# Patient Record
Sex: FEMALE | Race: WHITE | URBAN - METROPOLITAN AREA
[De-identification: names, ages, dates, MRNs, and addresses within clinical notes are randomized per-mention and may not be internally consistent; named-entity substitution may affect disease eponyms.]

---

## 2017-07-09 ENCOUNTER — TRANSFERRED RECORDS (OUTPATIENT)
Dept: HEALTH INFORMATION MANAGEMENT | Facility: CLINIC | Age: 77
End: 2017-07-09

## 2017-07-10 ENCOUNTER — TRANSFERRED RECORDS (OUTPATIENT)
Dept: HEALTH INFORMATION MANAGEMENT | Facility: CLINIC | Age: 77
End: 2017-07-10

## 2017-07-11 ENCOUNTER — TRANSFERRED RECORDS (OUTPATIENT)
Dept: HEALTH INFORMATION MANAGEMENT | Facility: CLINIC | Age: 77
End: 2017-07-11

## 2017-07-12 ENCOUNTER — TRANSFERRED RECORDS (OUTPATIENT)
Dept: HEALTH INFORMATION MANAGEMENT | Facility: CLINIC | Age: 77
End: 2017-07-12

## 2017-07-14 ENCOUNTER — TELEPHONE (OUTPATIENT)
Dept: FAMILY MEDICINE | Facility: CLINIC | Age: 77
End: 2017-07-14

## 2017-07-14 ENCOUNTER — TRANSFERRED RECORDS (OUTPATIENT)
Dept: HEALTH INFORMATION MANAGEMENT | Facility: CLINIC | Age: 77
End: 2017-07-14

## 2017-07-14 NOTE — TELEPHONE ENCOUNTER
Left message on pt family vm requesting what prescription they are referring to.  Requested they call me back with this information and spell it if it is hard to spell.  Alison Lund RN

## 2017-07-14 NOTE — TELEPHONE ENCOUNTER
Reason for Call:  Other prescription    Detailed comments: daughter is calling to inform pcp that patient will be going on hospice but doesn't need this right now but wants to make sure pcp will order this when time comes please call daughter to discuss and let her know this has been ordered    Phone Number Patient can be reached at: Home number on file [phone] (home)    Best Time:     Can we leave a detailed message on this number? YES    Call taken on 7/14/2017 at 11:33 AM by Allie Toledo

## 2017-07-14 NOTE — TELEPHONE ENCOUNTER
Pt daughter states pt is terminal now with pancreatic cancer.  They are not quite ready for hospice right now because pt is doing well.  When she starts declining they will be requesting hospice orders.  Right now pt is seeing Dr. Oro at [facility] and has discussed hospice with him.  Her orders will be reverting to Dr. Cope when she is done with Dr. Oro.  Family would like to confirm Dr. Cope will do hospice orders when they are needed.    To provider to advise.  Alison Lund RN

## 2017-10-02 ENCOUNTER — OFFICE VISIT (OUTPATIENT)
Dept: FAMILY MEDICINE | Facility: CLINIC | Age: 77
End: 2017-10-02
Payer: COMMERCIAL

## 2017-10-02 VITALS
DIASTOLIC BLOOD PRESSURE: 71 MMHG | HEART RATE: 89 BPM | OXYGEN SATURATION: 96 % | WEIGHT: 134.6 LBS | SYSTOLIC BLOOD PRESSURE: 142 MMHG | BODY MASS INDEX: 26.73 KG/M2

## 2017-10-02 DIAGNOSIS — C25.9 PANCREATIC ADENOCARCINOMA (H): Primary | ICD-10-CM

## 2017-10-02 PROCEDURE — 99213 OFFICE O/P EST LOW 20 MIN: CPT | Performed by: FAMILY MEDICINE

## 2017-10-02 NOTE — PROGRESS NOTES
SUBJECTIVE:   Talya Gatica is a 77 year old female who presents to clinic today for the following health issues:          Hospital Follow-up Visit:    Hospital/Nursing Home/IP Rehab Facility: [facility]  Date of Admission: 7/9/17  Date of Discharge: 7/14/17  Reason(s) for Admission: Pancreatic mass, liver mass.              Problems taking medications regularly:  None       Medication changes since discharge: None       Problems adhering to non-medication therapy:  Questions about prognosis and plans     Summary of hospitalization:  See outside records, reviewed and scanned  Diagnostic Tests/Treatments reviewed.  Follow up needed: none  Other Healthcare Providers Involved in Patient s Care:         Care Coordination  Update since discharge: improved.      Post Discharge Medication Reconciliation: discharge medications reconciled and changed, per note/orders (see AVS).  Plan of care communicated with patient     Coding guidelines for this visit:  Type of Medical   Decision Making Face-to-Face Visit       within 7 Days of discharge Face-to-Face Visit        within 14 days of discharge   Moderate Complexity 47353 [id number]   High Complexity 04706 [id number]            SUBJECTIVE:  77 year old.The patient has a history of pancreatic ca.  This started 3 months ago.   Associated symptoms are appetite is decreased. ROS she has loss weight.       Reviewed health maintenance  Patient Active Problem List   Diagnosis     CARDIOVASCULAR SCREENING; LDL GOAL LESS THAN 130     Hyperlipidemia LDL goal <160     Pseudophakia, ou     Benign neoplasm of eyelid     Advanced directives, counseling/discussion     Dermatochalasis of both upper eyelids     Glaucoma suspect, bilateral     Pancreatic adenocarcinoma (H)     Past Medical History:   Diagnosis Date     AR (allergic rhinitis)      Baker's cyst      DJD (degenerative joint disease)      Elevated cholesterol      Glaucoma      Menopause      Vertigo        OBJECTIVE:  no apparent  distress  /71  Pulse 89  Wt 134 lb 9.6 oz (61.1 kg)  SpO2 96%  BMI 26.73 kg/m2    LUNGS:  CTA B/L, no wheezing or crackles.   Cardiovascular: negative, PMI normal. No lifts, heaves, or thrills. RRR. No murmurs, clicks gallops or rub   Gastrointestinal: Abdomen soft, non-tender. BS normal. No masses, organomegaly       ICD-10-CM    1. Pancreatic adenocarcinoma (H) C25.9 ONC/HEME ADULT REFERRAL    PLAN: patient understands tumor is unlikely to be treated but would like to have in second opinion.

## 2017-10-02 NOTE — NURSING NOTE
"Chief Complaint   Patient presents with     Consult       Initial /71  Pulse 89  Wt 134 lb 9.6 oz (61.1 kg)  SpO2 96%  BMI 26.73 kg/m2 Estimated body mass index is 26.73 kg/(m^2) as calculated from the following:    Height as of 10/31/13: 4' 11.5\" (1.511 m).    Weight as of this encounter: 134 lb 9.6 oz (61.1 kg).  Medication Reconciliation: complete  Elroy Mclaughlin CMA    "

## 2017-10-02 NOTE — MR AVS SNAPSHOT
After Visit Summary   10/2/2017    Talya Gatica    MRN: [id number]           Patient Information     Date Of Birth          1940        Visit Information        Provider Department      10/2/2017 4:45 PM Pancho Cope MD [facility]        Today's Diagnoses     Pancreatic adenocarcinoma (H)    -  1       Follow-ups after your visit        Additional Services     ONC/HEME ADULT REFERRAL       Your provider has referred you to: [facility]: [facility] [phone]   [email or web address]    Please be aware that coverage of these services is subject to the terms and limitations of your health insurance plan.  Call member services at your health plan with any benefit or coverage questions.      Please bring the following with you to your appointment:    (1) Any X-Rays, CTs or MRIs which have been performed.  Contact the facility where they were done to arrange for  prior to your scheduled appointment.   (2) List of current medications  (3) This referral request   (4) Any documents/labs given to you for this referral                  Who to contact     If you have questions or need follow up information about today's clinic visit or your schedule please contact [facility] directly at [phone].  Normal or non-critical lab and imaging results will be communicated to you by MyChart, letter or phone within 4 business days after the clinic has received the results. If you do not hear from us within 7 days, please contact the clinic through MyChart or phone. If you have a critical or abnormal lab result, we will notify you by phone as soon as possible.  Submit refill requests through [employer] or call your pharmacy and they will forward the refill request to us. Please allow 3 business days for your refill to be completed.          Additional Information About Your  "Visit        Yemeksepetihart Information     In2Games lets you send messages to your doctor, view your test results, renew your prescriptions, schedule appointments and more. To sign up, go to [email or web address] . Click on \"Log in\" on the left side of the screen, which will take you to the Welcome page. Then click on \"Sign up Now\" on the right side of the page.     You will be asked to enter the access code listed below, as well as some personal information. Please follow the directions to create your username and password.     Your access code is: [email or web address]  Expires: 2017  5:34 PM     Your access code will  in 90 days. If you need help or a new code, please call your [place] clinic or [phone].        Care EveryWhere ID     This is your Care EveryWhere ID. This could be used by other organizations to access your [place] medical records  [id number]        Your Vitals Were     Pulse Pulse Oximetry BMI (Body Mass Index)             89 96% 26.73 kg/m2          Blood Pressure from Last 3 Encounters:   10/02/17 142/71   10/31/13 125/70   09/10/12 152/88    Weight from Last 3 Encounters:   10/02/17 134 lb 9.6 oz (61.1 kg)   10/31/13 158 lb (71.7 kg)   11 158 lb (71.7 kg)              We Performed the Following     ONC/HEME ADULT REFERRAL        Primary Care Provider Office Phone # Fax #    Pancho Cope [phone] [address] [facility] [address]        Equal Access to Services     [facility]: Hadii aad ku hadasho Soomaali, waaxda luqadaha, qaybta kaalmada patricio, carrie wheatley. So [facility] [phone].    ATENCIÓN: Si habla español, tiene a ornelas disposición servicios gratuitos de asistencia lingüística. Llame al [phone].    We comply with applicable federal civil rights laws and [place] laws. We do not discriminate on the basis of race, color, national origin, age, disability, sex, sexual orientation, or gender " identity.            Thank you!     Thank you for choosing [facility]  [facility]. Our goal is always to provide you with excellent care. Hearing back from our patients is one way we can continue to improve our services. Please take a few minutes to complete the written survey that you may receive in the mail after your visit with us. Thank you!             Your Updated Medication List - Protect others around you: Learn how to safely use, store and throw away your medicines at www.disposemymeds.org.          This list is accurate as of: 10/2/17  5:34 PM.  Always use your most recent med list.                   Brand Name Dispense Instructions for use Diagnosis    aspirin 325 MG EC tablet     100 tablet    Take 1 tablet by mouth daily.    High cholesterol       BIOTIN PO           latanoprost 0.005 % ophthalmic solution    XALATAN    3 Bottle    Place 1 drop into both eyes At Bedtime    Glaucoma suspect, bilateral       WOMENS 50+ MULTI VITAMIN/MIN PO

## 2017-10-03 ENCOUNTER — TELEPHONE (OUTPATIENT)
Dept: FAMILY MEDICINE | Facility: CLINIC | Age: 77
End: 2017-10-03

## 2017-10-03 NOTE — TELEPHONE ENCOUNTER
Left message on answering machine for patient to call back. Nora SANTANA, [phone]     Pt presents with fevers and cough with green nasal drainage x 3 days.

## 2017-10-03 NOTE — TELEPHONE ENCOUNTER
Patient is calling in regards to whether or not she should get a flu shot. Please call to discuss. Thank you.

## 2017-10-03 NOTE — TELEPHONE ENCOUNTER
Patient has been diagnosed terminal pancreatic cancer. Patient has not seen an oncologist. Patient is currently not doing chemo, etc. Patient wondering if Dr. Cope advises she gets her flu shot.   .Kaya REYNOSON, RN, CPN

## 2017-10-06 PROCEDURE — 00000346 ZZHCL STATISTIC REVIEW OUTSIDE SLIDES TC 88321: Performed by: INTERNAL MEDICINE

## 2017-10-11 LAB — COPATH REPORT: NORMAL

## 2017-10-16 ENCOUNTER — ONCOLOGY VISIT (OUTPATIENT)
Dept: ONCOLOGY | Facility: CLINIC | Age: 77
End: 2017-10-16
Payer: COMMERCIAL

## 2017-10-16 VITALS
TEMPERATURE: 97.2 F | RESPIRATION RATE: 15 BRPM | DIASTOLIC BLOOD PRESSURE: 102 MMHG | HEIGHT: 59 IN | WEIGHT: 132 LBS | HEART RATE: 88 BPM | SYSTOLIC BLOOD PRESSURE: 163 MMHG | BODY MASS INDEX: 26.61 KG/M2 | OXYGEN SATURATION: 97 %

## 2017-10-16 DIAGNOSIS — Z23 ENCOUNTER FOR IMMUNIZATION: ICD-10-CM

## 2017-10-16 DIAGNOSIS — C25.9 PANCREATIC ADENOCARCINOMA (H): Primary | ICD-10-CM

## 2017-10-16 PROCEDURE — G0008 ADMIN INFLUENZA VIRUS VAC: HCPCS | Performed by: INTERNAL MEDICINE

## 2017-10-16 PROCEDURE — 99205 OFFICE O/P NEW HI 60 MIN: CPT | Mod: 25 | Performed by: INTERNAL MEDICINE

## 2017-10-16 PROCEDURE — 90662 IIV NO PRSV INCREASED AG IM: CPT | Performed by: INTERNAL MEDICINE

## 2017-10-16 ASSESSMENT — PAIN SCALES - GENERAL: PAINLEVEL: MILD PAIN (2)

## 2017-10-16 NOTE — MR AVS SNAPSHOT
After Visit Summary   10/16/2017    Talya Gatica    MRN: [id number]           Patient Information     Date Of Birth          1940        Visit Information        Provider Department      10/16/2017 2:00 PM Jim Soares MD [facility]        Today's Diagnoses     Pancreatic adenocarcinoma (H)    -  1    Encounter for immunization           Follow-ups after your visit        Additional Services     NUTRITION REFERRAL       Your provider has referred you to: [facility]: [facility] [phone]   [email or web address]    Please be aware that coverage of these services is subject to the terms and limitations of your health insurance plan.  Call member services at your health plan with any benefit or coverage questions.      Please bring the following with you to your appointment:    (1) This referral request  (2) Any documents given to you regarding this referral  (3) Any specific questions you have about diet and/or food choices                  Your next 10 appointments already scheduled     Oct 19, 2017  3:30 PM CDT   CT CHEST/ABDOMEN/PELVIS W CONTRAST with MGCT1   [facility]    [address] [place] [id number] [phone]           Please bring any scans or X-rays taken at other hospitals, if similar tests were done. Also bring a list of your medicines, including vitamins, minerals and over-the-counter drugs. It is safest to leave personal items at home.  Be sure to tell your doctor:   If you have any allergies.   If there s any chance you are pregnant.   If you are breastfeeding.   If you have any special needs.  You may have contrast for this exam. To prepare:   Do not eat or drink for 2 hours before your exam. If you need to take medicine, you may take it with small sips of water. (We may ask you to take liquid medicine as well.)   The day before  your exam, drink extra fluids at least six 8-ounce glasses (unless your doctor tells you to restrict your fluids).  Patients over 70 or patients with diabetes or kidney problems:   If you haven t had a blood test (creatinine test) within the last 30 days, go to your clinic or Diagnostic Imaging Department for this test.  If you have diabetes:   If your kidney function is normal, continue taking your metformin (Avandamet, Glucophage, Glucovance, Metaglip) on the day of your exam.   If your kidney function is abnormal, wait 48 hours before restarting this medicine.  You will have oral contrast for this exam:   You will drink the contrast at home. Get this from your clinic or Diagnostic Imaging Department. Please follow the directions given.  Please wear loose clothing, such as a sweat suit or jogging clothes. Avoid snaps, zippers and other metal. We may ask you to undress and put on a hospital gown.  If you have any questions, please call the Imaging Department where you will have your exam.            Oct 30, 2017  2:30 PM CDT   Return Visit with Jim Soares MD   [facility]    [address] [place] [address] [phone]              Future tests that were ordered for you today     Open Standing Orders        Priority Remaining Interval Expires Ordered    Cancer antigen 19-9 Routine 25/25  10/16/2018 10/16/2017    CBC with platelets differential Routine 25/25  10/16/2018 10/16/2017    CEA Routine 25/25  10/16/2018 10/16/2017    Comprehensive metabolic panel Routine 25/25  10/16/2018 10/16/2017          Open Future Orders        Priority Expected Expires Ordered    CT Chest/Abdomen/Pelvis w Contrast Routine  10/16/2018 10/16/2017            Who to contact     If you have questions or need follow up information about today's clinic visit or your schedule please contact [facility] directly at [phone].  Normal or non-critical  "lab and imaging results will be communicated to you by MyChart, letter or phone within 4 business days after the clinic has received the results. If you do not hear from us within 7 days, please contact the clinic through [employer] or phone. If you have a critical or abnormal lab result, we will notify you by phone as soon as possible.  Submit refill requests through Astrostar or call your pharmacy and they will forward the refill request to us. Please allow 3 business days for your refill to be completed.          Additional Information About Your Visit        Astrostar Information     Astrostar is an electronic gateway that provides easy, online access to your medical records. With Astrostar, you can request a clinic appointment, read your test results, renew a prescription or communicate with your care team.     To sign up for Astrostar visit the website at [email or web address]   You will be asked to enter the access code listed below, as well as some personal information. Please follow the directions to create your username and password.     Your access code is: [email or web address]  Expires: 2017  5:34 PM     Your access code will  in 90 days. If you need help or a new code, please contact your [facility] or call [phone] for assistance.        Care EveryWhere ID     This is your Care EveryWhere ID. This could be used by other organizations to access your [place] medical records  [id number]        Your Vitals Were     Pulse Temperature Respirations Height Pulse Oximetry BMI (Body Mass Index)    88 97.2  F (36.2  C) 15 1.511 m (4' 11.49\") 97% 26.23 kg/m2       Blood Pressure from Last 3 Encounters:   10/16/17 (!) 163/102   10/02/17 142/71   10/31/13 125/70    Weight from Last 3 Encounters:   10/16/17 59.9 kg (132 lb)   10/02/17 61.1 kg (134 lb 9.6 oz)   10/31/13 71.7 kg (158 lb)              We Performed the Following     NUTRITION REFERRAL        Primary Care Provider Office " Phone # Fax #    Pancho Cope [phone] [address] [facility] [address]        Equal Access to Services     JOCELINE JORDAN : Joleen suárez jaxo Solenoraali, waaxda luqadaha, qaybta kaalmada patricio, carrie wilson patriciaterence pickens laPennyanu wheatley. So [facility] [phone].    ATENCIÓN: Si habla español, tiene a ornelas disposición servicios gratuitos de asistencia lingüística. Llame al [phone].    We comply with applicable federal civil rights laws and [place] laws. We do not discriminate on the basis of race, color, national origin, age, disability, sex, sexual orientation, or gender identity.            Thank you!     Thank you for choosing [facility]  for your care. Our goal is always to provide you with excellent care. Hearing back from our patients is one way we can continue to improve our services. Please take a few minutes to complete the written survey that you may receive in the mail after your visit with us. Thank you!             Your Updated Medication List - Protect others around you: Learn how to safely use, store and throw away your medicines at www.disposemymeds.org.          This list is accurate as of: 10/16/17  3:27 PM.  Always use your most recent med list.                   Brand Name Dispense Instructions for use Diagnosis    aspirin 325 MG EC tablet     100 tablet    Take 1 tablet by mouth daily.    High cholesterol       BIOTIN PO           latanoprost 0.005 % ophthalmic solution    XALATAN    3 Bottle    Place 1 drop into both eyes At Bedtime    Glaucoma suspect, bilateral       WOMENS 50+ MULTI VITAMIN/MIN PO

## 2017-10-16 NOTE — NURSING NOTE
"Oncology Rooming Note    October 16, 2017 2:03 PM   Talya Gatica is a 77 year old female who presents for:    Chief Complaint   Patient presents with     Oncology Clinic Visit     New pancreatic cancer     Initial Vitals: BP (!) 163/102  Pulse 88  Temp 97.2  F (36.2  C)  Resp 15  Ht 1.511 m (4' 11.49\")  Wt 59.9 kg (132 lb)  SpO2 97%  BMI 26.23 kg/m2 Estimated body mass index is 26.23 kg/(m^2) as calculated from the following:    Height as of this encounter: 1.511 m (4' 11.49\").    Weight as of this encounter: 59.9 kg (132 lb). Body surface area is 1.59 meters squared.  Mild Pain (2) Comment: Cramping in stomach. Nothing taken per patient.    No LMP recorded. Patient is postmenopausal.  Allergies reviewed: Yes  Medications reviewed: Yes    Medications: Medication refills not needed today.  Pharmacy name entered into [facility]: [id number] - [place], [place] - [address] AT [facility] [address]        5 minutes for nursing intake (face to face time)     Lea Cool LPN              "

## 2017-10-16 NOTE — PROGRESS NOTES
[facility]    NEW PATIENT VISIT NOTE    PATIENT NAME: Talya Gatica MRN # [id number]  DATE OF VISIT: October 16, 2017 YOB: 1940    REFERRING PROVIDER: Pancho Cope MD  [id number] [place] [address]    CANCER TYPE: Pancreatic cancer   STAGE: IV     TREATMENT SUMMARY:    CURRENT INTERVENTIONS:     HISTORY OF PRESENT ILLNESS   Talya Gatica is 77 year old female with no significant PMH who has been diagnosed with pancreatic cancer for which she seeks to establish care.     She presented with jaundice on July 7th. She was admitted to the hospital. She had a CT scan which suggested adenocarcinoma of the head of pancreas causing biliary obstruction and several pulmonary nodules consistent with metastasis. She had EUS/ERCP on 7/12/17 which showed pancreatic mass (s/p FNA) with biliary stricture s/p ERCP with metal biliary stent and PD stent placement. Pathology from her FNA revealed pancreatic cancer. She was told that she would have survival of few days and possibly to a maximum of 3 months. She is doing well 3 months out and she wants to know what she can eat and what exercise she can do at this time.     She has been taking ensure as she had been losing weight. She lost about 26 lbs in last 3 months. She is not sure what she can eat to stem this weight loss. She has fair appetite. She had plum, tuna fish and enjoyed it. She cannot eat as much as she used to. She does get hungry every so often. Some foods give her abdominal pain. She is trying to take several small meals a day. She has difficult time with raw broccoli. She is uncomfortable for an hr after her ensure.     She was discharged with pain and nausea medications from hospital and was told to return with fevers or melia stools. She never had either and so she did not know where to go. Her friend suggested that she follow up with PCP and she has been referred to Medical Oncology.      She has not noticed any more jaundice.     Wt Readings from Last 10 Encounters:   10/16/17 59.9 kg (132 lb)   10/02/17 61.1 kg (134 lb 9.6 oz)   10/31/13 71.7 kg (158 lb)   02/28/11 71.7 kg (158 lb)   01/12/11 71.7 kg (158 lb)     Her energy is not the very best. She has not been very active as she was told that she has barely few days left. She denies any CP/SOB. She denies any anxiety or depression. She has her alcides and she prays in morning and evening and it keeps her going. She is very pleased with her daughter who is a teacher - she has degree from [employer] and [email or web address].     She denies any complains at this time.     She has spinal stenosis.      PAST MEDICAL HISTORY   - Pancreatic cancer as above  - No other chronic illness      CURRENT OUTPATIENT MEDICATIONS     Current Outpatient Prescriptions   Medication Sig     BIOTIN PO      latanoprost (XALATAN) 0.005 % ophthalmic solution Place 1 drop into both eyes At Bedtime     Multiple Vitamins-Minerals (WOMENS 50+ MULTI VITAMIN/MIN PO)      aspirin 325 MG EC tablet Take 1 tablet by mouth daily.     No current facility-administered medications for this visit.         ALLERGIES      Allergies   Allergen Reactions     Codeine Camsylate         SOCIAL HISTORY   She lives with her daughter. She has been single and her daughter is single too. She is moving to a new apartment place and is excited about it. She is active and manages all her things. She does not need assistance with anything.     She worked for [employer] for 17 years - [employer] program. She was given a party at her group home and given 1000$. She retired in 1972. They called her back soon and she worked for another.  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     She smoked till age of 26. She never inhaled. She never smoked on a regular basis. She does not like alcohol. She does not  remember if she had any drink this year. She denies recreational drug use.      FAMILY HISTORY   Her mother  at age of 84. She had HTN, had a CVA at 83 and  a year later.   Her father was from [facility]. He owned a school supply store in [place] and lived in the shop. He was much older than his mother. He just  one day.      REVIEW OF SYSTEMS   As above in the HPI, o/w complete 12-point ROS was negative.     PHYSICAL EXAM   B/P: 163/102, T: 97.2, P: 88, R: 15  SpO2 Readings from Last 4 Encounters:   10/16/17 97%   10/02/17 96%   10/31/13 96%     Wt Readings from Last 3 Encounters:   10/16/17 59.9 kg (132 lb)   10/02/17 61.1 kg (134 lb 9.6 oz)   10/31/13 71.7 kg (158 lb)     GEN: NAD  HEENT: PERRL, EOMI, no icterus, injection or pallor. Oropharynx is clear.  NECK: no cervical or supraclavicular lymphadenopathy  LUNGS: clear bilaterally  CV: regular, no murmurs, rubs, or gallops  ABDOMEN: soft, non-tender, non-distended, normal bowel sounds, no hepatosplenomegaly by percussion or palpation  EXT: warm, well perfused, no edema  NEURO: alert  SKIN: no rashes     LABORATORY AND IMAGING STUDIES     Recent Labs   Lab Test  12/14/10   1034   NA  139   POTASSIUM  3.7   CHLORIDE  107   CO2  25   ANIONGAP  8   BUN  11   CR  0.56   GLC  103*   BERNARDO  9.1     No results for input(s): MAG, PHOS in the last 62598 hours.  Recent Labs   Lab Test  12/14/10   1034   WBC  5.2   HGB  13.9   PLT  273   MCV  91     No results for input(s): BILITOTAL, ALKPHOS, ALT, AST, ALBUMIN, LDH in the last 70585 hours.  No results found for: TSH  No results for input(s): CEA in the last 05179 hours.  Results for orders placed or performed in visit on 11   Mammo Screening digital (bilat)    Impression       SCREENING MAMMOGRAM, BILATERAL, DIGITAL w/ CAD  -  2011      BREAST SYMPTOMS: None reported.     COMPARISON: 2005.     PARENCHYMAL PATTERN: Scattered fibroglandular densities.     COMMENTS: Stable area of asymmetry  superior left breast when compared  to 2005 examination appearing representative of focus of summation  density. Coarse ductal calcifications in retroareolar region on the  right minimally changed from 2005 examination is well. It would be  important for the patient to have yearly mammography to assure  continuing stability of these microcalcifications.     IMPRESSION: BI-RADS 2, BENIGN.          Lab Results   Component Value Date    PATH  10/06/2017     Patient Name: EUGENE CHUN  MR#: [id number]  Specimen #: [id number]  Collected: 10/6/2017  Received: 10/6/2017  Reported: 10/11/2017 16:13  Ordering Phy(s): HECTOR MONTANO  Additional Phy(s): [facility], Dept. of Pathology    For improved result formatting, select 'View Enhanced Report Format'  under Linked Documents section.    TEST(S):  7 Slides, case #[id number]    FINAL DIAGNOSIS:  CASE FROM [employer], [place], [place] ([id number],  OBTAINED 07/12/2017):  Pancreas, mass, endoscopic ultrasound guided fine needle aspiration:  - Positive for malignancy  - Adenocarcinoma    COMMENT:  We agree with the outside pathologist interpretation and appreciate the  opportunity to review this case.    I have personally reviewed all specimens and/or slides, including the  listed special stains, and used them with my medical judgement to  determine or confirm the final diagnosis.    Electronically signed out by:  Alberto Gordillo M.D., [facility]    CLINICAL HISTORY:  The patient is a 77-year-old female.    GROSS:  Received from [facility] in [place], [place] are 7 stained  slides labeled [id number] (obtained 07/12/2017) and a copy of the  referring pathologist's report with patient identifying information.  All slides are returned.    MICROSCOPIC:  Microscopic examination is performed.    Joyce Ball MD, Cytopathology Fellow; Alberto Gordillo MD.    CPT Codes:  A: 32406-BNX9    TESTING LAB LOCATION:  [place]  [facility]  [address]  [place]   [id number] [phone]    COLLECTION SITE:  Client:  [facility], [place]  Location:  [facility] ([facility])    Resident  [id number]         Results for orders placed or performed in visit on 01/31/11   Mammo Screening digital (bilat)    Impression       SCREENING MAMMOGRAM, BILATERAL, DIGITAL w/ CAD  -  January 31, 2011      BREAST SYMPTOMS: None reported.     COMPARISON: 06/20/2005.     PARENCHYMAL PATTERN: Scattered fibroglandular densities.     COMMENTS: Stable area of asymmetry superior left breast when compared  to 2005 examination appearing representative of focus of summation  density. Coarse ductal calcifications in retroareolar region on the  right minimally changed from 2005 examination is well. It would be  important for the patient to have yearly mammography to assure  continuing stability of these microcalcifications.     IMPRESSION: BI-RADS 2, BENIGN.            ASSESSMENT    1. Pancreatic cancer - likely metastatic to lung  2. Weight loss  3. ECOG PS 1  4. No medical comorbidity    DISCUSSION     I had a lengthy discussion with Talya who comes alone at this clinic visit.  It had been explained to her that she has a survival of a few days and a maximum of 3 months.  She is confused about what she should eat and what she can do physically.  It is quite likely that she indeed has metastatic disease and that her pancreatic carcinoma is invariably incurable.  However, I would not recommend that she live from day to day with a fear of dying.  I encouraged her to be as active as feasible.  I would work with a nutritionist find a diet that is best tolerated for her and try to understand what food types are a problem for her.  While she does note that a lot of things do not suit her well, she does not have a definitive list of things that give her discomfort.  She did note that broccoli was harder  to digest for her.  It is quite possible that high-protein foods cause her discomfort with her bile duct obstruction or she is having difficulty digesting a high-protein diet with the pancreatic cancer and possible ductal obstruction there.  She seems to have a good appetite, as she notes that she gets hungry.  This is overall encouraging, as she continues to have a fair appetite and energy.  I would not restrict her physically and have encouraged her to be as active as she can.       I would recommend that we get a restaging CT scan of the chest, abdomen and pelvis.  I would like to see her once the scans are available.  We would consider treatment depending on her disease progression.  She has been diagnosed over 3 months ago and has not had any treatment.  If she has relatively stable disease with definitive evidence of metastasis, then we could in fact defer treatment initiation.  I am not sure if there is a huge advantage for early initiation of therapy.  However, single-agent gemcitabine is very well tolerated and has fair efficacy.  This is administered intravenously as an outpatient weekly for 2 weeks with the third week off in an every 3-week cycle.  The most common side effect is thrombocytopenia.  This can also cause some minimal fatigue, but this could be not apparent as she could in fact feel better because of disease control due to the chemotherapy.       She had a metal stent placed in the bile duct and another pancreatic duct stent on 07/12.  I will check with our Gastroenterology team to see if this will have to be replaced.  She is over 3 months out from this stent placement.  Luckily, she has not struggled with any infections or any stent occlusion as yet.      She had questions on immunization.  I do not see any restrictions on continuing immunizations.  I will have her receive the influenza vaccine today.  I would advise against screening procedures like colonoscopy or even mammogram and those  that are intended for long-term health improvement.  I would suggest that she plan for the next several months and if there is a change in her clinical status,  we could always adjust accordingly.       1.  She would receive high-dose influenza vaccine after this clinic visit.    2.  I would get a restaging CT scan of the chest, abdomen and pelvis with pancreatic protocol to assess disease progression.     3.  I would refer her to a nutritionist for consultation.  She has lost about 26 pounds over the last 3 months or so.  She has a fair appetite.    4.  I would review her case with GI to assess if she needs a stent replacement in her bile duct and the pancreatic ducts.    5.  I have extensively reviewed chemotherapy with gemcitabine with her.  This could be an option if she has progressive disease.           PLAN       Over 60 min of direct face to face time spent with patient with more than 50% time spent in counseling and coordinating care.      Jim Gerardo ,  Division of Hematology, Oncology & Transplantation  [facility].

## 2017-10-17 ENCOUNTER — CARE COORDINATION (OUTPATIENT)
Dept: ONCOLOGY | Facility: CLINIC | Age: 77
End: 2017-10-17

## 2017-10-17 ENCOUNTER — TELEPHONE (OUTPATIENT)
Dept: ONCOLOGY | Facility: CLINIC | Age: 77
End: 2017-10-17

## 2017-10-17 NOTE — PROGRESS NOTES
Met patient yesterday (10/16) after her consultation with Dr. Soares regarding her metastatic  pancreatic cancer.  Patient diagnosed last summer but she never proceeded with any chemotherapy as she was given a short time to live- she saw a medical oncologist (Dr. Oro from [facility]) while she was an inpatient.  She has now come here for another opinion; Dr. Soares would like to repeat her CT scan and then have her come back for review and possibly offer her Gemcitabine.  She had a metal stent placed in the bile duct and another pancreatic duct stent on 07/12.  I will check with our Gastroenterology team to see if this will have to be replaced.  She is over 3 months out from this stent placement.  Luckily, she has not struggled with any infections or any stent occlusion as yet.   I provided patient with saul and Dr. Soares's contact information with phone numbers, including scheduling line and 24-hour number.

## 2017-10-17 NOTE — TELEPHONE ENCOUNTER
Oncology Distress Screening   Clinical Nutrition  [facility]     Identified Concern and Score From Distress Screening: Request to speak with a dietitian     Date of Distress Screening: 10/16/17     Data: Per MD - Talya KD Gatica is 77 year old female with no significant PMH who has been diagnosed with pancreatic cancer for which she seeks to establish care. She has been taking ensure as she had been losing weight. She lost about 26 lbs in last 3 months. She is not sure what she can eat to stem this weight loss. She has fair appetite. She had plum, tuna fish and enjoyed it. She cannot eat as much as she used to. She does get hungry every so often. Some foods give her abdominal pain. She is trying to take several small meals a day. She has difficult time with raw broccoli. She is uncomfortable for an hr after her ensure.      Intervention: Called Talya today.  Left  advising her to call RD at her convenience.  Offered to speak with patient face-to-face or phone consult to accommodate her schedule.      Education Provided: NA     Follow-up Required: Will await return phone call. Will call back in 1 week if no contact made by patient.         Karolina Ulrich RD, LD  [facility]

## 2017-10-18 ENCOUNTER — TELEPHONE (OUTPATIENT)
Dept: ONCOLOGY | Facility: CLINIC | Age: 77
End: 2017-10-18

## 2017-10-18 NOTE — TELEPHONE ENCOUNTER
Oncology Distress Screening   Clinical Nutrition  [facility]     Identified Concern and Score From Distress Screening: Request to speak with a dietitian - received call back from patient.       Date of Distress Screening: 10/16/17      Data: Per MD - Talya Gatica is 77 year old female with no significant PMH who has been diagnosed with pancreatic cancer for which she seeks to establish care.    Talya reports that she has now lost ~35 lbs in the past 4 months.  She expresses that she does not know what to eat. She has been drinking Ensure Original but it has been upsetting her stomach.    She questions what foods she should be avoiding and consuming.  She has been avoiding sugary foods and soda.  She has been avoiding raw vegetables as they upset her stomach as well.      Intervention:  Reviewed ways to maximize calories and protein via small frequent meals.  Suggested pt aim for at least 1500kcal and 60g protein/day.  Suggested she avoid fried foods, choose more cooked vegetables over raw and choose more fruits w/o skins (bananas, peeled apples, canned fruit, melon et).  Suggested she chew her foods to pureed consistency to ease digestion.     Suggested pt try ProNourish ONS as alternative to Ensure.  ProNourish is FODMAP friendly and produces less gas/bloating in patients.      RD will have samples and coupons in clinic for patient to try.        Education Provided: See above      Follow-up Required: NA - provided pt with RD contact information for future nutrition questions/concerns.           Karolina Ulrich RD, LD  [facility]

## 2017-10-19 ENCOUNTER — RADIANT APPOINTMENT (OUTPATIENT)
Dept: CT IMAGING | Facility: CLINIC | Age: 77
End: 2017-10-19
Attending: INTERNAL MEDICINE
Payer: COMMERCIAL

## 2017-10-19 DIAGNOSIS — C25.9 PANCREATIC ADENOCARCINOMA (H): ICD-10-CM

## 2017-10-19 DIAGNOSIS — Z23 ENCOUNTER FOR IMMUNIZATION: ICD-10-CM

## 2017-10-19 LAB
CREAT BLD-MCNC: 0.3 MG/DL (ref 0.52–1.04)
CREAT BLD-MCNC: 0.3 MG/DL (ref 0.52–1.04)
GFR SERPL CREATININE-BSD FRML MDRD: >90 ML/MIN/1.7M2
GFR SERPL CREATININE-BSD FRML MDRD: >90 ML/MIN/1.7M2

## 2017-10-19 PROCEDURE — 71260 CT THORAX DX C+: CPT | Performed by: RADIOLOGY

## 2017-10-19 PROCEDURE — 82565 ASSAY OF CREATININE: CPT | Performed by: INTERNAL MEDICINE

## 2017-10-19 PROCEDURE — 74177 CT ABD & PELVIS W/CONTRAST: CPT | Performed by: RADIOLOGY

## 2017-10-19 PROCEDURE — 36415 COLL VENOUS BLD VENIPUNCTURE: CPT | Performed by: INTERNAL MEDICINE

## 2017-10-19 RX ORDER — IOPAMIDOL 755 MG/ML
81 INJECTION, SOLUTION INTRAVASCULAR ONCE
Status: COMPLETED | OUTPATIENT
Start: 2017-10-19 | End: 2017-10-19

## 2017-10-19 RX ADMIN — IOPAMIDOL 81 ML: 755 INJECTION, SOLUTION INTRAVASCULAR at 16:29

## 2017-10-23 ENCOUNTER — TELEPHONE (OUTPATIENT)
Dept: ONCOLOGY | Facility: CLINIC | Age: 77
End: 2017-10-23

## 2017-10-30 ENCOUNTER — ONCOLOGY VISIT (OUTPATIENT)
Dept: ONCOLOGY | Facility: CLINIC | Age: 77
End: 2017-10-30
Payer: COMMERCIAL

## 2017-10-30 VITALS
SYSTOLIC BLOOD PRESSURE: 149 MMHG | WEIGHT: 125.8 LBS | TEMPERATURE: 98.8 F | BODY MASS INDEX: 24.99 KG/M2 | HEART RATE: 94 BPM | DIASTOLIC BLOOD PRESSURE: 77 MMHG | OXYGEN SATURATION: 98 % | RESPIRATION RATE: 16 BRPM

## 2017-10-30 DIAGNOSIS — C25.9 PANCREATIC ADENOCARCINOMA (H): Primary | ICD-10-CM

## 2017-10-30 DIAGNOSIS — Z23 ENCOUNTER FOR IMMUNIZATION: ICD-10-CM

## 2017-10-30 DIAGNOSIS — C25.9 PANCREATIC ADENOCARCINOMA (H): ICD-10-CM

## 2017-10-30 LAB
ALBUMIN SERPL-MCNC: 3.7 G/DL (ref 3.4–5)
ALP SERPL-CCNC: 137 U/L (ref 40–150)
ALT SERPL W P-5'-P-CCNC: 38 U/L (ref 0–50)
ANION GAP SERPL CALCULATED.3IONS-SCNC: 10 MMOL/L (ref 3–14)
AST SERPL W P-5'-P-CCNC: 29 U/L (ref 0–45)
BASOPHILS # BLD AUTO: 0 10E9/L (ref 0–0.2)
BASOPHILS NFR BLD AUTO: 0.4 %
BILIRUB SERPL-MCNC: 0.6 MG/DL (ref 0.2–1.3)
BUN SERPL-MCNC: 10 MG/DL (ref 7–30)
CALCIUM SERPL-MCNC: 9.1 MG/DL (ref 8.5–10.1)
CEA SERPL-MCNC: 4.8 UG/L (ref 0–2.5)
CHLORIDE SERPL-SCNC: 111 MMOL/L (ref 94–109)
CO2 SERPL-SCNC: 21 MMOL/L (ref 20–32)
CREAT SERPL-MCNC: 0.4 MG/DL (ref 0.52–1.04)
DIFFERENTIAL METHOD BLD: ABNORMAL
EOSINOPHIL # BLD AUTO: 0.1 10E9/L (ref 0–0.7)
EOSINOPHIL NFR BLD AUTO: 0.6 %
ERYTHROCYTE [DISTWIDTH] IN BLOOD BY AUTOMATED COUNT: 16.8 % (ref 10–15)
GFR SERPL CREATININE-BSD FRML MDRD: >90 ML/MIN/1.7M2
GLUCOSE SERPL-MCNC: 112 MG/DL (ref 70–99)
HCT VFR BLD AUTO: 40.5 % (ref 35–47)
HGB BLD-MCNC: 14.2 G/DL (ref 11.7–15.7)
LYMPHOCYTES # BLD AUTO: 2.1 10E9/L (ref 0.8–5.3)
LYMPHOCYTES NFR BLD AUTO: 24.6 %
MCH RBC QN AUTO: 30 PG (ref 26.5–33)
MCHC RBC AUTO-ENTMCNC: 35.1 G/DL (ref 31.5–36.5)
MCV RBC AUTO: 86 FL (ref 78–100)
MONOCYTES # BLD AUTO: 0.8 10E9/L (ref 0–1.3)
MONOCYTES NFR BLD AUTO: 9.3 %
NEUTROPHILS # BLD AUTO: 5.5 10E9/L (ref 1.6–8.3)
NEUTROPHILS NFR BLD AUTO: 65.1 %
PLATELET # BLD AUTO: 267 10E9/L (ref 150–450)
POTASSIUM SERPL-SCNC: 3.5 MMOL/L (ref 3.4–5.3)
PROT SERPL-MCNC: 7.6 G/DL (ref 6.8–8.8)
RBC # BLD AUTO: 4.73 10E12/L (ref 3.8–5.2)
SODIUM SERPL-SCNC: 142 MMOL/L (ref 133–144)
WBC # BLD AUTO: 8.5 10E9/L (ref 4–11)

## 2017-10-30 PROCEDURE — 82378 CARCINOEMBRYONIC ANTIGEN: CPT | Performed by: INTERNAL MEDICINE

## 2017-10-30 PROCEDURE — 99215 OFFICE O/P EST HI 40 MIN: CPT | Performed by: INTERNAL MEDICINE

## 2017-10-30 PROCEDURE — 80053 COMPREHEN METABOLIC PANEL: CPT | Performed by: INTERNAL MEDICINE

## 2017-10-30 PROCEDURE — 99000 SPECIMEN HANDLING OFFICE-LAB: CPT | Performed by: INTERNAL MEDICINE

## 2017-10-30 PROCEDURE — 85025 COMPLETE CBC W/AUTO DIFF WBC: CPT | Performed by: INTERNAL MEDICINE

## 2017-10-30 PROCEDURE — 86301 IMMUNOASSAY TUMOR CA 19-9: CPT | Mod: 90 | Performed by: INTERNAL MEDICINE

## 2017-10-30 PROCEDURE — 36415 COLL VENOUS BLD VENIPUNCTURE: CPT | Performed by: INTERNAL MEDICINE

## 2017-10-30 RX ORDER — LORAZEPAM 2 MG/ML
0.5 INJECTION INTRAMUSCULAR EVERY 4 HOURS PRN
Status: CANCELLED
Start: 2017-12-01

## 2017-10-30 RX ORDER — MEPERIDINE HYDROCHLORIDE 50 MG/ML
25 INJECTION INTRAMUSCULAR; INTRAVENOUS; SUBCUTANEOUS EVERY 30 MIN PRN
Status: CANCELLED | OUTPATIENT
Start: 2017-11-17

## 2017-10-30 RX ORDER — MEPERIDINE HYDROCHLORIDE 50 MG/ML
25 INJECTION INTRAMUSCULAR; INTRAVENOUS; SUBCUTANEOUS EVERY 30 MIN PRN
Status: CANCELLED | OUTPATIENT
Start: 2017-11-10

## 2017-10-30 RX ORDER — EPINEPHRINE 0.3 MG/.3ML
0.3 INJECTION SUBCUTANEOUS EVERY 5 MIN PRN
Status: CANCELLED | OUTPATIENT
Start: 2017-12-14

## 2017-10-30 RX ORDER — DIPHENHYDRAMINE HYDROCHLORIDE 50 MG/ML
50 INJECTION INTRAMUSCULAR; INTRAVENOUS
Status: CANCELLED
Start: 2017-11-17

## 2017-10-30 RX ORDER — ALBUTEROL SULFATE 90 UG/1
1-2 AEROSOL, METERED RESPIRATORY (INHALATION)
Status: CANCELLED
Start: 2017-11-10

## 2017-10-30 RX ORDER — LORAZEPAM 2 MG/ML
0.5 INJECTION INTRAMUSCULAR EVERY 4 HOURS PRN
Status: CANCELLED
Start: 2017-11-10

## 2017-10-30 RX ORDER — METHYLPREDNISOLONE SODIUM SUCCINATE 125 MG/2ML
125 INJECTION, POWDER, LYOPHILIZED, FOR SOLUTION INTRAMUSCULAR; INTRAVENOUS
Status: CANCELLED
Start: 2017-12-01

## 2017-10-30 RX ORDER — ALBUTEROL SULFATE 0.83 MG/ML
2.5 SOLUTION RESPIRATORY (INHALATION)
Status: CANCELLED | OUTPATIENT
Start: 2017-12-14

## 2017-10-30 RX ORDER — EPINEPHRINE 0.3 MG/.3ML
0.3 INJECTION SUBCUTANEOUS EVERY 5 MIN PRN
Status: CANCELLED | OUTPATIENT
Start: 2017-12-08

## 2017-10-30 RX ORDER — SODIUM CHLORIDE 9 MG/ML
1000 INJECTION, SOLUTION INTRAVENOUS CONTINUOUS PRN
Status: CANCELLED
Start: 2017-10-30

## 2017-10-30 RX ORDER — METHYLPREDNISOLONE SODIUM SUCCINATE 125 MG/2ML
125 INJECTION, POWDER, LYOPHILIZED, FOR SOLUTION INTRAMUSCULAR; INTRAVENOUS
Status: CANCELLED
Start: 2017-11-10

## 2017-10-30 RX ORDER — EPINEPHRINE 1 MG/ML
0.3 INJECTION, SOLUTION INTRAMUSCULAR; SUBCUTANEOUS EVERY 5 MIN PRN
Status: CANCELLED | OUTPATIENT
Start: 2017-11-17

## 2017-10-30 RX ORDER — EPINEPHRINE 1 MG/ML
0.3 INJECTION, SOLUTION INTRAMUSCULAR; SUBCUTANEOUS EVERY 5 MIN PRN
Status: CANCELLED | OUTPATIENT
Start: 2017-12-08

## 2017-10-30 RX ORDER — ALBUTEROL SULFATE 0.83 MG/ML
2.5 SOLUTION RESPIRATORY (INHALATION)
Status: CANCELLED | OUTPATIENT
Start: 2017-10-30

## 2017-10-30 RX ORDER — EPINEPHRINE 0.3 MG/.3ML
0.3 INJECTION SUBCUTANEOUS EVERY 5 MIN PRN
Status: CANCELLED | OUTPATIENT
Start: 2017-11-17

## 2017-10-30 RX ORDER — METHYLPREDNISOLONE SODIUM SUCCINATE 125 MG/2ML
125 INJECTION, POWDER, LYOPHILIZED, FOR SOLUTION INTRAMUSCULAR; INTRAVENOUS
Status: CANCELLED
Start: 2017-11-17

## 2017-10-30 RX ORDER — SODIUM CHLORIDE 9 MG/ML
1000 INJECTION, SOLUTION INTRAVENOUS CONTINUOUS PRN
Status: CANCELLED
Start: 2017-11-10

## 2017-10-30 RX ORDER — EPINEPHRINE 1 MG/ML
0.3 INJECTION, SOLUTION INTRAMUSCULAR; SUBCUTANEOUS EVERY 5 MIN PRN
Status: CANCELLED | OUTPATIENT
Start: 2017-11-10

## 2017-10-30 RX ORDER — MEPERIDINE HYDROCHLORIDE 50 MG/ML
25 INJECTION INTRAMUSCULAR; INTRAVENOUS; SUBCUTANEOUS EVERY 30 MIN PRN
Status: CANCELLED | OUTPATIENT
Start: 2017-10-30

## 2017-10-30 RX ORDER — LORAZEPAM 2 MG/ML
0.5 INJECTION INTRAMUSCULAR EVERY 4 HOURS PRN
Status: CANCELLED
Start: 2017-11-17

## 2017-10-30 RX ORDER — ALBUTEROL SULFATE 90 UG/1
1-2 AEROSOL, METERED RESPIRATORY (INHALATION)
Status: CANCELLED
Start: 2017-10-30

## 2017-10-30 RX ORDER — METHYLPREDNISOLONE SODIUM SUCCINATE 125 MG/2ML
125 INJECTION, POWDER, LYOPHILIZED, FOR SOLUTION INTRAMUSCULAR; INTRAVENOUS
Status: CANCELLED
Start: 2017-12-14

## 2017-10-30 RX ORDER — EPINEPHRINE 1 MG/ML
0.3 INJECTION, SOLUTION INTRAMUSCULAR; SUBCUTANEOUS EVERY 5 MIN PRN
Status: CANCELLED | OUTPATIENT
Start: 2017-10-30

## 2017-10-30 RX ORDER — ALBUTEROL SULFATE 0.83 MG/ML
2.5 SOLUTION RESPIRATORY (INHALATION)
Status: CANCELLED | OUTPATIENT
Start: 2017-12-08

## 2017-10-30 RX ORDER — EPINEPHRINE 1 MG/ML
0.3 INJECTION, SOLUTION INTRAMUSCULAR; SUBCUTANEOUS EVERY 5 MIN PRN
Status: CANCELLED | OUTPATIENT
Start: 2017-12-01

## 2017-10-30 RX ORDER — DIPHENHYDRAMINE HYDROCHLORIDE 50 MG/ML
50 INJECTION INTRAMUSCULAR; INTRAVENOUS
Status: CANCELLED
Start: 2017-12-01

## 2017-10-30 RX ORDER — METHYLPREDNISOLONE SODIUM SUCCINATE 125 MG/2ML
125 INJECTION, POWDER, LYOPHILIZED, FOR SOLUTION INTRAMUSCULAR; INTRAVENOUS
Status: CANCELLED
Start: 2017-10-30

## 2017-10-30 RX ORDER — ALBUTEROL SULFATE 0.83 MG/ML
2.5 SOLUTION RESPIRATORY (INHALATION)
Status: CANCELLED | OUTPATIENT
Start: 2017-11-17

## 2017-10-30 RX ORDER — METHYLPREDNISOLONE SODIUM SUCCINATE 125 MG/2ML
125 INJECTION, POWDER, LYOPHILIZED, FOR SOLUTION INTRAMUSCULAR; INTRAVENOUS
Status: CANCELLED
Start: 2017-12-08

## 2017-10-30 RX ORDER — LORAZEPAM 2 MG/ML
0.5 INJECTION INTRAMUSCULAR EVERY 4 HOURS PRN
Status: CANCELLED
Start: 2017-12-14

## 2017-10-30 RX ORDER — MEPERIDINE HYDROCHLORIDE 50 MG/ML
25 INJECTION INTRAMUSCULAR; INTRAVENOUS; SUBCUTANEOUS EVERY 30 MIN PRN
Status: CANCELLED | OUTPATIENT
Start: 2017-12-14

## 2017-10-30 RX ORDER — SODIUM CHLORIDE 9 MG/ML
1000 INJECTION, SOLUTION INTRAVENOUS CONTINUOUS PRN
Status: CANCELLED
Start: 2017-12-01

## 2017-10-30 RX ORDER — DIPHENHYDRAMINE HYDROCHLORIDE 50 MG/ML
50 INJECTION INTRAMUSCULAR; INTRAVENOUS
Status: CANCELLED
Start: 2017-12-08

## 2017-10-30 RX ORDER — ALBUTEROL SULFATE 90 UG/1
1-2 AEROSOL, METERED RESPIRATORY (INHALATION)
Status: CANCELLED
Start: 2017-12-14

## 2017-10-30 RX ORDER — LORAZEPAM 2 MG/ML
0.5 INJECTION INTRAMUSCULAR EVERY 4 HOURS PRN
Status: CANCELLED
Start: 2017-12-08

## 2017-10-30 RX ORDER — EPINEPHRINE 0.3 MG/.3ML
0.3 INJECTION SUBCUTANEOUS EVERY 5 MIN PRN
Status: CANCELLED | OUTPATIENT
Start: 2017-10-30

## 2017-10-30 RX ORDER — DIPHENHYDRAMINE HYDROCHLORIDE 50 MG/ML
50 INJECTION INTRAMUSCULAR; INTRAVENOUS
Status: CANCELLED
Start: 2017-11-10

## 2017-10-30 RX ORDER — ALBUTEROL SULFATE 0.83 MG/ML
2.5 SOLUTION RESPIRATORY (INHALATION)
Status: CANCELLED | OUTPATIENT
Start: 2017-11-10

## 2017-10-30 RX ORDER — ALBUTEROL SULFATE 0.83 MG/ML
2.5 SOLUTION RESPIRATORY (INHALATION)
Status: CANCELLED | OUTPATIENT
Start: 2017-12-01

## 2017-10-30 RX ORDER — DIPHENHYDRAMINE HYDROCHLORIDE 50 MG/ML
50 INJECTION INTRAMUSCULAR; INTRAVENOUS
Status: CANCELLED
Start: 2017-10-30

## 2017-10-30 RX ORDER — DIPHENHYDRAMINE HYDROCHLORIDE 50 MG/ML
50 INJECTION INTRAMUSCULAR; INTRAVENOUS
Status: CANCELLED
Start: 2017-12-14

## 2017-10-30 RX ORDER — LORAZEPAM 2 MG/ML
0.5 INJECTION INTRAMUSCULAR EVERY 4 HOURS PRN
Status: CANCELLED
Start: 2017-10-30

## 2017-10-30 RX ORDER — ALBUTEROL SULFATE 90 UG/1
1-2 AEROSOL, METERED RESPIRATORY (INHALATION)
Status: CANCELLED
Start: 2017-12-08

## 2017-10-30 RX ORDER — SODIUM CHLORIDE 9 MG/ML
1000 INJECTION, SOLUTION INTRAVENOUS CONTINUOUS PRN
Status: CANCELLED
Start: 2017-12-14

## 2017-10-30 RX ORDER — SODIUM CHLORIDE 9 MG/ML
1000 INJECTION, SOLUTION INTRAVENOUS CONTINUOUS PRN
Status: CANCELLED
Start: 2017-11-17

## 2017-10-30 RX ORDER — MEPERIDINE HYDROCHLORIDE 50 MG/ML
25 INJECTION INTRAMUSCULAR; INTRAVENOUS; SUBCUTANEOUS EVERY 30 MIN PRN
Status: CANCELLED | OUTPATIENT
Start: 2017-12-08

## 2017-10-30 RX ORDER — EPINEPHRINE 0.3 MG/.3ML
0.3 INJECTION SUBCUTANEOUS EVERY 5 MIN PRN
Status: CANCELLED | OUTPATIENT
Start: 2017-12-01

## 2017-10-30 RX ORDER — EPINEPHRINE 0.3 MG/.3ML
0.3 INJECTION SUBCUTANEOUS EVERY 5 MIN PRN
Status: CANCELLED | OUTPATIENT
Start: 2017-11-10

## 2017-10-30 RX ORDER — EPINEPHRINE 1 MG/ML
0.3 INJECTION, SOLUTION INTRAMUSCULAR; SUBCUTANEOUS EVERY 5 MIN PRN
Status: CANCELLED | OUTPATIENT
Start: 2017-12-14

## 2017-10-30 RX ORDER — SODIUM CHLORIDE 9 MG/ML
1000 INJECTION, SOLUTION INTRAVENOUS CONTINUOUS PRN
Status: CANCELLED
Start: 2017-12-08

## 2017-10-30 RX ORDER — MEPERIDINE HYDROCHLORIDE 50 MG/ML
25 INJECTION INTRAMUSCULAR; INTRAVENOUS; SUBCUTANEOUS EVERY 30 MIN PRN
Status: CANCELLED | OUTPATIENT
Start: 2017-12-01

## 2017-10-30 RX ORDER — ALBUTEROL SULFATE 90 UG/1
1-2 AEROSOL, METERED RESPIRATORY (INHALATION)
Status: CANCELLED
Start: 2017-11-17

## 2017-10-30 RX ORDER — ALBUTEROL SULFATE 90 UG/1
1-2 AEROSOL, METERED RESPIRATORY (INHALATION)
Status: CANCELLED
Start: 2017-12-01

## 2017-10-30 ASSESSMENT — PAIN SCALES - GENERAL: PAINLEVEL: NO PAIN (0)

## 2017-10-30 NOTE — LETTER
10/30/2017         RE: Talya Gatica  [address]  [facility] [address]        Dear Colleague,    Thank you for referring your patient, Talya Gatica, to the [facility]. Please see a copy of my visit note below.    [facility]  HEMATOLOGY AND ONCOLOGY    FOLLOW-UP VISIT NOTE    PATIENT NAME: Talya Gatica MRN # [id number]  DATE OF VISIT: Oct 30, 2017 YOB: 1940    REFERRING PROVIDER: No referring provider defined for this encounter.    CANCER TYPE: Pancreatic adenocarcinoma  STAGE: IV; extensive lung metastasis    TREATMENT SUMMARY:  - 7/12/17 US guided FNA of pancreatic mass consistent with pancreatic adenocarcinoma    CURRENT INTERVENTIONS:  Planned therapy with gemcitabine    SUBJECTIVE   Talya Gatica is being followed for pancreatic cancer    She is being seen with restaging scans. She has no new complains since last visit. She again has a number of questions for me.       PAST MEDICAL HISTORY     Past Medical History:   Diagnosis Date     AR (allergic rhinitis)      Baker's cyst      DJD (degenerative joint disease)      Elevated cholesterol      Glaucoma      Menopause      Vertigo          CURRENT OUTPATIENT MEDICATIONS     Current Outpatient Prescriptions   Medication Sig     BIOTIN PO      latanoprost (XALATAN) 0.005 % ophthalmic solution Place 1 drop into both eyes At Bedtime     Multiple Vitamins-Minerals (WOMENS 50+ MULTI VITAMIN/MIN PO)      aspirin 325 MG EC tablet Take 1 tablet by mouth daily.     No current facility-administered medications for this visit.         ALLERGIES      Allergies   Allergen Reactions     Codeine Camsylate         REVIEW OF SYSTEMS   As above in the HPI, o/w complete 12-point ROS was negative.     PHYSICAL EXAM   /77  Pulse 94  Temp 98.8  F (37.1  C) (Oral)  Resp 16  Wt 57.1 kg (125 lb 12.8 oz)  SpO2 98%  BMI 24.99 kg/m2  GEN: NAD  HEENT: PERRL, EOMI, no icterus, injection or pallor.  Oropharynx is clear.  LYMPHATICs: no cervical or supraclavicular lymphadenopathy; no other abn lymphadenopathy  PULMONARY: clear with good air entry bilaterally  CARDIOVASCULAR: regular, no murmurs, rubs, or gallops  GASTROINTESTINAL: soft, non-tender, non-distended, normal bowel sounds, no hepatosplenomegaly by percussion or palpation  MUSCULOSKELTAL: warm, well perfused, no edema  NEURO: awake, alert and oriented to time place and person, cranial nerves intact - II - XII, no focal neurologic deficits  SKIN: no rashes     LABORATORY AND IMAGING STUDIES     Recent Labs   Lab Test  10/30/17   1342  12/14/10   1034   NA  142  139   POTASSIUM  3.5  3.7   CHLORIDE  111*  107   CO2  21  25   ANIONGAP  10  8   BUN  10  11   CR  0.40*  0.56   GLC  112*  103*   BERNARDO  9.1  9.1     No results for input(s): MAG, PHOS in the last 77559 hours.  Recent Labs   Lab Test  10/30/17   1342  12/14/10   1034   WBC  8.5  5.2   HGB  14.2  13.9   PLT  267  273   MCV  86  91   NEUTROPHIL  65.1   --      Recent Labs   Lab Test  10/30/17   1342   BILITOTAL  0.6   ALKPHOS  137   ALT  38   AST  29   ALBUMIN  3.7     No results found for: TSH  No results for input(s): CEA in the last 40494 hours.  Results for orders placed or performed in visit on 10/19/17   CT Chest/Abdomen/Pelvis w Contrast    Narrative    EXAMINATION: CT CHEST/ABDOMEN/PELVIS W CONTRAST, 10/19/2017 4:20 PM    TECHNIQUE:  Helical CT images from the thoracic inlet through the  symphysis pubis were obtained  with contrast. Contrast dose: 81 ml  isovue 370    COMPARISON: Outside CT 7/9/2017    HISTORY: Restaging, Malignant neoplasm of pancreas. EUS/ERCP biopsy  7/12/2017 showed pancreatic cancer/, metal biliary stent and  pancreatic duct stent were placed at that time. Has not had treatment  since that time.    FINDINGS:    Chest:     Heart size is normal. Normal caliber of the aorta and pulmonary  artery. No central pulmonary embolus. Coronary artery calcifications  are present as  well as mitral and aortic valve calcifications.  Visualized thyroid is unremarkable. No mediastinal, hilar or axillary  lymphadenopathy. No pericardial or pleural effusion. The central  tracheobronchial tree is patent. Innumerable pulmonary nodules and  masses with groundglass halo, with substantial progression in the  previously visualized lung bases since 7/9/2015. For example 2.9 x 3.0  cm right lower lobe mass (series 8 image 60), previously measured 2.0  x 1.8 cm, 3.4 x 2.3 cm right lower lobe mass (series 8 image 75),  previously measured 2.6 x 1.8 cm, and 1.3 x 1.6 cm left lower lobe  nodule (series 8 image 56), previously measured 1.2 x 1.3 cm. Some  areas of internal cavitation and peripheral groundglass opacity are  noted which may partially be related to treatment effect. Regardless,  there is still findings of overall progressive disease in the chest.    Abdomen and pelvis:     Pancreas: Poorly defined hypoattenuating mass in the pancreatic  head/neck. It measures 5.8 cm in greatest axial diameter, previously  4.9 cm. Overall it measures 5.8 x 5.6 x 4.6 cm, previously 4.5 x 4.9 x  4.3 cm when measured similarly. Interval placement of metal common  bile duct stent, which appears to be patent as evidenced by resolution  of intrahepatic biliary dilatation and pneumobilia. There is no  pancreatic duct stent present. The pancreatic duct is dilated within  the pancreatic tail upstream from the mass, measuring as much as 7 mm  in diameter, with atrophy of the pancreatic tail parenchyma, not  significantly changed from 7/9/2017. There is close abutment of the  duodenum without clear evidence of invasion. There is no evidence of  involvement of the stomach or other local organs.    Vascular involvement:    Celiac axis: Encasement of the distal celiac artery trifurcation  without significant narrowing. The left gastric artery origin appears  severely narrowed.  Hepatic artery: Encasement of the common and proper  hepatic arteries  as well as the GDA, with abutment but not encasement of the proper  hepatic artery bifurcation as well as the origins of the right and  left hepatic arteries.   Superior mesenteric artery: Encasement of the origin of the SMA  without significant narrowing, on previous study had less than 180  degree involvement consistent with abutment.   Superior mesenteric vein: There is encasement and mild narrowing at  the confluence of the SMV with the portal vein, not significantly  changed.   Portal vein: Encasement with long segment of moderate to severe  narrowing of the portal vein and portal vein confluence, slightly  progressed from 7/9/2017 (series 3 image 118)  Splenic artery and vein: Encasement and mild narrowing the splenic  vein at its confluence with the portal vein, not significantly  changed. Encasement of the origin of the splenic artery without  significant narrowing.  Vascular anatomy: There is no replaced hepatic artery. There is  increased prominence of vascular collaterals, appear to mostly be  portal systemic collateralization probably related to portal  confluence narrowing.     Lymph node evaluation: 1.0 cm gastrohepatic lymph node (series 3 image  105).    Peritoneum: No ascites or peritoneal nodules.    Liver: 1.9 x 1.6 cm focus of enhancement at the junction of the  hepatic segment 7 and 8 (series 3 image 96), not significantly changed  from 7/9/2017 and consistent with a intrahepatic portal venous-hepatic  venous shunt. No suspicious hepatic lesions.    Remainder of the abdomen and pelvis: Cholelithiasis. Spleen and  adrenals are within normal limits. Simple right renal cyst and  additional hypoattenuating foci in both kidneys which are too small to  characterize. Bladder is unremarkable. Calcified uterine fibroids. 5.6  cm simple fluid density right adnexal cyst with punctate mural  calcification (series 3 image 203), not significantly changed. Colonic  diverticulosis without  evidence of diverticulitis. Small bowel is  within normal limits. Small hiatal hernia. Aorta is normal in caliber,  with moderate arterial calcifications. Small amount of layering free  fluid in the pelvis.    Bones and soft tissues: No acute fracture or suspicious bone lesion.  Moderate to severe degenerative changes in the right hip, both  shoulders and lumbar spine, with stable multilevel degenerative  appearing listhesis in the lumbar spine. The bones appear  demineralized.      Impression    IMPRESSION:   1. Interval increase in size of poorly defined pancreatic head/neck  mass.  2. Extensive vascular encasement, slightly progressed from 7/9/2017,  resulting in the moderate to severe narrowing of the main portal vein  and portal vein confluence, with some intrahepatic and extrahepatic  portosystemic collateral formation.  3. There is abutment of the duodenum without definite invasion. No  other evidence of local or involvement.  4. There is a single mildly prominent gastrohepatic lymph node.  Otherwise no evidence of intra-abdominal metastatic disease.  5. Progression of extensive presumed pulmonary metastatic disease.  6. Patent common bile duct stent with resolution of intrahepatic  biliary dilatation.  7. Stable 5.6 cm simple fluid density right adnexal cyst with punctate  mural calcification. Ultrasound would typically be recommended for  further evaluation in a postmenopausal patient    I have personally reviewed the examination and initial interpretation  and I agree with the findings.    OSCAR SWAN MD         ASSESSMENT AND PLAN   1. Pancreatic cancer - extensive metastatis to lung  2. Weight loss  3. ECOG PS 1  4. No medical comorbidity    I had a lengthy discussion with Talya, who again comes alone for this clinic visit.  She had a number of questions for me which were answered for her.  I reviewed actual images from her restaging CT scan which was done on 10/19 and compared it to her previous  scans done at the outside facility in July.  She does have disease progression both locally and also in the lungs.  The previous scan was only an abdomen CT, and complete lung visualization was not available from the previous scan.  Overall, the scans do suggest disease progression, but given the fact that she has little in terms of clinical decline, this is quite encouraging.       She does have several loose bowel motions every day and notes that she has to go 6-8 times per day, where she has watery bowel motions.  I think she could have pancreatic enzyme deficiency and I will prescribe pancreatic enzymes for her to see if this does help her.  She also has been losing weight and this could be probably directly related to her marked diarrhea and both protein and fat malabsorption.        I have reviewed her case with our Gastroenterology team.  Her metal stents seem to be permanent and do not need to be changed unless we run into problems with infection.      She was told to notify us in the setting of infection with high fevers.  She has never had high-grade fevers that she is aware of.  I did explain to her that with progressive disease there is risk of blockage of either the pancreatic or the biliary ducts which can lead to infections like ascending cholangitis with high-grade fevers and she can be quite sick with it.  It is nice to know that she has not run into this problem as yet.       She does note that she has been notified that she will be sleeping more and more with disease progression, but she finds it often hard to fall asleep and is awake at 2:00 a.m. in the night.  Despite the high disease volume, she has maintained her performance status and is able to care for herself and do lots of activity at home.  I do feel that Dr. Oro was explaining to her pretty advanced disease where patients have diminished energy and appetite and cancer cachexia and sleep for the majority of the day.  She is nowhere  close to that point at this time and so she does not have any problems with increased somnolence.       I have encouraged her to be more physically active.  She used to participate in aerobic exercises in the pool at the [facility].  I suggested that she could restart this and continue with it if she can tolerate it.  She should try and be as physically active as she can be.        I would suggest considering single-agent gemcitabine.  I explained to her the regimen, the side effects, and the alternatives.  This agent does not cause any significant nausea, or vomiting or alopecia.  We will try our best to ensure that she tolerates it well and adjust the dose and the schedule for the same.  If at dose and schedule we could control the disease, then we can continue with chemotherapy.  Single-agent gemcitabine is very well tolerated and can be continued for a prolonged period of time - in the range of months to a few years.       She has concerns of getting rides for the clinic visits for the chemotherapy.  I will work with our  to find options for her.  We will have to check into options of either [employer] or support from the [employer].     Over 45 min of direct face to face time spent with patient with more than 50% time spent in counseling and coordinating care.      Again, thank you for allowing me to participate in the care of your patient.        Sincerely,        Jim Soares MD

## 2017-10-30 NOTE — PROGRESS NOTES
[facility]  HEMATOLOGY AND ONCOLOGY    FOLLOW-UP VISIT NOTE    PATIENT NAME: Talya Gatica MRN # [id number]  DATE OF VISIT: Oct 30, 2017 YOB: 1940    REFERRING PROVIDER: No referring provider defined for this encounter.    CANCER TYPE: Pancreatic adenocarcinoma  STAGE: IV; extensive lung metastasis    TREATMENT SUMMARY:  - 7/12/17 US guided FNA of pancreatic mass consistent with pancreatic adenocarcinoma    CURRENT INTERVENTIONS:  Planned therapy with gemcitabine    SUBJECTIVE   Talya Gatica is being followed for pancreatic cancer    She is being seen with restaging scans. She has no new complains since last visit. She again has a number of questions for me.       PAST MEDICAL HISTORY     Past Medical History:   Diagnosis Date     AR (allergic rhinitis)      Baker's cyst      DJD (degenerative joint disease)      Elevated cholesterol      Glaucoma      Menopause      Vertigo          CURRENT OUTPATIENT MEDICATIONS     Current Outpatient Prescriptions   Medication Sig     BIOTIN PO      latanoprost (XALATAN) 0.005 % ophthalmic solution Place 1 drop into both eyes At Bedtime     Multiple Vitamins-Minerals (WOMENS 50+ MULTI VITAMIN/MIN PO)      aspirin 325 MG EC tablet Take 1 tablet by mouth daily.     No current facility-administered medications for this visit.         ALLERGIES      Allergies   Allergen Reactions     Codeine Camsylate         REVIEW OF SYSTEMS   As above in the HPI, o/w complete 12-point ROS was negative.     PHYSICAL EXAM   /77  Pulse 94  Temp 98.8  F (37.1  C) (Oral)  Resp 16  Wt 57.1 kg (125 lb 12.8 oz)  SpO2 98%  BMI 24.99 kg/m2  GEN: NAD  HEENT: PERRL, EOMI, no icterus, injection or pallor. Oropharynx is clear.  LYMPHATICs: no cervical or supraclavicular lymphadenopathy; no other abn lymphadenopathy  PULMONARY: clear with good air entry bilaterally  CARDIOVASCULAR: regular, no murmurs, rubs, or gallops  GASTROINTESTINAL: soft, non-tender,  non-distended, normal bowel sounds, no hepatosplenomegaly by percussion or palpation  MUSCULOSKELTAL: warm, well perfused, no edema  NEURO: awake, alert and oriented to time place and person, cranial nerves intact - II - XII, no focal neurologic deficits  SKIN: no rashes     LABORATORY AND IMAGING STUDIES     Recent Labs   Lab Test  10/30/17   1342  12/14/10   1034   NA  142  139   POTASSIUM  3.5  3.7   CHLORIDE  111*  107   CO2  21  25   ANIONGAP  10  8   BUN  10  11   CR  0.40*  0.56   GLC  112*  103*   BERNARDO  9.1  9.1     No results for input(s): MAG, PHOS in the last 98053 hours.  Recent Labs   Lab Test  10/30/17   1342  12/14/10   1034   WBC  8.5  5.2   HGB  14.2  13.9   PLT  267  273   MCV  86  91   NEUTROPHIL  65.1   --      Recent Labs   Lab Test  10/30/17   1342   BILITOTAL  0.6   ALKPHOS  137   ALT  38   AST  29   ALBUMIN  3.7     No results found for: TSH  No results for input(s): CEA in the last 47711 hours.  Results for orders placed or performed in visit on 10/19/17   CT Chest/Abdomen/Pelvis w Contrast    Narrative    EXAMINATION: CT CHEST/ABDOMEN/PELVIS W CONTRAST, 10/19/2017 4:20 PM    TECHNIQUE:  Helical CT images from the thoracic inlet through the  symphysis pubis were obtained  with contrast. Contrast dose: 81 ml  isovue 370    COMPARISON: Outside CT 7/9/2017    HISTORY: Restaging, Malignant neoplasm of pancreas. EUS/ERCP biopsy  7/12/2017 showed pancreatic cancer/, metal biliary stent and  pancreatic duct stent were placed at that time. Has not had treatment  since that time.    FINDINGS:    Chest:     Heart size is normal. Normal caliber of the aorta and pulmonary  artery. No central pulmonary embolus. Coronary artery calcifications  are present as well as mitral and aortic valve calcifications.  Visualized thyroid is unremarkable. No mediastinal, hilar or axillary  lymphadenopathy. No pericardial or pleural effusion. The central  tracheobronchial tree is patent. Innumerable pulmonary nodules  and  masses with groundglass halo, with substantial progression in the  previously visualized lung bases since 7/9/2015. For example 2.9 x 3.0  cm right lower lobe mass (series 8 image 60), previously measured 2.0  x 1.8 cm, 3.4 x 2.3 cm right lower lobe mass (series 8 image 75),  previously measured 2.6 x 1.8 cm, and 1.3 x 1.6 cm left lower lobe  nodule (series 8 image 56), previously measured 1.2 x 1.3 cm. Some  areas of internal cavitation and peripheral groundglass opacity are  noted which may partially be related to treatment effect. Regardless,  there is still findings of overall progressive disease in the chest.    Abdomen and pelvis:     Pancreas: Poorly defined hypoattenuating mass in the pancreatic  head/neck. It measures 5.8 cm in greatest axial diameter, previously  4.9 cm. Overall it measures 5.8 x 5.6 x 4.6 cm, previously 4.5 x 4.9 x  4.3 cm when measured similarly. Interval placement of metal common  bile duct stent, which appears to be patent as evidenced by resolution  of intrahepatic biliary dilatation and pneumobilia. There is no  pancreatic duct stent present. The pancreatic duct is dilated within  the pancreatic tail upstream from the mass, measuring as much as 7 mm  in diameter, with atrophy of the pancreatic tail parenchyma, not  significantly changed from 7/9/2017. There is close abutment of the  duodenum without clear evidence of invasion. There is no evidence of  involvement of the stomach or other local organs.    Vascular involvement:    Celiac axis: Encasement of the distal celiac artery trifurcation  without significant narrowing. The left gastric artery origin appears  severely narrowed.  Hepatic artery: Encasement of the common and proper hepatic arteries  as well as the GDA, with abutment but not encasement of the proper  hepatic artery bifurcation as well as the origins of the right and  left hepatic arteries.   Superior mesenteric artery: Encasement of the origin of the  SMA  without significant narrowing, on previous study had less than 180  degree involvement consistent with abutment.   Superior mesenteric vein: There is encasement and mild narrowing at  the confluence of the SMV with the portal vein, not significantly  changed.   Portal vein: Encasement with long segment of moderate to severe  narrowing of the portal vein and portal vein confluence, slightly  progressed from 7/9/2017 (series 3 image 118)  Splenic artery and vein: Encasement and mild narrowing the splenic  vein at its confluence with the portal vein, not significantly  changed. Encasement of the origin of the splenic artery without  significant narrowing.  Vascular anatomy: There is no replaced hepatic artery. There is  increased prominence of vascular collaterals, appear to mostly be  portal systemic collateralization probably related to portal  confluence narrowing.     Lymph node evaluation: 1.0 cm gastrohepatic lymph node (series 3 image  105).    Peritoneum: No ascites or peritoneal nodules.    Liver: 1.9 x 1.6 cm focus of enhancement at the junction of the  hepatic segment 7 and 8 (series 3 image 96), not significantly changed  from 7/9/2017 and consistent with a intrahepatic portal venous-hepatic  venous shunt. No suspicious hepatic lesions.    Remainder of the abdomen and pelvis: Cholelithiasis. Spleen and  adrenals are within normal limits. Simple right renal cyst and  additional hypoattenuating foci in both kidneys which are too small to  characterize. Bladder is unremarkable. Calcified uterine fibroids. 5.6  cm simple fluid density right adnexal cyst with punctate mural  calcification (series 3 image 203), not significantly changed. Colonic  diverticulosis without evidence of diverticulitis. Small bowel is  within normal limits. Small hiatal hernia. Aorta is normal in caliber,  with moderate arterial calcifications. Small amount of layering free  fluid in the pelvis.    Bones and soft tissues: No acute  fracture or suspicious bone lesion.  Moderate to severe degenerative changes in the right hip, both  shoulders and lumbar spine, with stable multilevel degenerative  appearing listhesis in the lumbar spine. The bones appear  demineralized.      Impression    IMPRESSION:   1. Interval increase in size of poorly defined pancreatic head/neck  mass.  2. Extensive vascular encasement, slightly progressed from 7/9/2017,  resulting in the moderate to severe narrowing of the main portal vein  and portal vein confluence, with some intrahepatic and extrahepatic  portosystemic collateral formation.  3. There is abutment of the duodenum without definite invasion. No  other evidence of local or involvement.  4. There is a single mildly prominent gastrohepatic lymph node.  Otherwise no evidence of intra-abdominal metastatic disease.  5. Progression of extensive presumed pulmonary metastatic disease.  6. Patent common bile duct stent with resolution of intrahepatic  biliary dilatation.  7. Stable 5.6 cm simple fluid density right adnexal cyst with punctate  mural calcification. Ultrasound would typically be recommended for  further evaluation in a postmenopausal patient    I have personally reviewed the examination and initial interpretation  and I agree with the findings.    OSCAR SWAN MD         ASSESSMENT AND PLAN   1. Pancreatic cancer - extensive metastatis to lung  2. Weight loss  3. ECOG PS 1  4. No medical comorbidity    I had a lengthy discussion with Talya, who again comes alone for this clinic visit.  She had a number of questions for me which were answered for her.  I reviewed actual images from her restaging CT scan which was done on 10/19 and compared it to her previous scans done at the outside facility in July.  She does have disease progression both locally and also in the lungs.  The previous scan was only an abdomen CT, and complete lung visualization was not available from the previous scan.  Overall,  the scans do suggest disease progression, but given the fact that she has little in terms of clinical decline, this is quite encouraging.       She does have several loose bowel motions every day and notes that she has to go 6-8 times per day, where she has watery bowel motions.  I think she could have pancreatic enzyme deficiency and I will prescribe pancreatic enzymes for her to see if this does help her.  She also has been losing weight and this could be probably directly related to her marked diarrhea and both protein and fat malabsorption.        I have reviewed her case with our Gastroenterology team.  Her metal stents seem to be permanent and do not need to be changed unless we run into problems with infection.      She was told to notify us in the setting of infection with high fevers.  She has never had high-grade fevers that she is aware of.  I did explain to her that with progressive disease there is risk of blockage of either the pancreatic or the biliary ducts which can lead to infections like ascending cholangitis with high-grade fevers and she can be quite sick with it.  It is nice to know that she has not run into this problem as yet.       She does note that she has been notified that she will be sleeping more and more with disease progression, but she finds it often hard to fall asleep and is awake at 2:00 a.m. in the night.  Despite the high disease volume, she has maintained her performance status and is able to care for herself and do lots of activity at home.  I do feel that Dr. Oro was explaining to her pretty advanced disease where patients have diminished energy and appetite and cancer cachexia and sleep for the majority of the day.  She is nowhere close to that point at this time and so she does not have any problems with increased somnolence.       I have encouraged her to be more physically active.  She used to participate in aerobic exercises in the pool at the [facility].  I suggested that  she could restart this and continue with it if she can tolerate it.  She should try and be as physically active as she can be.        I would suggest considering single-agent gemcitabine.  I explained to her the regimen, the side effects, and the alternatives.  This agent does not cause any significant nausea, or vomiting or alopecia.  We will try our best to ensure that she tolerates it well and adjust the dose and the schedule for the same.  If at dose and schedule we could control the disease, then we can continue with chemotherapy.  Single-agent gemcitabine is very well tolerated and can be continued for a prolonged period of time - in the range of months to a few years.       She has concerns of getting rides for the clinic visits for the chemotherapy.  I will work with our  to find options for her.  We will have to check into options of either [employer] or support from the [employer].     Over 45 min of direct face to face time spent with patient with more than 50% time spent in counseling and coordinating care.

## 2017-10-30 NOTE — MR AVS SNAPSHOT
After Visit Summary   10/30/2017    Talya Gatica    MRN: [id number]           Patient Information     Date Of Birth          1940        Visit Information        Provider Department      10/30/2017 2:30 PM Jim Soares MD [facility]        Today's Diagnoses     Pancreatic adenocarcinoma (H)    -  1       Follow-ups after your visit        Your next 10 appointments already scheduled     Nov 03, 2017  1:30 PM CDT   LAB with LAB ONC [facility])    [address] [place] [address]   [phone]           Please do not eat 10-12 hours before your appointment if you are coming in fasting for labs on lipids, cholesterol, or glucose (sugar). This does not apply to pregnant women. Water, hot tea and black coffee (with nothing added) are okay. Do not drink other fluids, diet soda or chew gum.            Nov 03, 2017  2:00 PM CDT   Level 2 with [address])    [address] [place] [address]   [phone]            Nov 17, 2017  1:30 PM CST   LAB with LAB ONC [facility])    [address] [facility] [address]   [phone]           Please do not eat 10-12 hours before your appointment if you are coming in fasting for labs on lipids, cholesterol, or glucose (sugar). This does not apply to pregnant women. Water, hot tea and black coffee (with nothing added) are okay. Do not drink other fluids, diet soda or chew gum.            Nov 17, 2017  2:00 PM CST   Level 2 with [address])    [address] [facility] [address]   [phone]            Dec 01, 2017 12:15 PM CST   LAB with LAB ONC [facility] ([facility])    [address] [place] [address]  [address]   [phone]           Please do not eat 10-12 hours before your appointment if you are coming in fasting for labs on lipids, cholesterol, or glucose (sugar). This does not apply to pregnant women. Water, hot tea and black coffee (with nothing added) are okay. Do not drink other fluids, diet soda or chew gum.            Dec 01, 2017 12:45 PM CST   Return Visit with LISA Mendieta CNP   [facility] ([facility])    [address] [place] [address]   [phone]            Dec 01, 2017  2:00 PM CST   Level 2 with BAY 7 INFUSION   [facility])    [address] [place] [address] [phone]            Dec 08, 2017  1:30 PM CST   LAB with LAB ONC [facility])    [address] [facility] [address] [phone]           Please do not eat 10-12 hours before your appointment if you are coming in fasting for labs on lipids, cholesterol, or glucose (sugar). This does not apply to pregnant women. Water, hot tea and black coffee (with nothing added) are okay. Do not drink other fluids, diet soda or chew gum.            Dec 08, 2017  2:00 PM CST   Level 2 with BAY 4 INFUSION   [facility])    [address] [facility] [address] [phone]            Dec 14, 2017  2:00 PM CST   Level 2 with BAY 7 INFUSION   [facility])    [address] [facility] [address] [phone]              Who to contact     If you have questions or need follow up information about today's clinic visit or your schedule please contact [facility] directly at [phone].  Normal or non-critical lab and imaging results will be communicated to you by MyChart, letter or phone within 4 business days after the clinic  has received the results. If you do not hear from us within 7 days, please contact the clinic through [email or web address] or phone. If you have a critical or abnormal lab result, we will notify you by phone as soon as possible.  Submit refill requests through [email or web address] or call your pharmacy and they will forward the refill request to us. Please allow 3 business days for your refill to be completed.          Additional Information About Your Visit        [email or web address] Information     [email or web address] is an electronic gateway that provides easy, online access to your medical records. With [email or web address], you can request a clinic appointment, read your test results, renew a prescription or communicate with your care team.     To sign up for [email or web address] visit the website at [email or web address]   You will be asked to enter the access code listed below, as well as some personal information. Please follow the directions to create your username and password.     Your access code is: [email or web address]  Expires: 2017  5:34 PM     Your access code will  in 90 days. If you need help or a new code, please contact your [facility] or call [phone] for assistance.        Care EveryWhere ID     This is your Care EveryWhere ID. This could be used by other organizations to access your [facility] medical records  [id number]        Your Vitals Were     Pulse Temperature Respirations Pulse Oximetry BMI (Body Mass Index)       94 98.8  F (37.1  C) (Oral) 16 98% 24.99 kg/m2        Blood Pressure from Last 3 Encounters:   10/30/17 149/77   10/16/17 (!) 163/102   10/02/17 142/71    Weight from Last 3 Encounters:   10/30/17 57.1 kg (125 lb 12.8 oz)   10/16/17 59.9 kg (132 lb)   10/02/17 61.1 kg (134 lb 9.6 oz)              Today, you had the following     No orders found for display         Today's Medication Changes          These changes are accurate as of: 10/30/17  4:26 PM.  If you have any questions, ask your nurse or doctor.                Start taking these medicines.        Dose/Directions    amylase-lipase-protease [id number] UNITS Cpep per EC capsule   Commonly known as:  CREON   Used for:  Pancreatic adenocarcinoma (H)   Started by:  Jim Soares MD        Dose:  1 capsule   Take 1 capsule (24,000 Units) by mouth 3 times daily (with meals)   Quantity:  180 capsule   Refills:  3            Where to get your medicines      These medications were sent to [id number] - [place], [place] - [address] AT [facility] [address]  [address] [facility] [facility] [address], [place] [address]     Phone:  [phone]     amylase-lipase-protease [id number] UNITS Cpep per EC capsule                Primary Care Provider Office Phone # Fax #    Pancho Cope [phone] [address] [facility] [address]        Equal Access to Services     [facility] : Hadii aad ku hadasho Soomaali, waaxda luqadaha, qaybta kaalmada adeegyada, waxay idiin hayaan adeterence kharaeufemia kaur . So [facility] [phone].    ATENCIÓN: Si habla español, tiene a ornelas disposición servicios gratuitos de asistencia lingüística. Kristen al [phone].    We comply with applicable federal civil rights laws and [place] laws. We do not discriminate on the basis of race, color, national origin, age, disability, sex, sexual orientation, or gender identity.            Thank you!     Thank you for choosing [facility]  for your care. Our goal is always to provide you with excellent care. Hearing back from our patients is one way we can continue to improve our services. Please take a few minutes to complete the written survey that you may receive in the mail after your visit with us. Thank you!             Your Updated Medication List - Protect others around you: Learn how to safely use, store and throw away your medicines at www.disposemymeds.org.          This list is accurate as of: 10/30/17  4:26 PM.   Always use your most recent med list.                   Brand Name Dispense Instructions for use Diagnosis    amylase-lipase-protease [id number] UNITS Cpep per EC capsule    CREON    180 capsule    Take 1 capsule (24,000 Units) by mouth 3 times daily (with meals)    Pancreatic adenocarcinoma (H)       aspirin 325 MG EC tablet     100 tablet    Take 1 tablet by mouth daily.    High cholesterol       BIOTIN PO           latanoprost 0.005 % ophthalmic solution    XALATAN    3 Bottle    Place 1 drop into both eyes At Bedtime    Glaucoma suspect, bilateral       WOMENS 50+ MULTI VITAMIN/MIN PO

## 2017-10-30 NOTE — NURSING NOTE
"Oncology Rooming Note    October 30, 2017 2:55 PM   Talya Gatica is a 77 year old female who presents for:    Chief Complaint   Patient presents with     Oncology Clinic Visit     follow up. Discuss plan     Initial Vitals: /77  Pulse 80  Temp 98.8  F (37.1  C) (Oral)  Resp 16  Wt 57.1 kg (125 lb 12.8 oz)  SpO2 98%  BMI 24.99 kg/m2 Estimated body mass index is 24.99 kg/(m^2) as calculated from the following:    Height as of 10/16/17: 1.511 m (4' 11.49\").    Weight as of this encounter: 57.1 kg (125 lb 12.8 oz). Body surface area is 1.55 meters squared.  No Pain (0) Comment: Data Unavailable   No LMP recorded. Patient is postmenopausal.  Allergies reviewed: Yes  Medications reviewed: Yes    Medications: Medication refills not needed today.  Pharmacy name entered into [facility]: [facility] - [place], [place] - [address] AT [facility]    Clinical concerns: Stents needing changing? Weight loss. Loose stools.    12 minutes for nursing intake (face to face time)     ROSEY GARCIA RN              "

## 2017-10-31 ENCOUNTER — TELEPHONE (OUTPATIENT)
Dept: ONCOLOGY | Facility: CLINIC | Age: 77
End: 2017-10-31

## 2017-10-31 NOTE — TELEPHONE ENCOUNTER
Left voice message for patient to call.  She does not need her lab appt for 11/31/7 as she had labs with Dr Soares on 10/30.  No details left on voicemail.

## 2017-11-01 LAB — CANCER AG19-9 SERPL-ACNC: 77 U/ML (ref 0–37)

## 2017-11-02 DIAGNOSIS — C25.9 PANCREATIC ADENOCARCINOMA (H): Primary | ICD-10-CM

## 2017-11-02 RX ORDER — PROCHLORPERAZINE MALEATE 10 MG
5 TABLET ORAL EVERY 6 HOURS PRN
Qty: 30 TABLET | Refills: 1 | Status: SHIPPED | OUTPATIENT
Start: 2017-11-02 | End: 2018-05-07

## 2017-11-02 NOTE — TELEPHONE ENCOUNTER
Received call from patient. Instructed patient she does not need to come early tomorrow for lab appointment since she just had labs 10/30/17. Patient expressed understanding.  Miriam Small  RN, BSN, OCN

## 2017-11-03 ENCOUNTER — ONCOLOGY VISIT (OUTPATIENT)
Dept: ONCOLOGY | Facility: CLINIC | Age: 77
End: 2017-11-03
Payer: COMMERCIAL

## 2017-11-03 ENCOUNTER — INFUSION THERAPY VISIT (OUTPATIENT)
Dept: INFUSION THERAPY | Facility: CLINIC | Age: 77
End: 2017-11-03
Payer: COMMERCIAL

## 2017-11-03 VITALS
SYSTOLIC BLOOD PRESSURE: 131 MMHG | BODY MASS INDEX: 25.29 KG/M2 | OXYGEN SATURATION: 98 % | DIASTOLIC BLOOD PRESSURE: 84 MMHG | WEIGHT: 127.3 LBS | HEART RATE: 80 BPM | RESPIRATION RATE: 16 BRPM | TEMPERATURE: 97.7 F

## 2017-11-03 DIAGNOSIS — C25.9 PANCREATIC ADENOCARCINOMA (H): Primary | ICD-10-CM

## 2017-11-03 PROCEDURE — 96413 CHEMO IV INFUSION 1 HR: CPT | Performed by: INTERNAL MEDICINE

## 2017-11-03 PROCEDURE — 99207 ZZC NO CHARGE LOS: CPT

## 2017-11-03 PROCEDURE — 99207 ZZC NO CHARGE LOS: CPT | Performed by: DIETITIAN, REGISTERED

## 2017-11-03 PROCEDURE — 96367 TX/PROPH/DG ADDL SEQ IV INF: CPT | Performed by: INTERNAL MEDICINE

## 2017-11-03 RX ADMIN — Medication 500 ML: at 14:25

## 2017-11-03 ASSESSMENT — PAIN SCALES - GENERAL: PAINLEVEL: NO PAIN (0)

## 2017-11-03 NOTE — MR AVS SNAPSHOT
After Visit Summary   11/3/2017    Talya Gatica    MRN: [id number]           Patient Information     Date Of Birth          1940        Visit Information        Provider Department      11/3/2017 2:00 PM Karolina Corea RD [facility]        Today's Diagnoses     Pancreatic adenocarcinoma (H)    -  1       Follow-ups after your visit        Your next 10 appointments already scheduled     Nov 10, 2017  3:00 PM CST   LAB with LAB ONC [facility])    [address] [facility] [address]   [phone]           Please do not eat 10-12 hours before your appointment if you are coming in fasting for labs on lipids, cholesterol, or glucose (sugar). This does not apply to pregnant women. Water, hot tea and black coffee (with nothing added) are okay. Do not drink other fluids, diet soda or chew gum.            Nov 10, 2017  3:30 PM CST   Level 2 with BAY 4 INFUSION   [facility])    [address] [address]   [phone]            Nov 17, 2017  1:30 PM CST   LAB with LAB ONC [facility] ([facility])    [address] [facility] [address] [phone]           Please do not eat 10-12 hours before your appointment if you are coming in fasting for labs on lipids, cholesterol, or glucose (sugar). This does not apply to pregnant women. Water, hot tea and black coffee (with nothing added) are okay. Do not drink other fluids, diet soda or chew gum.            Nov 17, 2017  2:00 PM CST   Level 2 with Louise 5 [facility] ([facility])    [address] [facility] [address]   [phone]            Dec 01, 2017 12:15 PM CST   LAB with LAB ONC [facility] ([facility])    [address] [place] [address]  [facility] [address]   [phone]           Please do not eat 10-12 hours before your appointment if you are coming in fasting for labs on lipids, cholesterol, or glucose (sugar). This does not apply to pregnant women. Water, hot tea and black coffee (with nothing added) are okay. Do not drink other fluids, diet soda or chew gum.            Dec 01, 2017 12:45 PM CST   [facility]    [address] [facility] [address]   [phone]            Dec 01, 2017  2:00 PM CST   Level 2 with BAY 7 INFUSION   [facility])    [address] [place] [address]   [phone]            Dec 08, 2017  1:30 PM CST   LAB with LAB ONC [facility])    [address] [facility] [address]   [phone]           Please do not eat 10-12 hours before your appointment if you are coming in fasting for labs on lipids, cholesterol, or glucose (sugar). This does not apply to pregnant women. Water, hot tea and black coffee (with nothing added) are okay. Do not drink other fluids, diet soda or chew gum.            Dec 08, 2017  2:00 PM CST   Level 2 with BAY 4 INFUSION   [facility])    [address] [facility] [address]   [phone]            Dec 14, 2017  2:00 PM CST   Level 2 with BAY 7 INFUSION   [facility])    [address] [facility] [address] [phone]              Who to contact     If you have questions or need follow up information about today's clinic visit or your schedule please contact [facility] directly at [phone].  Normal or non-critical lab and imaging results will be communicated to you by MyChart, letter or phone within 4 business days after the  clinic has received the results. If you do not hear from us within 7 days, please contact the clinic through Atlas Cloud or phone. If you have a critical or abnormal lab result, we will notify you by phone as soon as possible.  Submit refill requests through [employer] or call your pharmacy and they will forward the refill request to us. Please allow 3 business days for your refill to be completed.          Additional Information About Your Visit        Atlas Cloud Information     At[employer] Cloud is an electronic gateway that provides easy, online access to your medical records. With [employer], you can request a clinic appointment, read your test results, renew a prescription or communicate with your care team.     To sign up for [employer] Cloud visit the website at [email or web address]   You will be asked to enter the access code listed below, as well as some personal information. Please follow the directions to create your username and password.     Your access code is: [email or web address]  Expires: 2017  4:34 PM     Your access code will  in 90 days. If you need help or a new code, please contact your [facility] or call [phone] for assistance.        Care EveryWhere ID     This is your Care EveryWhere ID. This could be used by other organizations to access your [place] medical records  [id number]         Blood Pressure from Last 3 Encounters:   17 131/84   10/30/17 149/77   10/16/17 (!) 163/102    Weight from Last 3 Encounters:   17 57.7 kg (127 lb 4.8 oz)   10/30/17 57.1 kg (125 lb 12.8 oz)   10/16/17 59.9 kg (132 lb)              Today, you had the following     No orders found for display       Primary Care Provider Office Phone # Fax #    Pancho Cope [phone] [address] [facility] [address]        Equal Access to Services     JOCELINE JORDAN AH: Hadii feliciano camarao Sokirit, waaxda luqadaha, qaybta kaalgreer curry, carrie wilson  kristi coynenaveeneufemia nolan'aaana rosa ah. So [facility] [phone].    ATENCIÓN: Si nikolas daniels, tiene a ornelas disposición servicios gratuitos de asistencia lingüística. Kristen dias [phone].    We comply with applicable federal civil rights laws and [place] laws. We do not discriminate on the basis of race, color, national origin, age, disability, sex, sexual orientation, or gender identity.            Thank you!     Thank you for choosing [facility]  for your care. Our goal is always to provide you with excellent care. Hearing back from our patients is one way we can continue to improve our services. Please take a few minutes to complete the written survey that you may receive in the mail after your visit with us. Thank you!             Your Updated Medication List - Protect others around you: Learn how to safely use, store and throw away your medicines at www.disposemymeds.org.          This list is accurate as of: 11/3/17 11:59 PM.  Always use your most recent med list.                   Brand Name Dispense Instructions for use Diagnosis    amylase-lipase-protease [id number] UNITS Cpep per EC capsule    CREON    180 capsule    Take 1 capsule (24,000 Units) by mouth 3 times daily (with meals)    Pancreatic adenocarcinoma (H)       aspirin 325 MG EC tablet     100 tablet    Take 1 tablet by mouth daily.    High cholesterol       BIOTIN PO           latanoprost 0.005 % ophthalmic solution    XALATAN    3 Bottle    Place 1 drop into both eyes At Bedtime    Glaucoma suspect, bilateral       prochlorperazine 10 MG tablet    COMPAZINE    30 tablet    Take 0.5 tablets (5 mg) by mouth every 6 hours as needed (Nausea/Vomiting)    Pancreatic adenocarcinoma (H)       WOMENS 50+ MULTI VITAMIN/MIN PO

## 2017-11-03 NOTE — LETTER
11/3/2017         RE: Talya Gatica  [address]  [facility] [address]        Dear Colleague,    Thank you for referring your patient, Talya Gatica, to the [facility]. Please see a copy of my visit note below.    Nutrition Services:     Met with Basilio today to introduce self and role as RD in oncology clinic.  Spoke with her ~1 week ago via phone from oncology distress screening - per her request to speak with RD.     Talya reports that she has lost ~35 lbs in the past 4 months. She expresses that she does not know what to eat. She has been drinking Ensure Original but it has been upsetting her stomach.    She questions what foods she should be avoiding and consuming. She has been avoiding sugary foods and soda.  She has been avoiding raw vegetables as they upset her stomach as well.       Intervention:    -Reviewed ways to maximize calories and protein via small frequent meals.  Suggested pt aim for at least 1500kcal and 60g protein/day.  Suggested she avoid fried foods, choose more cooked vegetables over raw and choose more fruits w/o skins (bananas, peeled apples, canned fruit, melon et).  Suggested she chew her foods to pureed consistency to ease digestion.     -Reviewed Creon 24,000 - dosing, administration, effectiveness etc.    She is currently taking 1 capsule with each meal.  She does feel as though they have been helping decrease her stomach pain.  She denies diarrhea or steatorrhea.      -Suggested pt try ProNourish ONS as alternative to Ensure.  ProNourish is FODMAP friendly and produces less gas/bloating in patients.   Provided pt with ProNourish samples and coupons.     Provided pt with RD contact information for further questions prn.     Karolina Ulrich RD, LD               Again, thank you for allowing me to participate in the care of your patient.        Sincerely,        Karolina Ulrich RD

## 2017-11-03 NOTE — PROGRESS NOTES
Infusion Nursing Note:  Talya Gatica presents today for C1D1 Gemzar.    Patient seen by provider today: No   present during visit today: Not Applicable.    Note: Pharmacist met with patient to provide education on Compazine. Compazine Rx filled and given to patient.     Intravenous Access:  Peripheral IV placed.    Treatment Conditions:  Lab Results   Component Value Date    HGB 14.2 10/30/2017     Lab Results   Component Value Date    WBC 8.5 10/30/2017      Lab Results   Component Value Date    ANEU 5.5 10/30/2017     Lab Results   Component Value Date     10/30/2017      Lab Results   Component Value Date     10/30/2017                   Lab Results   Component Value Date    POTASSIUM 3.5 10/30/2017           No results found for: MAG         Lab Results   Component Value Date    CR 0.40 10/30/2017                   Lab Results   Component Value Date    BERNARDO 9.1 10/30/2017                Lab Results   Component Value Date    BILITOTAL 0.6 10/30/2017           Lab Results   Component Value Date    ALBUMIN 3.7 10/30/2017                    Lab Results   Component Value Date    ALT 38 10/30/2017           Lab Results   Component Value Date    AST 29 10/30/2017     Results reviewed, labs MET treatment parameters, ok to proceed with treatment.    Post Infusion Assessment:  Patient tolerated infusion without incident.  Blood return noted pre and post infusion.  Site patent and intact, free from redness, edema or discomfort.  No evidence of extravasations.  Access discontinued per protocol.    Discharge Plan:   Copy of AVS reviewed with patient and/or family.  Patient will return 11/10/17 for next appointment.  Patient discharged in stable condition accompanied by: daughter.  Departure Mode: Ambulatory.    Angie Gordon RN

## 2017-11-03 NOTE — MR AVS SNAPSHOT
After Visit Summary   11/3/2017    Talya Gatica    MRN: [id number]           Patient Information     Date Of Birth          1940        Visit Information        Provider Department      11/3/2017 2:00 PM [place] [facility]        Today's Diagnoses     Pancreatic adenocarcinoma (H)    -  1       Follow-ups after your visit        Your next 10 appointments already scheduled     Nov 10, 2017  3:00 PM CST   LAB with LAB ONC [facility] ([facility])    [address] [facility] [address] [phone]           Please do not eat 10-12 hours before your appointment if you are coming in fasting for labs on lipids, cholesterol, or glucose (sugar). This does not apply to pregnant women. Water, hot tea and black coffee (with nothing added) are okay. Do not drink other fluids, diet soda or chew gum.            Nov 10, 2017  3:30 PM CST   Level 2 with [place] 4 [facility] ([facility])    [address] [facility] [address] [phone]            Nov 17, 2017  1:30 PM CST   LAB with LAB ONC [facility] ([facility])    [address] [facility] [address] [phone]           Please do not eat 10-12 hours before your appointment if you are coming in fasting for labs on lipids, cholesterol, or glucose (sugar). This does not apply to pregnant women. Water, hot tea and black coffee (with nothing added) are okay. Do not drink other fluids, diet soda or chew gum.            Nov 17, 2017  2:00 PM CST   Level 2 with [place] 5 [facility] ([facility])    [address] [facility] [address] [phone]            Dec 01, 2017 12:15 PM CST   LAB with LAB ONC [facility] ([facility])    [address] [facility] [address]    [phone]           Please do not eat 10-12 hours before your appointment if you are coming in fasting for labs on lipids, cholesterol, or glucose (sugar). This does not apply to pregnant women. Water, hot tea and black coffee (with nothing added) are okay. Do not drink other fluids, diet soda or chew gum.            Dec 01, 2017 12:45 PM CST   Return Visit with LISA Mendieta CNP   [facility]    [address] [facility] [address] [phone]            Dec 01, 2017  2:00 PM CST   Level 2 with BAY 7 INFUSION   [facility])    [address] [address] [phone]            Dec 08, 2017  1:30 PM CST   LAB with LAB ONC [facility] ([facility])    [address] [facility] [address] [phone]           Please do not eat 10-12 hours before your appointment if you are coming in fasting for labs on lipids, cholesterol, or glucose (sugar). This does not apply to pregnant women. Water, hot tea and black coffee (with nothing added) are okay. Do not drink other fluids, diet soda or chew gum.            Dec 08, 2017  2:00 PM CST   Level 2 with BAY 4 INFUSION   [facility])    [address] [facility] [address] [phone]            Dec 14, 2017  2:00 PM CST   Level 2 with BAY 7 INFUSION   [facility])    [address] [facility] [address] [phone]              Who to contact     If you have questions or need follow up information about today's clinic visit or your schedule please contact [facility] directly at [phone].  Normal or non-critical lab and imaging results will be communicated to you by MyChart, letter or phone within 4 business days after the clinic has received  the results. If you do not hear from us within 7 days, please contact the clinic through AnyMeeting or phone. If you have a critical or abnormal lab result, we will notify you by phone as soon as possible.  Submit refill requests through AnyMeeting or call your pharmacy and they will forward the refill request to us. Please allow 3 business days for your refill to be completed.          Additional Information About Your Visit        AnyMeeting Information     AnyMeeting is an electronic gateway that provides easy, online access to your medical records. With AnyMeeting, you can request a clinic appointment, read your test results, renew a prescription or communicate with your care team.     To sign up for AnyMeeting visit the website at [email or web address]   You will be asked to enter the access code listed below, as well as some personal information. Please follow the directions to create your username and password.     Your access code is: [email or web address]  Expires: 2017  5:34 PM     Your access code will  in 90 days. If you need help or a new code, please contact your [facility] or call [phone] for assistance.        Care EveryWhere ID     This is your Care EveryWhere ID. This could be used by other organizations to access your [place] medical records  [id number]        Your Vitals Were     Pulse Temperature Respirations Pulse Oximetry BMI (Body Mass Index)       80 97.7  F (36.5  C) (Oral) 16 98% 25.29 kg/m2        Blood Pressure from Last 3 Encounters:   17 131/84   10/30/17 149/77   10/16/17 (!) 163/102    Weight from Last 3 Encounters:   17 57.7 kg (127 lb 4.8 oz)   10/30/17 57.1 kg (125 lb 12.8 oz)   10/16/17 59.9 kg (132 lb)              Today, you had the following     No orders found for display       Primary Care Provider Office Phone # Fax #    Pancho Cope [phone] [address] [facility] [address]        Equal  Access to Services     [facility]: Hadii feliciano suárez yee Sun, waarmandda luqadaha, qaybta kagonzalezcarrie barakat. So [facility] [phone].    ATENCIÓN: Si eduardla frances, tiene a ornelas disposición servicios gratuitos de asistencia lingüística. Llame al [phone].    We comply with applicable federal civil rights laws and [place] laws. We do not discriminate on the basis of race, color, national origin, age, disability, sex, sexual orientation, or gender identity.            Thank you!     Thank you for choosing [facility]  for your care. Our goal is always to provide you with excellent care. Hearing back from our patients is one way we can continue to improve our services. Please take a few minutes to complete the written survey that you may receive in the mail after your visit with us. Thank you!             Your Updated Medication List - Protect others around you: Learn how to safely use, store and throw away your medicines at www.disposemymeds.org.          This list is accurate as of: 11/3/17  4:21 PM.  Always use your most recent med list.                   Brand Name Dispense Instructions for use Diagnosis    amylase-lipase-protease [id number] UNITS Cpep per EC capsule    CREON    180 capsule    Take 1 capsule (24,000 Units) by mouth 3 times daily (with meals)    Pancreatic adenocarcinoma (H)       aspirin 325 MG EC tablet     100 tablet    Take 1 tablet by mouth daily.    High cholesterol       BIOTIN PO           latanoprost 0.005 % ophthalmic solution    XALATAN    3 Bottle    Place 1 drop into both eyes At Bedtime    Glaucoma suspect, bilateral       prochlorperazine 10 MG tablet    COMPAZINE    30 tablet    Take 0.5 tablets (5 mg) by mouth every 6 hours as needed (Nausea/Vomiting)    Pancreatic adenocarcinoma (H)       WOMENS 50+ MULTI VITAMIN/MIN PO

## 2017-11-07 NOTE — PROGRESS NOTES
Nutrition Services:     Met with Basilio today to introduce self and role as RD in oncology clinic.  Spoke with her ~1 week ago via phone from oncology distress screening - per her request to speak with RD.     Talya reports that she has lost ~35 lbs in the past 4 months. She expresses that she does not know what to eat. She has been drinking Ensure Original but it has been upsetting her stomach.    She questions what foods she should be avoiding and consuming. She has been avoiding sugary foods and soda.  She has been avoiding raw vegetables as they upset her stomach as well.       Intervention:    -Reviewed ways to maximize calories and protein via small frequent meals.  Suggested pt aim for at least 1500kcal and 60g protein/day.  Suggested she avoid fried foods, choose more cooked vegetables over raw and choose more fruits w/o skins (bananas, peeled apples, canned fruit, melon et).  Suggested she chew her foods to pureed consistency to ease digestion.     -Reviewed Creon 24,000 - dosing, administration, effectiveness etc.    She is currently taking 1 capsule with each meal.  She does feel as though they have been helping decrease her stomach pain.  She denies diarrhea or steatorrhea.      -Suggested pt try ProNourish ONS as alternative to Ensure.  ProNourish is FODMAP friendly and produces less gas/bloating in patients.  Provided pt with ProNourish samples and coupons.     Provided pt with RD contact information for further questions prn.     Karolina Ulrich, DANG, LD

## 2017-11-09 ENCOUNTER — CARE COORDINATION (OUTPATIENT)
Dept: ONCOLOGY | Facility: CLINIC | Age: 77
End: 2017-11-09

## 2017-11-09 NOTE — PROGRESS NOTES
Application for financial assistance completed by this writer; patient will  form on 11/1017- left a  reception.

## 2017-11-10 ENCOUNTER — INFUSION THERAPY VISIT (OUTPATIENT)
Dept: INFUSION THERAPY | Facility: CLINIC | Age: 77
End: 2017-11-10
Payer: COMMERCIAL

## 2017-11-10 VITALS
BODY MASS INDEX: 24.48 KG/M2 | TEMPERATURE: 97.2 F | SYSTOLIC BLOOD PRESSURE: 122 MMHG | OXYGEN SATURATION: 97 % | RESPIRATION RATE: 18 BRPM | DIASTOLIC BLOOD PRESSURE: 74 MMHG | WEIGHT: 123.2 LBS | HEART RATE: 74 BPM

## 2017-11-10 DIAGNOSIS — Z23 ENCOUNTER FOR IMMUNIZATION: ICD-10-CM

## 2017-11-10 DIAGNOSIS — C25.9 PANCREATIC ADENOCARCINOMA (H): ICD-10-CM

## 2017-11-10 DIAGNOSIS — C25.9 PANCREATIC ADENOCARCINOMA (H): Primary | ICD-10-CM

## 2017-11-10 LAB
BASOPHILS # BLD AUTO: 0 10E9/L (ref 0–0.2)
BASOPHILS NFR BLD AUTO: 0.2 %
DIFFERENTIAL METHOD BLD: ABNORMAL
EOSINOPHIL # BLD AUTO: 0 10E9/L (ref 0–0.7)
EOSINOPHIL NFR BLD AUTO: 0.7 %
ERYTHROCYTE [DISTWIDTH] IN BLOOD BY AUTOMATED COUNT: 16.2 % (ref 10–15)
HCT VFR BLD AUTO: 36.8 % (ref 35–47)
HGB BLD-MCNC: 13.1 G/DL (ref 11.7–15.7)
LYMPHOCYTES # BLD AUTO: 1.6 10E9/L (ref 0.8–5.3)
LYMPHOCYTES NFR BLD AUTO: 28.6 %
MCH RBC QN AUTO: 30.3 PG (ref 26.5–33)
MCHC RBC AUTO-ENTMCNC: 35.6 G/DL (ref 31.5–36.5)
MCV RBC AUTO: 85 FL (ref 78–100)
MONOCYTES # BLD AUTO: 0.3 10E9/L (ref 0–1.3)
MONOCYTES NFR BLD AUTO: 5.9 %
NEUTROPHILS # BLD AUTO: 3.7 10E9/L (ref 1.6–8.3)
NEUTROPHILS NFR BLD AUTO: 64.6 %
PLATELET # BLD AUTO: 166 10E9/L (ref 150–450)
RBC # BLD AUTO: 4.32 10E12/L (ref 3.8–5.2)
WBC # BLD AUTO: 5.7 10E9/L (ref 4–11)

## 2017-11-10 PROCEDURE — 36415 COLL VENOUS BLD VENIPUNCTURE: CPT | Performed by: INTERNAL MEDICINE

## 2017-11-10 PROCEDURE — 96367 TX/PROPH/DG ADDL SEQ IV INF: CPT | Performed by: INTERNAL MEDICINE

## 2017-11-10 PROCEDURE — 96413 CHEMO IV INFUSION 1 HR: CPT | Performed by: INTERNAL MEDICINE

## 2017-11-10 PROCEDURE — 99207 ZZC NO CHARGE LOS: CPT

## 2017-11-10 PROCEDURE — 85025 COMPLETE CBC W/AUTO DIFF WBC: CPT | Performed by: INTERNAL MEDICINE

## 2017-11-10 NOTE — PROGRESS NOTES
Infusion Nursing Note:  Talya KD Gatica presents today for [facility].    Patient seen by provider today: No   present during visit today: Not Applicable.    Note: N/A.    Intravenous Access:  Peripheral IV placed.    Treatment Conditions:  Lab Results   Component Value Date    HGB 13.1 11/10/2017     Lab Results   Component Value Date    WBC 5.7 11/10/2017      Lab Results   Component Value Date    ANEU 3.7 11/10/2017     Lab Results   Component Value Date     11/10/2017      Results reviewed, labs MET treatment parameters, ok to proceed with treatment.        Post Infusion Assessment:  Patient tolerated infusion without incident.  Site patent and intact, free from redness, edema or discomfort.  No evidence of extravasations.  Access discontinued per protocol.    Discharge Plan:   Discharge instructions reviewed with: Patient.  Patient and/or family verbalized understanding of discharge instructions and all questions answered.  Patient discharged in stable condition accompanied by: self.  Departure Mode: Ambulatory.    Ann Gomez RN

## 2017-11-10 NOTE — MR AVS SNAPSHOT
After Visit Summary   11/10/2017    Talya Gatica    MRN: [id number]           Patient Information     Date Of Birth          1940        Visit Information        Provider Department      11/10/2017 3:00 PM [place] [facility]        Today's Diagnoses     Pancreatic adenocarcinoma (H)    -  1       Follow-ups after your visit        Your next 10 appointments already scheduled     Nov 17, 2017  1:30 PM CST   LAB with LAB ONC [facility])    [address] [facility] [address] [phone]           Please do not eat 10-12 hours before your appointment if you are coming in fasting for labs on lipids, cholesterol, or glucose (sugar). This does not apply to pregnant women. Water, hot tea and black coffee (with nothing added) are okay. Do not drink other fluids, diet soda or chew gum.            Nov 17, 2017  2:00 PM CST   Level 2 with [address])    [address] [place] [address] [phone]            Dec 01, 2017 12:15 PM CST   LAB with LAB ONC [facility])    [address] [facility] [address] [phone]           Please do not eat 10-12 hours before your appointment if you are coming in fasting for labs on lipids, cholesterol, or glucose (sugar). This does not apply to pregnant women. Water, hot tea and black coffee (with nothing added) are okay. Do not drink other fluids, diet soda or chew gum.            Dec 01, 2017 12:45 PM CST   Return Visit with LISA Mendieta [facility])    [address] [facility] [address] [phone]            Dec 01, 2017  2:00 PM CST   Level 2 with [place] [facility])    [address] [place] [address]  [facility] [address]   [phone]            Dec 08, 2017  1:30 PM CST   LAB with LAB ONC [facility] ([facility])    [address] [facility] [address]   [phone]           Please do not eat 10-12 hours before your appointment if you are coming in fasting for labs on lipids, cholesterol, or glucose (sugar). This does not apply to pregnant women. Water, hot tea and black coffee (with nothing added) are okay. Do not drink other fluids, diet soda or chew gum.            Dec 08, 2017  2:00 PM CST   Level 2 with BAY 4 INFUSION   [facility])    [address] [facility] [address]   [phone]            Dec 14, 2017  1:30 PM CST   LAB with LAB ONC [facility] ([facility])    [address] [facility] [address] [phone]           Please do not eat 10-12 hours before your appointment if you are coming in fasting for labs on lipids, cholesterol, or glucose (sugar). This does not apply to pregnant women. Water, hot tea and black coffee (with nothing added) are okay. Do not drink other fluids, diet soda or chew gum.            Dec 14, 2017  2:00 PM CST   Level 2 with BAY 7 INFUSION   [facility])    [address] [facility] [address]   [phone]            Dec 22, 2017  2:00 PM CST   New Visit with Prateek Guillen MD   [facility])    [address] [facility] [address] [phone]              Who to contact     If you have questions or need follow up information about today's clinic visit or your schedule please contact [facility] directly at [phone].  Normal or non-critical lab and imaging results will be communicated to you by MyChart, letter or phone within 4 business days after the clinic has received  the results. If you do not hear from us within 7 days, please contact the clinic through Quantum Secure or phone. If you have a critical or abnormal lab result, we will notify you by phone as soon as possible.  Submit refill requests through Quantum Secure or call your pharmacy and they will forward the refill request to us. Please allow 3 business days for your refill to be completed.          Additional Information About Your Visit        Quantum Secure Information     Quantum Secure is an electronic gateway that provides easy, online access to your medical records. With Quantum Secure, you can request a clinic appointment, read your test results, renew a prescription or communicate with your care team.     To sign up for Quantum Secure visit the website at [email or web address]   You will be asked to enter the access code listed below, as well as some personal information. Please follow the directions to create your username and password.     Your access code is: [email or web address]  Expires: 2017  4:34 PM     Your access code will  in 90 days. If you need help or a new code, please contact your [facility] or call [phone] for assistance.        Care EveryWhere ID     This is your Care EveryWhere ID. This could be used by other organizations to access your [place] medical records  [id number]        Your Vitals Were     Pulse Temperature Respirations Pulse Oximetry BMI (Body Mass Index)       74 97.2  F (36.2  C) (Oral) 18 97% 24.48 kg/m2        Blood Pressure from Last 3 Encounters:   11/10/17 122/74   17 131/84   10/30/17 149/77    Weight from Last 3 Encounters:   11/10/17 55.9 kg (123 lb 3.2 oz)   17 57.7 kg (127 lb 4.8 oz)   10/30/17 57.1 kg (125 lb 12.8 oz)              Today, you had the following     No orders found for display       Primary Care Provider Office Phone # Fax #    Pancho Cope [phone] [address] [facility] [address]        Equal  Access to Services     [facility]: Hadii feliciano suárez yee Sun, waarmandda luqadaha, qaybta kagonzalezcarrie barakat. So [facility] [phone].    ATENCIÓN: Si eduardla frances, tiene a ornelas disposición servicios gratuitos de asistencia lingüística. Llame al [phone].    We comply with applicable federal civil rights laws and [place] laws. We do not discriminate on the basis of race, color, national origin, age, disability, sex, sexual orientation, or gender identity.            Thank you!     Thank you for choosing [facility]  for your care. Our goal is always to provide you with excellent care. Hearing back from our patients is one way we can continue to improve our services. Please take a few minutes to complete the written survey that you may receive in the mail after your visit with us. Thank you!             Your Updated Medication List - Protect others around you: Learn how to safely use, store and throw away your medicines at www.disposemymeds.org.          This list is accurate as of: 11/10/17  4:32 PM.  Always use your most recent med list.                   Brand Name Dispense Instructions for use Diagnosis    amylase-lipase-protease [address] UNITS Cpep per EC capsule    CREON    180 capsule    Take 1 capsule (24,000 Units) by mouth 3 times daily (with meals)    Pancreatic adenocarcinoma (H)       aspirin 325 MG EC tablet     100 tablet    Take 1 tablet by mouth daily.    High cholesterol       BIOTIN PO           latanoprost 0.005 % ophthalmic solution    XALATAN    3 Bottle    Place 1 drop into both eyes At Bedtime    Glaucoma suspect, bilateral       prochlorperazine 10 MG tablet    COMPAZINE    30 tablet    Take 0.5 tablets (5 mg) by mouth every 6 hours as needed (Nausea/Vomiting)    Pancreatic adenocarcinoma (H)       WOMENS 50+ MULTI VITAMIN/MIN PO

## 2017-11-17 ENCOUNTER — INFUSION THERAPY VISIT (OUTPATIENT)
Dept: INFUSION THERAPY | Facility: CLINIC | Age: 77
End: 2017-11-17
Payer: COMMERCIAL

## 2017-11-17 VITALS
HEART RATE: 71 BPM | SYSTOLIC BLOOD PRESSURE: 129 MMHG | OXYGEN SATURATION: 97 % | DIASTOLIC BLOOD PRESSURE: 69 MMHG | RESPIRATION RATE: 18 BRPM | TEMPERATURE: 97.1 F

## 2017-11-17 DIAGNOSIS — C25.9 PANCREATIC ADENOCARCINOMA (H): Primary | ICD-10-CM

## 2017-11-17 LAB
BASOPHILS # BLD AUTO: 0 10E9/L (ref 0–0.2)
BASOPHILS NFR BLD AUTO: 0.3 %
DIFFERENTIAL METHOD BLD: ABNORMAL
EOSINOPHIL # BLD AUTO: 0 10E9/L (ref 0–0.7)
EOSINOPHIL NFR BLD AUTO: 0.3 %
ERYTHROCYTE [DISTWIDTH] IN BLOOD BY AUTOMATED COUNT: 16.3 % (ref 10–15)
HCT VFR BLD AUTO: 34.2 % (ref 35–47)
HGB BLD-MCNC: 12.3 G/DL (ref 11.7–15.7)
LYMPHOCYTES # BLD AUTO: 1.1 10E9/L (ref 0.8–5.3)
LYMPHOCYTES NFR BLD AUTO: 28.3 %
MCH RBC QN AUTO: 30.4 PG (ref 26.5–33)
MCHC RBC AUTO-ENTMCNC: 36 G/DL (ref 31.5–36.5)
MCV RBC AUTO: 84 FL (ref 78–100)
MONOCYTES # BLD AUTO: 0.2 10E9/L (ref 0–1.3)
MONOCYTES NFR BLD AUTO: 5.7 %
NEUTROPHILS # BLD AUTO: 2.6 10E9/L (ref 1.6–8.3)
NEUTROPHILS NFR BLD AUTO: 65.4 %
PLATELET # BLD AUTO: 112 10E9/L (ref 150–450)
RBC # BLD AUTO: 4.05 10E12/L (ref 3.8–5.2)
WBC # BLD AUTO: 3.9 10E9/L (ref 4–11)

## 2017-11-17 PROCEDURE — 85025 COMPLETE CBC W/AUTO DIFF WBC: CPT | Performed by: INTERNAL MEDICINE

## 2017-11-17 PROCEDURE — 96413 CHEMO IV INFUSION 1 HR: CPT | Performed by: INTERNAL MEDICINE

## 2017-11-17 PROCEDURE — 99207 ZZC NO CHARGE LOS: CPT

## 2017-11-17 PROCEDURE — 36415 COLL VENOUS BLD VENIPUNCTURE: CPT | Performed by: INTERNAL MEDICINE

## 2017-11-17 PROCEDURE — 96367 TX/PROPH/DG ADDL SEQ IV INF: CPT | Performed by: INTERNAL MEDICINE

## 2017-11-17 RX ADMIN — Medication 250 ML: at 14:28

## 2017-11-17 ASSESSMENT — PAIN SCALES - GENERAL: PAINLEVEL: NO PAIN (0)

## 2017-11-17 NOTE — PROGRESS NOTES
Infusion Nursing Note:  Talya Gatica presents today for Gemzar.    Patient seen by provider today: No   present during visit today: Not Applicable.    Note: N/A.    Intravenous Access:  Peripheral IV placed.    Treatment Conditions:  Results reviewed, labs MET treatment parameters, ok to proceed with treatment.      Post Infusion Assessment:  Patient tolerated infusion without incident.    Discharge Plan:   RTC as scheduled 12/1 for NP and next cycle    FREDIS LINARES RN

## 2017-11-17 NOTE — MR AVS SNAPSHOT
After Visit Summary   11/17/2017    Talya Gatica    MRN: [id number]           Patient Information     Date Of Birth          1940        Visit Information        Provider Department      11/17/2017 2:00 PM [address]        Today's Diagnoses     Pancreatic adenocarcinoma (H)    -  1       Follow-ups after your visit        Your next 10 appointments already scheduled     Dec 01, 2017 12:15 PM CST   LAB with LAB ONC [facility])    [address] [place] [address]   [phone]           Please do not eat 10-12 hours before your appointment if you are coming in fasting for labs on lipids, cholesterol, or glucose (sugar). This does not apply to pregnant women. Water, hot tea and black coffee (with nothing added) are okay. Do not drink other fluids, diet soda or chew gum.            Dec 01, 2017 12:45 PM CST   Return Visit with LISA Mendieta [facility])    [address] [place] [address]   [phone]            Dec 01, 2017  2:00 PM CST   Level 2 with [address])    [address] [place] [address]   [phone]            Dec 08, 2017  1:30 PM CST   LAB with LAB ONC [facility])    [address] [place] [address]   [phone]           Please do not eat 10-12 hours before your appointment if you are coming in fasting for labs on lipids, cholesterol, or glucose (sugar). This does not apply to pregnant women. Water, hot tea and black coffee (with nothing added) are okay. Do not drink other fluids, diet soda or chew gum.            Dec 08, 2017  2:00 PM CST   Level 2 with [address] ([facility])    [address] [place] [address]  [facility] [address] [phone]            Dec 14, 2017  1:30 PM CST   LAB with LAB ONC [facility] ([facility])    [address] [place] [address] [phone]           Please do not eat 10-12 hours before your appointment if you are coming in fasting for labs on lipids, cholesterol, or glucose (sugar). This does not apply to pregnant women. Water, hot tea and black coffee (with nothing added) are okay. Do not drink other fluids, diet soda or chew gum.            Dec 14, 2017  2:00 PM CST   Level 2 with BAY 7 INFUSION   [facility] ([facility])    [address] [facility] [address] [phone]            Dec 22, 2017  2:00 PM CST   New Visit with Prateek Guillen MD   [facility] ([address] [address] [phone]              Who to contact     If you have questions or need follow up information about today's clinic visit or your schedule please contact [facility] directly at [phone].  Normal or non-critical lab and imaging results will be communicated to you by NXTMhart, letter or phone within 4 business days after the clinic has received the results. If you do not hear from us within 7 days, please contact the clinic through MyChart or phone. If you have a critical or abnormal lab result, we will notify you by phone as soon as possible.  Submit refill requests through Simple Tithe or call your pharmacy and they will forward the refill request to us. Please allow 3 business days for your refill to be completed.          Additional Information About Your Visit        Simple Tithe Information     Simple Tithe is an electronic gateway that provides easy, online access to your medical records. With Simple Tithe, you can request a clinic appointment, read your test results, renew a prescription or communicate with your care  team.     To sign up for Morgan Solart visit the website at [email or web address]   You will be asked to enter the access code listed below, as well as some personal information. Please follow the directions to create your username and password.     Your access code is: [email or web address]  Expires: 2017  4:34 PM     Your access code will  in 90 days. If you need help or a new code, please contact your [facility] or call [phone] for assistance.        Care EveryWhere ID     This is your Care EveryWhere ID. This could be used by other organizations to access your [place] medical records  [id number]        Your Vitals Were     Pulse Temperature Respirations Pulse Oximetry          71 97.1  F (36.2  C) (Oral) 18 97%         Blood Pressure from Last 3 Encounters:   17 129/69   11/10/17 122/74   17 131/84    Weight from Last 3 Encounters:   11/10/17 55.9 kg (123 lb 3.2 oz)   17 57.7 kg (127 lb 4.8 oz)   10/30/17 57.1 kg (125 lb 12.8 oz)              Today, you had the following     No orders found for display       Primary Care Provider Office Phone # Fax #    Pancho Cope [phone] [address] [facility] [address]        Equal Access to Services     [facility]: Hadii aad ku hadasho Soomaali, waaxda luqadaha, qaybta kaalmada adeegyada, carrie kaur . So [facility] [phone].    ATENCIÓN: Si habla español, tiene a ornelas disposición servicios gratuitos de asistencia lingüística. Llame al [phone].    We comply with applicable federal civil rights laws and [place] laws. We do not discriminate on the basis of race, color, national origin, age, disability, sex, sexual orientation, or gender identity.            Thank you!     Thank you for choosing [facility]  for your care. Our goal is always to provide you with excellent care. Hearing back from our patients is one way we can  continue to improve our services. Please take a few minutes to complete the written survey that you may receive in the mail after your visit with us. Thank you!             Your Updated Medication List - Protect others around you: Learn how to safely use, store and throw away your medicines at www.disposemymeds.org.          This list is accurate as of: 11/17/17 11:59 PM.  Always use your most recent med list.                   Brand Name Dispense Instructions for use Diagnosis    amylase-lipase-protease [id number] UNITS Cpep per EC capsule    CREON    180 capsule    Take 1 capsule (24,000 Units) by mouth 3 times daily (with meals)    Pancreatic adenocarcinoma (H)       aspirin 325 MG EC tablet     100 tablet    Take 1 tablet by mouth daily.    High cholesterol       BIOTIN PO           latanoprost 0.005 % ophthalmic solution    XALATAN    3 Bottle    Place 1 drop into both eyes At Bedtime    Glaucoma suspect, bilateral       prochlorperazine 10 MG tablet    COMPAZINE    30 tablet    Take 0.5 tablets (5 mg) by mouth every 6 hours as needed (Nausea/Vomiting)    Pancreatic adenocarcinoma (H)       WOMENS 50+ MULTI VITAMIN/MIN PO

## 2017-12-01 ENCOUNTER — ONCOLOGY VISIT (OUTPATIENT)
Dept: ONCOLOGY | Facility: CLINIC | Age: 77
End: 2017-12-01
Payer: COMMERCIAL

## 2017-12-01 ENCOUNTER — INFUSION THERAPY VISIT (OUTPATIENT)
Dept: INFUSION THERAPY | Facility: CLINIC | Age: 77
End: 2017-12-01
Payer: COMMERCIAL

## 2017-12-01 VITALS
HEART RATE: 64 BPM | TEMPERATURE: 97.8 F | RESPIRATION RATE: 18 BRPM | HEIGHT: 59 IN | DIASTOLIC BLOOD PRESSURE: 69 MMHG | BODY MASS INDEX: 24.19 KG/M2 | WEIGHT: 120 LBS | SYSTOLIC BLOOD PRESSURE: 148 MMHG | OXYGEN SATURATION: 98 %

## 2017-12-01 DIAGNOSIS — R53.83 FATIGUE, UNSPECIFIED TYPE: ICD-10-CM

## 2017-12-01 DIAGNOSIS — R63.4 LOSS OF WEIGHT: ICD-10-CM

## 2017-12-01 DIAGNOSIS — C25.9 PANCREATIC ADENOCARCINOMA (H): Primary | ICD-10-CM

## 2017-12-01 LAB
BASOPHILS # BLD AUTO: 0 10E9/L (ref 0–0.2)
BASOPHILS NFR BLD AUTO: 0.6 %
DIFFERENTIAL METHOD BLD: ABNORMAL
EOSINOPHIL # BLD AUTO: 0 10E9/L (ref 0–0.7)
EOSINOPHIL NFR BLD AUTO: 0.1 %
ERYTHROCYTE [DISTWIDTH] IN BLOOD BY AUTOMATED COUNT: 19.6 % (ref 10–15)
HCT VFR BLD AUTO: 36.9 % (ref 35–47)
HGB BLD-MCNC: 12.6 G/DL (ref 11.7–15.7)
IMM GRANULOCYTES # BLD: 0 10E9/L (ref 0–0.4)
IMM GRANULOCYTES NFR BLD: 0.3 %
LYMPHOCYTES # BLD AUTO: 1.4 10E9/L (ref 0.8–5.3)
LYMPHOCYTES NFR BLD AUTO: 19.8 %
MCH RBC QN AUTO: 30.1 PG (ref 26.5–33)
MCHC RBC AUTO-ENTMCNC: 34.1 G/DL (ref 31.5–36.5)
MCV RBC AUTO: 88 FL (ref 78–100)
MONOCYTES # BLD AUTO: 1 10E9/L (ref 0–1.3)
MONOCYTES NFR BLD AUTO: 14.3 %
NEUTROPHILS # BLD AUTO: 4.7 10E9/L (ref 1.6–8.3)
NEUTROPHILS NFR BLD AUTO: 64.9 %
PLATELET # BLD AUTO: 541 10E9/L (ref 150–450)
RBC # BLD AUTO: 4.19 10E12/L (ref 3.8–5.2)
WBC # BLD AUTO: 7.3 10E9/L (ref 4–11)

## 2017-12-01 PROCEDURE — 99207 ZZC NO CHARGE LOS: CPT

## 2017-12-01 PROCEDURE — 99214 OFFICE O/P EST MOD 30 MIN: CPT | Mod: 25 | Performed by: NURSE PRACTITIONER

## 2017-12-01 PROCEDURE — 85025 COMPLETE CBC W/AUTO DIFF WBC: CPT | Performed by: INTERNAL MEDICINE

## 2017-12-01 PROCEDURE — 96367 TX/PROPH/DG ADDL SEQ IV INF: CPT | Performed by: NURSE PRACTITIONER

## 2017-12-01 PROCEDURE — 96413 CHEMO IV INFUSION 1 HR: CPT | Performed by: NURSE PRACTITIONER

## 2017-12-01 PROCEDURE — 36415 COLL VENOUS BLD VENIPUNCTURE: CPT | Performed by: INTERNAL MEDICINE

## 2017-12-01 RX ADMIN — Medication 250 ML: at 14:43

## 2017-12-01 ASSESSMENT — PAIN SCALES - GENERAL: PAINLEVEL: MODERATE PAIN (4)

## 2017-12-01 NOTE — MR AVS SNAPSHOT
After Visit Summary   12/1/2017    Talya Gatica    MRN: [id number]           Patient Information     Date Of Birth          1940        Visit Information        Provider Department      12/1/2017 2:00 PM [address]        Today's Diagnoses     Pancreatic adenocarcinoma (H)    -  1       Follow-ups after your visit        Your next 10 appointments already scheduled     Dec 08, 2017  1:30 PM CST   LAB with LAB ONC [facility])    [address] [facility] [address] [phone]           Please do not eat 10-12 hours before your appointment if you are coming in fasting for labs on lipids, cholesterol, or glucose (sugar). This does not apply to pregnant women. Water, hot tea and black coffee (with nothing added) are okay. Do not drink other fluids, diet soda or chew gum.            Dec 08, 2017  2:00 PM CST   Level 2 with [address] ([email or web address]])    [address] [facility] [address]   [phone]            Dec 14, 2017  1:30 PM CST   LAB with LAB ONC [facility] ([email or web address]    [address] [facility] [address] [phone]           Please do not eat 10-12 hours before your appointment if you are coming in fasting for labs on lipids, cholesterol, or glucose (sugar). This does not apply to pregnant women. Water, hot tea and black coffee (with nothing added) are okay. Do not drink other fluids, diet soda or chew gum.            Dec 14, 2017  2:00 PM CST   Level 2 with [address] ([facility] [[email or web address]])    [address] [facility] [address]   [phone]            Dec 22, 2017  2:00 PM CST   New Visit with Prateek Guillen MD   [facility] [place] ([facility])    [address] [place] [address]  [place]  [address] [phone]              Who to contact     If you have questions or need follow up information about today's clinic visit or your schedule please contact [facility] directly at [phone].  Normal or non-critical lab and imaging results will be communicated to you by MyChart, letter or phone within 4 business days after the clinic has received the results. If you do not hear from us within 7 days, please contact the clinic through MyChart or phone. If you have a critical or abnormal lab result, we will notify you by phone as soon as possible.  Submit refill requests through Pomelo or call your pharmacy and they will forward the refill request to us. Please allow 3 business days for your refill to be completed.          Additional Information About Your Visit        Pomelo Information     Pomelo is an electronic gateway that provides easy, online access to your medical records. With Pomelo, you can request a clinic appointment, read your test results, renew a prescription or communicate with your care team.     To sign up for Pomelo visit the website at [email or web address]   You will be asked to enter the access code listed below, as well as some personal information. Please follow the directions to create your username and password.     Your access code is: [email or web address]  Expires: 2017  4:34 PM     Your access code will  in 90 days. If you need help or a new code, please contact your [facility] or call [phone] for assistance.        Care EveryWhere ID     This is your Care EveryWhere ID. This could be used by other organizations to access your [place] medical records  [id number]         Blood Pressure from Last 3 Encounters:   17 148/69   17 129/69   11/10/17 122/74    Weight from Last 3 Encounters:   17 54.4 kg (120 lb)   11/10/17 55.9 kg (123 lb 3.2 oz)   17 57.7 kg (127 lb 4.8 oz)               Today, you had the following     No orders found for display       Primary Care Provider Office Phone # Fax #    Pancho Cope [phone] [address] [facility] [address]        Equal Access to Services     JOCELINE JORDAN : Hadii feliciano ku jaxo Soomaali, waaxda luqadaha, qaybta kaalmada adeegyada, carrie tellon patriciaterence pickens laPennyanu wheatley. So [facility] [phone].    ATENCIÓN: Si habla español, tiene a ornelas disposición servicios gratuitos de asistencia lingüística. Llame al [phone].    We comply with applicable federal civil rights laws and [place] laws. We do not discriminate on the basis of race, color, national origin, age, disability, sex, sexual orientation, or gender identity.            Thank you!     Thank you for choosing [facility]  for your care. Our goal is always to provide you with excellent care. Hearing back from our patients is one way we can continue to improve our services. Please take a few minutes to complete the written survey that you may receive in the mail after your visit with us. Thank you!             Your Updated Medication List - Protect others around you: Learn how to safely use, store and throw away your medicines at www.disposemymeds.org.          This list is accurate as of: 12/1/17  4:17 PM.  Always use your most recent med list.                   Brand Name Dispense Instructions for use Diagnosis    amylase-lipase-protease [id number] UNITS Cpep per EC capsule    CREON    180 capsule    Take 1 capsule (24,000 Units) by mouth 3 times daily (with meals)    Pancreatic adenocarcinoma (H)       aspirin 325 MG EC tablet     100 tablet    Take 1 tablet by mouth daily.    High cholesterol       BIOTIN PO           latanoprost 0.005 % ophthalmic solution    XALATAN    3 Bottle    Place 1 drop into both eyes At Bedtime    Glaucoma suspect, bilateral       prochlorperazine 10 MG tablet    COMPAZINE    30 tablet    Take 0.5 tablets (5  mg) by mouth every 6 hours as needed (Nausea/Vomiting)    Pancreatic adenocarcinoma (H)       WOMENS 50+ MULTI VITAMIN/MIN PO

## 2017-12-01 NOTE — NURSING NOTE
"Oncology Rooming Note    December 1, 2017 12:34 PM   Talya Gatica is a 77 year old female who presents for:    Chief Complaint   Patient presents with     Oncology Clinic Visit     prior to tx     Initial Vitals: There were no vitals taken for this visit. Estimated body mass index is 24.48 kg/(m^2) as calculated from the following:    Height as of 10/16/17: 1.511 m (4' 11.49\").    Weight as of 11/10/17: 55.9 kg (123 lb 3.2 oz). There is no height or weight on file to calculate BSA.  Data Unavailable Comment: Data Unavailable   No LMP recorded. Patient is postmenopausal.  Allergies reviewed: Yes  Medications reviewed: Yes    Medications: Medication refills not needed today.  [facility] into Jobspot[id number] [place], [place] - [address] [place] [address] NE AT [facility]    Clinical concerns:    8 minutes for nursing intake (face to face time)     ASHA HAMMOND LPN              "

## 2017-12-01 NOTE — LETTER
12/1/2017         RE: Talya Gatica  [address]   [place] [address]        Dear Colleague,    Thank you for referring your patient, Talya Gatica, to the [facility]. Please see a copy of my visit note below.    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  Treatment:   11/3/2017 began treatment with Gemzar- days 1,8,15 of 28 day plan    Interval History: Ms. Sanchez comes to clinic for review prior to cycle 2 of Gemzar plan. States she tolerated first cycle well with some hair thinning and continuation of intermittent abdominal pain- ranked 4/10- not currently on pain medication- states she feels better after bowel movement. Denies nausea, SOB, chest pain, fevers, weakness or neuropathy. Can feel tired and is continuing to loose weight though is eating better after dietician consult- surprised she has lost weight. Bowels are variable- feels this is better since start of creon. Sleeping well and having energy to get out and around.Daughter is staying with her for support.   Rest of comprehensive and complete ROS is reviewed and is negative.   Past Medical History:   Diagnosis Date     AR (allergic rhinitis)      Baker's cyst      DJD (degenerative joint disease)      Elevated cholesterol      Glaucoma      Menopause      Vertigo      Current Outpatient Prescriptions   Medication     prochlorperazine (COMPAZINE) 10 MG tablet     amylase-lipase-protease (CREON) 80442-71065 UNITS CPEP per EC capsule     BIOTIN PO     latanoprost (XALATAN) 0.005 % ophthalmic solution     Multiple Vitamins-Minerals (WOMENS 50+ MULTI VITAMIN/MIN PO)     aspirin  "325 MG EC tablet     No current facility-administered medications for this visit.      Allergies   Allergen Reactions     Codeine Camsylate        Physical Exam:/69  Pulse 64  Temp 97.8  F (36.6  C) (Oral)  Resp 18  Ht 1.511 m (4' 11.49\")  Wt 54.4 kg (120 lb)  SpO2 98%  BMI 23.84 kg/m2   ECOG PS-1   Constitutional: Alert, moving well with cane for support and in no distress.   ENT: Eyes bright, No mouth sores  Neck: Supple, No adenopathy.Thyroid symmetric  Cardiac: Heart rate and rhythm is regular and strong with possible mild aortic murmur  Respiratory: Breathing easy. Lung sounds clear to auscultation  GI: Abdomen is soft, non-tender, BS normal. No masses or organomegaly  MS: Muscle tone fair- uses cane for support but is steady on feet.- able to get to exam table with mild assistance, extremities normal with no edema.   Skin: No suspicious lesions or rashes  Neuro: Sensory grossly WNL, gait normal.   Lymph: Normal ant/post cervical, axillary, supraclavicular nodes  Psych: Mentation appears normal and affect normal/bright and smiling    Laboratory Results:   Results for orders placed or performed in visit on 12/01/17   CBC with platelets differential   Result Value Ref Range    WBC 7.3 4.0 - 11.0 10e9/L    RBC Count 4.19 3.8 - 5.2 10e12/L    Hemoglobin 12.6 11.7 - 15.7 g/dL    Hematocrit 36.9 35.0 - 47.0 %    MCV 88 78 - 100 fl    MCH 30.1 26.5 - 33.0 pg    MCHC 34.1 31.5 - 36.5 g/dL    RDW 19.6 (H) 10.0 - 15.0 %    Platelet Count 541 (H) 150 - 450 10e9/L    Diff Method Automated Method     % Neutrophils 64.9 %    % Lymphocytes 19.8 %    % Monocytes 14.3 %    % Eosinophils 0.1 %    % Basophils 0.6 %    % Immature Granulocytes 0.3 %    Absolute Neutrophil 4.7 1.6 - 8.3 10e9/L    Absolute Lymphocytes 1.4 0.8 - 5.3 10e9/L    Absolute Monocytes 1.0 0.0 - 1.3 10e9/L    Absolute Eosinophils 0.0 0.0 - 0.7 10e9/L    Absolute Basophils 0.0 0.0 - 0.2 10e9/L    Abs Immature Granulocytes 0.0 0 - 0.4 10e9/L "     Assessment and Plan:   Stage IV Pancreatic cancer-Pt had diagnosis in late summer and did not have quick escalation of symptoms toward death as expected but has continued with good performance status. She was started on treatment with Gemzar on days 1,8, and 15 of 28 day plan on 11/3/2017 and appears to have tolerated first cycle well with hair thinning and some fatigue. Though she continues with weight loss she has good intake of food and fluids with good energy- has seen dietician for ideas on good nutrition and boosting intake.   She meets goals to continue with cycle 2 of the Gemzar plan.   Encouraged her to stay active and let us know of any fevers or new symptoms.   She will return for day 8 and 15 infusions and will see Provider prior to cycle 3 with treatment labs- will add CMP at that time.   Can plan on CT CAP imaging after cycle 3 or before with new symptoms  This was a 30 min visit with > 50% in counseling and coordinating care including education and management of concerns.    Nai Simmons CNP      Again, thank you for allowing me to participate in the care of your patient.        Sincerely,        Nai Simmons, NP, APRN CNP

## 2017-12-01 NOTE — PROGRESS NOTES
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  Treatment:   11/3/2017 began treatment with Gemzar- days 1,8,15 of 28 day plan    Interval History: Ms. Sanchez comes to clinic for review prior to cycle 2 of Gemzar plan. States she tolerated first cycle well with some hair thinning and continuation of intermittent abdominal pain- ranked 4/10- not currently on pain medication- states she feels better after bowel movement. Denies nausea, SOB, chest pain, fevers, weakness or neuropathy. Can feel tired and is continuing to loose weight though is eating better after dietician consult- surprised she has lost weight. Bowels are variable- feels this is better since start of creon. Sleeping well and having energy to get out and around.Daughter is staying with her for support.   Rest of comprehensive and complete ROS is reviewed and is negative.   Past Medical History:   Diagnosis Date     AR (allergic rhinitis)      Baker's cyst      DJD (degenerative joint disease)      Elevated cholesterol      Glaucoma      Menopause      Vertigo      Current Outpatient Prescriptions   Medication     prochlorperazine (COMPAZINE) 10 MG tablet     amylase-lipase-protease (CREON) [id number] UNITS CPEP per EC capsule     BIOTIN PO     latanoprost (XALATAN) 0.005 % ophthalmic solution     Multiple Vitamins-Minerals (WOMENS 50+ MULTI VITAMIN/MIN PO)     aspirin 325 MG EC tablet     No current facility-administered medications for this visit.      Allergies   Allergen Reactions     Codeine Camsylate        Physical Exam:/69  Pulse 64  Temp 97.8  F (36.6  C) (Oral)  Resp 18  Ht 1.511 m (4' 11.49\")  Wt " 54.4 kg (120 lb)  SpO2 98%  BMI 23.84 kg/m2   ECOG PS-1   Constitutional: Alert, moving well with cane for support and in no distress.   ENT: Eyes bright, No mouth sores  Neck: Supple, No adenopathy.Thyroid symmetric  Cardiac: Heart rate and rhythm is regular and strong with possible mild aortic murmur  Respiratory: Breathing easy. Lung sounds clear to auscultation  GI: Abdomen is soft, non-tender, BS normal. No masses or organomegaly  MS: Muscle tone fair- uses cane for support but is steady on feet.- able to get to exam table with mild assistance, extremities normal with no edema.   Skin: No suspicious lesions or rashes  Neuro: Sensory grossly WNL, gait normal.   Lymph: Normal ant/post cervical, axillary, supraclavicular nodes  Psych: Mentation appears normal and affect normal/bright and smiling    Laboratory Results:   Results for orders placed or performed in visit on 12/01/17   CBC with platelets differential   Result Value Ref Range    WBC 7.3 4.0 - 11.0 10e9/L    RBC Count 4.19 3.8 - 5.2 10e12/L    Hemoglobin 12.6 11.7 - 15.7 g/dL    Hematocrit 36.9 35.0 - 47.0 %    MCV 88 78 - 100 fl    MCH 30.1 26.5 - 33.0 pg    MCHC 34.1 31.5 - 36.5 g/dL    RDW 19.6 (H) 10.0 - 15.0 %    Platelet Count 541 (H) 150 - 450 10e9/L    Diff Method Automated Method     % Neutrophils 64.9 %    % Lymphocytes 19.8 %    % Monocytes 14.3 %    % Eosinophils 0.1 %    % Basophils 0.6 %    % Immature Granulocytes 0.3 %    Absolute Neutrophil 4.7 1.6 - 8.3 10e9/L    Absolute Lymphocytes 1.4 0.8 - 5.3 10e9/L    Absolute Monocytes 1.0 0.0 - 1.3 10e9/L    Absolute Eosinophils 0.0 0.0 - 0.7 10e9/L    Absolute Basophils 0.0 0.0 - 0.2 10e9/L    Abs Immature Granulocytes 0.0 0 - 0.4 10e9/L     Assessment and Plan:   Stage IV Pancreatic cancer-Pt had diagnosis in late summer and did not have quick escalation of symptoms toward death as expected but has continued with good performance status. She was started on treatment with Gemzar on days 1,8,  and 15 of 28 day plan on 11/3/2017 and appears to have tolerated first cycle well with hair thinning and some fatigue. Though she continues with weight loss she has good intake of food and fluids with good energy- has seen dietician for ideas on good nutrition and boosting intake.   She meets goals to continue with cycle 2 of the Gemzar plan.   Encouraged her to stay active and let us know of any fevers or new symptoms.   She will return for day 8 and 15 infusions and will see Provider prior to cycle 3 with treatment labs- will add CMP at that time.   Can plan on CT CAP imaging after cycle 3 or before with new symptoms  This was a 30 min visit with > 50% in counseling and coordinating care including education and management of concerns.    Nai Simmons,CNP

## 2017-12-01 NOTE — PROGRESS NOTES
Infusion Nursing Note:  Talya Gatica presents today for C2D1 Gemzar.    Patient seen by provider today: Yes: Nai Simmons NP   present during visit today: Not Applicable.    Note: N/A.    Intravenous Access:  Peripheral IV placed.    Treatment Conditions:  Lab Results   Component Value Date    HGB 12.6 12/01/2017     Lab Results   Component Value Date    WBC 7.3 12/01/2017      Lab Results   Component Value Date    ANEU 4.7 12/01/2017     Lab Results   Component Value Date     12/01/2017      Lab Results   Component Value Date     10/30/2017                   Lab Results   Component Value Date    POTASSIUM 3.5 10/30/2017           No results found for: MAG         Lab Results   Component Value Date    CR 0.40 10/30/2017                   Lab Results   Component Value Date    BERNARDO 9.1 10/30/2017                Lab Results   Component Value Date    BILITOTAL 0.6 10/30/2017           Lab Results   Component Value Date    ALBUMIN 3.7 10/30/2017                    Lab Results   Component Value Date    ALT 38 10/30/2017           Lab Results   Component Value Date    AST 29 10/30/2017     Results reviewed, labs MET treatment parameters, ok to proceed with treatment.    Post Infusion Assessment:  Patient tolerated infusion without incident.  Blood return noted pre and post infusion.  Site patent and intact, free from redness, edema or discomfort.  Access discontinued per protocol.    Discharge Plan:   Copy of AVS reviewed with patient and/or family.  Patient will return 12/8/17 for next appointment.  Patient discharged in stable condition accompanied by: daughter.  Departure Mode: Ambulatory.    Angie Gordon RN

## 2017-12-01 NOTE — MR AVS SNAPSHOT
After Visit Summary   12/1/2017    Talya Gatica    MRN: [id number]           Patient Information     Date Of Birth          1940        Visit Information        Provider Department      12/1/2017 12:45 PM Nai Simmons APRN [facility]        Today's Diagnoses     Pancreatic adenocarcinoma (H)    -  1    Loss of weight        Fatigue, unspecified type           Follow-ups after your visit        Your next 10 appointments already scheduled     Dec 08, 2017  1:30 PM CST   LAB with LAB ONC [facility])    [address] [place] [address]   [phone]           Please do not eat 10-12 hours before your appointment if you are coming in fasting for labs on lipids, cholesterol, or glucose (sugar). This does not apply to pregnant women. Water, hot tea and black coffee (with nothing added) are okay. Do not drink other fluids, diet soda or chew gum.            Dec 08, 2017  2:00 PM CST   Level 2 with [place] [facility])    [address] [place] [address]   [phone]            Dec 14, 2017  1:30 PM CST   LAB with LAB ONC [facility])    [id number] [place] [id number] [phone]           Please do not eat 10-12 hours before your appointment if you are coming in fasting for labs on lipids, cholesterol, or glucose (sugar). This does not apply to pregnant women. Water, hot tea and black coffee (with nothing added) are okay. Do not drink other fluids, diet soda or chew gum.            Dec 14, 2017  2:00 PM CST   Level 2 with [place] [facility])    [address] [place] [address]   [phone]            Dec 22, 2017  2:00 PM CST   New Visit with Prateek Guillen MD   [facility]  ([facility])    [address] [facility] [address]   [phone]            Dec 29, 2017 12:15 PM CST   LAB with LAB ONC [facility] ([facility])    [address] [place] [id number] [phone]           Please do not eat 10-12 hours before your appointment if you are coming in fasting for labs on lipids, cholesterol, or glucose (sugar). This does not apply to pregnant women. Water, hot tea and black coffee (with nothing added) are okay. Do not drink other fluids, diet soda or chew gum.            Dec 29, 2017 12:45 PM CST   Return Visit with LISA Mendieta CNP   [facility] ([facility])    [address] [place] [id number] [phone]            Dec 29, 2017  2:00 PM CST   Level 2 with [place] [facility]   [facility])    [address] [place] [id number] [phone]            Jan 05, 2018  8:30 AM CST   LAB with LAB ONC [facility] ([facility])    [address] [place] [id number] [phone]           Please do not eat 10-12 hours before your appointment if you are coming in fasting for labs on lipids, cholesterol, or glucose (sugar). This does not apply to pregnant women. Water, hot tea and black coffee (with nothing added) are okay. Do not drink other fluids, diet soda or chew gum.            Jan 05, 2018  9:00 AM CST   Level 2 with [place] [facility])    [address] [place] [phone]              Who to contact     If you have questions or need follow up information about today's clinic visit or your schedule please contact [facility] directly at [phone].  Normal or non-critical lab and imaging results will be communicated to you by [employer]  "letter or phone within 4 business days after the clinic has received the results. If you do not hear from us within 7 days, please contact the clinic through [employer] or phone. If you have a critical or abnormal lab result, we will notify you by phone as soon as possible.  Submit refill requests through [employer] or call your pharmacy and they will forward the refill request to us. Please allow 3 business days for your refill to be completed.          Additional Information About Your Visit        [employer] Information     [employer] is an electronic gateway that provides easy, online access to your medical records. With [employer], you can request a clinic appointment, read your test results, renew a prescription or communicate with your care team.     To sign up for [employer] visit the website at [email or web address]   You will be asked to enter the access code listed below, as well as some personal information. Please follow the directions to create your username and password.     Your access code is: [email or web address]  Expires: 2017  4:34 PM     Your access code will  in 90 days. If you need help or a new code, please contact your [facility] or call [phone] for assistance.        Care EveryWhere ID     This is your Care EveryWhere ID. This could be used by other organizations to access your [place] medical records  [id number]        Your Vitals Were     Pulse Temperature Respirations Height Pulse Oximetry BMI (Body Mass Index)    64 97.8  F (36.6  C) (Oral) 18 1.511 m (4' 11.49\") 98% 23.84 kg/m2       Blood Pressure from Last 3 Encounters:   17 148/69   17 129/69   11/10/17 122/74    Weight from Last 3 Encounters:   17 54.4 kg (120 lb)   11/10/17 55.9 kg (123 lb 3.2 oz)   17 57.7 kg (127 lb 4.8 oz)              Today, you had the following     No orders found for display       Primary Care Provider Office Phone # Fax #    Pancho Cope MD " [phone]       [address] [facility] [address]        Equal Access to Services     JOCELINE JORDAN : Hadii aad ku hadwong Sun, waarmandda luvaldemar, qaybta kagonzalezda patricio, carrie wilson patriciaterence pickens laPennyanu dioni. So [facility] [phone].    ATENCIÓN: Si habla español, tiene a ornelas disposición servicios gratuitos de asistencia lingüística. Llame al [phone].    We comply with applicable federal civil rights laws and [place] laws. We do not discriminate on the basis of race, color, national origin, age, disability, sex, sexual orientation, or gender identity.            Thank you!     Thank you for choosing [facility]  for your care. Our goal is always to provide you with excellent care. Hearing back from our patients is one way we can continue to improve our services. Please take a few minutes to complete the written survey that you may receive in the mail after your visit with us. Thank you!             Your Updated Medication List - Protect others around you: Learn how to safely use, store and throw away your medicines at www.disposemymeds.org.          This list is accurate as of: 12/1/17 11:59 PM.  Always use your most recent med list.                   Brand Name Dispense Instructions for use Diagnosis    amylase-lipase-protease 13778-03808 UNITS Cpep per EC capsule    CREON    180 capsule    Take 1 capsule (24,000 Units) by mouth 3 times daily (with meals)    Pancreatic adenocarcinoma (H)       aspirin 325 MG EC tablet     100 tablet    Take 1 tablet by mouth daily.    High cholesterol       BIOTIN PO           latanoprost 0.005 % ophthalmic solution    XALATAN    3 Bottle    Place 1 drop into both eyes At Bedtime    Glaucoma suspect, bilateral       prochlorperazine 10 MG tablet    COMPAZINE    30 tablet    Take 0.5 tablets (5 mg) by mouth every 6 hours as needed (Nausea/Vomiting)    Pancreatic adenocarcinoma (H)       WOMENS 50+ MULTI VITAMIN/MIN PO

## 2017-12-08 ENCOUNTER — INFUSION THERAPY VISIT (OUTPATIENT)
Dept: INFUSION THERAPY | Facility: CLINIC | Age: 77
End: 2017-12-08
Payer: COMMERCIAL

## 2017-12-08 VITALS
OXYGEN SATURATION: 100 % | DIASTOLIC BLOOD PRESSURE: 77 MMHG | HEART RATE: 78 BPM | BODY MASS INDEX: 23.7 KG/M2 | TEMPERATURE: 97.3 F | WEIGHT: 119.3 LBS | SYSTOLIC BLOOD PRESSURE: 121 MMHG | RESPIRATION RATE: 18 BRPM

## 2017-12-08 DIAGNOSIS — C25.9 PANCREATIC ADENOCARCINOMA (H): Primary | ICD-10-CM

## 2017-12-08 LAB
BASOPHILS # BLD AUTO: 0 10E9/L (ref 0–0.2)
BASOPHILS NFR BLD AUTO: 0.2 %
DIFFERENTIAL METHOD BLD: ABNORMAL
EOSINOPHIL # BLD AUTO: 0 10E9/L (ref 0–0.7)
EOSINOPHIL NFR BLD AUTO: 0.2 %
ERYTHROCYTE [DISTWIDTH] IN BLOOD BY AUTOMATED COUNT: 18.2 % (ref 10–15)
HCT VFR BLD AUTO: 34 % (ref 35–47)
HGB BLD-MCNC: 11.6 G/DL (ref 11.7–15.7)
IMM GRANULOCYTES # BLD: 0 10E9/L (ref 0–0.4)
IMM GRANULOCYTES NFR BLD: 0.8 %
LYMPHOCYTES # BLD AUTO: 1.7 10E9/L (ref 0.8–5.3)
LYMPHOCYTES NFR BLD AUTO: 32.5 %
MCH RBC QN AUTO: 30.1 PG (ref 26.5–33)
MCHC RBC AUTO-ENTMCNC: 34.1 G/DL (ref 31.5–36.5)
MCV RBC AUTO: 88 FL (ref 78–100)
MONOCYTES # BLD AUTO: 0.5 10E9/L (ref 0–1.3)
MONOCYTES NFR BLD AUTO: 10 %
NEUTROPHILS # BLD AUTO: 3 10E9/L (ref 1.6–8.3)
NEUTROPHILS NFR BLD AUTO: 56.3 %
PLATELET # BLD AUTO: 443 10E9/L (ref 150–450)
RBC # BLD AUTO: 3.86 10E12/L (ref 3.8–5.2)
WBC # BLD AUTO: 5.3 10E9/L (ref 4–11)

## 2017-12-08 PROCEDURE — 96413 CHEMO IV INFUSION 1 HR: CPT | Performed by: NURSE PRACTITIONER

## 2017-12-08 PROCEDURE — 99207 ZZC NO CHARGE LOS: CPT

## 2017-12-08 PROCEDURE — 96375 TX/PRO/DX INJ NEW DRUG ADDON: CPT | Performed by: NURSE PRACTITIONER

## 2017-12-08 PROCEDURE — 36415 COLL VENOUS BLD VENIPUNCTURE: CPT | Performed by: INTERNAL MEDICINE

## 2017-12-08 PROCEDURE — 85025 COMPLETE CBC W/AUTO DIFF WBC: CPT | Performed by: INTERNAL MEDICINE

## 2017-12-08 RX ADMIN — Medication 250 ML: at 14:39

## 2017-12-08 NOTE — MR AVS SNAPSHOT
After Visit Summary   12/8/2017    Talya Gatica    MRN: [id number]           Patient Information     Date Of Birth          1940        Visit Information        Provider Department      12/8/2017 2:00 PM [place] [facility]        Today's Diagnoses     Pancreatic adenocarcinoma (H)    -  1       Follow-ups after your visit        Your next 10 appointments already scheduled     Dec 14, 2017  1:30 PM CST   LAB with LAB ONC [facility] ([facility])    [address] [place] [id number] [phone]           Please do not eat 10-12 hours before your appointment if you are coming in fasting for labs on lipids, cholesterol, or glucose (sugar). This does not apply to pregnant women. Water, hot tea and black coffee (with nothing added) are okay. Do not drink other fluids, diet soda or chew gum.            Dec 14, 2017  2:00 PM CST   Level 2 with [address])    [address] [place] [address]   [phone]            Dec 22, 2017  2:00 PM CST   New Visit with Prateek Guillen MD   [facility] ([address] [address] [phone]            Dec 29, 2017 12:15 PM CST   LAB with LAB ONC [facility] ([facility])    [address] [place] [id number] [phone]           Please do not eat 10-12 hours before your appointment if you are coming in fasting for labs on lipids, cholesterol, or glucose (sugar). This does not apply to pregnant women. Water, hot tea and black coffee (with nothing added) are okay. Do not drink other fluids, diet soda or chew gum.            Dec 29, 2017 12:45 PM CST   Return Visit with LISA [facility])    [address] [place] [address]  [facility] [address]   [phone]            Dec 29, 2017  2:00 PM CST   Level 2 with [place] 5 INFUSION   [facility] ([facility])    [address] [facility] [address]   [phone]            Jan 05, 2018  8:30 AM CST   LAB with LAB ONC [facility] ([facility])    [address] [facility] [address]   [phone]           Please do not eat 10-12 hours before your appointment if you are coming in fasting for labs on lipids, cholesterol, or glucose (sugar). This does not apply to pregnant women. Water, hot tea and black coffee (with nothing added) are okay. Do not drink other fluids, diet soda or chew gum.            Jan 05, 2018  9:00 AM CST   Level 2 with BAY 9 INFUSION   [facility] ([facility])    [address] [facility] [address]   [phone]            Jan 12, 2018  9:00 AM CST   LAB with LAB ONC [facility] ([facility])    [address] [facility] [address]   [phone]           Please do not eat 10-12 hours before your appointment if you are coming in fasting for labs on lipids, cholesterol, or glucose (sugar). This does not apply to pregnant women. Water, hot tea and black coffee (with nothing added) are okay. Do not drink other fluids, diet soda or chew gum.            Jan 12, 2018  9:30 AM CST   Level 2 with [place] [facility] ([facility])    [address] [facility] [address]   [phone]              Who to contact     If you have questions or need follow up information about today's clinic visit or your schedule please contact [facility] directly at [phone].  Normal or non-critical lab and imaging results will be communicated to you by MyChart, letter or phone within 4 business days after the clinic has  received the results. If you do not hear from us within 7 days, please contact the clinic through [employer] ([employer]) or phone. If you have a critical or abnormal lab result, we will notify you by phone as soon as possible.  Submit refill requests through [employer]) or call your pharmacy and they will forward the refill request to us. Please allow 3 business days for your refill to be completed.          Additional Information About Your Visit        [employer] Information     [employer] is an electronic gateway that provides easy, online access to your medical records. With [employer]), you can request a clinic appointment, read your test results, renew a prescription or communicate with your care team.     To sign up for [employer] visit the website at [email or web address]   You will be asked to enter the access code listed below, as well as some personal information. Please follow the directions to create your username and password.     Your access code is: [email or web address]  Expires: 2017  4:34 PM     Your access code will  in 90 days. If you need help or a new code, please contact your [facility] or call [phone] for assistance.        Care EveryWhere ID     This is your Care EveryWhere ID. This could be used by other organizations to access your [facility] medical records  [id number]        Your Vitals Were     Pulse Temperature Respirations Pulse Oximetry BMI (Body Mass Index)       78 97.3  F (36.3  C) (Oral) 18 100% 23.7 kg/m2        Blood Pressure from Last 3 Encounters:   17 121/77   17 148/69   17 129/69    Weight from Last 3 Encounters:   17 54.1 kg (119 lb 4.8 oz)   17 54.4 kg (120 lb)   11/10/17 55.9 kg (123 lb 3.2 oz)              Today, you had the following     No orders found for display         Today's Medication Changes          These changes are accurate as of: 17 11:59 PM.  If you have any questions, ask your nurse or doctor.                Stop taking these medicines if you haven't already. Please contact your care team if you have questions.     aspirin 325 MG EC tablet                    Primary Care Provider Office Phone # Fax #    Pancho Cope [phone] [address] [facility] [address]        Equal Access to Services     JOCELINE JORDAN : Hadii aad ku hadasho Soomaali, waaxda luqadaha, qaybta kaalmada adeterenceyada, waxay andres elishaana rosa coynenaveeneufemia wheatley. So [facility] [phone].    ATENCIÓN: Si habla español, tiene a ornelas disposición servicios gratuitos de asistencia lingüística. Ll[facility] [phone].    We comply with applicable federal civil rights laws and [place] laws. We do not discriminate on the basis of race, color, national origin, age, disability, sex, sexual orientation, or gender identity.            Thank you!     Thank you for choosing [facility]  for your care. Our goal is always to provide you with excellent care. Hearing back from our patients is one way we can continue to improve our services. Please take a few minutes to complete the written survey that you may receive in the mail after your visit with us. Thank you!             Your Updated Medication List - Protect others around you: Learn how to safely use, store and throw away your medicines at www.disposemymeds.org.          This list is accurate as of: 12/8/17 11:59 PM.  Always use your most recent med list.                   Brand Name Dispense Instructions for use Diagnosis    amylase-lipase-protease [id number] UNITS Cpep per EC capsule    CREON    180 capsule    Take 1 capsule (24,000 Units) by mouth 3 times daily (with meals)    Pancreatic adenocarcinoma (H)       BIOTIN PO           latanoprost 0.005 % ophthalmic solution    XALATAN    3 Bottle    Place 1 drop into both eyes At Bedtime    Glaucoma suspect, bilateral       prochlorperazine 10 MG tablet    COMPAZINE    30 tablet    Take 0.5 tablets (5 mg) by mouth  every 6 hours as needed (Nausea/Vomiting)    Pancreatic adenocarcinoma (H)       WOMENS 50+ MULTI VITAMIN/MIN PO

## 2017-12-08 NOTE — PROGRESS NOTES
Infusion Nursing Note:  Talya Gatica presents today for C2 D8 Gemzar.    Patient seen by provider today: No   present during visit today: Not Applicable.    Note: N/A.    Intravenous Access:  Peripheral IV placed.    Treatment Conditions:  Lab Results   Component Value Date    HGB 11.6 12/08/2017     Lab Results   Component Value Date    WBC 5.3 12/08/2017      Lab Results   Component Value Date    ANEU 3.0 12/08/2017     Lab Results   Component Value Date     12/08/2017      Lab Results   Component Value Date     10/30/2017                   Lab Results   Component Value Date    POTASSIUM 3.5 10/30/2017           No results found for: MAG         Lab Results   Component Value Date    CR 0.40 10/30/2017                   Lab Results   Component Value Date    BERNARDO 9.1 10/30/2017                Lab Results   Component Value Date    BILITOTAL 0.6 10/30/2017           Lab Results   Component Value Date    ALBUMIN 3.7 10/30/2017                    Lab Results   Component Value Date    ALT 38 10/30/2017           Lab Results   Component Value Date    AST 29 10/30/2017     Results reviewed, labs MET treatment parameters, ok to proceed with treatment.          Post Infusion Assessment:  Patient tolerated infusion without incident.  Site patent and intact, free from redness, edema or discomfort.  Access discontinued per protocol.    Discharge Plan:   Patient will return 12/14/17 for next appointment.   Patient discharged in stable condition accompanied by: daughter.  Departure Mode: lorie.    Madalyn Hughes RN

## 2017-12-14 ENCOUNTER — INFUSION THERAPY VISIT (OUTPATIENT)
Dept: INFUSION THERAPY | Facility: CLINIC | Age: 77
End: 2017-12-14
Payer: COMMERCIAL

## 2017-12-14 VITALS
DIASTOLIC BLOOD PRESSURE: 74 MMHG | SYSTOLIC BLOOD PRESSURE: 125 MMHG | OXYGEN SATURATION: 98 % | TEMPERATURE: 97.2 F | WEIGHT: 119.1 LBS | RESPIRATION RATE: 16 BRPM | HEART RATE: 69 BPM | BODY MASS INDEX: 23.66 KG/M2

## 2017-12-14 DIAGNOSIS — C25.9 PANCREATIC ADENOCARCINOMA (H): Primary | ICD-10-CM

## 2017-12-14 LAB
BASOPHILS # BLD AUTO: 0 10E9/L (ref 0–0.2)
BASOPHILS NFR BLD AUTO: 0.4 %
DIFFERENTIAL METHOD BLD: ABNORMAL
EOSINOPHIL # BLD AUTO: 0 10E9/L (ref 0–0.7)
EOSINOPHIL NFR BLD AUTO: 0.4 %
ERYTHROCYTE [DISTWIDTH] IN BLOOD BY AUTOMATED COUNT: 18.2 % (ref 10–15)
HCT VFR BLD AUTO: 34 % (ref 35–47)
HGB BLD-MCNC: 11.8 G/DL (ref 11.7–15.7)
IMM GRANULOCYTES # BLD: 0 10E9/L (ref 0–0.4)
IMM GRANULOCYTES NFR BLD: 0.4 %
LYMPHOCYTES # BLD AUTO: 1.2 10E9/L (ref 0.8–5.3)
LYMPHOCYTES NFR BLD AUTO: 48.2 %
MCH RBC QN AUTO: 30.8 PG (ref 26.5–33)
MCHC RBC AUTO-ENTMCNC: 34.7 G/DL (ref 31.5–36.5)
MCV RBC AUTO: 89 FL (ref 78–100)
MONOCYTES # BLD AUTO: 0.2 10E9/L (ref 0–1.3)
MONOCYTES NFR BLD AUTO: 8.1 %
NEUTROPHILS # BLD AUTO: 1.1 10E9/L (ref 1.6–8.3)
NEUTROPHILS NFR BLD AUTO: 42.5 %
PLATELET # BLD AUTO: 194 10E9/L (ref 150–450)
RBC # BLD AUTO: 3.83 10E12/L (ref 3.8–5.2)
WBC # BLD AUTO: 2.5 10E9/L (ref 4–11)

## 2017-12-14 PROCEDURE — 36415 COLL VENOUS BLD VENIPUNCTURE: CPT | Performed by: INTERNAL MEDICINE

## 2017-12-14 PROCEDURE — 96375 TX/PRO/DX INJ NEW DRUG ADDON: CPT | Performed by: NURSE PRACTITIONER

## 2017-12-14 PROCEDURE — 99207 ZZC NO CHARGE LOS: CPT

## 2017-12-14 PROCEDURE — 85025 COMPLETE CBC W/AUTO DIFF WBC: CPT | Performed by: INTERNAL MEDICINE

## 2017-12-14 PROCEDURE — 96413 CHEMO IV INFUSION 1 HR: CPT | Performed by: NURSE PRACTITIONER

## 2017-12-14 RX ADMIN — Medication 250 ML: at 14:49

## 2017-12-14 NOTE — PATIENT INSTRUCTIONS
[place] triage: [phone]    [place] main line: [phone]    Call triage with chills and/or temperature greater than or equal to 100.5, uncontrolled nausea/vomiting, diarrhea, constipation, dizziness, shortness of breath, chest pain, bleeding, unexplained bruising, or any new/concerning symptoms, questions/concerns.     If you are having any concerning symptoms or wish to speak to a provider before your next infusion visit, please call your care coordinator or triage to notify them so we can adequately serve you.     After Hours: [phone]    If after hours, weekends, or holidays, call main hospital  and ask for Oncology doctor on call.           December 2017 Sunday Monday Tuesday Wednesday Thursday Friday Saturday 1     LAB   12:15 PM   (15 min.)   LAB ONC [facility]     RETURN   12:45 PM   (60 min.)   Nai Simmons APRN CNP   [facility]     LEVEL 2    2:00 PM   (120 min.)   [place] [address] INFUSION   [facility] 2       3     4     5     6     7     8     LAB    1:30 PM   (15 min.)   LAB ONC [facility]     LEVEL 2    2:00 PM   (120 min.)   [place] [address] [facility] 9       10     11     12     13     14     LAB    1:30 PM   (15 min.)   LAB ONC [facility]     LEVEL 2    2:00 PM   (120 min.)   [place] [address] [facility] 15     16       17     18     19     20     21     22     NEW    2:00 PM   (15 min.)   Prateek Guillen MD   [facility] 23       24     25     26     27     28     29     LAB   12:15 PM   (15 min.)   LAB ONC [facility]     RETURN   12:45 PM   (60 min.)   Nai Simmons APRN CNP   [facility]     LEVEL 2    2:00 PM   (120 min.)   [address] 30 31 January  2018   Sunday Monday Tuesday Wednesday Thursday Friday Saturday        1     2     3     4     5     LAB   12:00 PM   (15 min.)   LAB ONC [facility]     LEVEL 2   12:30 PM   (120 min.)   [facility] INFUSION   [facility] 6       7     8     9     10     11     12     LAB    2:00 PM   (15 min.)   LAB ONC [facility]     LEVEL 2    2:30 PM   (120 min.)   [address] 13       14     15     16     17     18     19     20       21     22     23     24     25     26     27       28     29     30     31                                Recent Results (from the past 24 hour(s))   CBC with platelets differential    Collection Time: 12/14/17  1:27 PM   Result Value Ref Range    WBC 2.5 (L) 4.0 - 11.0 10e9/L    RBC Count 3.83 3.8 - 5.2 10e12/L    Hemoglobin 11.8 11.7 - 15.7 g/dL    Hematocrit 34.0 (L) 35.0 - 47.0 %    MCV 89 78 - 100 fl    MCH 30.8 26.5 - 33.0 pg    MCHC 34.7 31.5 - 36.5 g/dL    RDW 18.2 (H) 10.0 - 15.0 %    Platelet Count 194 150 - 450 10e9/L    Diff Method Automated Method     % Neutrophils 42.5 %    % Lymphocytes 48.2 %    % Monocytes 8.1 %    % Eosinophils 0.4 %    % Basophils 0.4 %    % Immature Granulocytes 0.4 %    Absolute Neutrophil 1.1 (L) 1.6 - 8.3 10e9/L    Absolute Lymphocytes 1.2 0.8 - 5.3 10e9/L    Absolute Monocytes 0.2 0.0 - 1.3 10e9/L    Absolute Eosinophils 0.0 0.0 - 0.7 10e9/L    Absolute Basophils 0.0 0.0 - 0.2 10e9/L    Abs Immature Granulocytes 0.0 0 - 0.4 10e9/L

## 2017-12-14 NOTE — PROGRESS NOTES
Infusion Nursing Note:  Talya Gatica presents today for Cycle 2 Day 15 Gemzar.    Patient seen by provider today: No   present during visit today: Not Applicable.    Note: .    Intravenous Access:  Peripheral IV placed.    Treatment Conditions:  Lab Results   Component Value Date    HGB 11.8 12/14/2017     Lab Results   Component Value Date    WBC 2.5 12/14/2017      Lab Results   Component Value Date    ANEU 1.1  OK to proceed. CATALINA Serna NP/Cassandra Hernandes RN 12/14/2017     Lab Results   Component Value Date     12/14/2017      Results reviewed, labs did NOT meet treatment parameters: Discussed with CATALINA Simmons NP see TORB in Treatement plan OK to proceed.    Reminded to pt to call with temp of 100.5, chills, etc.     Post Infusion Assessment:  Patient tolerated infusion without incident.  No evidence of extravasations.  Access discontinued per protocol.    Discharge Plan:   Copy of AVS reviewed with patient and/or family.  Patient will return 12/29 for next appointment.  Patient discharged in stable condition accompanied by: self.  .    Cassandra Hernandes RN

## 2017-12-14 NOTE — MR AVS SNAPSHOT
After Visit Summary   12/14/2017    Talya Gatica    MRN: [id number]           Patient Information     Date Of Birth          1940        Visit Information        Provider Department      12/14/2017 2:00 PM [address]        Today's Diagnoses     Pancreatic adenocarcinoma (H)    -  1      Care Instructions    [place] triage: [phone]    [place] main line: [phone]    Call triage with chills and/or temperature greater than or equal to 100.5, uncontrolled nausea/vomiting, diarrhea, constipation, dizziness, shortness of breath, chest pain, bleeding, unexplained bruising, or any new/concerning symptoms, questions/concerns.     If you are having any concerning symptoms or wish to speak to a provider before your next infusion visit, please call your care coordinator or triage to notify them so we can adequately serve you.     After Hours: [phone]    If after hours, weekends, or holidays, call main hospital  and ask for Oncology doctor on call.           December 2017 Sunday Monday Tuesday Wednesday Thursday Friday Saturday                            1     LAB   12:15 PM   (15 min.)   LAB ONC [facility]     RETURN   12:45 PM   (60 min.)   Nai Simmons, LISA [facility]     LEVEL 2    2:00 PM   (120 min.)   [address] 2       3     4     5     6     7     8     LAB    1:30 PM   (15 min.)   LAB ONC [facility]     LEVEL 2    2:00 PM   (120 min.)   [address] 9       10     11     12     13     14     LAB    1:30 PM   (15 min.)   LAB ONC [facility]     LEVEL 2    2:00 PM   (120 min.)   [address] 15     16       17     18     19     20     21     22     NEW    2:00 PM   (15 min.)   Prateek Guillen MD   [place] [address]        24     25     26     27     28     29     LAB   12:15 PM   (15 min.)   LAB ONC [facility]     RETURN   12:45 PM   (60 min.)   Nai Simmons, LISA [facility]     LEVEL 2    2:00 PM   (120 min.)   [address] 30 31 January 2018 Sunday Monday Tuesday Wednesday Thursday Friday Saturday        1     2     3     4     5     LAB   12:00 PM   (15 min.)   LAB ONC [facility] 2   12:30 PM   (120 min.)   [address] 6       7     8     9     10     11     12     LAB    2:00 PM   (15 min.)   LAB ONC [facility] 2    2:30 PM   (120 min.)   [address] 13       14     15     16     17     18     19     20       21     22     23     24     25     26     27       28     29     30     31                                Recent Results (from the past 24 hour(s))   CBC with platelets differential    Collection Time: 12/14/17  1:27 PM   Result Value Ref Range    WBC 2.5 (L) 4.0 - 11.0 10e9/L    RBC Count 3.83 3.8 - 5.2 10e12/L    Hemoglobin 11.8 11.7 - 15.7 g/dL    Hematocrit 34.0 (L) 35.0 - 47.0 %    MCV 89 78 - 100 fl    MCH 30.8 26.5 - 33.0 pg    MCHC 34.7 31.5 - 36.5 g/dL    RDW 18.2 (H) 10.0 - 15.0 %    Platelet Count 194 150 - 450 10e9/L    Diff Method Automated Method     % Neutrophils 42.5 %    % Lymphocytes 48.2 %    % Monocytes 8.1 %    % Eosinophils 0.4 %    % Basophils 0.4 %    % Immature Granulocytes 0.4 %    Absolute Neutrophil 1.1 (L) 1.6 - 8.3 10e9/L    Absolute Lymphocytes 1.2 0.8 - 5.3 10e9/L    Absolute Monocytes 0.2 0.0 - 1.3 10e9/L    Absolute Eosinophils 0.0 0.0 - 0.7 10e9/L    Absolute Basophils 0.0 0.0 - 0.2 10e9/L    Abs Immature Granulocytes 0.0 0 - 0.4 10e9/L                 Follow-ups after your visit        Your next 10 appointments  already scheduled     Dec 22, 2017  2:00 PM CST   New Visit [facility]ateek [facility]illen [facility]   [facility]    [address] [facility]  [place] [address]   [phone]            Dec 29, 2017 12:15 PM CST   LAB with LAB ONC [facility] ([facility])    [address] [facility] [address] [phone]           Please do not eat 10-12 hours before your appointment if you are coming in fasting for labs on lipids, cholesterol, or glucose (sugar). This does not apply to pregnant women. Water, hot tea and black coffee (with nothing added) are okay. Do not drink other fluids, diet soda or chew gum.            Dec 29, 2017 12:45 PM CST   Return Visit with LISA Mendieta [facility] ([facility])    [address] [facility] [address] [phone]            Dec 29, 2017  2:00 PM CST   Level 2 with [place] [address] [facility])    [address] [facility] [address] [phone]            Jan 05, 2018 12:00 PM CST   LAB with LAB ONC [facility] ([facility])    [address] [facility] [address] [phone]           Please do not eat 10-12 hours before your appointment if you are coming in fasting for labs on lipids, cholesterol, or glucose (sugar). This does not apply to pregnant women. Water, hot tea and black coffee (with nothing added) are okay. Do not drink other fluids, diet soda or chew gum.            Jan 05, 2018 12:30 PM CST   Level 2 with [place] [address] [facility] ([facility])    [address] [facility] [address] [phone]            Jan 12, 2018  2:00 PM CST   LAB with LAB ONC [facility] ([facility])    [address] [facility] [address]    [phone]           Please do not eat 10-12 hours before your appointment if you are coming in fasting for labs on lipids, cholesterol, or glucose (sugar). This does not apply to pregnant women. Water, hot tea and black coffee (with nothing added) are okay. Do not drink other fluids, diet soda or chew gum.            2018  2:30 PM CST   Level 2 with BAY 8 INFUSION   [facility] ([facility])    [address] [place] [address] [phone]              Who to contact     If you have questions or need follow up information about today's clinic visit or your schedule please contact [facility] directly at [phone].  Normal or non-critical lab and imaging results will be communicated to you by Slackhart, letter or phone within 4 business days after the clinic has received the results. If you do not hear from us within 7 days, please contact the clinic through Slackhart or phone. If you have a critical or abnormal lab result, we will notify you by phone as soon as possible.  Submit refill requests through Mango-Mate or call your pharmacy and they will forward the refill request to us. Please allow 3 business days for your refill to be completed.          Additional Information About Your Visit        Mango-Mate Information     Mango-Mate is an electronic gateway that provides easy, online access to your medical records. With Mango-Mate, you can request a clinic appointment, read your test results, renew a prescription or communicate with your care team.     To sign up for Mango-Mate visit the website at [email or web address]   You will be asked to enter the access code listed below, as well as some personal information. Please follow the directions to create your username and password.     Your access code is: [email or web address]  Expires: 2017  4:34 PM     Your access code will  in 90 days. If you need help or a new code, please contact your  [facility] or call [phone] for assistance.        Care EveryWhere ID     This is your Care EveryWhere ID. This could be used by other organizations to access your [place] medical records  [id number]        Your Vitals Were     Pulse Temperature Respirations Pulse Oximetry BMI (Body Mass Index)       69 97.2  F (36.2  C) (Oral) 16 98% 23.66 kg/m2        Blood Pressure from Last 3 Encounters:   12/14/17 125/74   12/08/17 121/77   12/01/17 148/69    Weight from Last 3 Encounters:   12/14/17 54 kg (119 lb 1.6 oz)   12/08/17 54.1 kg (119 lb 4.8 oz)   12/01/17 54.4 kg (120 lb)              Today, you had the following     No orders found for display       Primary Care Provider Office Phone # Fax #    Pancho Cope [phone] [address] [facility] [address]        Equal Access to Services     JOCELINE JORDAN : Hadii aad ku hadasho Soomaali, waaxda luqadaha, qaybta kaalmada adeegyada, waxay idiin hayaan adeeg kharaeufemia la'moen . So [facility] [phone].    ATENCIÓN: Si habla español, tiene a ornelas disposición servicios gratuitos de asistencia lingüística. Llame al [phone].    We comply with applicable federal civil rights laws and [place] laws. We do not discriminate on the basis of race, color, national origin, age, disability, sex, sexual orientation, or gender identity.            Thank you!     Thank you for choosing [facility]  for your care. Our goal is always to provide you with excellent care. Hearing back from our patients is one way we can continue to improve our services. Please take a few minutes to complete the written survey that you may receive in the mail after your visit with us. Thank you!             Your Updated Medication List - Protect others around you: Learn how to safely use, store and throw away your medicines at www.disposemymeds.org.          This list is accurate as of: 12/14/17  3:41 PM.  Always use your  most recent med list.                   Brand Name Dispense Instructions for use Diagnosis    amylase-lipase-protease [id number] UNITS Cpep per EC capsule    CREON    180 capsule    Take 1 capsule (24,000 Units) by mouth 3 times daily (with meals)    Pancreatic adenocarcinoma (H)       BIOTIN PO           latanoprost 0.005 % ophthalmic solution    XALATAN    3 Bottle    Place 1 drop into both eyes At Bedtime    Glaucoma suspect, bilateral       prochlorperazine 10 MG tablet    COMPAZINE    30 tablet    Take 0.5 tablets (5 mg) by mouth every 6 hours as needed (Nausea/Vomiting)    Pancreatic adenocarcinoma (H)       WOMENS 50+ MULTI VITAMIN/MIN PO

## 2017-12-22 ENCOUNTER — OFFICE VISIT (OUTPATIENT)
Dept: OPHTHALMOLOGY | Facility: CLINIC | Age: 77
End: 2017-12-22
Payer: COMMERCIAL

## 2017-12-22 DIAGNOSIS — H02.831 DERMATOCHALASIS OF BOTH UPPER EYELIDS: ICD-10-CM

## 2017-12-22 DIAGNOSIS — H40.003 GLAUCOMA SUSPECT, BILATERAL: ICD-10-CM

## 2017-12-22 DIAGNOSIS — H52.4 PRESBYOPIA: ICD-10-CM

## 2017-12-22 DIAGNOSIS — Z96.1 PSEUDOPHAKIA: Primary | ICD-10-CM

## 2017-12-22 DIAGNOSIS — H02.834 DERMATOCHALASIS OF BOTH UPPER EYELIDS: ICD-10-CM

## 2017-12-22 PROCEDURE — 92015 DETERMINE REFRACTIVE STATE: CPT | Performed by: OPHTHALMOLOGY

## 2017-12-22 PROCEDURE — 92014 COMPRE OPH EXAM EST PT 1/>: CPT | Performed by: OPHTHALMOLOGY

## 2017-12-22 ASSESSMENT — VISUAL ACUITY
METHOD: SNELLEN - LINEAR
CORRECTION_TYPE: GLASSES
OS_CC: J1
OD_CC: J1
OS_CC: 20/25+3

## 2017-12-22 ASSESSMENT — REFRACTION_WEARINGRX
SPECS_TYPE: BIFOCAL
OD_AXIS: 005
OD_CYLINDER: +1.50
OS_SPHERE: -1.75
OS_ADD: +2.75
OD_ADD: +2.75
OD_SPHERE: -1.50
OS_AXIS: 006
OS_CYLINDER: +1.75

## 2017-12-22 ASSESSMENT — REFRACTION_MANIFEST
OS_CYLINDER: +1.50
OD_SPHERE: -1.75
OS_ADD: +2.75
OD_CYLINDER: +1.75
OS_SPHERE: -1.50
OD_ADD: +2.75
OD_AXIS: 010
OS_AXIS: 005

## 2017-12-22 ASSESSMENT — TONOMETRY
OD_IOP_MMHG: 16
IOP_METHOD: APPLANATION
OS_IOP_MMHG: 15

## 2017-12-22 ASSESSMENT — CUP TO DISC RATIO
OS_RATIO: 0.6
OD_RATIO: 0.6

## 2017-12-22 ASSESSMENT — CONF VISUAL FIELD
OS_NORMAL: 1
OD_NORMAL: 1

## 2017-12-22 ASSESSMENT — EXTERNAL EXAM - LEFT EYE: OS_EXAM: PROLAPSED FAT PADS: UPPER, LOWER

## 2017-12-22 ASSESSMENT — EXTERNAL EXAM - RIGHT EYE: OD_EXAM: PROLAPSED FAT PADS: UPPER, LOWER

## 2017-12-22 NOTE — PROGRESS NOTES
Current Eye Medications:  Latanoprost nightly both eyes.     Subjective:  Comprehensive eye exam.   Pt reports she is seeing pretty good and her eyes seem otherwise fine to her . Pt states she has been diagnosed with incurable panreatic cancer.    On chemo protocol - tolerable side effects so far.     Objective:  See Ophthalmology Exam.       Assessment:  Stable intraocular pressure and discs in patient who is a treated glaucoma suspect.      ICD-10-CM    1. Pseudophakia, ou Z96.1 EYE EXAM (SIMPLE-NONBILLABLE)   2. Glaucoma suspect, bilateral - treated H40.003    3. Dermatochalasis of both upper eyelids H02.831     H02.834    4. Presbyopia H52.4 REFRACTIVE STATUS        Plan:  Continue same medication.  Possible clouding of posterior capsule discussed.   Possible posterior vitreous detachment (sudden onset large floater and/or flashing lights) discussed.   Continue same glasses.  Return visit 6 months for intraocular pressure check.    Prateek Guillen M.D.

## 2017-12-22 NOTE — PATIENT INSTRUCTIONS
Continue same medication.  Possible clouding of posterior capsule discussed.   Possible posterior vitreous detachment (sudden onset large floater and/or flashing lights) discussed.   Continue same glasses.  Return visit 6 months for intraocular pressure check.    Prateek Guillen M.D.

## 2017-12-22 NOTE — MR AVS SNAPSHOT
After Visit Summary   12/22/2017    Talya Gatica    MRN: [id number]           Patient Information     Date Of Birth          1940        Visit Information        Provider Department      12/22/2017 2:00 PM Prateek Guillen MD [facility]        Today's Diagnoses     Presbyopia    -  1    Dermatochalasis of both upper eyelids        Glaucoma suspect, bilateral        Pseudophakia, ou          Care Instructions    Continue same medication.  Possible clouding of posterior capsule discussed.   Possible posterior vitreous detachment (sudden onset large floater and/or flashing lights) discussed.   Continue same glasses.  Return visit 6 months for intraocular pressure check.    Prateek Guillen M.D.            Follow-ups after your visit        Your next 10 appointments already scheduled     Dec 29, 2017 12:15 PM CST   LAB with LAB ONC [facility])    [address] [place] [id number] [phone]           Please do not eat 10-12 hours before your appointment if you are coming in fasting for labs on lipids, cholesterol, or glucose (sugar). This does not apply to pregnant women. Water, hot tea and black coffee (with nothing added) are okay. Do not drink other fluids, diet soda or chew gum.            Dec 29, 2017 12:45 PM CST   Return Visit with LISA Mendieta [facility])    [address] [place] [address] [phone]            Dec 29, 2017  2:00 PM CST   Level 2 with [place] 5 [facility])    [address] [place] [address] [phone]            Jan 05, 2018 12:00 PM CST   LAB with LAB ONC [facility])    [address] [place] [id number] [phone]           Please do not eat 10-12 hours  before your appointment if you are coming in fasting for labs on lipids, cholesterol, or glucose (sugar). This does not apply to pregnant women. Water, hot tea and black coffee (with nothing added) are okay. Do not drink other fluids, diet soda or chew gum.            Jan 05, 2018 12:30 PM CST   Level 2 with BAY 7 INFUSION   [facility] ([facility])    [address] [place] [address] [phone]            Jan 12, 2018  2:00 PM CST   LAB with LAB ONC [facility] ([facility])    [address] [place] [id number] [phone]           Please do not eat 10-12 hours before your appointment if you are coming in fasting for labs on lipids, cholesterol, or glucose (sugar). This does not apply to pregnant women. Water, hot tea and black coffee (with nothing added) are okay. Do not drink other fluids, diet soda or chew gum.            Jan 12, 2018  2:30 PM CST   Level 2 with [place] 8 INFUSION   [facility] ([facility])    [address] [place] [address] [phone]              Who to contact     If you have questions or need follow up information about today's clinic visit or your schedule please contact [facility] directly at [phone].  Normal or non-critical lab and imaging results will be communicated to you by MyChart, letter or phone within 4 business days after the clinic has received the results. If you do not hear from us within 7 days, please contact the clinic through Firebasehart or phone. If you have a critical or abnormal lab result, we will notify you by phone as soon as possible.  Submit refill requests through orderTopia or call your pharmacy and they will forward the refill request to us. Please allow 3 business days for your refill to be completed.          Additional Information About Your Visit        MyChart Information     Stratus5t  "lets you send messages to your doctor, view your test results, renew your prescriptions, schedule appointments and more. To sign up, go to [email or web address] . Click on \"Log in\" on the left side of the screen, which will take you to the Welcome page. Then click on \"Sign up Now\" on the right side of the page.     You will be asked to enter the access code listed below, as well as some personal information. Please follow the directions to create your username and password.     Your access code is: [email or web address]  Expires: 2017  4:34 PM     Your access code will  in 90 days. If you need help or a new code, please call your [place] clinic or [phone].        Care EveryWhere ID     This is your Care EveryWhere ID. This could be used by other organizations to access your [place] medical records  [id number]         Blood Pressure from Last 3 Encounters:   17 125/74   17 121/77   17 148/69    Weight from Last 3 Encounters:   17 54 kg (119 lb 1.6 oz)   17 54.1 kg (119 lb 4.8 oz)   17 54.4 kg (120 lb)              Today, you had the following     No orders found for display       Primary Care Provider Office Phone # Fax #    Pancho Rl Cope [phone] [address] [facility] [address]        Equal Access to Services     [facility] Center[facility] AH: Hadii aad ku hadasho Soomaali, waaxda luqadaha, qaybta kaalmada adeegyada, waxay idiin haymoen kristi kaur . So [facility] [phone].    ATENCIÓN: Si habla español, tiene a ornelas disposición servicios gratuitos de asistencia lingüística. Llame al [phone].    We comply with applicable federal civil rights laws and [place] laws. We do not discriminate on the basis of race, color, national origin, age, disability, sex, sexual orientation, or gender identity.            Thank you!     Thank you for choosing [facility]  for your care. Our goal is always to provide you with excellent " care. Hearing back from our patients is one way we can continue to improve our services. Please take a few minutes to complete the written survey that you may receive in the mail after your visit with us. Thank you!             Your Updated Medication List - Protect others around you: Learn how to safely use, store and throw away your medicines at www.disposemymeds.org.          This list is accurate as of: 12/22/17  3:02 PM.  Always use your most recent med list.                   Brand Name Dispense Instructions for use Diagnosis    amylase-lipase-protease [id number] UNITS Cpep per EC capsule    CREON    180 capsule    Take 1 capsule (24,000 Units) by mouth 3 times daily (with meals)    Pancreatic adenocarcinoma (H)       BIOTIN PO           latanoprost 0.005 % ophthalmic solution    XALATAN    3 Bottle    Place 1 drop into both eyes At Bedtime    Glaucoma suspect, bilateral       prochlorperazine 10 MG tablet    COMPAZINE    30 tablet    Take 0.5 tablets (5 mg) by mouth every 6 hours as needed (Nausea/Vomiting)    Pancreatic adenocarcinoma (H)       WOMENS 50+ MULTI VITAMIN/MIN PO

## 2017-12-29 ENCOUNTER — ONCOLOGY VISIT (OUTPATIENT)
Dept: ONCOLOGY | Facility: CLINIC | Age: 77
End: 2017-12-29
Payer: COMMERCIAL

## 2017-12-29 ENCOUNTER — INFUSION THERAPY VISIT (OUTPATIENT)
Dept: INFUSION THERAPY | Facility: CLINIC | Age: 77
End: 2017-12-29
Payer: COMMERCIAL

## 2017-12-29 VITALS
OXYGEN SATURATION: 99 % | HEART RATE: 76 BPM | HEIGHT: 59 IN | DIASTOLIC BLOOD PRESSURE: 66 MMHG | RESPIRATION RATE: 20 BRPM | WEIGHT: 119 LBS | SYSTOLIC BLOOD PRESSURE: 128 MMHG | TEMPERATURE: 98.1 F | BODY MASS INDEX: 23.99 KG/M2

## 2017-12-29 DIAGNOSIS — E87.6 HYPOKALEMIA: Primary | ICD-10-CM

## 2017-12-29 DIAGNOSIS — R63.4 LOSS OF WEIGHT: ICD-10-CM

## 2017-12-29 DIAGNOSIS — C25.9 PANCREATIC ADENOCARCINOMA (H): ICD-10-CM

## 2017-12-29 DIAGNOSIS — C25.9 PANCREATIC ADENOCARCINOMA (H): Primary | ICD-10-CM

## 2017-12-29 LAB
ALBUMIN SERPL-MCNC: 3.4 G/DL (ref 3.4–5)
ALP SERPL-CCNC: 176 U/L (ref 40–150)
ALT SERPL W P-5'-P-CCNC: 42 U/L (ref 0–50)
ANION GAP SERPL CALCULATED.3IONS-SCNC: 10 MMOL/L (ref 3–14)
AST SERPL W P-5'-P-CCNC: 40 U/L (ref 0–45)
BASOPHILS # BLD AUTO: 0.1 10E9/L (ref 0–0.2)
BASOPHILS NFR BLD AUTO: 0.6 %
BILIRUB SERPL-MCNC: 1.2 MG/DL (ref 0.2–1.3)
BUN SERPL-MCNC: 5 MG/DL (ref 7–30)
CALCIUM SERPL-MCNC: 8.3 MG/DL (ref 8.5–10.1)
CHLORIDE SERPL-SCNC: 108 MMOL/L (ref 94–109)
CO2 SERPL-SCNC: 27 MMOL/L (ref 20–32)
CREAT SERPL-MCNC: 0.37 MG/DL (ref 0.52–1.04)
DIFFERENTIAL METHOD BLD: ABNORMAL
EOSINOPHIL # BLD AUTO: 0.1 10E9/L (ref 0–0.7)
EOSINOPHIL NFR BLD AUTO: 0.6 %
ERYTHROCYTE [DISTWIDTH] IN BLOOD BY AUTOMATED COUNT: 22.3 % (ref 10–15)
GFR SERPL CREATININE-BSD FRML MDRD: >90 ML/MIN/1.7M2
GLUCOSE SERPL-MCNC: 110 MG/DL (ref 70–99)
HCT VFR BLD AUTO: 32.3 % (ref 35–47)
HGB BLD-MCNC: 11.1 G/DL (ref 11.7–15.7)
IMM GRANULOCYTES # BLD: 0 10E9/L (ref 0–0.4)
IMM GRANULOCYTES NFR BLD: 0.3 %
LYMPHOCYTES # BLD AUTO: 1.7 10E9/L (ref 0.8–5.3)
LYMPHOCYTES NFR BLD AUTO: 19 %
MCH RBC QN AUTO: 31.4 PG (ref 26.5–33)
MCHC RBC AUTO-ENTMCNC: 34.4 G/DL (ref 31.5–36.5)
MCV RBC AUTO: 91 FL (ref 78–100)
MONOCYTES # BLD AUTO: 1.2 10E9/L (ref 0–1.3)
MONOCYTES NFR BLD AUTO: 14.3 %
NEUTROPHILS # BLD AUTO: 5.7 10E9/L (ref 1.6–8.3)
NEUTROPHILS NFR BLD AUTO: 65.2 %
PLATELET # BLD AUTO: 781 10E9/L (ref 150–450)
POTASSIUM SERPL-SCNC: 2.9 MMOL/L (ref 3.4–5.3)
PROT SERPL-MCNC: 6.6 G/DL (ref 6.8–8.8)
RBC # BLD AUTO: 3.54 10E12/L (ref 3.8–5.2)
SODIUM SERPL-SCNC: 145 MMOL/L (ref 133–144)
WBC # BLD AUTO: 8.7 10E9/L (ref 4–11)

## 2017-12-29 PROCEDURE — 96413 CHEMO IV INFUSION 1 HR: CPT | Performed by: INTERNAL MEDICINE

## 2017-12-29 PROCEDURE — 99214 OFFICE O/P EST MOD 30 MIN: CPT | Mod: 25 | Performed by: NURSE PRACTITIONER

## 2017-12-29 PROCEDURE — 96361 HYDRATE IV INFUSION ADD-ON: CPT | Mod: 59 | Performed by: INTERNAL MEDICINE

## 2017-12-29 PROCEDURE — 99207 ZZC NO CHARGE NURSE ONLY: CPT

## 2017-12-29 PROCEDURE — 36415 COLL VENOUS BLD VENIPUNCTURE: CPT | Performed by: INTERNAL MEDICINE

## 2017-12-29 PROCEDURE — 85025 COMPLETE CBC W/AUTO DIFF WBC: CPT | Performed by: INTERNAL MEDICINE

## 2017-12-29 PROCEDURE — 96367 TX/PROPH/DG ADDL SEQ IV INF: CPT | Performed by: INTERNAL MEDICINE

## 2017-12-29 PROCEDURE — 80053 COMPREHEN METABOLIC PANEL: CPT | Performed by: INTERNAL MEDICINE

## 2017-12-29 RX ORDER — LORAZEPAM 2 MG/ML
0.5 INJECTION INTRAMUSCULAR EVERY 4 HOURS PRN
Status: CANCELLED
Start: 2018-01-05

## 2017-12-29 RX ORDER — EPINEPHRINE 0.3 MG/.3ML
0.3 INJECTION SUBCUTANEOUS EVERY 5 MIN PRN
Status: CANCELLED | OUTPATIENT
Start: 2017-12-29

## 2017-12-29 RX ORDER — DIPHENHYDRAMINE HYDROCHLORIDE 50 MG/ML
50 INJECTION INTRAMUSCULAR; INTRAVENOUS
Status: CANCELLED
Start: 2018-01-05

## 2017-12-29 RX ORDER — LORAZEPAM 2 MG/ML
0.5 INJECTION INTRAMUSCULAR EVERY 4 HOURS PRN
Status: CANCELLED
Start: 2018-01-18

## 2017-12-29 RX ORDER — EPINEPHRINE 0.3 MG/.3ML
0.3 INJECTION SUBCUTANEOUS EVERY 5 MIN PRN
Status: CANCELLED | OUTPATIENT
Start: 2018-01-18

## 2017-12-29 RX ORDER — SODIUM CHLORIDE 9 MG/ML
1000 INJECTION, SOLUTION INTRAVENOUS CONTINUOUS PRN
Status: CANCELLED
Start: 2018-01-05

## 2017-12-29 RX ORDER — EPINEPHRINE 1 MG/ML
0.3 INJECTION, SOLUTION INTRAMUSCULAR; SUBCUTANEOUS EVERY 5 MIN PRN
Status: CANCELLED | OUTPATIENT
Start: 2017-12-29

## 2017-12-29 RX ORDER — LORAZEPAM 2 MG/ML
0.5 INJECTION INTRAMUSCULAR EVERY 4 HOURS PRN
Status: CANCELLED
Start: 2017-12-29

## 2017-12-29 RX ORDER — DIPHENHYDRAMINE HYDROCHLORIDE 50 MG/ML
50 INJECTION INTRAMUSCULAR; INTRAVENOUS
Status: CANCELLED
Start: 2017-12-29

## 2017-12-29 RX ORDER — ALBUTEROL SULFATE 90 UG/1
1-2 AEROSOL, METERED RESPIRATORY (INHALATION)
Status: CANCELLED
Start: 2017-12-29

## 2017-12-29 RX ORDER — ALBUTEROL SULFATE 90 UG/1
1-2 AEROSOL, METERED RESPIRATORY (INHALATION)
Status: CANCELLED
Start: 2018-01-18

## 2017-12-29 RX ORDER — ALBUTEROL SULFATE 0.83 MG/ML
2.5 SOLUTION RESPIRATORY (INHALATION)
Status: CANCELLED | OUTPATIENT
Start: 2018-01-18

## 2017-12-29 RX ORDER — ALBUTEROL SULFATE 0.83 MG/ML
2.5 SOLUTION RESPIRATORY (INHALATION)
Status: CANCELLED | OUTPATIENT
Start: 2017-12-29

## 2017-12-29 RX ORDER — EPINEPHRINE 1 MG/ML
0.3 INJECTION, SOLUTION INTRAMUSCULAR; SUBCUTANEOUS EVERY 5 MIN PRN
Status: CANCELLED | OUTPATIENT
Start: 2018-01-18

## 2017-12-29 RX ORDER — DIPHENHYDRAMINE HYDROCHLORIDE 50 MG/ML
50 INJECTION INTRAMUSCULAR; INTRAVENOUS
Status: CANCELLED
Start: 2018-01-18

## 2017-12-29 RX ORDER — METHYLPREDNISOLONE SODIUM SUCCINATE 125 MG/2ML
125 INJECTION, POWDER, LYOPHILIZED, FOR SOLUTION INTRAMUSCULAR; INTRAVENOUS
Status: CANCELLED
Start: 2018-01-05

## 2017-12-29 RX ORDER — ALBUTEROL SULFATE 90 UG/1
1-2 AEROSOL, METERED RESPIRATORY (INHALATION)
Status: CANCELLED
Start: 2018-01-05

## 2017-12-29 RX ORDER — POTASSIUM CHLORIDE 750 MG/1
40 TABLET, EXTENDED RELEASE ORAL ONCE
Status: COMPLETED | OUTPATIENT
Start: 2017-12-29 | End: 2017-12-29

## 2017-12-29 RX ORDER — SODIUM CHLORIDE 9 MG/ML
1000 INJECTION, SOLUTION INTRAVENOUS CONTINUOUS PRN
Status: CANCELLED
Start: 2018-01-18

## 2017-12-29 RX ORDER — MEPERIDINE HYDROCHLORIDE 50 MG/ML
25 INJECTION INTRAMUSCULAR; INTRAVENOUS; SUBCUTANEOUS EVERY 30 MIN PRN
Status: CANCELLED | OUTPATIENT
Start: 2018-01-05

## 2017-12-29 RX ORDER — EPINEPHRINE 1 MG/ML
0.3 INJECTION, SOLUTION INTRAMUSCULAR; SUBCUTANEOUS EVERY 5 MIN PRN
Status: CANCELLED | OUTPATIENT
Start: 2018-01-05

## 2017-12-29 RX ORDER — METHYLPREDNISOLONE SODIUM SUCCINATE 125 MG/2ML
125 INJECTION, POWDER, LYOPHILIZED, FOR SOLUTION INTRAMUSCULAR; INTRAVENOUS
Status: CANCELLED
Start: 2017-12-29

## 2017-12-29 RX ORDER — MEPERIDINE HYDROCHLORIDE 50 MG/ML
25 INJECTION INTRAMUSCULAR; INTRAVENOUS; SUBCUTANEOUS EVERY 30 MIN PRN
Status: CANCELLED | OUTPATIENT
Start: 2017-12-29

## 2017-12-29 RX ORDER — MEPERIDINE HYDROCHLORIDE 50 MG/ML
25 INJECTION INTRAMUSCULAR; INTRAVENOUS; SUBCUTANEOUS EVERY 30 MIN PRN
Status: CANCELLED | OUTPATIENT
Start: 2018-01-18

## 2017-12-29 RX ORDER — EPINEPHRINE 0.3 MG/.3ML
0.3 INJECTION SUBCUTANEOUS EVERY 5 MIN PRN
Status: CANCELLED | OUTPATIENT
Start: 2018-01-05

## 2017-12-29 RX ORDER — SODIUM CHLORIDE 9 MG/ML
1000 INJECTION, SOLUTION INTRAVENOUS CONTINUOUS PRN
Status: CANCELLED
Start: 2017-12-29

## 2017-12-29 RX ORDER — METHYLPREDNISOLONE SODIUM SUCCINATE 125 MG/2ML
125 INJECTION, POWDER, LYOPHILIZED, FOR SOLUTION INTRAMUSCULAR; INTRAVENOUS
Status: CANCELLED
Start: 2018-01-18

## 2017-12-29 RX ORDER — ALBUTEROL SULFATE 0.83 MG/ML
2.5 SOLUTION RESPIRATORY (INHALATION)
Status: CANCELLED | OUTPATIENT
Start: 2018-01-05

## 2017-12-29 RX ADMIN — POTASSIUM CHLORIDE 40 MEQ: 750 TABLET, EXTENDED RELEASE ORAL at 15:18

## 2017-12-29 RX ADMIN — Medication 250 ML: at 14:50

## 2017-12-29 ASSESSMENT — PAIN SCALES - GENERAL: PAINLEVEL: NO PAIN (0)

## 2017-12-29 NOTE — MR AVS SNAPSHOT
After Visit Summary   12/29/2017    Talya Gatica    MRN: [id number]           Patient Information     Date Of Birth          1940        Visit Information        Provider Department      12/29/2017 12:45 PM Nai Simmons APRN CNP [facility] [[email or web address]]        Today's Diagnoses     Hypokalemia    -  1    Pancreatic adenocarcinoma (H)        Loss of weight           Follow-ups after your visit        Additional Services     NUTRITION REFERRAL       Your provider has referred you to: [facility]: [facility] [phone]   [email or web address]    Please be aware that coverage of these services is subject to the terms and limitations of your health insurance plan.  Call member services at your health plan with any benefit or coverage questions.      Please bring the following with you to your appointment:    (1) This referral request  (2) Any documents given to you regarding this referral  (3) Any specific questions you have about diet and/or food choices                  Your next 10 appointments already scheduled     Jan 05, 2018 12:00 PM CST   LAB with LAB ONC [facility])    [address] [place] [address] [phone]           Please do not eat 10-12 hours before your appointment if you are coming in fasting for labs on lipids, cholesterol, or glucose (sugar). This does not apply to pregnant women. Water, hot tea and black coffee (with nothing added) are okay. Do not drink other fluids, diet soda or chew gum.            Jan 05, 2018 12:30 PM CST   Level 2 with [place] 7 [facility])    [address] [facility] [address] [phone]            Jan 12, 2018  1:00 PM CST   New Visit with Karolina Ulrich RD   [facility])    [address]  [facility] [address]   [phone]            Jan 12, 2018  2:00 PM CST   LAB with LAB ONC Replaced by [facility] ([facility])    [address] [address] [address]   [phone]           Please do not eat 10-12 hours before your appointment if you are coming in fasting for labs on lipids, cholesterol, or glucose (sugar). This does not apply to pregnant women. Water, hot tea and black coffee (with nothing added) are okay. Do not drink other fluids, diet soda or chew gum.            Jan 12, 2018  2:30 PM CST   Level 2 with BAY 2 INFUSION   [facility] ([facility])    [address] [place] [address]   [phone]            Jan 19, 2018  2:00 PM CST   (Arrive by 1:45 PM)   CT CHEST ABDOMEN PELVIS W/O & W CONTRAST with MGCT1   [facility])    [address] [place] [address] [phone]           Please bring any scans or X-rays taken at other hospitals, if similar tests were done. Also bring a list of your medicines, including vitamins, minerals and over-the-counter drugs. It is safest to leave personal items at home.  Be sure to tell your doctor:   If you have any allergies.   If there s any chance you are pregnant.   If you are breastfeeding.   If you have any special needs.  You may have contrast for this exam. To prepare:   Do not eat or drink for 2 hours before your exam. If you need to take medicine, you may take it with small sips of water. (We may ask you to take liquid medicine as well.)   The day before your exam, drink extra fluids at least six 8-ounce glasses (unless your doctor tells you to restrict your fluids).  Patients over 70 or patients with diabetes or kidney problems:   If you haven t had a blood test (creatinine test) within the last 30 days, go to your clinic or Diagnostic Imaging Department for this test.  If you have  diabetes:   If your kidney function is normal, continue taking your metformin (Avandamet, Glucophage, Glucovance, Metaglip) on the day of your exam.   If your kidney function is abnormal, wait 48 hours before restarting this medicine.  You will have oral contrast for this exam:   You will drink the contrast at home. Get this from your clinic or Diagnostic Imaging Department. Please follow the directions given.  Please wear loose clothing, such as a sweat suit or jogging clothes. Avoid snaps, zippers and other metal. We may ask you to undress and put on a hospital gown.  If you have any questions, please call the Imaging Department where you will have your exam.            Jan 25, 2018 10:00 AM CST   LAB with LAB ONC [facility] ([facility])    [address] [place] [id number] [phone]           Please do not eat 10-12 hours before your appointment if you are coming in fasting for labs on lipids, cholesterol, or glucose (sugar). This does not apply to pregnant women. Water, hot tea and black coffee (with nothing added) are okay. Do not drink other fluids, diet soda or chew gum.            Jan 25, 2018 10:45 AM CST   Return Visit with Jim Soaers MD   [facility])    [address] [place] [address] [phone]            Jan 25, 2018 11:30 AM CST   Level 2 with [address])    [address] [place] [address] [phone]              Who to contact     If you have questions or need follow up information about today's clinic visit or your schedule please contact [facility] directly at [phone].  Normal or non-critical lab and imaging results will be communicated to you by MyChart, letter or phone within 4 business days after the clinic has received the results. If you do not hear from  "us within 7 days, please contact the clinic through [employer] or phone. If you have a critical or abnormal lab result, we will notify you by phone as soon as possible.  Submit refill requests through [employer] or call your pharmacy and they will forward the refill request to us. Please allow 3 business days for your refill to be completed.          Additional Information About Your Visit        [employer] Information     [employer] is an electronic gateway that provides easy, online access to your medical records. With [employer], you can request a clinic appointment, read your test results, renew a prescription or communicate with your care team.     To sign up for [employer] visit the website at [email or web address]   You will be asked to enter the access code listed below, as well as some personal information. Please follow the directions to create your username and password.     Your access code is: [email or web address]  Expires: 4/3/2018  2:06 PM     Your access code will  in 90 days. If you need help or a new code, please contact your [facility] or call [phone] for assistance.        Care EveryWhere ID     This is your Care EveryWhere ID. This could be used by other organizations to access your [place] medical records  [id number]        Your Vitals Were     Pulse Temperature Respirations Height Pulse Oximetry BMI (Body Mass Index)    76 98.1  F (36.7  C) (Oral) 20 1.511 m (4' 11.49\") 99% 23.64 kg/m2       Blood Pressure from Last 3 Encounters:   17 128/66   17 125/74   17 121/77    Weight from Last 3 Encounters:   17 54 kg (119 lb)   17 54 kg (119 lb 1.6 oz)   17 54.1 kg (119 lb 4.8 oz)              We Performed the Following     NUTRITION REFERRAL          Today's Medication Changes          These changes are accurate as of: 17 11:59 PM.  If you have any questions, ask your nurse or doctor.               Start taking these medicines.        " Dose/Directions    Potassium Chloride ER 20 MEQ Tbcr   Used for:  Hypokalemia        Dose:  20 mEq   Take 1 tablet (20 mEq) by mouth daily   Quantity:  90 tablet   Refills:  0            Where to get your medicines      These medications were sent to [id number] - [place], [place] - [address] AT [facility] [address]  [address], [place] [address]     Phone:  [phone]     Potassium Chloride ER 20 MEQ Tbcr                Primary Care Provider Office Phone # Fax #    Pancho Cope [phone] [address] [facility] [address]        Equal Access to Services     JOCELINE JORDAN : Hadii aad ku hadasho Soomaali, waaxda luqadaha, qaybta kaalmada adeegyada, carrie wheatley. So [facility] [place] [phone].    ATENCIÓN: Si habla español, tiene a ornelas disposición servicios gratuitos de asistencia lingüística. Llame al [phone].    We comply with applicable federal civil rights laws and [place] laws. We do not discriminate on the basis of race, color, national origin, age, disability, sex, sexual orientation, or gender identity.            Thank you!     Thank you for choosing [facility]  for your care. Our goal is always to provide you with excellent care. Hearing back from our patients is one way we can continue to improve our services. Please take a few minutes to complete the written survey that you may receive in the mail after your visit with us. Thank you!             Your Updated Medication List - Protect others around you: Learn how to safely use, store and throw away your medicines at www.disposemymeds.org.          This list is accurate as of: 12/29/17 11:59 PM.  Always use your most recent med list.                   Brand Name Dispense Instructions for use Diagnosis    amylase-lipase-protease [address]68-[address] UNITS Cpep per EC capsule    CREON    180 capsule    Take 1 capsule (24,000 Units)  by mouth 3 times daily (with meals)    Pancreatic adenocarcinoma (H)       BIOTIN PO           latanoprost 0.005 % ophthalmic solution    XALATAN    3 Bottle    Place 1 drop into both eyes At Bedtime    Glaucoma suspect, bilateral       Potassium Chloride ER 20 MEQ Tbcr     90 tablet    Take 1 tablet (20 mEq) by mouth daily    Hypokalemia       prochlorperazine 10 MG tablet    COMPAZINE    30 tablet    Take 0.5 tablets (5 mg) by mouth every 6 hours as needed (Nausea/Vomiting)    Pancreatic adenocarcinoma (H)       WOMENS 50+ MULTI VITAMIN/MIN PO

## 2017-12-29 NOTE — NURSING NOTE
"Oncology Rooming Note    December 29, 2017 12:57 PM   Talya Gatica is a 77 year old female who presents for:    Chief Complaint   Patient presents with     Oncology Clinic Visit     f/u prior to tx     Initial Vitals: There were no vitals taken for this visit. Estimated body mass index is 23.66 kg/(m^2) as calculated from the following:    Height as of 12/1/17: 1.511 m (4' 11.49\").    Weight as of 12/14/17: 54 kg (119 lb 1.6 oz). There is no height or weight on file to calculate BSA.  Data Unavailable Comment: Data Unavailable   No LMP recorded. Patient is postmenopausal.  Allergies reviewed: Yes  Medications reviewed: Yes    Medications: Medication refills not needed today.  Pharmacy name entered into [employer]: [id number] - [place], [place] - [address] [place] [address] AT [facility] & [facility]    Clinical concerns:     8 minutes for nursing intake (face to face time)     ASHA HAMMOND LPN              "

## 2017-12-29 NOTE — LETTER
12/29/2017         RE: Talya Gatica  [address]   [place] [address]        Dear Colleague,    Thank you for referring your patient, Talya Gatica, to the [facility]. Please see a copy of my visit note below.    Oncology Follow Up Visit: December 29, 2017     Oncologist: Dr Jim Soares  PCP: Pancho Cope    Diagnosis: Stage IV Pancreatic cancer  Talya Gatica is a 78 yo who c/o jaundice in 7/2017 was found to have  adenocarcinoma of the head of pancreas causing biliary obstruction and several pulmonary nodules consistent with metastasis- initially told days to 3 months of life.  Treatment:   11/3/2017 began treatment with Gemzar- days 1,8,15 of 28 day plan    Interval History: Ms. Sanchez comes to clinic for review prior to cycle 2 of Gemzar plan.Patient shares she has been feeling well over the Kat where she took an extra week off treatment to enjoy her holiday. She did not gain any weight yet actually lost weight with the holiday. States she is eating well and not having any nausea with the plan and is concerned about her weight loss. She has no pain no nausea or mild source nor shortness of breath. Does use a cane to get aroundreports no falls. She does mention she does have some constipation at times that she deals with on her own with no problem she also reports the Creon is doing very well for her  Now using 3 times a day She has not had any neuropathy skin changes memory loss or trouble sleeping nor cramping.  Rest of comprehensive and complete ROS is reviewed and is negative.   Past Medical History:   Diagnosis Date     AR (allergic rhinitis)      Baker's cyst      DJD (degenerative joint disease)      Elevated cholesterol      Glaucoma      Menopause      Vertigo      Current Outpatient Prescriptions   Medication     amylase-lipase-protease (CREON) [id number] UNITS CPEP per EC capsule     BIOTIN PO     latanoprost (XALATAN) 0.005 % ophthalmic  "solution     Multiple Vitamins-Minerals (WOMENS 50+ MULTI VITAMIN/MIN PO)     prochlorperazine (COMPAZINE) 10 MG tablet     No current facility-administered medications for this visit.      Allergies   Allergen Reactions     Codeine Camsylate        Physical Exam:/66  Pulse 76  Temp 98.1  F (36.7  C) (Oral)  Resp 20  Ht 1.511 m (4' 11.49\")  Wt 54 kg (119 lb)  SpO2 99%  BMI 23.64 kg/m2   ECOG PS-1   Constitutional: Alert, moving well with cane for support or one assist.   ENT: Eyes bright, No mouth sores  Neck: Supple, No adenopathy.Thyroid symmetric  Cardiac: Heart rate and rhythm is regular and strong with possible mild aortic murmur again noted  Respiratory: Breathing easy. Lung sounds clear to auscultation  GI: Abdomen is soft, non-tender, BS normal. No masses or organomegaly  MS: Muscle tone fair- uses cane for support- able to get to exam table with mild assistance, extremities normal with no edema.   Skin: No suspicious lesions or rashes or bruising  Neuro: Sensory grossly WNL, gait normal.   Lymph: Normal ant/post cervical, axillary, supraclavicular nodes  Psych: Mentation appears normal and affect normal/bright and talkative.    Laboratory Results:   Results for orders placed or performed in visit on 12/29/17   Comprehensive metabolic panel   Result Value Ref Range    Sodium 145 (H) 133 - 144 mmol/L    Potassium 2.9 (L) 3.4 - 5.3 mmol/L    Chloride 108 94 - 109 mmol/L    Carbon Dioxide 27 20 - 32 mmol/L    Anion Gap 10 3 - 14 mmol/L    Glucose 110 (H) 70 - 99 mg/dL    Urea Nitrogen 5 (L) 7 - 30 mg/dL    Creatinine 0.37 (L) 0.52 - 1.04 mg/dL    GFR Estimate >90 >60 mL/min/1.7m2    GFR Estimate If Black >90 >60 mL/min/1.7m2    Calcium 8.3 (L) 8.5 - 10.1 mg/dL    Bilirubin Total 1.2 0.2 - 1.3 mg/dL    Albumin 3.4 3.4 - 5.0 g/dL    Protein Total 6.6 (L) 6.8 - 8.8 g/dL    Alkaline Phosphatase 176 (H) 40 - 150 U/L    ALT 42 0 - 50 U/L    AST 40 0 - 45 U/L   CBC with platelets differential   Result " Value Ref Range    WBC 8.7 4.0 - 11.0 10e9/L    RBC Count 3.54 (L) 3.8 - 5.2 10e12/L    Hemoglobin 11.1 (L) 11.7 - 15.7 g/dL    Hematocrit 32.3 (L) 35.0 - 47.0 %    MCV 91 78 - 100 fl    MCH 31.4 26.5 - 33.0 pg    MCHC 34.4 31.5 - 36.5 g/dL    RDW 22.3 (H) 10.0 - 15.0 %    Platelet Count 781 (H) 150 - 450 10e9/L    Diff Method Automated Method     % Neutrophils 65.2 %    % Lymphocytes 19.0 %    % Monocytes 14.3 %    % Eosinophils 0.6 %    % Basophils 0.6 %    % Immature Granulocytes 0.3 %    Absolute Neutrophil 5.7 1.6 - 8.3 10e9/L    Absolute Lymphocytes 1.7 0.8 - 5.3 10e9/L    Absolute Monocytes 1.2 0.0 - 1.3 10e9/L    Absolute Eosinophils 0.1 0.0 - 0.7 10e9/L    Absolute Basophils 0.1 0.0 - 0.2 10e9/L    Abs Immature Granulocytes 0.0 0 - 0.4 10e9/L     Assessment and Plan:   Stage IV Pancreatic cancer-Pt continues on treatment with Gemzar on days 1,8, and 15 of 28 day plan which started 11/3/2017. She has now completed 2 cycles of treatment and is tolerating well. She did take one extra week off with the holiday but meets all goals today for continuation of treatment.    She remains healthy without new illness or side effects related to plan.   She will return for day 8 and 15 infusions.   CT CAP after cycle 3 which will be reviewed by Dr Soares for planning.  Weight loss- slow continued weight loss of 8 labs noted since starting plan- . Pt is wishing to gain weight. Will recommend seeing dietician for review.    Hypokalemia- Will use electrolyte replacement plan today and will restart her potassium oral replacement. Discussed nutritional  sources of potassium . Will add potassium recheck for days 8 and 15 to see stability with oral tabs.   This was a 25min visit with > 50% in counseling and coordinating care including education and management of concerns.    Nai Simmons,CNP      Again, thank you for allowing me to participate in the care of your patient.        Sincerely,        Nai Simmons, RICHAR, APRN  CNP

## 2017-12-29 NOTE — MR AVS SNAPSHOT
After Visit Summary   12/29/2017    Talya Gatica    MRN: [id number]           Patient Information     Date Of Birth          1940        Visit Information        Provider Department      12/29/2017 2:00 PM [place] [facility]        Today's Diagnoses     Hypokalemia    -  1    Pancreatic adenocarcinoma (H)           Follow-ups after your visit        Your next 10 appointments already scheduled     Jan 05, 2018 12:00 PM CST   LAB with LAB ONC [facility])    [address] [place] [address] [phone]           Please do not eat 10-12 hours before your appointment if you are coming in fasting for labs on lipids, cholesterol, or glucose (sugar). This does not apply to pregnant women. Water, hot tea and black coffee (with nothing added) are okay. Do not drink other fluids, diet soda or chew gum.            Jan 05, 2018 12:30 PM CST   Level 2 with [place] [facility] ([facility])    [address] [facility] [address] [phone]            Jan 12, 2018  2:00 PM CST   LAB with LAB ONC [facility])    [address] [facility] [id number] [phone]           Please do not eat 10-12 hours before your appointment if you are coming in fasting for labs on lipids, cholesterol, or glucose (sugar). This does not apply to pregnant women. Water, hot tea and black coffee (with nothing added) are okay. Do not drink other fluids, diet soda or chew gum.            Jan 12, 2018  2:30 PM CST   Level 2 with [place] [facility] ([facility])    [address] [facility] [id number] [phone]              Future tests that were ordered for you today     Open Future Orders        Priority Expected Expires Ordered    CT Chest abdomen pelvis w  & w/o contrast Routine  2018            Who to contact     If you have questions or need follow up information about today's clinic visit or your schedule please contact [facility] directly at [phone].  Normal or non-critical lab and imaging results will be communicated to you by [employer], letter or phone within 4 business days after the clinic has received the results. If you do not hear from us within 7 days, please contact the clinic through [employer] or phone. If you have a critical or abnormal lab result, we will notify you by phone as soon as possible.  Submit refill requests through [email or web address] or call your pharmacy and they will forward the refill request to us. Please allow 3 business days for your refill to be completed.          Additional Information About Your Visit        [email or web address] Information     [email or web address] is an electronic gateway that provides easy, online access to your medical records. With [email or web address], you can request a clinic appointment, read your test results, renew a prescription or communicate with your care team.     To sign up for [email or web address] visit the website at [email or web address]   You will be asked to enter the access code listed below, as well as some personal information. Please follow the directions to create your username and password.     Your access code is: [email or web address]  Expires: 2017  4:34 PM     Your access code will  in 90 days. If you need help or a new code, please contact your [facility] or call [phone] for assistance.        Care EveryWhere ID     This is your Care EveryWhere ID. This could be used by other organizations to access your [place] medical records  [id number]         Blood Pressure from Last 3 Encounters:   17 128/66   17 125/74   17 121/77    Weight from Last 3 Encounters:   17 54 kg (119 lb)   17 54 kg (119 lb 1.6 oz)   17 54.1 kg (119 lb 4.8  oz)              Today, you had the following     No orders found for display       Primary Care Provider Office Phone # Fax #    Pancho Cope [phone] [address] [facility] [place] [facility] [address]        Equal Access to Services     JOCELINE JORDAN : Hadii feliciano suárez hadleonardoo Soomaali, waaxda luqadaha, qaybta kaalmada adeegyada, carrie shanicein hayaan patriciaterence pickens natasha wheatley. So [facility] [phone].    ATENCIÓN: Si habla español, tiene a ornelas disposición servicios gratuitos de asistencia lingüística. Llame al [phone].    We comply with applicable federal civil rights laws and [place] laws. We do not discriminate on the basis of race, color, national origin, age, disability, sex, sexual orientation, or gender identity.            Thank you!     Thank you for choosing [facility]  for your care. Our goal is always to provide you with excellent care. Hearing back from our patients is one way we can continue to improve our services. Please take a few minutes to complete the written survey that you may receive in the mail after your visit with us. Thank you!             Your Updated Medication List - Protect others around you: Learn how to safely use, store and throw away your medicines at www.disposemymeds.org.          This list is accurate as of: 12/29/17  4:58 PM.  Always use your most recent med list.                   Brand Name Dispense Instructions for use Diagnosis    amylase-lipase-protease [id number] UNITS Cpep per EC capsule    CREON    180 capsule    Take 1 capsule (24,000 Units) by mouth 3 times daily (with meals)    Pancreatic adenocarcinoma (H)       BIOTIN PO           latanoprost 0.005 % ophthalmic solution    XALATAN    3 Bottle    Place 1 drop into both eyes At Bedtime    Glaucoma suspect, bilateral       prochlorperazine 10 MG tablet    COMPAZINE    30 tablet    Take 0.5 tablets (5 mg) by mouth every 6 hours as needed (Nausea/Vomiting)    Pancreatic adenocarcinoma  (H)       WOMENS 50+ MULTI VITAMIN/MIN PO

## 2017-12-29 NOTE — PROGRESS NOTES
Infusion Nursing Note:  Talya Gatica presents today for C3,D1 of Gemzar.  Patient seen by provider today: Yes: Nai Suarez NP   present during visit today: Not Applicable.    Note: Pt states she is doing well, denies any problems with her last Gemzar infusion, CBC stable, will replace potassium today per protocol.    Intravenous Access:  Peripheral IV placed.    Treatment Conditions:  Lab Results   Component Value Date    HGB 11.1 12/29/2017     Lab Results   Component Value Date    WBC 8.7 12/29/2017      Lab Results   Component Value Date    ANEU 5.7 12/29/2017     Lab Results   Component Value Date     12/29/2017      Lab Results   Component Value Date     12/29/2017                   Lab Results   Component Value Date    POTASSIUM 2.9 12/29/2017           No results found for: MAG         Lab Results   Component Value Date    CR 0.37 12/29/2017                   Lab Results   Component Value Date    BERNARDO 8.3 12/29/2017                Lab Results   Component Value Date    BILITOTAL 1.2 12/29/2017           Lab Results   Component Value Date    ALBUMIN 3.4 12/29/2017                    Lab Results   Component Value Date    ALT 42 12/29/2017           Lab Results   Component Value Date    AST 40 12/29/2017     Results reviewed, labs MET treatment parameters, ok to proceed with treatment.    Pt to have 40 meq PO potassium and 30 meq potassium IV over 2 hours today per pharmacy protocol.      Post Infusion Assessment:  Patient tolerated infusion without incident.  Blood return noted pre and post infusion.  Site patent and intact, free from redness, edema or discomfort.  No evidence of extravasations.  Access discontinued per protocol.    Discharge Plan:   Return for C3D8 of Gemzar on 01/05/18 and C3D15 on 01/12/18.  Discharge instructions reviewed with: Patient.  Patient discharged in stable condition accompanied by: self.  Departure Mode: Ambulatory.    Conchis Hill,  RN

## 2017-12-29 NOTE — PROGRESS NOTES
Oncology Follow Up Visit: December 29, 2017     Oncologist: Dr Jim Soares  PCP: Pancho Cope    Diagnosis: Stage IV Pancreatic cancer  Talya Gatica is a 78 yo who c/o jaundice in 7/2017 was found to have  adenocarcinoma of the head of pancreas causing biliary obstruction and several pulmonary nodules consistent with metastasis- initially told days to 3 months of life.  Treatment:   11/3/2017 began treatment with Gemzar- days 1,8,15 of 28 day plan    Interval History: Ms. Sanchez comes to clinic for review prior to cycle 2 of Gemzar plan.Patient shares she has been feeling well over the Kat where she took an extra week off treatment to enjoy her holiday. She did not gain any weight yet actually lost weight with the holiday. States she is eating well and not having any nausea with the plan and is concerned about her weight loss. She has no pain no nausea or mild source nor shortness of breath. Does use a cane to get aroundreports no falls. She does mention she does have some constipation at times that she deals with on her own with no problem she also reports the Creon is doing very well for her  Now using 3 times a day She has not had any neuropathy skin changes memory loss or trouble sleeping nor cramping.  Rest of comprehensive and complete ROS is reviewed and is negative.   Past Medical History:   Diagnosis Date     AR (allergic rhinitis)      Baker's cyst      DJD (degenerative joint disease)      Elevated cholesterol      Glaucoma      Menopause      Vertigo      Current Outpatient Prescriptions   Medication     amylase-lipase-protease (CREON) 55084-95418 UNITS CPEP per EC capsule     BIOTIN PO     latanoprost (XALATAN) 0.005 % ophthalmic solution     Multiple Vitamins-Minerals (WOMENS 50+ MULTI VITAMIN/MIN PO)     prochlorperazine (COMPAZINE) 10 MG tablet     No current facility-administered medications for this visit.      Allergies   Allergen Reactions     Codeine Camsylate   "      Physical Exam:/66  Pulse 76  Temp 98.1  F (36.7  C) (Oral)  Resp 20  Ht 1.511 m (4' 11.49\")  Wt 54 kg (119 lb)  SpO2 99%  BMI 23.64 kg/m2   ECOG PS-1   Constitutional: Alert, moving well with cane for support or one assist.   ENT: Eyes bright, No mouth sores  Neck: Supple, No adenopathy.Thyroid symmetric  Cardiac: Heart rate and rhythm is regular and strong with possible mild aortic murmur again noted  Respiratory: Breathing easy. Lung sounds clear to auscultation  GI: Abdomen is soft, non-tender, BS normal. No masses or organomegaly  MS: Muscle tone fair- uses cane for support- able to get to exam table with mild assistance, extremities normal with no edema.   Skin: No suspicious lesions or rashes or bruising  Neuro: Sensory grossly WNL, gait normal.   Lymph: Normal ant/post cervical, axillary, supraclavicular nodes  Psych: Mentation appears normal and affect normal/bright and talkative.    Laboratory Results:   Results for orders placed or performed in visit on 12/29/17   Comprehensive metabolic panel   Result Value Ref Range    Sodium 145 (H) 133 - 144 mmol/L    Potassium 2.9 (L) 3.4 - 5.3 mmol/L    Chloride 108 94 - 109 mmol/L    Carbon Dioxide 27 20 - 32 mmol/L    Anion Gap 10 3 - 14 mmol/L    Glucose 110 (H) 70 - 99 mg/dL    Urea Nitrogen 5 (L) 7 - 30 mg/dL    Creatinine 0.37 (L) 0.52 - 1.04 mg/dL    GFR Estimate >90 >60 mL/min/1.7m2    GFR Estimate If Black >90 >60 mL/min/1.7m2    Calcium 8.3 (L) 8.5 - 10.1 mg/dL    Bilirubin Total 1.2 0.2 - 1.3 mg/dL    Albumin 3.4 3.4 - 5.0 g/dL    Protein Total 6.6 (L) 6.8 - 8.8 g/dL    Alkaline Phosphatase 176 (H) 40 - 150 U/L    ALT 42 0 - 50 U/L    AST 40 0 - 45 U/L   CBC with platelets differential   Result Value Ref Range    WBC 8.7 4.0 - 11.0 10e9/L    RBC Count 3.54 (L) 3.8 - 5.2 10e12/L    Hemoglobin 11.1 (L) 11.7 - 15.7 g/dL    Hematocrit 32.3 (L) 35.0 - 47.0 %    MCV 91 78 - 100 fl    MCH 31.4 26.5 - 33.0 pg    MCHC 34.4 31.5 - 36.5 g/dL    RDW " 22.3 (H) 10.0 - 15.0 %    Platelet Count 781 (H) 150 - 450 10e9/L    Diff Method Automated Method     % Neutrophils 65.2 %    % Lymphocytes 19.0 %    % Monocytes 14.3 %    % Eosinophils 0.6 %    % Basophils 0.6 %    % Immature Granulocytes 0.3 %    Absolute Neutrophil 5.7 1.6 - 8.3 10e9/L    Absolute Lymphocytes 1.7 0.8 - 5.3 10e9/L    Absolute Monocytes 1.2 0.0 - 1.3 10e9/L    Absolute Eosinophils 0.1 0.0 - 0.7 10e9/L    Absolute Basophils 0.1 0.0 - 0.2 10e9/L    Abs Immature Granulocytes 0.0 0 - 0.4 10e9/L     Assessment and Plan:   Stage IV Pancreatic cancer-Pt continues on treatment with Gemzar on days 1,8, and 15 of 28 day plan which started 11/3/2017. She has now completed 2 cycles of treatment and is tolerating well. She did take one extra week off with the holiday but meets all goals today for continuation of treatment.    She remains healthy without new illness or side effects related to plan.   She will return for day 8 and 15 infusions.   CT CAP after cycle 3 which will be reviewed by Dr Soares for planning.  Weight loss- slow continued weight loss of 8 labs noted since starting plan- . Pt is wishing to gain weight. Will recommend seeing dietician for review.    Hypokalemia- Will use electrolyte replacement plan today and will restart her potassium oral replacement. Discussed nutritional  sources of potassium . Will add potassium recheck for days 8 and 15 to see stability with oral tabs.   This was a 25min visit with > 50% in counseling and coordinating care including education and management of concerns.    Nai Simmons,CNP

## 2018-01-01 ENCOUNTER — CARE COORDINATION (OUTPATIENT)
Dept: ONCOLOGY | Facility: CLINIC | Age: 78
End: 2018-01-01

## 2018-01-01 ENCOUNTER — APPOINTMENT (OUTPATIENT)
Dept: GENERAL RADIOLOGY | Facility: CLINIC | Age: 78
End: 2018-01-01
Payer: COMMERCIAL

## 2018-01-01 ENCOUNTER — DOCUMENTATION ONLY (OUTPATIENT)
Dept: PHARMACY | Facility: CLINIC | Age: 78
End: 2018-01-01

## 2018-01-01 ENCOUNTER — INFUSION THERAPY VISIT (OUTPATIENT)
Dept: INFUSION THERAPY | Facility: CLINIC | Age: 78
End: 2018-01-01
Payer: COMMERCIAL

## 2018-01-01 ENCOUNTER — ONCOLOGY VISIT (OUTPATIENT)
Dept: ONCOLOGY | Facility: CLINIC | Age: 78
End: 2018-01-01
Payer: COMMERCIAL

## 2018-01-01 ENCOUNTER — APPOINTMENT (OUTPATIENT)
Dept: CT IMAGING | Facility: CLINIC | Age: 78
DRG: 435 | End: 2018-01-01
Attending: EMERGENCY MEDICINE
Payer: MEDICARE

## 2018-01-01 ENCOUNTER — MEDICAL CORRESPONDENCE (OUTPATIENT)
Dept: HEALTH INFORMATION MANAGEMENT | Facility: CLINIC | Age: 78
End: 2018-01-01

## 2018-01-01 ENCOUNTER — APPOINTMENT (OUTPATIENT)
Dept: GENERAL RADIOLOGY | Facility: CLINIC | Age: 78
DRG: 435 | End: 2018-01-01
Attending: EMERGENCY MEDICINE
Payer: MEDICARE

## 2018-01-01 ENCOUNTER — HOME INFUSION (PRE-WILLOW HOME INFUSION) (OUTPATIENT)
Dept: PHARMACY | Facility: CLINIC | Age: 78
End: 2018-01-01

## 2018-01-01 ENCOUNTER — APPOINTMENT (OUTPATIENT)
Dept: MRI IMAGING | Facility: CLINIC | Age: 78
DRG: 435 | End: 2018-01-01
Attending: PHYSICIAN ASSISTANT
Payer: MEDICARE

## 2018-01-01 ENCOUNTER — TELEPHONE (OUTPATIENT)
Dept: FAMILY MEDICINE | Facility: CLINIC | Age: 78
End: 2018-01-01

## 2018-01-01 ENCOUNTER — APPOINTMENT (OUTPATIENT)
Dept: PHYSICAL THERAPY | Facility: CLINIC | Age: 78
DRG: 435 | End: 2018-01-01
Payer: MEDICARE

## 2018-01-01 ENCOUNTER — APPOINTMENT (OUTPATIENT)
Dept: INFUSION THERAPY | Facility: CLINIC | Age: 78
End: 2018-01-01
Payer: COMMERCIAL

## 2018-01-01 ENCOUNTER — APPOINTMENT (OUTPATIENT)
Dept: ULTRASOUND IMAGING | Facility: CLINIC | Age: 78
DRG: 435 | End: 2018-01-01
Attending: EMERGENCY MEDICINE
Payer: MEDICARE

## 2018-01-01 ENCOUNTER — APPOINTMENT (OUTPATIENT)
Dept: PHYSICAL THERAPY | Facility: CLINIC | Age: 78
DRG: 435 | End: 2018-01-01
Attending: INTERNAL MEDICINE
Payer: MEDICARE

## 2018-01-01 ENCOUNTER — APPOINTMENT (OUTPATIENT)
Dept: OCCUPATIONAL THERAPY | Facility: CLINIC | Age: 78
DRG: 435 | End: 2018-01-01
Attending: INTERNAL MEDICINE
Payer: MEDICARE

## 2018-01-01 ENCOUNTER — TELEPHONE (OUTPATIENT)
Dept: ONCOLOGY | Facility: CLINIC | Age: 78
End: 2018-01-01

## 2018-01-01 ENCOUNTER — HOSPITAL ENCOUNTER (INPATIENT)
Facility: CLINIC | Age: 78
LOS: 4 days | Discharge: HOSPICE | DRG: 435 | End: 2018-10-05
Attending: EMERGENCY MEDICINE | Admitting: INTERNAL MEDICINE
Payer: MEDICARE

## 2018-01-01 ENCOUNTER — RADIANT APPOINTMENT (OUTPATIENT)
Dept: GENERAL RADIOLOGY | Facility: CLINIC | Age: 78
End: 2018-01-01
Attending: NURSE PRACTITIONER
Payer: COMMERCIAL

## 2018-01-01 ENCOUNTER — APPOINTMENT (OUTPATIENT)
Dept: OCCUPATIONAL THERAPY | Facility: CLINIC | Age: 78
DRG: 435 | End: 2018-01-01
Payer: MEDICARE

## 2018-01-01 ENCOUNTER — RADIANT APPOINTMENT (OUTPATIENT)
Dept: CT IMAGING | Facility: CLINIC | Age: 78
End: 2018-01-01
Attending: INTERNAL MEDICINE
Payer: COMMERCIAL

## 2018-01-01 VITALS
OXYGEN SATURATION: 94 % | DIASTOLIC BLOOD PRESSURE: 66 MMHG | HEART RATE: 132 BPM | RESPIRATION RATE: 30 BRPM | WEIGHT: 96.2 LBS | TEMPERATURE: 98.3 F | HEIGHT: 59 IN | SYSTOLIC BLOOD PRESSURE: 101 MMHG | BODY MASS INDEX: 19.4 KG/M2

## 2018-01-01 VITALS
WEIGHT: 96 LBS | OXYGEN SATURATION: 98 % | HEART RATE: 73 BPM | RESPIRATION RATE: 16 BRPM | TEMPERATURE: 98.7 F | HEIGHT: 59 IN | BODY MASS INDEX: 19.35 KG/M2 | DIASTOLIC BLOOD PRESSURE: 77 MMHG | SYSTOLIC BLOOD PRESSURE: 149 MMHG

## 2018-01-01 VITALS
HEIGHT: 59 IN | OXYGEN SATURATION: 97 % | RESPIRATION RATE: 18 BRPM | BODY MASS INDEX: 19 KG/M2 | HEART RATE: 71 BPM | TEMPERATURE: 98.4 F | WEIGHT: 94.25 LBS | DIASTOLIC BLOOD PRESSURE: 58 MMHG | SYSTOLIC BLOOD PRESSURE: 115 MMHG

## 2018-01-01 VITALS
OXYGEN SATURATION: 98 % | BODY MASS INDEX: 19.56 KG/M2 | SYSTOLIC BLOOD PRESSURE: 130 MMHG | DIASTOLIC BLOOD PRESSURE: 73 MMHG | HEART RATE: 71 BPM | RESPIRATION RATE: 16 BRPM | HEIGHT: 59 IN | WEIGHT: 97 LBS | TEMPERATURE: 97.9 F

## 2018-01-01 VITALS
DIASTOLIC BLOOD PRESSURE: 88 MMHG | HEIGHT: 59 IN | SYSTOLIC BLOOD PRESSURE: 154 MMHG | BODY MASS INDEX: 19.15 KG/M2 | WEIGHT: 95 LBS | HEART RATE: 81 BPM | TEMPERATURE: 98.4 F | RESPIRATION RATE: 16 BRPM | OXYGEN SATURATION: 95 %

## 2018-01-01 VITALS
SYSTOLIC BLOOD PRESSURE: 115 MMHG | HEART RATE: 94 BPM | BODY MASS INDEX: 18.6 KG/M2 | DIASTOLIC BLOOD PRESSURE: 76 MMHG | RESPIRATION RATE: 18 BRPM | OXYGEN SATURATION: 98 % | WEIGHT: 93.6 LBS | TEMPERATURE: 98.5 F

## 2018-01-01 VITALS
WEIGHT: 103.1 LBS | TEMPERATURE: 96 F | RESPIRATION RATE: 18 BRPM | HEIGHT: 61 IN | DIASTOLIC BLOOD PRESSURE: 40 MMHG | BODY MASS INDEX: 19.47 KG/M2 | SYSTOLIC BLOOD PRESSURE: 88 MMHG | HEART RATE: 82 BPM | OXYGEN SATURATION: 94 %

## 2018-01-01 VITALS
OXYGEN SATURATION: 98 % | HEART RATE: 75 BPM | DIASTOLIC BLOOD PRESSURE: 71 MMHG | BODY MASS INDEX: 19.57 KG/M2 | RESPIRATION RATE: 18 BRPM | WEIGHT: 98.5 LBS | TEMPERATURE: 97.2 F | SYSTOLIC BLOOD PRESSURE: 138 MMHG

## 2018-01-01 VITALS
BODY MASS INDEX: 18.91 KG/M2 | WEIGHT: 95.2 LBS | TEMPERATURE: 97 F | OXYGEN SATURATION: 99 % | HEART RATE: 50 BPM | SYSTOLIC BLOOD PRESSURE: 116 MMHG | DIASTOLIC BLOOD PRESSURE: 62 MMHG

## 2018-01-01 VITALS
BODY MASS INDEX: 19.39 KG/M2 | TEMPERATURE: 98.5 F | DIASTOLIC BLOOD PRESSURE: 62 MMHG | HEIGHT: 59 IN | RESPIRATION RATE: 16 BRPM | HEART RATE: 70 BPM | WEIGHT: 96.19 LBS | OXYGEN SATURATION: 97 % | SYSTOLIC BLOOD PRESSURE: 124 MMHG

## 2018-01-01 VITALS
WEIGHT: 95.1 LBS | HEART RATE: 75 BPM | TEMPERATURE: 97.8 F | SYSTOLIC BLOOD PRESSURE: 112 MMHG | BODY MASS INDEX: 18.89 KG/M2 | DIASTOLIC BLOOD PRESSURE: 64 MMHG

## 2018-01-01 DIAGNOSIS — C78.01 MALIGNANT NEOPLASM METASTATIC TO BOTH LUNGS (H): Primary | ICD-10-CM

## 2018-01-01 DIAGNOSIS — C78.02 MALIGNANT NEOPLASM METASTATIC TO BOTH LUNGS (H): Primary | ICD-10-CM

## 2018-01-01 DIAGNOSIS — C25.9 PANCREATIC ADENOCARCINOMA (H): ICD-10-CM

## 2018-01-01 DIAGNOSIS — E86.0 DEHYDRATION: ICD-10-CM

## 2018-01-01 DIAGNOSIS — C78.02 MALIGNANT NEOPLASM METASTATIC TO BOTH LUNGS (H): ICD-10-CM

## 2018-01-01 DIAGNOSIS — R63.4 LOSS OF WEIGHT: ICD-10-CM

## 2018-01-01 DIAGNOSIS — E88.09 HYPOALBUMINEMIA: ICD-10-CM

## 2018-01-01 DIAGNOSIS — M54.89 OTHER ACUTE BACK PAIN: Primary | ICD-10-CM

## 2018-01-01 DIAGNOSIS — C25.9 PANCREATIC ADENOCARCINOMA (H): Primary | ICD-10-CM

## 2018-01-01 DIAGNOSIS — D70.1 CHEMOTHERAPY-INDUCED NEUTROPENIA (H): ICD-10-CM

## 2018-01-01 DIAGNOSIS — B37.0 THRUSH: ICD-10-CM

## 2018-01-01 DIAGNOSIS — E87.6 HYPOKALEMIA: ICD-10-CM

## 2018-01-01 DIAGNOSIS — C78.01 MALIGNANT NEOPLASM METASTATIC TO BOTH LUNGS (H): ICD-10-CM

## 2018-01-01 DIAGNOSIS — R63.0 ANOREXIA: ICD-10-CM

## 2018-01-01 DIAGNOSIS — Z23 ENCOUNTER FOR IMMUNIZATION: ICD-10-CM

## 2018-01-01 DIAGNOSIS — R19.5 LOOSE STOOLS: ICD-10-CM

## 2018-01-01 DIAGNOSIS — R19.7 DIARRHEA, UNSPECIFIED TYPE: ICD-10-CM

## 2018-01-01 DIAGNOSIS — T45.1X5A CHEMOTHERAPY-INDUCED NEUTROPENIA (H): ICD-10-CM

## 2018-01-01 DIAGNOSIS — R09.89 RALES: ICD-10-CM

## 2018-01-01 DIAGNOSIS — R68.2 DRY MOUTH: ICD-10-CM

## 2018-01-01 DIAGNOSIS — D72.829 LEUKOCYTOSIS, UNSPECIFIED TYPE: ICD-10-CM

## 2018-01-01 DIAGNOSIS — R26.81 GAIT INSTABILITY: ICD-10-CM

## 2018-01-01 DIAGNOSIS — M62.81 GENERALIZED MUSCLE WEAKNESS: ICD-10-CM

## 2018-01-01 DIAGNOSIS — R62.7 ADULT FAILURE TO THRIVE: ICD-10-CM

## 2018-01-01 LAB
ABO + RH BLD: NORMAL
ABO + RH BLD: NORMAL
ALBUMIN SERPL-MCNC: 2 G/DL (ref 3.4–5)
ALBUMIN SERPL-MCNC: 2.6 G/DL (ref 3.4–5)
ALBUMIN SERPL-MCNC: 3.2 G/DL (ref 3.4–5)
ALBUMIN SERPL-MCNC: 3.3 G/DL (ref 3.4–5)
ALBUMIN SERPL-MCNC: 3.3 G/DL (ref 3.4–5)
ALBUMIN UR-MCNC: NEGATIVE MG/DL
ALP SERPL-CCNC: 111 U/L (ref 40–150)
ALP SERPL-CCNC: 125 U/L (ref 40–150)
ALP SERPL-CCNC: 133 U/L (ref 40–150)
ALP SERPL-CCNC: 134 U/L (ref 40–150)
ALP SERPL-CCNC: 135 U/L (ref 40–150)
ALT SERPL W P-5'-P-CCNC: 18 U/L (ref 0–50)
ALT SERPL W P-5'-P-CCNC: 22 U/L (ref 0–50)
ALT SERPL W P-5'-P-CCNC: 29 U/L (ref 0–50)
ALT SERPL W P-5'-P-CCNC: 32 U/L (ref 0–50)
ALT SERPL W P-5'-P-CCNC: 36 U/L (ref 0–50)
ANION GAP SERPL CALCULATED.3IONS-SCNC: 10 MMOL/L (ref 3–14)
ANION GAP SERPL CALCULATED.3IONS-SCNC: 13 MMOL/L (ref 3–14)
ANION GAP SERPL CALCULATED.3IONS-SCNC: 5 MMOL/L (ref 3–14)
ANION GAP SERPL CALCULATED.3IONS-SCNC: 7 MMOL/L (ref 3–14)
ANION GAP SERPL CALCULATED.3IONS-SCNC: 7 MMOL/L (ref 3–14)
ANION GAP SERPL CALCULATED.3IONS-SCNC: 8 MMOL/L (ref 3–14)
ANION GAP SERPL CALCULATED.3IONS-SCNC: 8 MMOL/L (ref 3–14)
ANION GAP SERPL CALCULATED.3IONS-SCNC: 9 MMOL/L (ref 3–14)
ANISOCYTOSIS BLD QL SMEAR: SLIGHT
APPEARANCE UR: CLEAR
AST SERPL W P-5'-P-CCNC: 19 U/L (ref 0–45)
AST SERPL W P-5'-P-CCNC: 26 U/L (ref 0–45)
AST SERPL W P-5'-P-CCNC: 29 U/L (ref 0–45)
AST SERPL W P-5'-P-CCNC: 30 U/L (ref 0–45)
AST SERPL W P-5'-P-CCNC: 35 U/L (ref 0–45)
BASOPHILS # BLD AUTO: 0 10E9/L (ref 0–0.2)
BASOPHILS NFR BLD AUTO: 0 %
BASOPHILS NFR BLD AUTO: 0 %
BASOPHILS NFR BLD AUTO: 0.3 %
BASOPHILS NFR BLD AUTO: 0.4 %
BASOPHILS NFR BLD AUTO: 0.7 %
BASOPHILS NFR BLD AUTO: 0.7 %
BASOPHILS NFR BLD AUTO: 0.8 %
BASOPHILS NFR BLD AUTO: 0.8 %
BASOPHILS NFR BLD AUTO: 1.1 %
BASOPHILS NFR BLD AUTO: 2 %
BILIRUB SERPL-MCNC: 0.5 MG/DL (ref 0.2–1.3)
BILIRUB SERPL-MCNC: 0.5 MG/DL (ref 0.2–1.3)
BILIRUB SERPL-MCNC: 0.6 MG/DL (ref 0.2–1.3)
BILIRUB SERPL-MCNC: 0.7 MG/DL (ref 0.2–1.3)
BILIRUB SERPL-MCNC: 1.2 MG/DL (ref 0.2–1.3)
BILIRUB SERPL-MCNC: 1.9 MG/DL (ref 0.2–1.3)
BILIRUB UR QL STRIP: NEGATIVE
BLD GP AB SCN SERPL QL: NORMAL
BLD PROD TYP BPU: NORMAL
BLD PROD TYP BPU: NORMAL
BLD UNIT ID BPU: 0
BLOOD BANK CMNT PATIENT-IMP: NORMAL
BLOOD PRODUCT CODE: NORMAL
BPU ID: NORMAL
BUN SERPL-MCNC: 10 MG/DL (ref 7–30)
BUN SERPL-MCNC: 11 MG/DL (ref 7–30)
BUN SERPL-MCNC: 17 MG/DL (ref 7–30)
BUN SERPL-MCNC: 7 MG/DL (ref 7–30)
BUN SERPL-MCNC: 7 MG/DL (ref 7–30)
BUN SERPL-MCNC: 8 MG/DL (ref 7–30)
BURR CELLS BLD QL SMEAR: SLIGHT
CALCIUM SERPL-MCNC: 7.4 MG/DL (ref 8.5–10.1)
CALCIUM SERPL-MCNC: 8.1 MG/DL (ref 8.5–10.1)
CALCIUM SERPL-MCNC: 8.2 MG/DL (ref 8.5–10.1)
CALCIUM SERPL-MCNC: 8.2 MG/DL (ref 8.5–10.1)
CALCIUM SERPL-MCNC: 8.6 MG/DL (ref 8.5–10.1)
CALCIUM SERPL-MCNC: 8.6 MG/DL (ref 8.5–10.1)
CALCIUM SERPL-MCNC: 8.9 MG/DL (ref 8.5–10.1)
CALCIUM SERPL-MCNC: 9 MG/DL (ref 8.5–10.1)
CHLORIDE SERPL-SCNC: 103 MMOL/L (ref 94–109)
CHLORIDE SERPL-SCNC: 105 MMOL/L (ref 94–109)
CHLORIDE SERPL-SCNC: 108 MMOL/L (ref 94–109)
CHLORIDE SERPL-SCNC: 111 MMOL/L (ref 94–109)
CHLORIDE SERPL-SCNC: 111 MMOL/L (ref 94–109)
CHLORIDE SERPL-SCNC: 114 MMOL/L (ref 94–109)
CHLORIDE SERPL-SCNC: 114 MMOL/L (ref 94–109)
CHLORIDE SERPL-SCNC: 99 MMOL/L (ref 94–109)
CO2 SERPL-SCNC: 19 MMOL/L (ref 20–32)
CO2 SERPL-SCNC: 21 MMOL/L (ref 20–32)
CO2 SERPL-SCNC: 22 MMOL/L (ref 20–32)
CO2 SERPL-SCNC: 24 MMOL/L (ref 20–32)
CO2 SERPL-SCNC: 24 MMOL/L (ref 20–32)
CO2 SERPL-SCNC: 25 MMOL/L (ref 20–32)
CO2 SERPL-SCNC: 25 MMOL/L (ref 20–32)
CO2 SERPL-SCNC: 26 MMOL/L (ref 20–32)
COLOR UR AUTO: YELLOW
CREAT BLD-MCNC: 0.3 MG/DL (ref 0.52–1.04)
CREAT SERPL-MCNC: 0.38 MG/DL (ref 0.52–1.04)
CREAT SERPL-MCNC: 0.38 MG/DL (ref 0.52–1.04)
CREAT SERPL-MCNC: 0.39 MG/DL (ref 0.52–1.04)
CREAT SERPL-MCNC: 0.41 MG/DL (ref 0.52–1.04)
CREAT SERPL-MCNC: 0.42 MG/DL (ref 0.52–1.04)
CREAT SERPL-MCNC: 0.46 MG/DL (ref 0.52–1.04)
CREAT SERPL-MCNC: 0.47 MG/DL (ref 0.52–1.04)
CREAT SERPL-MCNC: 0.49 MG/DL (ref 0.52–1.04)
DIFFERENTIAL METHOD BLD: ABNORMAL
EOSINOPHIL # BLD AUTO: 0 10E9/L (ref 0–0.7)
EOSINOPHIL # BLD AUTO: 0.1 10E9/L (ref 0–0.7)
EOSINOPHIL NFR BLD AUTO: 0 %
EOSINOPHIL NFR BLD AUTO: 0 %
EOSINOPHIL NFR BLD AUTO: 0.2 %
EOSINOPHIL NFR BLD AUTO: 0.7 %
EOSINOPHIL NFR BLD AUTO: 0.8 %
EOSINOPHIL NFR BLD AUTO: 0.9 %
EOSINOPHIL NFR BLD AUTO: 1 %
EOSINOPHIL NFR BLD AUTO: 1.2 %
EOSINOPHIL NFR BLD AUTO: 1.4 %
EOSINOPHIL NFR BLD AUTO: 3.9 %
ERYTHROCYTE [DISTWIDTH] IN BLOOD BY AUTOMATED COUNT: 13.8 % (ref 10–15)
ERYTHROCYTE [DISTWIDTH] IN BLOOD BY AUTOMATED COUNT: 14.2 % (ref 10–15)
ERYTHROCYTE [DISTWIDTH] IN BLOOD BY AUTOMATED COUNT: 14.6 % (ref 10–15)
ERYTHROCYTE [DISTWIDTH] IN BLOOD BY AUTOMATED COUNT: 14.7 % (ref 10–15)
ERYTHROCYTE [DISTWIDTH] IN BLOOD BY AUTOMATED COUNT: 14.8 % (ref 10–15)
ERYTHROCYTE [DISTWIDTH] IN BLOOD BY AUTOMATED COUNT: 15.1 % (ref 10–15)
ERYTHROCYTE [DISTWIDTH] IN BLOOD BY AUTOMATED COUNT: 16 % (ref 10–15)
ERYTHROCYTE [DISTWIDTH] IN BLOOD BY AUTOMATED COUNT: 16.4 % (ref 10–15)
ERYTHROCYTE [DISTWIDTH] IN BLOOD BY AUTOMATED COUNT: 17 % (ref 10–15)
ERYTHROCYTE [DISTWIDTH] IN BLOOD BY AUTOMATED COUNT: 17.4 % (ref 10–15)
ERYTHROCYTE [DISTWIDTH] IN BLOOD BY AUTOMATED COUNT: 18.9 % (ref 10–15)
ERYTHROCYTE [DISTWIDTH] IN BLOOD BY AUTOMATED COUNT: 19.1 % (ref 10–15)
GFR SERPL CREATININE-BSD FRML MDRD: >90 ML/MIN/1.7M2
GLUCOSE SERPL-MCNC: 108 MG/DL (ref 70–99)
GLUCOSE SERPL-MCNC: 132 MG/DL (ref 70–99)
GLUCOSE SERPL-MCNC: 133 MG/DL (ref 70–99)
GLUCOSE SERPL-MCNC: 156 MG/DL (ref 70–99)
GLUCOSE SERPL-MCNC: 80 MG/DL (ref 70–99)
GLUCOSE SERPL-MCNC: 89 MG/DL (ref 70–99)
GLUCOSE SERPL-MCNC: 95 MG/DL (ref 70–99)
GLUCOSE SERPL-MCNC: 99 MG/DL (ref 70–99)
GLUCOSE UR STRIP-MCNC: NEGATIVE MG/DL
HCT VFR BLD AUTO: 22.7 % (ref 35–47)
HCT VFR BLD AUTO: 24.9 % (ref 35–47)
HCT VFR BLD AUTO: 25.7 % (ref 35–47)
HCT VFR BLD AUTO: 27.9 % (ref 35–47)
HCT VFR BLD AUTO: 28.1 % (ref 35–47)
HCT VFR BLD AUTO: 29.8 % (ref 35–47)
HCT VFR BLD AUTO: 30.3 % (ref 35–47)
HCT VFR BLD AUTO: 31.6 % (ref 35–47)
HCT VFR BLD AUTO: 32.6 % (ref 35–47)
HCT VFR BLD AUTO: 33.3 % (ref 35–47)
HCT VFR BLD AUTO: 33.4 % (ref 35–47)
HCT VFR BLD AUTO: 33.9 % (ref 35–47)
HGB BLD-MCNC: 10.3 G/DL (ref 11.7–15.7)
HGB BLD-MCNC: 10.4 G/DL (ref 11.7–15.7)
HGB BLD-MCNC: 10.9 G/DL (ref 11.7–15.7)
HGB BLD-MCNC: 11.1 G/DL (ref 11.7–15.7)
HGB BLD-MCNC: 11.3 G/DL (ref 11.7–15.7)
HGB BLD-MCNC: 11.5 G/DL (ref 11.7–15.7)
HGB BLD-MCNC: 7.4 G/DL (ref 11.7–15.7)
HGB BLD-MCNC: 8.2 G/DL (ref 11.7–15.7)
HGB BLD-MCNC: 9 G/DL (ref 11.7–15.7)
HGB BLD-MCNC: 9.3 G/DL (ref 11.7–15.7)
HGB BLD-MCNC: 9.6 G/DL (ref 11.7–15.7)
HGB BLD-MCNC: 9.8 G/DL (ref 11.7–15.7)
HGB UR QL STRIP: NEGATIVE
IMM GRANULOCYTES # BLD: 0 10E9/L (ref 0–0.4)
IMM GRANULOCYTES NFR BLD: 0.2 %
IMM GRANULOCYTES NFR BLD: 0.2 %
IMM GRANULOCYTES NFR BLD: 0.4 %
IMM GRANULOCYTES NFR BLD: 0.5 %
IMM GRANULOCYTES NFR BLD: 0.7 %
IMM GRANULOCYTES NFR BLD: 1.1 %
KETONES UR STRIP-MCNC: 10 MG/DL
LACTATE BLD-SCNC: 2.1 MMOL/L (ref 0.7–2)
LEUKOCYTE ESTERASE UR QL STRIP: ABNORMAL
LIPASE SERPL-CCNC: 49 U/L (ref 73–393)
LYMPHOCYTES # BLD AUTO: 0.2 10E9/L (ref 0.8–5.3)
LYMPHOCYTES # BLD AUTO: 0.3 10E9/L (ref 0.8–5.3)
LYMPHOCYTES # BLD AUTO: 0.8 10E9/L (ref 0.8–5.3)
LYMPHOCYTES # BLD AUTO: 0.9 10E9/L (ref 0.8–5.3)
LYMPHOCYTES # BLD AUTO: 1 10E9/L (ref 0.8–5.3)
LYMPHOCYTES # BLD AUTO: 1 10E9/L (ref 0.8–5.3)
LYMPHOCYTES # BLD AUTO: 1.1 10E9/L (ref 0.8–5.3)
LYMPHOCYTES # BLD AUTO: 1.1 10E9/L (ref 0.8–5.3)
LYMPHOCYTES # BLD AUTO: 1.3 10E9/L (ref 0.8–5.3)
LYMPHOCYTES NFR BLD AUTO: 20.2 %
LYMPHOCYTES NFR BLD AUTO: 21.6 %
LYMPHOCYTES NFR BLD AUTO: 23.9 %
LYMPHOCYTES NFR BLD AUTO: 25.9 %
LYMPHOCYTES NFR BLD AUTO: 29.8 %
LYMPHOCYTES NFR BLD AUTO: 34.3 %
LYMPHOCYTES NFR BLD AUTO: 35.4 %
LYMPHOCYTES NFR BLD AUTO: 4.4 %
LYMPHOCYTES NFR BLD AUTO: 57 %
LYMPHOCYTES NFR BLD AUTO: 7 %
LYMPHOCYTES NFR BLD AUTO: 7 %
MACROCYTES BLD QL SMEAR: PRESENT
MAGNESIUM SERPL-MCNC: 1.9 MG/DL (ref 1.6–2.3)
MAGNESIUM SERPL-MCNC: 2.1 MG/DL (ref 1.6–2.3)
MAGNESIUM SERPL-MCNC: 2.2 MG/DL (ref 1.6–2.3)
MAGNESIUM SERPL-MCNC: 2.3 MG/DL (ref 1.6–2.3)
MCH RBC QN AUTO: 28.5 PG (ref 26.5–33)
MCH RBC QN AUTO: 28.6 PG (ref 26.5–33)
MCH RBC QN AUTO: 29.1 PG (ref 26.5–33)
MCH RBC QN AUTO: 29.5 PG (ref 26.5–33)
MCH RBC QN AUTO: 30.1 PG (ref 26.5–33)
MCH RBC QN AUTO: 30.5 PG (ref 26.5–33)
MCH RBC QN AUTO: 30.5 PG (ref 26.5–33)
MCH RBC QN AUTO: 31.7 PG (ref 26.5–33)
MCH RBC QN AUTO: 31.9 PG (ref 26.5–33)
MCH RBC QN AUTO: 32 PG (ref 26.5–33)
MCH RBC QN AUTO: 32.7 PG (ref 26.5–33)
MCH RBC QN AUTO: 32.7 PG (ref 26.5–33)
MCHC RBC AUTO-ENTMCNC: 32.6 G/DL (ref 31.5–36.5)
MCHC RBC AUTO-ENTMCNC: 32.9 G/DL (ref 31.5–36.5)
MCHC RBC AUTO-ENTMCNC: 33.1 G/DL (ref 31.5–36.5)
MCHC RBC AUTO-ENTMCNC: 33.3 G/DL (ref 31.5–36.5)
MCHC RBC AUTO-ENTMCNC: 33.4 G/DL (ref 31.5–36.5)
MCHC RBC AUTO-ENTMCNC: 33.8 G/DL (ref 31.5–36.5)
MCHC RBC AUTO-ENTMCNC: 33.9 G/DL (ref 31.5–36.5)
MCHC RBC AUTO-ENTMCNC: 34 G/DL (ref 31.5–36.5)
MCHC RBC AUTO-ENTMCNC: 34.4 G/DL (ref 31.5–36.5)
MCHC RBC AUTO-ENTMCNC: 35 G/DL (ref 31.5–36.5)
MCV RBC AUTO: 83 FL (ref 78–100)
MCV RBC AUTO: 86 FL (ref 78–100)
MCV RBC AUTO: 87 FL (ref 78–100)
MCV RBC AUTO: 87 FL (ref 78–100)
MCV RBC AUTO: 91 FL (ref 78–100)
MCV RBC AUTO: 92 FL (ref 78–100)
MCV RBC AUTO: 93 FL (ref 78–100)
MCV RBC AUTO: 94 FL (ref 78–100)
MCV RBC AUTO: 94 FL (ref 78–100)
MCV RBC AUTO: 96 FL (ref 78–100)
MCV RBC AUTO: 96 FL (ref 78–100)
MCV RBC AUTO: 98 FL (ref 78–100)
METAMYELOCYTES # BLD: 0.3 10E9/L
METAMYELOCYTES NFR BLD MANUAL: 2 %
MICROCYTES BLD QL SMEAR: PRESENT
MONOCYTES # BLD AUTO: 0 10E9/L (ref 0–1.3)
MONOCYTES # BLD AUTO: 0.1 10E9/L (ref 0–1.3)
MONOCYTES # BLD AUTO: 0.2 10E9/L (ref 0–1.3)
MONOCYTES # BLD AUTO: 0.3 10E9/L (ref 0–1.3)
MONOCYTES # BLD AUTO: 0.5 10E9/L (ref 0–1.3)
MONOCYTES # BLD AUTO: 0.5 10E9/L (ref 0–1.3)
MONOCYTES # BLD AUTO: 0.6 10E9/L (ref 0–1.3)
MONOCYTES # BLD AUTO: 0.6 10E9/L (ref 0–1.3)
MONOCYTES # BLD AUTO: 0.7 10E9/L (ref 0–1.3)
MONOCYTES NFR BLD AUTO: 11.4 %
MONOCYTES NFR BLD AUTO: 11.7 %
MONOCYTES NFR BLD AUTO: 12 %
MONOCYTES NFR BLD AUTO: 13.5 %
MONOCYTES NFR BLD AUTO: 15.3 %
MONOCYTES NFR BLD AUTO: 2 %
MONOCYTES NFR BLD AUTO: 2.6 %
MONOCYTES NFR BLD AUTO: 3 %
MONOCYTES NFR BLD AUTO: 6.7 %
MONOCYTES NFR BLD AUTO: 7 %
MONOCYTES NFR BLD AUTO: 7.4 %
MUCOUS THREADS #/AREA URNS LPF: PRESENT /LPF
NEUTROPHILS # BLD AUTO: 0.6 10E9/L (ref 1.6–8.3)
NEUTROPHILS # BLD AUTO: 1.3 10E9/L (ref 1.6–8.3)
NEUTROPHILS # BLD AUTO: 1.8 10E9/L (ref 1.6–8.3)
NEUTROPHILS # BLD AUTO: 11.9 10E9/L (ref 1.6–8.3)
NEUTROPHILS # BLD AUTO: 2.2 10E9/L (ref 1.6–8.3)
NEUTROPHILS # BLD AUTO: 2.6 10E9/L (ref 1.6–8.3)
NEUTROPHILS # BLD AUTO: 2.9 10E9/L (ref 1.6–8.3)
NEUTROPHILS # BLD AUTO: 3.3 10E9/L (ref 1.6–8.3)
NEUTROPHILS # BLD AUTO: 3.3 10E9/L (ref 1.6–8.3)
NEUTROPHILS # BLD AUTO: 4.2 10E9/L (ref 1.6–8.3)
NEUTROPHILS # BLD AUTO: 5.1 10E9/L (ref 1.6–8.3)
NEUTROPHILS NFR BLD AUTO: 37 %
NEUTROPHILS NFR BLD AUTO: 51.8 %
NEUTROPHILS NFR BLD AUTO: 52.3 %
NEUTROPHILS NFR BLD AUTO: 52.6 %
NEUTROPHILS NFR BLD AUTO: 58.7 %
NEUTROPHILS NFR BLD AUTO: 63.1 %
NEUTROPHILS NFR BLD AUTO: 65.6 %
NEUTROPHILS NFR BLD AUTO: 69.8 %
NEUTROPHILS NFR BLD AUTO: 86 %
NEUTROPHILS NFR BLD AUTO: 89 %
NEUTROPHILS NFR BLD AUTO: 93 %
NITRATE UR QL: NEGATIVE
NRBC # BLD AUTO: 0 10*3/UL
NRBC BLD AUTO-RTO: 1 /100
NUM BPU REQUESTED: 1
OVALOCYTES BLD QL SMEAR: SLIGHT
OVALOCYTES BLD QL SMEAR: SLIGHT
PH UR STRIP: 6 PH (ref 5–7)
PHOSPHATE SERPL-MCNC: 2.4 MG/DL (ref 2.5–4.5)
PHOSPHATE SERPL-MCNC: 3.3 MG/DL (ref 2.5–4.5)
PLATELET # BLD AUTO: 116 10E9/L (ref 150–450)
PLATELET # BLD AUTO: 128 10E9/L (ref 150–450)
PLATELET # BLD AUTO: 142 10E9/L (ref 150–450)
PLATELET # BLD AUTO: 196 10E9/L (ref 150–450)
PLATELET # BLD AUTO: 215 10E9/L (ref 150–450)
PLATELET # BLD AUTO: 219 10E9/L (ref 150–450)
PLATELET # BLD AUTO: 220 10E9/L (ref 150–450)
PLATELET # BLD AUTO: 220 10E9/L (ref 150–450)
PLATELET # BLD AUTO: 259 10E9/L (ref 150–450)
PLATELET # BLD AUTO: 272 10E9/L (ref 150–450)
PLATELET # BLD AUTO: 275 10E9/L (ref 150–450)
PLATELET # BLD AUTO: 97 10E9/L (ref 150–450)
PLATELET # BLD EST: ABNORMAL 10*3/UL
POIKILOCYTOSIS BLD QL SMEAR: SLIGHT
POTASSIUM SERPL-SCNC: 3.4 MMOL/L (ref 3.4–5.3)
POTASSIUM SERPL-SCNC: 3.6 MMOL/L (ref 3.4–5.3)
POTASSIUM SERPL-SCNC: 3.6 MMOL/L (ref 3.4–5.3)
POTASSIUM SERPL-SCNC: 3.8 MMOL/L (ref 3.4–5.3)
POTASSIUM SERPL-SCNC: 4 MMOL/L (ref 3.4–5.3)
POTASSIUM SERPL-SCNC: 4 MMOL/L (ref 3.4–5.3)
PROT SERPL-MCNC: 6.2 G/DL (ref 6.8–8.8)
PROT SERPL-MCNC: 6.4 G/DL (ref 6.8–8.8)
PROT SERPL-MCNC: 6.5 G/DL (ref 6.8–8.8)
RBC # BLD AUTO: 2.6 10E12/L (ref 3.8–5.2)
RBC # BLD AUTO: 2.87 10E12/L (ref 3.8–5.2)
RBC # BLD AUTO: 3.05 10E12/L (ref 3.8–5.2)
RBC # BLD AUTO: 3.09 10E12/L (ref 3.8–5.2)
RBC # BLD AUTO: 3.23 10E12/L (ref 3.8–5.2)
RBC # BLD AUTO: 3.25 10E12/L (ref 3.8–5.2)
RBC # BLD AUTO: 3.26 10E12/L (ref 3.8–5.2)
RBC # BLD AUTO: 3.33 10E12/L (ref 3.8–5.2)
RBC # BLD AUTO: 3.41 10E12/L (ref 3.8–5.2)
RBC # BLD AUTO: 3.47 10E12/L (ref 3.8–5.2)
RBC # BLD AUTO: 3.52 10E12/L (ref 3.8–5.2)
RBC # BLD AUTO: 3.56 10E12/L (ref 3.8–5.2)
RBC #/AREA URNS AUTO: 1 /HPF (ref 0–2)
RBC INCLUSIONS BLD: SLIGHT
RBC MORPH BLD: NORMAL
SODIUM SERPL-SCNC: 132 MMOL/L (ref 133–144)
SODIUM SERPL-SCNC: 135 MMOL/L (ref 133–144)
SODIUM SERPL-SCNC: 137 MMOL/L (ref 133–144)
SODIUM SERPL-SCNC: 137 MMOL/L (ref 133–144)
SODIUM SERPL-SCNC: 143 MMOL/L (ref 133–144)
SODIUM SERPL-SCNC: 144 MMOL/L (ref 133–144)
SODIUM SERPL-SCNC: 145 MMOL/L (ref 133–144)
SODIUM SERPL-SCNC: 145 MMOL/L (ref 133–144)
SOURCE: ABNORMAL
SP GR UR STRIP: 1.01 (ref 1–1.03)
SPECIMEN EXP DATE BLD: NORMAL
SQUAMOUS #/AREA URNS AUTO: 1 /HPF (ref 0–1)
TRANS CELLS #/AREA URNS HPF: 1 /HPF (ref 0–1)
TRANSFUSION STATUS PATIENT QL: NORMAL
TRANSFUSION STATUS PATIENT QL: NORMAL
TROPONIN I SERPL-MCNC: <0.015 UG/L (ref 0–0.04)
UROBILINOGEN UR STRIP-MCNC: 2 MG/DL (ref 0–2)
WBC # BLD AUTO: 1.6 10E9/L (ref 4–11)
WBC # BLD AUTO: 13.4 10E9/L (ref 4–11)
WBC # BLD AUTO: 2.5 10E9/L (ref 4–11)
WBC # BLD AUTO: 3.6 10E9/L (ref 4–11)
WBC # BLD AUTO: 3.8 10E9/L (ref 4–11)
WBC # BLD AUTO: 4 10E9/L (ref 4–11)
WBC # BLD AUTO: 4.3 10E9/L (ref 4–11)
WBC # BLD AUTO: 4.4 10E9/L (ref 4–11)
WBC # BLD AUTO: 4.6 10E9/L (ref 4–11)
WBC # BLD AUTO: 5 10E9/L (ref 4–11)
WBC # BLD AUTO: 5.5 10E9/L (ref 4–11)
WBC # BLD AUTO: 6.1 10E9/L (ref 4–11)
WBC #/AREA URNS AUTO: 1 /HPF (ref 0–5)

## 2018-01-01 PROCEDURE — 99215 OFFICE O/P EST HI 40 MIN: CPT | Performed by: NURSE PRACTITIONER

## 2018-01-01 PROCEDURE — 80048 BASIC METABOLIC PNL TOTAL CA: CPT | Performed by: PHYSICIAN ASSISTANT

## 2018-01-01 PROCEDURE — 99207 ZZC NO CHARGE NURSE ONLY: CPT

## 2018-01-01 PROCEDURE — 80048 BASIC METABOLIC PNL TOTAL CA: CPT | Performed by: INTERNAL MEDICINE

## 2018-01-01 PROCEDURE — 25000132 ZZH RX MED GY IP 250 OP 250 PS 637: Mod: GY | Performed by: INTERNAL MEDICINE

## 2018-01-01 PROCEDURE — 96361 HYDRATE IV INFUSION ADD-ON: CPT

## 2018-01-01 PROCEDURE — 85025 COMPLETE CBC W/AUTO DIFF WBC: CPT | Performed by: INTERNAL MEDICINE

## 2018-01-01 PROCEDURE — 96413 CHEMO IV INFUSION 1 HR: CPT | Performed by: INTERNAL MEDICINE

## 2018-01-01 PROCEDURE — 96367 TX/PROPH/DG ADDL SEQ IV INF: CPT | Performed by: NURSE PRACTITIONER

## 2018-01-01 PROCEDURE — 71046 X-RAY EXAM CHEST 2 VIEWS: CPT | Performed by: RADIOLOGY

## 2018-01-01 PROCEDURE — 83690 ASSAY OF LIPASE: CPT | Performed by: EMERGENCY MEDICINE

## 2018-01-01 PROCEDURE — 97535 SELF CARE MNGMENT TRAINING: CPT | Mod: GO | Performed by: OCCUPATIONAL THERAPIST

## 2018-01-01 PROCEDURE — 36591 DRAW BLOOD OFF VENOUS DEVICE: CPT

## 2018-01-01 PROCEDURE — 86900 BLOOD TYPING SEROLOGIC ABO: CPT | Performed by: INTERNAL MEDICINE

## 2018-01-01 PROCEDURE — A9270 NON-COVERED ITEM OR SERVICE: HCPCS | Mod: GY | Performed by: INTERNAL MEDICINE

## 2018-01-01 PROCEDURE — 99207 ZZC NO CHARGE LOS: CPT

## 2018-01-01 PROCEDURE — 97530 THERAPEUTIC ACTIVITIES: CPT | Mod: GP

## 2018-01-01 PROCEDURE — 85025 COMPLETE CBC W/AUTO DIFF WBC: CPT | Performed by: NURSE PRACTITIONER

## 2018-01-01 PROCEDURE — 25000132 ZZH RX MED GY IP 250 OP 250 PS 637: Mod: GY | Performed by: PHYSICIAN ASSISTANT

## 2018-01-01 PROCEDURE — 84100 ASSAY OF PHOSPHORUS: CPT | Performed by: INTERNAL MEDICINE

## 2018-01-01 PROCEDURE — 12000008 ZZH R&B INTERMEDIATE UMMC

## 2018-01-01 PROCEDURE — 40000133 ZZH STATISTIC OT WARD VISIT: Performed by: OCCUPATIONAL THERAPIST

## 2018-01-01 PROCEDURE — 25000128 H RX IP 250 OP 636: Performed by: PHYSICIAN ASSISTANT

## 2018-01-01 PROCEDURE — 71260 CT THORAX DX C+: CPT | Performed by: RADIOLOGY

## 2018-01-01 PROCEDURE — 25500064 ZZH RX 255 OP 636: Performed by: INTERNAL MEDICINE

## 2018-01-01 PROCEDURE — 96360 HYDRATION IV INFUSION INIT: CPT

## 2018-01-01 PROCEDURE — P9016 RBC LEUKOCYTES REDUCED: HCPCS | Performed by: INTERNAL MEDICINE

## 2018-01-01 PROCEDURE — 97162 PT EVAL MOD COMPLEX 30 MIN: CPT | Mod: GP

## 2018-01-01 PROCEDURE — 96367 TX/PROPH/DG ADDL SEQ IV INF: CPT | Performed by: INTERNAL MEDICINE

## 2018-01-01 PROCEDURE — 80053 COMPREHEN METABOLIC PANEL: CPT | Performed by: INTERNAL MEDICINE

## 2018-01-01 PROCEDURE — 40000133 ZZH STATISTIC OT WARD VISIT

## 2018-01-01 PROCEDURE — 40000901 ZZH STATISTIC WOC PT EDUCATION, 0-15 MIN

## 2018-01-01 PROCEDURE — 36592 COLLECT BLOOD FROM PICC: CPT | Performed by: PHYSICIAN ASSISTANT

## 2018-01-01 PROCEDURE — 40000193 ZZH STATISTIC PT WARD VISIT

## 2018-01-01 PROCEDURE — 83735 ASSAY OF MAGNESIUM: CPT | Performed by: PHYSICIAN ASSISTANT

## 2018-01-01 PROCEDURE — 80053 COMPREHEN METABOLIC PANEL: CPT | Performed by: NURSE PRACTITIONER

## 2018-01-01 PROCEDURE — 99233 SBSQ HOSP IP/OBS HIGH 50: CPT | Performed by: INTERNAL MEDICINE

## 2018-01-01 PROCEDURE — 99232 SBSQ HOSP IP/OBS MODERATE 35: CPT | Performed by: INTERNAL MEDICINE

## 2018-01-01 PROCEDURE — 25000128 H RX IP 250 OP 636: Performed by: INTERNAL MEDICINE

## 2018-01-01 PROCEDURE — A9585 GADOBUTROL INJECTION: HCPCS | Performed by: INTERNAL MEDICINE

## 2018-01-01 PROCEDURE — 97116 GAIT TRAINING THERAPY: CPT | Mod: GP

## 2018-01-01 PROCEDURE — 74177 CT ABD & PELVIS W/CONTRAST: CPT

## 2018-01-01 PROCEDURE — 99214 OFFICE O/P EST MOD 30 MIN: CPT | Mod: 25 | Performed by: NURSE PRACTITIONER

## 2018-01-01 PROCEDURE — 82565 ASSAY OF CREATININE: CPT | Performed by: RADIOLOGY

## 2018-01-01 PROCEDURE — 71045 X-RAY EXAM CHEST 1 VIEW: CPT

## 2018-01-01 PROCEDURE — 86850 RBC ANTIBODY SCREEN: CPT | Performed by: INTERNAL MEDICINE

## 2018-01-01 PROCEDURE — 97110 THERAPEUTIC EXERCISES: CPT | Mod: GO | Performed by: OCCUPATIONAL THERAPIST

## 2018-01-01 PROCEDURE — 96415 CHEMO IV INFUSION ADDL HR: CPT | Performed by: INTERNAL MEDICINE

## 2018-01-01 PROCEDURE — 97165 OT EVAL LOW COMPLEX 30 MIN: CPT | Mod: GO | Performed by: OCCUPATIONAL THERAPIST

## 2018-01-01 PROCEDURE — 99284 EMERGENCY DEPT VISIT MOD MDM: CPT | Mod: Z6 | Performed by: EMERGENCY MEDICINE

## 2018-01-01 PROCEDURE — 25000128 H RX IP 250 OP 636: Performed by: EMERGENCY MEDICINE

## 2018-01-01 PROCEDURE — 76705 ECHO EXAM OF ABDOMEN: CPT

## 2018-01-01 PROCEDURE — 99239 HOSP IP/OBS DSCHRG MGMT >30: CPT | Performed by: INTERNAL MEDICINE

## 2018-01-01 PROCEDURE — A9270 NON-COVERED ITEM OR SERVICE: HCPCS | Mod: GY | Performed by: PHYSICIAN ASSISTANT

## 2018-01-01 PROCEDURE — 82247 BILIRUBIN TOTAL: CPT | Performed by: INTERNAL MEDICINE

## 2018-01-01 PROCEDURE — 97535 SELF CARE MNGMENT TRAINING: CPT | Mod: GO

## 2018-01-01 PROCEDURE — 83735 ASSAY OF MAGNESIUM: CPT | Performed by: INTERNAL MEDICINE

## 2018-01-01 PROCEDURE — 99223 1ST HOSP IP/OBS HIGH 75: CPT | Mod: AI | Performed by: INTERNAL MEDICINE

## 2018-01-01 PROCEDURE — 86923 COMPATIBILITY TEST ELECTRIC: CPT | Performed by: INTERNAL MEDICINE

## 2018-01-01 PROCEDURE — 96413 CHEMO IV INFUSION 1 HR: CPT | Performed by: NURSE PRACTITIONER

## 2018-01-01 PROCEDURE — 81001 URINALYSIS AUTO W/SCOPE: CPT | Performed by: EMERGENCY MEDICINE

## 2018-01-01 PROCEDURE — 25000128 H RX IP 250 OP 636: Performed by: STUDENT IN AN ORGANIZED HEALTH CARE EDUCATION/TRAINING PROGRAM

## 2018-01-01 PROCEDURE — 70553 MRI BRAIN STEM W/O & W/DYE: CPT

## 2018-01-01 PROCEDURE — 99285 EMERGENCY DEPT VISIT HI MDM: CPT | Mod: 25

## 2018-01-01 PROCEDURE — 96417 CHEMO IV INFUS EACH ADDL SEQ: CPT | Performed by: NURSE PRACTITIONER

## 2018-01-01 PROCEDURE — 84484 ASSAY OF TROPONIN QUANT: CPT | Performed by: EMERGENCY MEDICINE

## 2018-01-01 PROCEDURE — 96375 TX/PRO/DX INJ NEW DRUG ADDON: CPT | Performed by: INTERNAL MEDICINE

## 2018-01-01 PROCEDURE — 71260 CT THORAX DX C+: CPT

## 2018-01-01 PROCEDURE — 25800025 ZZH RX 258: Performed by: INTERNAL MEDICINE

## 2018-01-01 PROCEDURE — 96416 CHEMO PROLONG INFUSE W/PUMP: CPT | Performed by: INTERNAL MEDICINE

## 2018-01-01 PROCEDURE — 25000125 ZZHC RX 250: Performed by: INTERNAL MEDICINE

## 2018-01-01 PROCEDURE — 86901 BLOOD TYPING SEROLOGIC RH(D): CPT | Performed by: INTERNAL MEDICINE

## 2018-01-01 PROCEDURE — 83605 ASSAY OF LACTIC ACID: CPT | Performed by: EMERGENCY MEDICINE

## 2018-01-01 PROCEDURE — 40000556 ZZH STATISTIC PERIPHERAL IV START W US GUIDANCE

## 2018-01-01 PROCEDURE — 99215 OFFICE O/P EST HI 40 MIN: CPT | Performed by: INTERNAL MEDICINE

## 2018-01-01 PROCEDURE — 74177 CT ABD & PELVIS W/CONTRAST: CPT | Performed by: RADIOLOGY

## 2018-01-01 PROCEDURE — 80053 COMPREHEN METABOLIC PANEL: CPT | Performed by: EMERGENCY MEDICINE

## 2018-01-01 PROCEDURE — 99214 OFFICE O/P EST MOD 30 MIN: CPT | Performed by: NURSE PRACTITIONER

## 2018-01-01 PROCEDURE — 85025 COMPLETE CBC W/AUTO DIFF WBC: CPT | Performed by: EMERGENCY MEDICINE

## 2018-01-01 PROCEDURE — 36415 COLL VENOUS BLD VENIPUNCTURE: CPT | Performed by: INTERNAL MEDICINE

## 2018-01-01 PROCEDURE — 84100 ASSAY OF PHOSPHORUS: CPT | Performed by: PHYSICIAN ASSISTANT

## 2018-01-01 PROCEDURE — 85027 COMPLETE CBC AUTOMATED: CPT | Performed by: PHYSICIAN ASSISTANT

## 2018-01-01 RX ORDER — METHYLPREDNISOLONE SODIUM SUCCINATE 125 MG/2ML
125 INJECTION, POWDER, LYOPHILIZED, FOR SOLUTION INTRAMUSCULAR; INTRAVENOUS
Status: CANCELLED
Start: 2018-01-01

## 2018-01-01 RX ORDER — NYSTATIN 100000/ML
500000 SUSPENSION, ORAL (FINAL DOSE FORM) ORAL 4 TIMES DAILY
Status: DISCONTINUED | OUTPATIENT
Start: 2018-01-01 | End: 2018-01-01 | Stop reason: HOSPADM

## 2018-01-01 RX ORDER — ALBUTEROL SULFATE 90 UG/1
1-2 AEROSOL, METERED RESPIRATORY (INHALATION)
Status: CANCELLED
Start: 2018-01-01

## 2018-01-01 RX ORDER — SODIUM CHLORIDE 9 MG/ML
1000 INJECTION, SOLUTION INTRAVENOUS CONTINUOUS PRN
Status: CANCELLED
Start: 2018-01-01

## 2018-01-01 RX ORDER — MEPERIDINE HYDROCHLORIDE 25 MG/ML
25 INJECTION INTRAMUSCULAR; INTRAVENOUS; SUBCUTANEOUS EVERY 30 MIN PRN
Status: CANCELLED | OUTPATIENT
Start: 2018-01-01

## 2018-01-01 RX ORDER — POTASSIUM CHLORIDE 29.8 MG/ML
20 INJECTION INTRAVENOUS
Status: DISCONTINUED | OUTPATIENT
Start: 2018-01-01 | End: 2018-01-01

## 2018-01-01 RX ORDER — PACLITAXEL 100 MG/20ML
100 INJECTION, POWDER, LYOPHILIZED, FOR SUSPENSION INTRAVENOUS ONCE
Status: CANCELLED
Start: 2018-01-01

## 2018-01-01 RX ORDER — HEPARIN SODIUM (PORCINE) LOCK FLUSH IV SOLN 100 UNIT/ML 100 UNIT/ML
5 SOLUTION INTRAVENOUS ONCE
Status: CANCELLED
Start: 2018-01-01 | End: 2018-01-01

## 2018-01-01 RX ORDER — PACLITAXEL 100 MG/20ML
100 INJECTION, POWDER, LYOPHILIZED, FOR SUSPENSION INTRAVENOUS ONCE
Status: COMPLETED | OUTPATIENT
Start: 2018-01-01 | End: 2018-01-01

## 2018-01-01 RX ORDER — TRAMADOL HYDROCHLORIDE 50 MG/1
50 TABLET ORAL EVERY 6 HOURS PRN
Status: DISCONTINUED | OUTPATIENT
Start: 2018-01-01 | End: 2018-01-01 | Stop reason: HOSPADM

## 2018-01-01 RX ORDER — ONDANSETRON 8 MG/1
8 TABLET, FILM COATED ORAL EVERY 8 HOURS PRN
Qty: 10 TABLET | Refills: 2 | Status: SHIPPED | OUTPATIENT
Start: 2018-01-01

## 2018-01-01 RX ORDER — ONDANSETRON 2 MG/ML
4 INJECTION INTRAMUSCULAR; INTRAVENOUS EVERY 6 HOURS PRN
Status: DISCONTINUED | OUTPATIENT
Start: 2018-01-01 | End: 2018-01-01 | Stop reason: HOSPADM

## 2018-01-01 RX ORDER — LIDOCAINE 4 G/G
2-3 PATCH TOPICAL
Status: DISCONTINUED | OUTPATIENT
Start: 2018-01-01 | End: 2018-01-01 | Stop reason: HOSPADM

## 2018-01-01 RX ORDER — NYSTATIN 100000/ML
500000 SUSPENSION, ORAL (FINAL DOSE FORM) ORAL 4 TIMES DAILY
Qty: 200 ML | Refills: 0 | Status: SHIPPED | OUTPATIENT
Start: 2018-01-01 | End: 2018-01-01

## 2018-01-01 RX ORDER — MEGESTROL ACETATE 20 MG/1
20 TABLET ORAL DAILY
Status: DISCONTINUED | OUTPATIENT
Start: 2018-01-01 | End: 2018-01-01 | Stop reason: HOSPADM

## 2018-01-01 RX ORDER — DIPHENHYDRAMINE HYDROCHLORIDE 50 MG/ML
50 INJECTION INTRAMUSCULAR; INTRAVENOUS
Status: CANCELLED
Start: 2018-01-01

## 2018-01-01 RX ORDER — EPINEPHRINE 0.3 MG/.3ML
0.3 INJECTION SUBCUTANEOUS EVERY 5 MIN PRN
Status: CANCELLED | OUTPATIENT
Start: 2018-01-01

## 2018-01-01 RX ORDER — SODIUM CHLORIDE 9 MG/ML
1000 INJECTION, SOLUTION INTRAVENOUS CONTINUOUS
Status: DISCONTINUED | OUTPATIENT
Start: 2018-01-01 | End: 2018-01-01

## 2018-01-01 RX ORDER — EPINEPHRINE 1 MG/ML
0.3 INJECTION, SOLUTION INTRAMUSCULAR; SUBCUTANEOUS EVERY 5 MIN PRN
Status: CANCELLED | OUTPATIENT
Start: 2018-01-01

## 2018-01-01 RX ORDER — AMOXICILLIN 250 MG
2 CAPSULE ORAL
Status: DISCONTINUED | OUTPATIENT
Start: 2018-01-01 | End: 2018-01-01

## 2018-01-01 RX ORDER — POTASSIUM CHLORIDE 1.5 G/1.58G
20-40 POWDER, FOR SOLUTION ORAL
Status: DISCONTINUED | OUTPATIENT
Start: 2018-01-01 | End: 2018-01-01

## 2018-01-01 RX ORDER — LOPERAMIDE HCL 2 MG
2 CAPSULE ORAL 4 TIMES DAILY PRN
Status: DISCONTINUED | OUTPATIENT
Start: 2018-01-01 | End: 2018-01-01 | Stop reason: HOSPADM

## 2018-01-01 RX ORDER — HEPARIN SODIUM,PORCINE 10 UNIT/ML
5-10 VIAL (ML) INTRAVENOUS EVERY 24 HOURS
Status: DISCONTINUED | OUTPATIENT
Start: 2018-01-01 | End: 2018-01-01 | Stop reason: HOSPADM

## 2018-01-01 RX ORDER — HEPARIN SODIUM (PORCINE) LOCK FLUSH IV SOLN 100 UNIT/ML 100 UNIT/ML
500 SOLUTION INTRAVENOUS DAILY PRN
Status: DISCONTINUED | OUTPATIENT
Start: 2018-01-01 | End: 2018-01-01 | Stop reason: HOSPADM

## 2018-01-01 RX ORDER — HEPARIN SODIUM (PORCINE) LOCK FLUSH IV SOLN 100 UNIT/ML 100 UNIT/ML
5 SOLUTION INTRAVENOUS
Status: DISCONTINUED | OUTPATIENT
Start: 2018-01-01 | End: 2018-01-01 | Stop reason: HOSPADM

## 2018-01-01 RX ORDER — HEPARIN SODIUM (PORCINE) LOCK FLUSH IV SOLN 100 UNIT/ML 100 UNIT/ML
5 SOLUTION INTRAVENOUS ONCE
Status: COMPLETED | OUTPATIENT
Start: 2018-01-01 | End: 2018-01-01

## 2018-01-01 RX ORDER — ALBUTEROL SULFATE 0.83 MG/ML
2.5 SOLUTION RESPIRATORY (INHALATION)
Status: CANCELLED | OUTPATIENT
Start: 2018-01-01

## 2018-01-01 RX ORDER — FLUORIDE TOOTHPASTE
5-10 TOOTHPASTE DENTAL 4 TIMES DAILY PRN
Qty: 59 ML | Refills: 0 | Status: SHIPPED | OUTPATIENT
Start: 2018-01-01

## 2018-01-01 RX ORDER — FLUORIDE TOOTHPASTE
5-10 TOOTHPASTE DENTAL 4 TIMES DAILY
Status: DISCONTINUED | OUTPATIENT
Start: 2018-01-01 | End: 2018-01-01 | Stop reason: HOSPADM

## 2018-01-01 RX ORDER — NALOXONE HYDROCHLORIDE 0.4 MG/ML
.1-.4 INJECTION, SOLUTION INTRAMUSCULAR; INTRAVENOUS; SUBCUTANEOUS
Status: DISCONTINUED | OUTPATIENT
Start: 2018-01-01 | End: 2018-01-01 | Stop reason: HOSPADM

## 2018-01-01 RX ORDER — LOPERAMIDE HCL 2 MG
2 CAPSULE ORAL 4 TIMES DAILY PRN
Qty: 20 CAPSULE | Refills: 0 | Status: SHIPPED | OUTPATIENT
Start: 2018-01-01

## 2018-01-01 RX ORDER — LORAZEPAM 2 MG/ML
0.5 INJECTION INTRAMUSCULAR EVERY 4 HOURS PRN
Status: CANCELLED
Start: 2018-01-01

## 2018-01-01 RX ORDER — TRAMADOL HYDROCHLORIDE 50 MG/1
50 TABLET ORAL EVERY 6 HOURS PRN
Qty: 10 TABLET | Refills: 0 | Status: SHIPPED | OUTPATIENT
Start: 2018-01-01

## 2018-01-01 RX ORDER — FLUCONAZOLE 200 MG/1
200 TABLET ORAL DAILY
Status: DISCONTINUED | OUTPATIENT
Start: 2018-01-01 | End: 2018-01-01 | Stop reason: HOSPADM

## 2018-01-01 RX ORDER — MAGNESIUM SULFATE HEPTAHYDRATE 40 MG/ML
4 INJECTION, SOLUTION INTRAVENOUS EVERY 4 HOURS PRN
Status: DISCONTINUED | OUTPATIENT
Start: 2018-01-01 | End: 2018-01-01 | Stop reason: HOSPADM

## 2018-01-01 RX ORDER — MEGESTROL ACETATE 20 MG/1
20 TABLET ORAL DAILY
Qty: 30 TABLET | Refills: 0 | Status: SHIPPED | OUTPATIENT
Start: 2018-01-01

## 2018-01-01 RX ORDER — POTASSIUM CL/LIDO/0.9 % NACL 10MEQ/0.1L
10 INTRAVENOUS SOLUTION, PIGGYBACK (ML) INTRAVENOUS
Status: DISCONTINUED | OUTPATIENT
Start: 2018-01-01 | End: 2018-01-01 | Stop reason: RX

## 2018-01-01 RX ORDER — ACETAMINOPHEN 325 MG/1
650 TABLET ORAL EVERY 4 HOURS PRN
Qty: 50 TABLET | Refills: 0 | Status: SHIPPED | OUTPATIENT
Start: 2018-01-01

## 2018-01-01 RX ORDER — ACETAMINOPHEN 325 MG/1
650 TABLET ORAL EVERY 4 HOURS PRN
Status: DISCONTINUED | OUTPATIENT
Start: 2018-01-01 | End: 2018-01-01 | Stop reason: HOSPADM

## 2018-01-01 RX ORDER — POTASSIUM CHLORIDE 7.45 MG/ML
10 INJECTION INTRAVENOUS
Status: DISCONTINUED | OUTPATIENT
Start: 2018-01-01 | End: 2018-01-01

## 2018-01-01 RX ORDER — IOPAMIDOL 755 MG/ML
58 INJECTION, SOLUTION INTRAVASCULAR ONCE
Status: COMPLETED | OUTPATIENT
Start: 2018-01-01 | End: 2018-01-01

## 2018-01-01 RX ORDER — AMOXICILLIN 250 MG
2 CAPSULE ORAL 2 TIMES DAILY PRN
Status: DISCONTINUED | OUTPATIENT
Start: 2018-01-01 | End: 2018-01-01 | Stop reason: HOSPADM

## 2018-01-01 RX ORDER — LATANOPROST 50 UG/ML
1 SOLUTION/ DROPS OPHTHALMIC AT BEDTIME
Status: DISCONTINUED | OUTPATIENT
Start: 2018-01-01 | End: 2018-01-01 | Stop reason: HOSPADM

## 2018-01-01 RX ORDER — IOPAMIDOL 755 MG/ML
59 INJECTION, SOLUTION INTRAVASCULAR ONCE
Status: COMPLETED | OUTPATIENT
Start: 2018-01-01 | End: 2018-01-01

## 2018-01-01 RX ORDER — SUCRALFATE ORAL 1 G/10ML
1 SUSPENSION ORAL 4 TIMES DAILY PRN
Status: DISCONTINUED | OUTPATIENT
Start: 2018-01-01 | End: 2018-01-01 | Stop reason: HOSPADM

## 2018-01-01 RX ORDER — FLUCONAZOLE 200 MG/1
200 TABLET ORAL DAILY
Qty: 10 TABLET | Refills: 0 | Status: SHIPPED | OUTPATIENT
Start: 2018-01-01 | End: 2018-01-01

## 2018-01-01 RX ORDER — GADOBUTROL 604.72 MG/ML
7.5 INJECTION INTRAVENOUS ONCE
Status: COMPLETED | OUTPATIENT
Start: 2018-01-01 | End: 2018-01-01

## 2018-01-01 RX ORDER — DOCUSATE SODIUM 100 MG/1
100 CAPSULE, LIQUID FILLED ORAL 2 TIMES DAILY
Status: DISCONTINUED | OUTPATIENT
Start: 2018-01-01 | End: 2018-01-01

## 2018-01-01 RX ORDER — PROCHLORPERAZINE MALEATE 5 MG
5 TABLET ORAL EVERY 6 HOURS PRN
Status: DISCONTINUED | OUTPATIENT
Start: 2018-01-01 | End: 2018-01-01 | Stop reason: HOSPADM

## 2018-01-01 RX ORDER — HEPARIN SODIUM,PORCINE 10 UNIT/ML
5-10 VIAL (ML) INTRAVENOUS
Status: DISCONTINUED | OUTPATIENT
Start: 2018-01-01 | End: 2018-01-01 | Stop reason: HOSPADM

## 2018-01-01 RX ORDER — LIDOCAINE 4 G/G
1-3 PATCH TOPICAL EVERY 24 HOURS
Qty: 15 PATCH | Refills: 0 | Status: SHIPPED | OUTPATIENT
Start: 2018-01-01

## 2018-01-01 RX ORDER — FLUCONAZOLE 200 MG/1
200 TABLET ORAL ONCE
Status: COMPLETED | OUTPATIENT
Start: 2018-01-01 | End: 2018-01-01

## 2018-01-01 RX ORDER — POTASSIUM CHLORIDE 750 MG/1
20-40 TABLET, EXTENDED RELEASE ORAL
Status: DISCONTINUED | OUTPATIENT
Start: 2018-01-01 | End: 2018-01-01

## 2018-01-01 RX ORDER — MAGNESIUM HYDROXIDE 1200 MG/15ML
LIQUID ORAL
Status: DISPENSED
Start: 2018-01-01 | End: 2018-01-01

## 2018-01-01 RX ADMIN — DICLOFENAC SODIUM 2 G: 10 GEL TOPICAL at 17:53

## 2018-01-01 RX ADMIN — IOPAMIDOL 58 ML: 755 INJECTION, SOLUTION INTRAVASCULAR at 14:05

## 2018-01-01 RX ADMIN — HEPARIN SODIUM (PORCINE) LOCK FLUSH IV SOLN 100 UNIT/ML 500 UNITS: 100 SOLUTION at 12:56

## 2018-01-01 RX ADMIN — Medication 10 ML: at 12:04

## 2018-01-01 RX ADMIN — HEPARIN SODIUM (PORCINE) LOCK FLUSH IV SOLN 100 UNIT/ML 5 ML: 100 SOLUTION at 12:11

## 2018-01-01 RX ADMIN — DEXTROSE MONOHYDRATE AND SODIUM CHLORIDE: 5; .9 INJECTION, SOLUTION INTRAVENOUS at 06:37

## 2018-01-01 RX ADMIN — NYSTATIN 500000 UNITS: 100000 SUSPENSION ORAL at 08:29

## 2018-01-01 RX ADMIN — HEPARIN SODIUM (PORCINE) LOCK FLUSH IV SOLN 100 UNIT/ML 5 ML: 100 SOLUTION at 14:43

## 2018-01-01 RX ADMIN — Medication 10 ML: at 08:28

## 2018-01-01 RX ADMIN — POTASSIUM PHOSPHATE, MONOBASIC AND POTASSIUM PHOSPHATE, DIBASIC 15 MMOL: 224; 236 INJECTION, SOLUTION INTRAVENOUS at 10:26

## 2018-01-01 RX ADMIN — Medication 5 ML: at 17:52

## 2018-01-01 RX ADMIN — Medication 10 ML: at 07:55

## 2018-01-01 RX ADMIN — PACLITAXEL 135 MG: 100 INJECTION, POWDER, LYOPHILIZED, FOR SUSPENSION INTRAVENOUS at 16:04

## 2018-01-01 RX ADMIN — FLUCONAZOLE 200 MG: 200 TABLET ORAL at 08:30

## 2018-01-01 RX ADMIN — NYSTATIN 500000 UNITS: 100000 SUSPENSION ORAL at 09:39

## 2018-01-01 RX ADMIN — DICLOFENAC SODIUM 2 G: 10 GEL TOPICAL at 16:55

## 2018-01-01 RX ADMIN — ACETAMINOPHEN 650 MG: 325 TABLET, FILM COATED ORAL at 02:52

## 2018-01-01 RX ADMIN — PANCRELIPASE: 24000; 76000; 120000 CAPSULE, DELAYED RELEASE PELLETS ORAL at 09:39

## 2018-01-01 RX ADMIN — Medication 5 ML: at 21:10

## 2018-01-01 RX ADMIN — HEPARIN SODIUM (PORCINE) LOCK FLUSH IV SOLN 100 UNIT/ML 5 ML: 100 SOLUTION at 11:49

## 2018-01-01 RX ADMIN — PANCRELIPASE 48000 UNITS: 24000; 76000; 120000 CAPSULE, DELAYED RELEASE PELLETS ORAL at 19:35

## 2018-01-01 RX ADMIN — FLUCONAZOLE 200 MG: 200 TABLET ORAL at 00:24

## 2018-01-01 RX ADMIN — NYSTATIN 500000 UNITS: 100000 SUSPENSION ORAL at 21:10

## 2018-01-01 RX ADMIN — LOPERAMIDE HYDROCHLORIDE 2 MG: 2 CAPSULE ORAL at 21:01

## 2018-01-01 RX ADMIN — TRAMADOL HYDROCHLORIDE 50 MG: 50 TABLET, COATED ORAL at 05:06

## 2018-01-01 RX ADMIN — NYSTATIN 500000 UNITS: 100000 SUSPENSION ORAL at 00:24

## 2018-01-01 RX ADMIN — PANCRELIPASE 24000 UNITS: 24000; 76000; 120000 CAPSULE, DELAYED RELEASE PELLETS ORAL at 12:03

## 2018-01-01 RX ADMIN — NYSTATIN 500000 UNITS: 100000 SUSPENSION ORAL at 12:04

## 2018-01-01 RX ADMIN — Medication 10 ML: at 12:14

## 2018-01-01 RX ADMIN — NYSTATIN 500000 UNITS: 100000 SUSPENSION ORAL at 20:35

## 2018-01-01 RX ADMIN — GADOBUTROL 5 ML: 604.72 INJECTION INTRAVENOUS at 15:34

## 2018-01-01 RX ADMIN — LIDOCAINE 2 PATCH: 560 PATCH PERCUTANEOUS; TOPICAL; TRANSDERMAL at 20:34

## 2018-01-01 RX ADMIN — Medication 250 ML: at 13:49

## 2018-01-01 RX ADMIN — PANCRELIPASE 24000 UNITS: 24000; 76000; 120000 CAPSULE, DELAYED RELEASE PELLETS ORAL at 07:55

## 2018-01-01 RX ADMIN — PANCRELIPASE 24000 UNITS: 24000; 76000; 120000 CAPSULE, DELAYED RELEASE PELLETS ORAL at 08:26

## 2018-01-01 RX ADMIN — HEPARIN SODIUM (PORCINE) LOCK FLUSH IV SOLN 100 UNIT/ML 5 ML: 100 SOLUTION at 09:40

## 2018-01-01 RX ADMIN — NYSTATIN 500000 UNITS: 100000 SUSPENSION ORAL at 17:53

## 2018-01-01 RX ADMIN — HEPARIN SODIUM (PORCINE) LOCK FLUSH IV SOLN 100 UNIT/ML 5 ML: 100 SOLUTION at 12:12

## 2018-01-01 RX ADMIN — LATANOPROST 1 DROP: 50 SOLUTION OPHTHALMIC at 22:15

## 2018-01-01 RX ADMIN — LOPERAMIDE HYDROCHLORIDE 2 MG: 2 CAPSULE ORAL at 11:39

## 2018-01-01 RX ADMIN — PACLITAXEL 135 MG: 100 INJECTION, POWDER, LYOPHILIZED, FOR SUSPENSION INTRAVENOUS at 14:38

## 2018-01-01 RX ADMIN — HEPARIN SODIUM (PORCINE) LOCK FLUSH IV SOLN 100 UNIT/ML 5 ML: 100 SOLUTION at 12:06

## 2018-01-01 RX ADMIN — LATANOPROST 1 DROP: 50 SOLUTION OPHTHALMIC at 00:23

## 2018-01-01 RX ADMIN — SODIUM CHLORIDE 1000 ML: 9 INJECTION, SOLUTION INTRAVENOUS at 07:46

## 2018-01-01 RX ADMIN — PANCRELIPASE 48000 UNITS: 24000; 76000; 120000 CAPSULE, DELAYED RELEASE PELLETS ORAL at 09:39

## 2018-01-01 RX ADMIN — DEXTROSE MONOHYDRATE AND SODIUM CHLORIDE: 5; .9 INJECTION, SOLUTION INTRAVENOUS at 14:57

## 2018-01-01 RX ADMIN — HEPARIN SODIUM (PORCINE) LOCK FLUSH IV SOLN 100 UNIT/ML 5 ML: 100 SOLUTION at 16:45

## 2018-01-01 RX ADMIN — PANCRELIPASE 24000 UNITS: 24000; 76000; 120000 CAPSULE, DELAYED RELEASE PELLETS ORAL at 18:11

## 2018-01-01 RX ADMIN — NYSTATIN 500000 UNITS: 100000 SUSPENSION ORAL at 16:54

## 2018-01-01 RX ADMIN — Medication 10 ML: at 20:29

## 2018-01-01 RX ADMIN — DEXTROSE MONOHYDRATE AND SODIUM CHLORIDE: 5; .45 INJECTION, SOLUTION INTRAVENOUS at 00:25

## 2018-01-01 RX ADMIN — LIDOCAINE 2 PATCH: 560 PATCH PERCUTANEOUS; TOPICAL; TRANSDERMAL at 20:28

## 2018-01-01 RX ADMIN — NYSTATIN 500000 UNITS: 100000 SUSPENSION ORAL at 11:51

## 2018-01-01 RX ADMIN — PACLITAXEL 135 MG: 100 INJECTION, POWDER, LYOPHILIZED, FOR SUSPENSION INTRAVENOUS at 14:03

## 2018-01-01 RX ADMIN — Medication 10 ML: at 09:38

## 2018-01-01 RX ADMIN — Medication 250 ML: at 10:16

## 2018-01-01 RX ADMIN — MEGESTROL ACETATE 20 MG: 20 TABLET ORAL at 08:30

## 2018-01-01 RX ADMIN — PANCRELIPASE 24000 UNITS: 24000; 76000; 120000 CAPSULE, DELAYED RELEASE PELLETS ORAL at 11:39

## 2018-01-01 RX ADMIN — HEPARIN SODIUM (PORCINE) LOCK FLUSH IV SOLN 100 UNIT/ML 5 ML: 100 SOLUTION at 11:43

## 2018-01-01 RX ADMIN — SODIUM CHLORIDE 1000 ML: 9 INJECTION, SOLUTION INTRAVENOUS at 21:16

## 2018-01-01 RX ADMIN — SODIUM CHLORIDE 1000 ML: 9 INJECTION, SOLUTION INTRAVENOUS at 13:55

## 2018-01-01 RX ADMIN — ACETAMINOPHEN 650 MG: 325 TABLET, FILM COATED ORAL at 09:38

## 2018-01-01 RX ADMIN — HEPARIN 5 ML: 100 SYRINGE at 13:34

## 2018-01-01 RX ADMIN — HEPARIN SODIUM (PORCINE) LOCK FLUSH IV SOLN 100 UNIT/ML 5 ML: 100 SOLUTION at 15:18

## 2018-01-01 RX ADMIN — DICLOFENAC SODIUM 2 G: 10 GEL TOPICAL at 21:10

## 2018-01-01 RX ADMIN — Medication 10 ML: at 11:40

## 2018-01-01 RX ADMIN — FLUCONAZOLE 200 MG: 200 TABLET ORAL at 07:54

## 2018-01-01 RX ADMIN — LATANOPROST 1 DROP: 50 SOLUTION OPHTHALMIC at 21:10

## 2018-01-01 RX ADMIN — PANCRELIPASE 48000 UNITS: 24000; 76000; 120000 CAPSULE, DELAYED RELEASE PELLETS ORAL at 21:02

## 2018-01-01 RX ADMIN — FLUCONAZOLE 200 MG: 200 TABLET ORAL at 09:39

## 2018-01-01 RX ADMIN — Medication 250 ML: at 13:04

## 2018-01-01 RX ADMIN — NYSTATIN 500000 UNITS: 100000 SUSPENSION ORAL at 20:29

## 2018-01-01 RX ADMIN — Medication 10 ML: at 16:54

## 2018-01-01 RX ADMIN — NYSTATIN 500000 UNITS: 100000 SUSPENSION ORAL at 07:54

## 2018-01-01 RX ADMIN — HEPARIN 5 ML: 100 SYRINGE at 13:10

## 2018-01-01 RX ADMIN — PACLITAXEL 135 MG: 100 INJECTION, POWDER, LYOPHILIZED, FOR SUSPENSION INTRAVENOUS at 11:12

## 2018-01-01 RX ADMIN — HEPARIN SODIUM (PORCINE) LOCK FLUSH IV SOLN 100 UNIT/ML 5 ML: 100 SOLUTION at 09:29

## 2018-01-01 RX ADMIN — Medication 500 ML: at 13:16

## 2018-01-01 RX ADMIN — PANCRELIPASE: 24000; 76000; 120000 CAPSULE, DELAYED RELEASE PELLETS ORAL at 12:14

## 2018-01-01 RX ADMIN — IOPAMIDOL 59 ML: 755 INJECTION, SOLUTION INTRAVENOUS at 20:39

## 2018-01-01 RX ADMIN — MEGESTROL ACETATE 20 MG: 20 TABLET ORAL at 09:39

## 2018-01-01 RX ADMIN — Medication 500 ML: at 15:15

## 2018-01-01 RX ADMIN — Medication 5 ML: at 11:48

## 2018-01-01 RX ADMIN — NYSTATIN 500000 UNITS: 100000 SUSPENSION ORAL at 11:48

## 2018-01-01 RX ADMIN — NYSTATIN 500000 UNITS: 100000 SUSPENSION ORAL at 12:13

## 2018-01-01 RX ADMIN — MEGESTROL ACETATE 20 MG: 20 TABLET ORAL at 07:54

## 2018-01-01 RX ADMIN — FLUCONAZOLE 200 MG: 200 TABLET ORAL at 09:38

## 2018-01-01 RX ADMIN — ACETAMINOPHEN 650 MG: 325 TABLET, FILM COATED ORAL at 03:41

## 2018-01-01 RX ADMIN — MEGESTROL ACETATE 20 MG: 20 TABLET ORAL at 09:38

## 2018-01-01 RX ADMIN — DICLOFENAC SODIUM 2 G: 10 GEL TOPICAL at 08:27

## 2018-01-01 RX ADMIN — ACETAMINOPHEN 650 MG: 325 TABLET, FILM COATED ORAL at 00:09

## 2018-01-01 RX ADMIN — HEPARIN SODIUM (PORCINE) LOCK FLUSH IV SOLN 100 UNIT/ML 5 ML: 100 SOLUTION at 15:24

## 2018-01-01 ASSESSMENT — PAIN SCALES - GENERAL
PAINLEVEL: NO PAIN (0)

## 2018-01-01 ASSESSMENT — ACTIVITIES OF DAILY LIVING (ADL)
ADLS_ACUITY_SCORE: 11
ADLS_ACUITY_SCORE: 21
ADLS_ACUITY_SCORE: 11
ADLS_ACUITY_SCORE: 11
ADLS_ACUITY_SCORE: 21
ADLS_ACUITY_SCORE: 11
ADLS_ACUITY_SCORE: 13
ADLS_ACUITY_SCORE: 11
ADLS_ACUITY_SCORE: 12
ADLS_ACUITY_SCORE: 19
ADLS_ACUITY_SCORE: 11
ADLS_ACUITY_SCORE: 11
PREVIOUS_RESPONSIBILITIES: MEAL PREP;HOUSEKEEPING;LAUNDRY;SHOPPING;MEDICATION MANAGEMENT
ADLS_ACUITY_SCORE: 12
ADLS_ACUITY_SCORE: 12
ADLS_ACUITY_SCORE: 11
ADLS_ACUITY_SCORE: 12
ADLS_ACUITY_SCORE: 11
ADLS_ACUITY_SCORE: 11
ADLS_ACUITY_SCORE: 13

## 2018-01-01 ASSESSMENT — PAIN DESCRIPTION - DESCRIPTORS
DESCRIPTORS: SHARP
DESCRIPTORS: HEADACHE
DESCRIPTORS: ACHING
DESCRIPTORS: DISCOMFORT;ACHING
DESCRIPTORS: HEADACHE
DESCRIPTORS: ACHING
DESCRIPTORS: ACHING

## 2018-01-01 ASSESSMENT — ENCOUNTER SYMPTOMS
COUGH: 1
ABDOMINAL PAIN: 0
TROUBLE SWALLOWING: 1
APPETITE CHANGE: 1
DYSURIA: 0
WEAKNESS: 1
FREQUENCY: 1
FEVER: 0

## 2018-01-02 ENCOUNTER — TELEPHONE (OUTPATIENT)
Dept: INFUSION THERAPY | Facility: CLINIC | Age: 78
End: 2018-01-02

## 2018-01-02 RX ORDER — POTASSIUM CHLORIDE 1500 MG/1
20 TABLET, EXTENDED RELEASE ORAL DAILY
Qty: 90 TABLET | Refills: 0 | Status: SHIPPED | OUTPATIENT
Start: 2018-01-02 | End: 2018-03-22

## 2018-01-02 NOTE — TELEPHONE ENCOUNTER
Left a message to let Talya know that Nai Simmons NP put in a perscription for potassium at [facility] in her chart. I gave her Juile's phone number to call with questions.

## 2018-01-05 ENCOUNTER — INFUSION THERAPY VISIT (OUTPATIENT)
Dept: INFUSION THERAPY | Facility: CLINIC | Age: 78
End: 2018-01-05
Payer: COMMERCIAL

## 2018-01-05 VITALS
RESPIRATION RATE: 18 BRPM | HEART RATE: 90 BPM | DIASTOLIC BLOOD PRESSURE: 67 MMHG | OXYGEN SATURATION: 97 % | WEIGHT: 116.2 LBS | BODY MASS INDEX: 23.09 KG/M2 | TEMPERATURE: 98.1 F | SYSTOLIC BLOOD PRESSURE: 105 MMHG

## 2018-01-05 DIAGNOSIS — C25.9 PANCREATIC ADENOCARCINOMA (H): ICD-10-CM

## 2018-01-05 DIAGNOSIS — E87.6 HYPOKALEMIA: Primary | ICD-10-CM

## 2018-01-05 LAB
BASOPHILS # BLD AUTO: 0 10E9/L (ref 0–0.2)
BASOPHILS NFR BLD AUTO: 0.2 %
DIFFERENTIAL METHOD BLD: ABNORMAL
EOSINOPHIL # BLD AUTO: 0 10E9/L (ref 0–0.7)
EOSINOPHIL NFR BLD AUTO: 0 %
ERYTHROCYTE [DISTWIDTH] IN BLOOD BY AUTOMATED COUNT: 20.2 % (ref 10–15)
HCT VFR BLD AUTO: 31.7 % (ref 35–47)
HGB BLD-MCNC: 10.9 G/DL (ref 11.7–15.7)
IMM GRANULOCYTES # BLD: 0 10E9/L (ref 0–0.4)
IMM GRANULOCYTES NFR BLD: 0.7 %
LYMPHOCYTES # BLD AUTO: 0.9 10E9/L (ref 0.8–5.3)
LYMPHOCYTES NFR BLD AUTO: 21.8 %
MCH RBC QN AUTO: 32.2 PG (ref 26.5–33)
MCHC RBC AUTO-ENTMCNC: 34.4 G/DL (ref 31.5–36.5)
MCV RBC AUTO: 94 FL (ref 78–100)
MONOCYTES # BLD AUTO: 0.6 10E9/L (ref 0–1.3)
MONOCYTES NFR BLD AUTO: 14.4 %
NEUTROPHILS # BLD AUTO: 2.7 10E9/L (ref 1.6–8.3)
NEUTROPHILS NFR BLD AUTO: 62.9 %
PLATELET # BLD AUTO: 444 10E9/L (ref 150–450)
POTASSIUM SERPL-SCNC: 3.5 MMOL/L (ref 3.4–5.3)
RBC # BLD AUTO: 3.39 10E12/L (ref 3.8–5.2)
WBC # BLD AUTO: 4.3 10E9/L (ref 4–11)

## 2018-01-05 PROCEDURE — 84132 ASSAY OF SERUM POTASSIUM: CPT | Performed by: NURSE PRACTITIONER

## 2018-01-05 PROCEDURE — 85025 COMPLETE CBC W/AUTO DIFF WBC: CPT | Performed by: NURSE PRACTITIONER

## 2018-01-05 PROCEDURE — 99207 ZZC NO CHARGE LOS: CPT

## 2018-01-05 PROCEDURE — 96413 CHEMO IV INFUSION 1 HR: CPT | Performed by: NURSE PRACTITIONER

## 2018-01-05 PROCEDURE — 36415 COLL VENOUS BLD VENIPUNCTURE: CPT | Performed by: NURSE PRACTITIONER

## 2018-01-05 PROCEDURE — 96375 TX/PRO/DX INJ NEW DRUG ADDON: CPT | Performed by: NURSE PRACTITIONER

## 2018-01-05 RX ADMIN — Medication 250 ML: at 13:16

## 2018-01-05 ASSESSMENT — PAIN SCALES - GENERAL: PAINLEVEL: NO PAIN (0)

## 2018-01-05 NOTE — MR AVS SNAPSHOT
After Visit Summary   1/5/2018    Talya Gatica    MRN: [id number]           Patient Information     Date Of Birth          1940        Visit Information        Provider Department      1/5/2018 12:30 PM [facility]        Today's Diagnoses     Hypokalemia    -  1    Pancreatic adenocarcinoma (H)           Follow-ups after your visit        Your next 10 appointments already scheduled     Jan 12, 2018  1:00 PM CST   New Visit with Karolina Ulrich RD   [facility])    [id number] [place] [id number] [phone]            Jan 12, 2018  2:00 PM CST   LAB with LAB ONC [facility])    [id number] [place] [id number] [phone]           Please do not eat 10-12 hours before your appointment if you are coming in fasting for labs on lipids, cholesterol, or glucose (sugar). This does not apply to pregnant women. Water, hot tea and black coffee (with nothing added) are okay. Do not drink other fluids, diet soda or chew gum.            Jan 12, 2018  2:30 PM CST   Level 2 with [place] [facility])    [address] [address] [place] [address] [id number] [phone]            Jan 19, 2018  2:00 PM CST   (Arrive by 1:45 PM)   CT CHEST ABDOMEN PELVIS W/O & W CONTRAST with MGCT1   [facility])    [address] [place] [address] [phone]           Please bring any scans or X-rays taken at other hospitals, if similar tests were done. Also bring a list of your medicines, including vitamins, minerals and over-the-counter drugs. It is safest to leave personal items at home.  Be sure to tell your doctor:   If you have any allergies.   If there s any chance you are pregnant.   If you are breastfeeding.   If you have any  special needs.  You may have contrast for this exam. To prepare:   Do not eat or drink for 2 hours before your exam. If you need to take medicine, you may take it with small sips of water. (We may ask you to take liquid medicine as well.)   The day before your exam, drink extra fluids at least six 8-ounce glasses (unless your doctor tells you to restrict your fluids).  Patients over 70 or patients with diabetes or kidney problems:   If you haven t had a blood test (creatinine test) within the last 30 days, go to your clinic or Diagnostic Imaging Department for this test.  If you have diabetes:   If your kidney function is normal, continue taking your metformin (Avandamet, Glucophage, Glucovance, Metaglip) on the day of your exam.   If your kidney function is abnormal, wait 48 hours before restarting this medicine.  You will have oral contrast for this exam:   You will drink the contrast at home. Get this from your clinic or Diagnostic Imaging Department. Please follow the directions given.  Please wear loose clothing, such as a sweat suit or jogging clothes. Avoid snaps, zippers and other metal. We may ask you to undress and put on a hospital gown.  If you have any questions, please call the Imaging Department where you will have your exam.            Jan 25, 2018 10:00 AM CST   LAB with LAB ONC [facility] ([facility])    [address] [place] [phone]           Please do not eat 10-12 hours before your appointment if you are coming in fasting for labs on lipids, cholesterol, or glucose (sugar). This does not apply to pregnant women. Water, hot tea and black coffee (with nothing added) are okay. Do not drink other fluids, diet soda or chew gum.            Jan 25, 2018 10:45 AM CST   Return Visit with Jim Soares MD   [facility])    [address] [place] [phone]    [phone]            2018 11:30 AM CST   Level 2 with B[place] 7 INFUSION   [facility] ([facility])    [address] [place] [phone]              Who to contact     If you have questions or need follow up information about today's clinic visit or your schedule please contact [facility] directly at [phone].  Normal or non-critical lab and imaging results will be communicated to you by [employer], letter or phone within 4 business days after the clinic has received the results. If you do not hear from us within 7 days, please contact the clinic through [employer] or phone. If you have a critical or abnormal lab result, we will notify you by phone as soon as possible.  Submit refill requests through [employer] or call your pharmacy and they will forward the refill request to us. Please allow 3 business days for your refill to be completed.          Additional Information About Your Visit        [employer] Information     [employer] is an electronic gateway that provides easy, online access to your medical records. With [employer], you can request a clinic appointment, read your test results, renew a prescription or communicate with your care team.     To sign up for [employer] visit the website at [email or web address]   You will be asked to enter the access code listed below, as well as some personal information. Please follow the directions to create your username and password.     Your access code is: [email or web address]  Expires: 4/3/2018  2:06 PM     Your access code will  in 90 days. If you need help or a new code, please contact your [facility] or call [phone] for assistance.        Care EveryWhere ID     This is your Care EveryWhere ID. This could be used by other organizations to access your [place] medical records  [id number]        Your Vitals Were     Pulse Temperature Respirations Pulse  Oximetry BMI (Body Mass Index)       90 98.1  F (36.7  C) (Oral) 18 97% 23.09 kg/m2        Blood Pressure from Last 3 Encounters:   01/05/18 105/67   12/29/17 128/66   12/14/17 125/74    Weight from Last 3 Encounters:   01/05/18 52.7 kg (116 lb 3.2 oz)   12/29/17 54 kg (119 lb)   12/14/17 54 kg (119 lb 1.6 oz)              Today, you had the following     No orders found for display       Primary Care Provider Office Phone # Fax #    Pancho Rl Cope [phone] [address] [facility] [address]        Equal Access to Services     JOCELINE JORDAN : Hadii aad ku hadasho Solenoraali, waaxda luqadaha, qaybta kaalmada adeegyada, carrie kaur . [facility] [phone].    ATENCIÓN: Si habla español, tiene a ornelas disposición servicios gratuitos de asistencia lingüística. [facility] [phone].    We comply with applicable federal civil rights laws and [place] laws. We do not discriminate on the basis of race, color, national origin, age, disability, sex, sexual orientation, or gender identity.            Thank you!     Thank you for choosing [facility]  for your care. Our goal is always to provide you with excellent care. Hearing back from our patients is one way we can continue to improve our services. Please take a few minutes to complete the written survey that you may receive in the mail after your visit with us. Thank you!             Your Updated Medication List - Protect others around you: Learn how to safely use, store and throw away your medicines at www.disposemymeds.org.          This list is accurate as of: 1/5/18 11:59 PM.  Always use your most recent med list.                   Brand Name Dispense Instructions for use Diagnosis    amylase-lipase-protease [id number] UNITS Cpep per EC capsule    CREON    180 capsule    Take 1 capsule (24,000 Units) by mouth 3 times daily (with meals)    Pancreatic adenocarcinoma (H)       BIOTIN PO            latanoprost 0.005 % ophthalmic solution    XALATAN    3 Bottle    Place 1 drop into both eyes At Bedtime    Glaucoma suspect, bilateral       Potassium Chloride ER 20 MEQ Tbcr     90 tablet    Take 1 tablet (20 mEq) by mouth daily    Hypokalemia       prochlorperazine 10 MG tablet    COMPAZINE    30 tablet    Take 0.5 tablets (5 mg) by mouth every 6 hours as needed (Nausea/Vomiting)    Pancreatic adenocarcinoma (H)       WOMENS 50+ MULTI VITAMIN/MIN PO

## 2018-01-05 NOTE — PROGRESS NOTES
Infusion Nursing Note:  Talya Gatica presents today for Gemzar.    Patient seen by provider today: No   present during visit today: Not Applicable.    Note: Pt has been feel VERY well over past week with great appetite and very few loose stools.    Intravenous Access:  Peripheral IV placed.    Treatment Conditions:  Results reviewed, labs MET treatment parameters, ok to proceed with treatment.      Post Infusion Assessment:  Patient tolerated infusion without incident.    Discharge Plan:   1/12 as scheduled for next chemo.    FREDIS LINAERS RN

## 2018-01-18 ENCOUNTER — INFUSION THERAPY VISIT (OUTPATIENT)
Dept: INFUSION THERAPY | Facility: CLINIC | Age: 78
End: 2018-01-18
Payer: COMMERCIAL

## 2018-01-18 VITALS
TEMPERATURE: 98.2 F | WEIGHT: 111.4 LBS | DIASTOLIC BLOOD PRESSURE: 83 MMHG | RESPIRATION RATE: 18 BRPM | HEART RATE: 83 BPM | OXYGEN SATURATION: 99 % | SYSTOLIC BLOOD PRESSURE: 131 MMHG | BODY MASS INDEX: 22.13 KG/M2

## 2018-01-18 DIAGNOSIS — E87.6 HYPOKALEMIA: Primary | ICD-10-CM

## 2018-01-18 DIAGNOSIS — C25.9 PANCREATIC ADENOCARCINOMA (H): ICD-10-CM

## 2018-01-18 LAB
BASOPHILS # BLD AUTO: 0 10E9/L (ref 0–0.2)
BASOPHILS NFR BLD AUTO: 0.5 %
DIFFERENTIAL METHOD BLD: ABNORMAL
EOSINOPHIL # BLD AUTO: 0 10E9/L (ref 0–0.7)
EOSINOPHIL NFR BLD AUTO: 0.7 %
ERYTHROCYTE [DISTWIDTH] IN BLOOD BY AUTOMATED COUNT: 19.9 % (ref 10–15)
HCT VFR BLD AUTO: 31.9 % (ref 35–47)
HGB BLD-MCNC: 10.5 G/DL (ref 11.7–15.7)
IMM GRANULOCYTES # BLD: 0 10E9/L (ref 0–0.4)
IMM GRANULOCYTES NFR BLD: 0.3 %
LYMPHOCYTES # BLD AUTO: 0.9 10E9/L (ref 0.8–5.3)
LYMPHOCYTES NFR BLD AUTO: 15.1 %
MCH RBC QN AUTO: 32.2 PG (ref 26.5–33)
MCHC RBC AUTO-ENTMCNC: 32.9 G/DL (ref 31.5–36.5)
MCV RBC AUTO: 98 FL (ref 78–100)
MONOCYTES # BLD AUTO: 0.9 10E9/L (ref 0–1.3)
MONOCYTES NFR BLD AUTO: 14.5 %
NEUTROPHILS # BLD AUTO: 4.1 10E9/L (ref 1.6–8.3)
NEUTROPHILS NFR BLD AUTO: 68.9 %
PLATELET # BLD AUTO: 368 10E9/L (ref 150–450)
POTASSIUM SERPL-SCNC: 3.8 MMOL/L (ref 3.4–5.3)
RBC # BLD AUTO: 3.26 10E12/L (ref 3.8–5.2)
WBC # BLD AUTO: 6 10E9/L (ref 4–11)

## 2018-01-18 PROCEDURE — 96413 CHEMO IV INFUSION 1 HR: CPT | Performed by: INTERNAL MEDICINE

## 2018-01-18 PROCEDURE — 84132 ASSAY OF SERUM POTASSIUM: CPT | Performed by: NURSE PRACTITIONER

## 2018-01-18 PROCEDURE — 36415 COLL VENOUS BLD VENIPUNCTURE: CPT | Performed by: NURSE PRACTITIONER

## 2018-01-18 PROCEDURE — 96367 TX/PROPH/DG ADDL SEQ IV INF: CPT | Performed by: INTERNAL MEDICINE

## 2018-01-18 PROCEDURE — 85025 COMPLETE CBC W/AUTO DIFF WBC: CPT | Performed by: NURSE PRACTITIONER

## 2018-01-18 PROCEDURE — 99207 ZZC NO CHARGE NURSE ONLY: CPT

## 2018-01-18 RX ADMIN — Medication 250 ML: at 13:36

## 2018-01-18 ASSESSMENT — PAIN SCALES - GENERAL: PAINLEVEL: NO PAIN (0)

## 2018-01-18 NOTE — PROGRESS NOTES
Infusion Nursing Note:  Talya Gatica presents today for C3D15 Gemzar.    Patient seen by provider today: No   present during visit today: Not Applicable.    Note: Patient reports she has been having constipation. Discussed with Nai Simmons NP. Patient should decrease the Creon to twice a day with largest meals and patient should meet with the dietician.     Intravenous Access:  Peripheral IV placed.    Treatment Conditions:  Lab Results   Component Value Date    HGB 10.5 01/18/2018     Lab Results   Component Value Date    WBC 6.0 01/18/2018      Lab Results   Component Value Date    ANEU 4.1 01/18/2018     Lab Results   Component Value Date     01/18/2018      Results reviewed, labs MET treatment parameters, ok to proceed with treatment.        Post Infusion Assessment:  Patient tolerated infusion without incident.  Blood return noted pre and post infusion.  Site patent and intact, free from redness, edema or discomfort.  Access discontinued per protocol.    Discharge Plan:   Discharge instructions reviewed with: Patient.  Patient and/or family verbalized understanding of discharge instructions and all questions answered.  Copy of AVS reviewed with patient and/or family.  Patient will return 2/1/18 for next appointment.  Patient discharged in stable condition accompanied by: self and ride.  Departure Mode: Ambulatory.    Angie Gordon RN

## 2018-01-18 NOTE — MR AVS SNAPSHOT
After Visit Summary   1/18/2018    Talya Gatica    MRN: [id number]           Patient Information     Date Of Birth          1940        Visit Information        Provider Department      1/18/2018 1:00 PM [place] [facility]        Today's Diagnoses     Hypokalemia    -  1    Pancreatic adenocarcinoma (H)           Follow-ups after your visit        Your next 10 appointments already scheduled     Jan 19, 2018  2:00 PM CST   (Arrive by 1:45 PM)   CT CHEST ABDOMEN PELVIS W/O & W CONTRAST with MGCT1   [facility]    [id number] [place] [address] [phone]           Please bring any scans or X-rays taken at other hospitals, if similar tests were done. Also bring a list of your medicines, including vitamins, minerals and over-the-counter drugs. It is safest to leave personal items at home.  Be sure to tell your doctor:   If you have any allergies.   If there s any chance you are pregnant.   If you are breastfeeding.   If you have any special needs.  You may have contrast for this exam. To prepare:   Do not eat or drink for 2 hours before your exam. If you need to take medicine, you may take it with small sips of water. (We may ask you to take liquid medicine as well.)   The day before your exam, drink extra fluids at least six 8-ounce glasses (unless your doctor tells you to restrict your fluids).  Patients over 70 or patients with diabetes or kidney problems:   If you haven t had a blood test (creatinine test) within the last 30 days, go to your clinic or Diagnostic Imaging Department for this test.  If you have diabetes:   If your kidney function is normal, continue taking your metformin (Avandamet, Glucophage, Glucovance, Metaglip) on the day of your exam.   If your kidney function is abnormal, wait 48 hours before restarting this medicine.  You will have oral contrast for this exam:   You will drink  the contrast at home. Get this from your clinic or Diagnostic Imaging Department. Please follow the directions given.  Please wear loose clothing, such as a sweat suit or jogging clothes. Avoid snaps, zippers and other metal. We may ask you to undress and put on a hospital gown.  If you have any questions, please call the Imaging Department where you will have your exam.            Jan 19, 2018  2:30 PM CST   Return Visit with Karolina Ulrich RD   [facility])    [address] [place] [id number] [phone]            Feb 01, 2018 12:15 PM CST   LAB with LAB ONC [facility])    [address] [place] [address] [phone]           Please do not eat 10-12 hours before your appointment if you are coming in fasting for labs on lipids, cholesterol, or glucose (sugar). This does not apply to pregnant women. Water, hot tea and black coffee (with nothing added) are okay. Do not drink other fluids, diet soda or chew gum.            Feb 01, 2018 12:45 PM CST   Return Visit with LISA Mendieta CNP   [facility])    [address] [place] [id number] [phone]            Feb 01, 2018  1:30 PM CST   Level 2 with BAY 7 INFUSION   [facility])    [address] [place] [id number] [phone]              Who to contact     If you have questions or need follow up information about today's clinic visit or your schedule please contact [facility] directly at [phone].  Normal or non-critical lab and imaging results will be communicated to you by MyChart, letter or phone within 4 business days after the clinic has received the results. If you do not hear from us within 7 days, please contact the clinic through MyChart or phone. If you  have a critical or abnormal lab result, we will notify you by phone as soon as possible.  Submit refill requests through [employer] or call your pharmacy and they will forward the refill request to us. Please allow 3 business days for your refill to be completed.          Additional Information About Your Visit        POWWOWhart Information     [employer] is an electronic gateway that provides easy, online access to your medical records. With [employer], you can request a clinic appointment, read your test results, renew a prescription or communicate with your care team.     To sign up for [employer] visit the website at [email or web address]   You will be asked to enter the access code listed below, as well as some personal information. Please follow the directions to create your username and password.     Your access code is: [email or web address]  Expires: 4/3/2018  2:06 PM     Your access code will  in 90 days. If you need help or a new code, please contact your [facility] or call [phone] for assistance.        Care EveryWhere ID     This is your Care EveryWhere ID. This could be used by other organizations to access your [place] medical records  [id number]        Your Vitals Were     Pulse Temperature Respirations Pulse Oximetry BMI (Body Mass Index)       83 98.2  F (36.8  C) (Oral) 18 99% 22.13 kg/m2        Blood Pressure from Last 3 Encounters:   18 131/83   18 105/67   17 128/66    Weight from Last 3 Encounters:   18 50.5 kg (111 lb 6.4 oz)   18 52.7 kg (116 lb 3.2 oz)   17 54 kg (119 lb)              Today, you had the following     No orders found for display       Primary Care Provider Office Phone # Fax #    Pancho Cope [phone] [address] [facility] [address]        Equal Access to Services     JOCELINE JORDAN : Joleen Sun, waarmandda luqadaha, qaybta kacarrie [place]  steve coynenaveeneufemia nolanPennyanu ah. So [facility] [phone].    ATENCIÓN: Si eduardla frances, tiene a ornelas disposición servicios gratuitos de asistencia lingüística. Kristen dias [phone].    We comply with applicable federal civil rights laws and [place] laws. We do not discriminate on the basis of race, color, national origin, age, disability, sex, sexual orientation, or gender identity.            Thank you!     Thank you for choosing [facility]  for your care. Our goal is always to provide you with excellent care. Hearing back from our patients is one way we can continue to improve our services. Please take a few minutes to complete the written survey that you may receive in the mail after your visit with us. Thank you!             Your Updated Medication List - Protect others around you: Learn how to safely use, store and throw away your medicines at www.disposemymeds.org.          This list is accurate as of: 1/18/18  4:55 PM.  Always use your most recent med list.                   Brand Name Dispense Instructions for use Diagnosis    amylase-lipase-protease [id number] UNITS Cpep per EC capsule    CREON    180 capsule    Take 1 capsule (24,000 Units) by mouth 3 times daily (with meals)    Pancreatic adenocarcinoma (H)       BIOTIN PO           latanoprost 0.005 % ophthalmic solution    XALATAN    3 Bottle    Place 1 drop into both eyes At Bedtime    Glaucoma suspect, bilateral       Potassium Chloride ER 20 MEQ Tbcr     90 tablet    Take 1 tablet (20 mEq) by mouth daily    Hypokalemia       prochlorperazine 10 MG tablet    COMPAZINE    30 tablet    Take 0.5 tablets (5 mg) by mouth every 6 hours as needed (Nausea/Vomiting)    Pancreatic adenocarcinoma (H)       WOMENS 50+ MULTI VITAMIN/MIN PO

## 2018-01-19 ENCOUNTER — ONCOLOGY VISIT (OUTPATIENT)
Dept: ONCOLOGY | Facility: CLINIC | Age: 78
End: 2018-01-19
Payer: COMMERCIAL

## 2018-01-19 ENCOUNTER — RADIANT APPOINTMENT (OUTPATIENT)
Dept: CT IMAGING | Facility: CLINIC | Age: 78
End: 2018-01-19
Attending: NURSE PRACTITIONER
Payer: COMMERCIAL

## 2018-01-19 DIAGNOSIS — C25.9 PANCREATIC ADENOCARCINOMA (H): Primary | ICD-10-CM

## 2018-01-19 DIAGNOSIS — C25.9 PANCREATIC ADENOCARCINOMA (H): ICD-10-CM

## 2018-01-19 LAB
CREAT BLD-MCNC: 0.3 MG/DL (ref 0.52–1.04)
GFR SERPL CREATININE-BSD FRML MDRD: >90 ML/MIN/1.7M2

## 2018-01-19 PROCEDURE — 82565 ASSAY OF CREATININE: CPT | Performed by: INTERNAL MEDICINE

## 2018-01-19 PROCEDURE — 71260 CT THORAX DX C+: CPT | Performed by: RADIOLOGY

## 2018-01-19 PROCEDURE — 36415 COLL VENOUS BLD VENIPUNCTURE: CPT | Performed by: INTERNAL MEDICINE

## 2018-01-19 PROCEDURE — 74177 CT ABD & PELVIS W/CONTRAST: CPT | Performed by: RADIOLOGY

## 2018-01-19 PROCEDURE — 97802 MEDICAL NUTRITION INDIV IN: CPT | Performed by: DIETITIAN, REGISTERED

## 2018-01-19 RX ORDER — IOPAMIDOL 755 MG/ML
68 INJECTION, SOLUTION INTRAVASCULAR ONCE
Status: COMPLETED | OUTPATIENT
Start: 2018-01-19 | End: 2018-01-19

## 2018-01-19 RX ADMIN — IOPAMIDOL 68 ML: 755 INJECTION, SOLUTION INTRAVASCULAR at 14:45

## 2018-01-19 NOTE — LETTER
"    1/19/2018         RE: Talya Gatica  [address]  [place] [facility] [place] [address]        Dear Colleague,    Thank you for referring your patient, Talya Gatica, to the [facility]. Please see a copy of my visit note below.    CLINICAL NUTRITION SERVICES - ASSESSMENT NOTE    Talya Gatica 77 year old referred for MNT related to pancreatic cancer, constipation    Time Spent: 15 minutes  Visit Type:  Initial  Referring Physician:  Troy  Pt accompanied by:  self    NUTRITION HISTORY  Factors affecting nutrition intake include:constipation  Current diet: general  Current appetite/intake: good      Amira reports that her appetite has been better since the beginning of January, however, continues to lose weight.    She denies oily stools or diarrhea.  She reports that she has a BM daily, however, it tends to be hard, causing pain.  She was suggested to reduce her Creon 24,000 from 3 capsules/day to 2 capsules/day.  She has been doing this and reports feeling a bit better.    She typically eats 1, sometimes 2 meals/day. She almost always skips lunch.   She does not drink ONS - she reports that Ensure has been causing diarrhea.     Diet Recall  Breakfast +/- cereal or eggs or skips   Lunch Leftovers from dinner or skips   Dinner Chili or salad or beef stroganoff or chicken with stuffing and potatoes, veggies   Snacks None   Beverages Water, <4 cups/day   Alcohol No   Dining out Almost daily     ANTHROPOMETRICS  Height: 59\"  Weight: 111 lb/50kg  BMI: 22  Weight Status:  Normal BMI  IBW: 100 lbs  % IBW: 111%  Weight History: down 9 lb x past 7 weeks  Wt Readings from Last 6 Encounters:   01/18/18 50.5 kg (111 lb 6.4 oz)   01/05/18 52.7 kg (116 lb 3.2 oz)   12/29/17 54 kg (119 lb)   12/14/17 54 kg (119 lb 1.6 oz)   12/08/17 54.1 kg (119 lb 4.8 oz)   12/01/17 54.4 kg (120 lb)         Medications/vitamins/minerals/herbals: Creon 24,000 (taking 1 capsule with meals BID.    LABS  Labs " reviewed    ASSESSED NUTRITION NEEDS:  Estimated Energy Needs: 0829-0898 kcals (30-35 Kcal/Kg)  Justification: maintenance  Estimated Protein Needs: 60 grams protein (1-1.2 g pro/Kg)  Justification: maintenance  Estimated Fluid Needs: 4529-5956  mL   Justification: maintenance    MALNUTRITION:  % Weight Loss:  > 5% in 1 month (severe malnutrition)  % Intake:  Decreased intake does not meet criteria for malnutrition   Subcutaneous Fat Loss:  None observed  Muscle Loss:  Temporal region mild depletion  Fluid Retention:  None noted    Malnutrition Diagnosis: Patient does not meet two of the above criteria necessary for diagnosing malnutrition but is at risk.     NUTRITION DIAGNOSIS:  Altered GI function related to pancreatitc cancer as evidenced by constipation.    INTERVENTIONS  Recommendations / Nutrition Prescription  1. Aim for >25g fiber/day (both soluble and insoluble sources)  2. Aim for at least 3 meals/day - 1500kcal.  Nutrition Education     Provided written & verbal education on:   - Ways to maximize kcal and protein intake. Discussed calorie and protein needs for maintenance of weight and nutrition status.  Advised pt to aim for at least 1500kcal and 60g protein via at least 3 small frequent meals.   - Fiber intake for constipation - suggested pt focus on consuming insoluble fiber in addition to >6 cups water/day.     Provided pt with corresponding education materials/handouts on:  Six Small Meals a Day, High Fiber Diet, Soluble Fiber Tips to Increase the Calories in Your Diet, Power Pudding recipe     Pt verbalize understanding of materials provided during consult.   Patient Understanding: Excellent  Expected Compliance: Excellent     Goals  1.  Aim for 3 meals/day  2.  Aim for 1500kcal day  3. Weight maintenance as able 110-115 lb baseline.       Follow-Up Plans: Pt has RD contact information for questions.      MONITORING AND EVALUATION:  -Food intake  -Fluid/beverage intake  - GI  -Weight  trends    Karolina Ulrich RD, LD        Again, thank you for allowing me to participate in the care of your patient.        Sincerely,        Karolina Ulrich RD

## 2018-01-19 NOTE — PROGRESS NOTES
"CLINICAL NUTRITION SERVICES - ASSESSMENT NOTE    Talya Gatica 77 year old referred for MNT related to pancreatic cancer, constipation    Time Spent: 15 minutes  Visit Type: Initial  Referring Physician: Troy Viera accompanied by: self    NUTRITION HISTORY  Factors affecting nutrition intake include:constipation  Current diet: general  Current appetite/intake: good      Amira reports that her appetite has been better since the beginning of January, however, continues to lose weight.    She denies oily stools or diarrhea.  She reports that she has a BM daily, however, it tends to be hard, causing pain.  She was suggested to reduce her Creon 24,000 from 3 capsules/day to 2 capsules/day.  She has been doing this and reports feeling a bit better.    She typically eats 1, sometimes 2 meals/day. She almost always skips lunch.   She does not drink ONS - she reports that Ensure has been causing diarrhea.     Diet Recall  Breakfast +/- cereal or eggs or skips   Lunch Leftovers from dinner or skips   Dinner Chili or salad or beef stroganoff or chicken with stuffing and potatoes, veggies   Snacks None   Beverages Water, <4 cups/day   Alcohol No   Dining out Almost daily     ANTHROPOMETRICS  Height: 59\"  Weight: 111 lb/50kg  BMI: 22  Weight Status:  Normal BMI  IBW: 100 lbs  % IBW: 111%  Weight History: down 9 lb x past 7 weeks  Wt Readings from Last 6 Encounters:   01/18/18 50.5 kg (111 lb 6.4 oz)   01/05/18 52.7 kg (116 lb 3.2 oz)   12/29/17 54 kg (119 lb)   12/14/17 54 kg (119 lb 1.6 oz)   12/08/17 54.1 kg (119 lb 4.8 oz)   12/01/17 54.4 kg (120 lb)         Medications/vitamins/minerals/herbals: Creon 24,000 (taking 1 capsule with meals BID.    LABS  Labs reviewed    ASSESSED NUTRITION NEEDS:  Estimated Energy Needs: 2942-2883 kcals (30-35 Kcal/Kg)  Justification: maintenance  Estimated Protein Needs: 60 grams protein (1-1.2 g pro/Kg)  Justification: maintenance  Estimated Fluid Needs: 0715-1269  mL   Justification: " maintenance    MALNUTRITION:  % Weight Loss:  > 5% in 1 month (severe malnutrition)  % Intake:  Decreased intake does not meet criteria for malnutrition   Subcutaneous Fat Loss:  None observed  Muscle Loss:  Temporal region mild depletion  Fluid Retention:  None noted    Malnutrition Diagnosis: Patient does not meet two of the above criteria necessary for diagnosing malnutrition but is at risk.     NUTRITION DIAGNOSIS:  Altered GI function related to pancreatitc cancer as evidenced by constipation.    INTERVENTIONS  Recommendations / Nutrition Prescription  1. Aim for >25g fiber/day (both soluble and insoluble sources)  2. Aim for at least 3 meals/day - 1500kcal.  Nutrition Education     Provided written & verbal education on:   - Ways to maximize kcal and protein intake. Discussed calorie and protein needs for maintenance of weight and nutrition status.  Advised pt to aim for at least 1500kcal and 60g protein via at least 3 small frequent meals.   - Fiber intake for constipation - suggested pt focus on consuming insoluble fiber in addition to >6 cups water/day.     Provided pt with corresponding education materials/handouts on:  Six Small Meals a Day, High Fiber Diet, Soluble Fiber Tips to Increase the Calories in Your Diet, Power Pudding recipe     Pt verbalize understanding of materials provided during consult.   Patient Understanding: Excellent  Expected Compliance: Excellent     Goals  1.  Aim for 3 meals/day  2.  Aim for 1500kcal day  3. Weight maintenance as able 110-115 lb baseline.       Follow-Up Plans: Pt has RD contact information for questions.      MONITORING AND EVALUATION:  -Food intake  -Fluid/beverage intake  - GI  -Weight trends    Karolina Ulrich RD, LD

## 2018-01-19 NOTE — MR AVS SNAPSHOT
After Visit Summary   1/19/2018    Talya Gatica    MRN: [id number]           Patient Information     Date Of Birth          1940        Visit Information        Provider Department      1/19/2018 2:30 PM Karolina Corea RD [facility]        Today's Diagnoses     Pancreatic adenocarcinoma (H)    -  1       Follow-ups after your visit        Your next 10 appointments already scheduled     Feb 01, 2018 12:15 PM CST   LAB with LAB ONC [facility])    [address] [place] [id number] [phone]           Please do not eat 10-12 hours before your appointment if you are coming in fasting for labs on lipids, cholesterol, or glucose (sugar). This does not apply to pregnant women. Water, hot tea and black coffee (with nothing added) are okay. Do not drink other fluids, diet soda or chew gum.            Feb 01, 2018 12:45 PM CST   Return Visit with LISA Mendieta CNP   [facility])    [address] [place] [id number] [phone]            Feb 01, 2018  1:30 PM CST   Level 2 with [facility])    [address] [place] [id number] [phone]              Who to contact     If you have questions or need follow up information about today's clinic visit or your schedule please contact [facility] directly at [phone].  Normal or non-critical lab and imaging results will be communicated to you by MyChart, letter or phone within 4 business days after the clinic has received the results. If you do not hear from us within 7 days, please contact the clinic through MyChart or phone. If you have a critical or abnormal lab result, we will notify you by phone as soon as possible.  Submit refill requests through [employer] or call your pharmacy and  they will forward the refill request to us. Please allow 3 business days for your refill to be completed.          Additional Information About Your Visit        [employer] Information     [employer] is an electronic gateway that provides easy, online access to your medical records. With [employer], you can request a clinic appointment, read your test results, renew a prescription or communicate with your care team.     To sign up for [employer] visit the website at [email or web address]   You will be asked to enter the access code listed below, as well as some personal information. Please follow the directions to create your username and password.     Your access code is: [email or web address]  Expires: 4/3/2018  2:06 PM     Your access code will  in 90 days. If you need help or a new code, please contact your [facility] or call [phone] for assistance.        Care EveryWhere ID     This is your Care EveryWhere ID. This could be used by other organizations to access your [place] medical records  [id number]         Blood Pressure from Last 3 Encounters:   18 131/83   18 105/67   17 128/66    Weight from Last 3 Encounters:   18 50.5 kg (111 lb 6.4 oz)   18 52.7 kg (116 lb 3.2 oz)   17 54 kg (119 lb)              We Performed the Following     MNT INDIVIDUAL INITIAL EA 15 MIN        Primary Care Provider Office Phone # Fax #    Pancho Rl Cope [phone] [address] [facility] [address]        Equal Access to Services     [facility] USAF Medical CenterCINDY : Hadii aad ku hadasho Soomaali, waaxda luqadaha, qaybta kaalmada adeegyada, carrie campos haymoen kristi kaur . So [facility] [phone].    ATENCIÓN: Si habla español, tiene a ornelas disposición servicios gratuitos de asistencia lingüística. Llame al [phone].    We comply with applicable federal civil rights laws and [place] laws. We do not discriminate on the basis of race,  color, national origin, age, disability, sex, sexual orientation, or gender identity.            Thank you!     Thank you for choosing [facility]  for your care. Our goal is always to provide you with excellent care. Hearing back from our patients is one way we can continue to improve our services. Please take a few minutes to complete the written survey that you may receive in the mail after your visit with us. Thank you!             Your Updated Medication List - Protect others around you: Learn how to safely use, store and throw away your medicines at www.disposemymeds.org.          This list is accurate as of: 1/19/18  3:25 PM.  Always use your most recent med list.                   Brand Name Dispense Instructions for use Diagnosis    amylase-lipase-protease [id number] UNITS Cpep per EC capsule    CREON    180 capsule    Take 1 capsule (24,000 Units) by mouth 3 times daily (with meals)    Pancreatic adenocarcinoma (H)       BIOTIN PO           latanoprost 0.005 % ophthalmic solution    XALATAN    3 Bottle    Place 1 drop into both eyes At Bedtime    Glaucoma suspect, bilateral       Potassium Chloride ER 20 MEQ Tbcr     90 tablet    Take 1 tablet (20 mEq) by mouth daily    Hypokalemia       prochlorperazine 10 MG tablet    COMPAZINE    30 tablet    Take 0.5 tablets (5 mg) by mouth every 6 hours as needed (Nausea/Vomiting)    Pancreatic adenocarcinoma (H)       WOMENS 50+ MULTI VITAMIN/MIN PO

## 2018-02-01 ENCOUNTER — INFUSION THERAPY VISIT (OUTPATIENT)
Dept: INFUSION THERAPY | Facility: CLINIC | Age: 78
End: 2018-02-01
Payer: COMMERCIAL

## 2018-02-01 ENCOUNTER — ONCOLOGY VISIT (OUTPATIENT)
Dept: ONCOLOGY | Facility: CLINIC | Age: 78
End: 2018-02-01
Payer: COMMERCIAL

## 2018-02-01 VITALS
DIASTOLIC BLOOD PRESSURE: 63 MMHG | TEMPERATURE: 97.6 F | HEART RATE: 72 BPM | RESPIRATION RATE: 16 BRPM | WEIGHT: 108.4 LBS | SYSTOLIC BLOOD PRESSURE: 162 MMHG | OXYGEN SATURATION: 100 % | BODY MASS INDEX: 21.54 KG/M2

## 2018-02-01 DIAGNOSIS — E87.6 HYPOKALEMIA: Primary | ICD-10-CM

## 2018-02-01 DIAGNOSIS — E87.6 HYPOKALEMIA: ICD-10-CM

## 2018-02-01 DIAGNOSIS — R63.4 LOSS OF WEIGHT: ICD-10-CM

## 2018-02-01 DIAGNOSIS — C25.9 PANCREATIC ADENOCARCINOMA (H): Primary | ICD-10-CM

## 2018-02-01 DIAGNOSIS — R53.83 FATIGUE, UNSPECIFIED TYPE: ICD-10-CM

## 2018-02-01 DIAGNOSIS — C25.9 PANCREATIC ADENOCARCINOMA (H): ICD-10-CM

## 2018-02-01 DIAGNOSIS — R74.8 ELEVATED LIVER ENZYMES: ICD-10-CM

## 2018-02-01 LAB
ALBUMIN SERPL-MCNC: 3.7 G/DL (ref 3.4–5)
ALP SERPL-CCNC: 151 U/L (ref 40–150)
ALT SERPL W P-5'-P-CCNC: 60 U/L (ref 0–50)
ANION GAP SERPL CALCULATED.3IONS-SCNC: 9 MMOL/L (ref 3–14)
AST SERPL W P-5'-P-CCNC: 53 U/L (ref 0–45)
BASOPHILS # BLD AUTO: 0.1 10E9/L (ref 0–0.2)
BASOPHILS NFR BLD AUTO: 0.8 %
BILIRUB SERPL-MCNC: 1 MG/DL (ref 0.2–1.3)
BUN SERPL-MCNC: 8 MG/DL (ref 7–30)
CALCIUM SERPL-MCNC: 8.8 MG/DL (ref 8.5–10.1)
CHLORIDE SERPL-SCNC: 110 MMOL/L (ref 94–109)
CO2 SERPL-SCNC: 24 MMOL/L (ref 20–32)
CREAT SERPL-MCNC: 0.41 MG/DL (ref 0.52–1.04)
DIFFERENTIAL METHOD BLD: ABNORMAL
EOSINOPHIL # BLD AUTO: 0 10E9/L (ref 0–0.7)
EOSINOPHIL NFR BLD AUTO: 0.5 %
ERYTHROCYTE [DISTWIDTH] IN BLOOD BY AUTOMATED COUNT: 17.1 % (ref 10–15)
GFR SERPL CREATININE-BSD FRML MDRD: >90 ML/MIN/1.7M2
GLUCOSE SERPL-MCNC: 103 MG/DL (ref 70–99)
HCT VFR BLD AUTO: 36.9 % (ref 35–47)
HGB BLD-MCNC: 12.2 G/DL (ref 11.7–15.7)
IMM GRANULOCYTES # BLD: 0 10E9/L (ref 0–0.4)
IMM GRANULOCYTES NFR BLD: 0.2 %
LYMPHOCYTES # BLD AUTO: 1.3 10E9/L (ref 0.8–5.3)
LYMPHOCYTES NFR BLD AUTO: 20 %
MCH RBC QN AUTO: 31.9 PG (ref 26.5–33)
MCHC RBC AUTO-ENTMCNC: 33.1 G/DL (ref 31.5–36.5)
MCV RBC AUTO: 97 FL (ref 78–100)
MONOCYTES # BLD AUTO: 0.8 10E9/L (ref 0–1.3)
MONOCYTES NFR BLD AUTO: 12.3 %
NEUTROPHILS # BLD AUTO: 4.4 10E9/L (ref 1.6–8.3)
NEUTROPHILS NFR BLD AUTO: 66.2 %
PLATELET # BLD AUTO: 193 10E9/L (ref 150–450)
POTASSIUM SERPL-SCNC: 3.6 MMOL/L (ref 3.4–5.3)
PROT SERPL-MCNC: 6.9 G/DL (ref 6.8–8.8)
RBC # BLD AUTO: 3.82 10E12/L (ref 3.8–5.2)
SODIUM SERPL-SCNC: 143 MMOL/L (ref 133–144)
WBC # BLD AUTO: 6.7 10E9/L (ref 4–11)

## 2018-02-01 PROCEDURE — 80053 COMPREHEN METABOLIC PANEL: CPT | Performed by: NURSE PRACTITIONER

## 2018-02-01 PROCEDURE — 96413 CHEMO IV INFUSION 1 HR: CPT | Performed by: NURSE PRACTITIONER

## 2018-02-01 PROCEDURE — 85025 COMPLETE CBC W/AUTO DIFF WBC: CPT | Performed by: NURSE PRACTITIONER

## 2018-02-01 PROCEDURE — 36415 COLL VENOUS BLD VENIPUNCTURE: CPT | Performed by: NURSE PRACTITIONER

## 2018-02-01 PROCEDURE — 99214 OFFICE O/P EST MOD 30 MIN: CPT | Mod: 25 | Performed by: NURSE PRACTITIONER

## 2018-02-01 PROCEDURE — 96375 TX/PRO/DX INJ NEW DRUG ADDON: CPT | Performed by: NURSE PRACTITIONER

## 2018-02-01 PROCEDURE — 99207 ZZC NO CHARGE LOS: CPT

## 2018-02-01 RX ORDER — EPINEPHRINE 0.3 MG/.3ML
0.3 INJECTION SUBCUTANEOUS EVERY 5 MIN PRN
Status: CANCELLED | OUTPATIENT
Start: 2018-02-01

## 2018-02-01 RX ORDER — ALBUTEROL SULFATE 0.83 MG/ML
2.5 SOLUTION RESPIRATORY (INHALATION)
Status: CANCELLED | OUTPATIENT
Start: 2018-02-15

## 2018-02-01 RX ORDER — METHYLPREDNISOLONE SODIUM SUCCINATE 125 MG/2ML
125 INJECTION, POWDER, LYOPHILIZED, FOR SOLUTION INTRAMUSCULAR; INTRAVENOUS
Status: CANCELLED
Start: 2018-02-15

## 2018-02-01 RX ORDER — ALBUTEROL SULFATE 0.83 MG/ML
2.5 SOLUTION RESPIRATORY (INHALATION)
Status: CANCELLED | OUTPATIENT
Start: 2018-02-08

## 2018-02-01 RX ORDER — EPINEPHRINE 1 MG/ML
0.3 INJECTION, SOLUTION INTRAMUSCULAR; SUBCUTANEOUS EVERY 5 MIN PRN
Status: CANCELLED | OUTPATIENT
Start: 2018-02-15

## 2018-02-01 RX ORDER — SODIUM CHLORIDE 9 MG/ML
1000 INJECTION, SOLUTION INTRAVENOUS CONTINUOUS PRN
Status: CANCELLED
Start: 2018-02-15

## 2018-02-01 RX ORDER — EPINEPHRINE 0.3 MG/.3ML
0.3 INJECTION SUBCUTANEOUS EVERY 5 MIN PRN
Status: CANCELLED | OUTPATIENT
Start: 2018-02-15

## 2018-02-01 RX ORDER — METHYLPREDNISOLONE SODIUM SUCCINATE 125 MG/2ML
125 INJECTION, POWDER, LYOPHILIZED, FOR SOLUTION INTRAMUSCULAR; INTRAVENOUS
Status: CANCELLED
Start: 2018-02-01

## 2018-02-01 RX ORDER — DIPHENHYDRAMINE HYDROCHLORIDE 50 MG/ML
50 INJECTION INTRAMUSCULAR; INTRAVENOUS
Status: CANCELLED
Start: 2018-02-08

## 2018-02-01 RX ORDER — EPINEPHRINE 1 MG/ML
0.3 INJECTION, SOLUTION INTRAMUSCULAR; SUBCUTANEOUS EVERY 5 MIN PRN
Status: CANCELLED | OUTPATIENT
Start: 2018-02-08

## 2018-02-01 RX ORDER — DIPHENHYDRAMINE HYDROCHLORIDE 50 MG/ML
50 INJECTION INTRAMUSCULAR; INTRAVENOUS
Status: CANCELLED
Start: 2018-02-15

## 2018-02-01 RX ORDER — ALBUTEROL SULFATE 90 UG/1
1-2 AEROSOL, METERED RESPIRATORY (INHALATION)
Status: CANCELLED
Start: 2018-02-08

## 2018-02-01 RX ORDER — SODIUM CHLORIDE 9 MG/ML
1000 INJECTION, SOLUTION INTRAVENOUS CONTINUOUS PRN
Status: CANCELLED
Start: 2018-02-01

## 2018-02-01 RX ORDER — MEPERIDINE HYDROCHLORIDE 50 MG/ML
25 INJECTION INTRAMUSCULAR; INTRAVENOUS; SUBCUTANEOUS EVERY 30 MIN PRN
Status: CANCELLED | OUTPATIENT
Start: 2018-02-08

## 2018-02-01 RX ORDER — EPINEPHRINE 1 MG/ML
0.3 INJECTION, SOLUTION INTRAMUSCULAR; SUBCUTANEOUS EVERY 5 MIN PRN
Status: CANCELLED | OUTPATIENT
Start: 2018-02-01

## 2018-02-01 RX ORDER — SODIUM CHLORIDE 9 MG/ML
1000 INJECTION, SOLUTION INTRAVENOUS CONTINUOUS PRN
Status: CANCELLED
Start: 2018-02-08

## 2018-02-01 RX ORDER — ALBUTEROL SULFATE 0.83 MG/ML
2.5 SOLUTION RESPIRATORY (INHALATION)
Status: CANCELLED | OUTPATIENT
Start: 2018-02-01

## 2018-02-01 RX ORDER — DIPHENHYDRAMINE HYDROCHLORIDE 50 MG/ML
50 INJECTION INTRAMUSCULAR; INTRAVENOUS
Status: CANCELLED
Start: 2018-02-01

## 2018-02-01 RX ORDER — ALBUTEROL SULFATE 90 UG/1
1-2 AEROSOL, METERED RESPIRATORY (INHALATION)
Status: CANCELLED
Start: 2018-02-15

## 2018-02-01 RX ORDER — METHYLPREDNISOLONE SODIUM SUCCINATE 125 MG/2ML
125 INJECTION, POWDER, LYOPHILIZED, FOR SOLUTION INTRAMUSCULAR; INTRAVENOUS
Status: CANCELLED
Start: 2018-02-08

## 2018-02-01 RX ORDER — ALBUTEROL SULFATE 90 UG/1
1-2 AEROSOL, METERED RESPIRATORY (INHALATION)
Status: CANCELLED
Start: 2018-02-01

## 2018-02-01 RX ORDER — MEPERIDINE HYDROCHLORIDE 50 MG/ML
25 INJECTION INTRAMUSCULAR; INTRAVENOUS; SUBCUTANEOUS EVERY 30 MIN PRN
Status: CANCELLED | OUTPATIENT
Start: 2018-02-01

## 2018-02-01 RX ORDER — EPINEPHRINE 0.3 MG/.3ML
0.3 INJECTION SUBCUTANEOUS EVERY 5 MIN PRN
Status: CANCELLED | OUTPATIENT
Start: 2018-02-08

## 2018-02-01 RX ORDER — MEPERIDINE HYDROCHLORIDE 50 MG/ML
25 INJECTION INTRAMUSCULAR; INTRAVENOUS; SUBCUTANEOUS EVERY 30 MIN PRN
Status: CANCELLED | OUTPATIENT
Start: 2018-02-15

## 2018-02-01 RX ADMIN — Medication 250 ML: at 14:47

## 2018-02-01 NOTE — PROGRESS NOTES
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  Treatment:   11/3/2017 began treatment with Gemzar- days 1,8,15 of 28 day plan    Interval History: Ms. Sanchez comes to clinic for review prior to cycle 4 of Gemzar plan.Pt had imaging completed after cycle 3 of Gemzar and reviewed that this generally looks positive with improvement to the pancreatic mass, pulmonary disease as well. She feels she is tolerating treatment well with some weakness or decreased stamina noted but feels her quality of life is still good and wants to continue with treatment for now. She denies pain, nausea and is eating well . She has had some constipation with treatment and is not using dulcolax as needed but most days at this time.she is sleeping well and feels good support form those around her.   Rest of comprehensive and complete ROS is reviewed and is negative.   Past Medical History:   Diagnosis Date     AR (allergic rhinitis)      Baker's cyst      DJD (degenerative joint disease)      Elevated cholesterol      Glaucoma      Menopause      Vertigo      Current Outpatient Prescriptions   Medication     Potassium Chloride ER 20 MEQ TBCR     prochlorperazine (COMPAZINE) 10 MG tablet     amylase-lipase-protease (CREON) [id number] UNITS CPEP per EC capsule     BIOTIN PO     latanoprost (XALATAN) 0.005 % ophthalmic solution     Multiple Vitamins-Minerals (WOMENS 50+ MULTI VITAMIN/MIN PO)     No current facility-administered medications for this visit.      Allergies   Allergen Reactions     Codeine Camsylate        Physical Exam:/63 (BP Location: Left arm)  Pulse 72  Temp 97.6  F (36.4  C) (Oral)   Resp 16  Wt 49.2 kg (108 lb 6.4 oz)  SpO2 100%  BMI 21.54 kg/m2   ECOG PS-1   Constitutional: Alert, moving well with cane for support or one assist due to dizziness.   ENT: Eyes bright, No mouth sores  Neck: Supple, No adenopathy.Thyroid symmetric  Cardiac: Heart rate and rhythm is regular and strong with possible mild aortic murmur again noted  Respiratory: Breathing easy. Lung sounds clear to auscultation  GI: Abdomen is soft, non-tender, BS normal. No masses or organomegaly  MS: Muscle tone fair- uses cane for support- able to get to exam table with light assistance, extremities normal with no edema.   Skin: No suspicious lesions or rashes or bruising  Neuro: Sensory grossly WNL, does have dizziness which pt feels is not new- no falls.   Lymph: Normal ant/post cervical, axillary, supraclavicular nodes  Psych: Mentation appears normal and affect normal/bright and talkative with good eye contact.     Laboratory Results:   Results for orders placed or performed in visit on 02/01/18   Comprehensive metabolic panel   Result Value Ref Range    Sodium 143 133 - 144 mmol/L    Potassium 3.6 3.4 - 5.3 mmol/L    Chloride 110 (H) 94 - 109 mmol/L    Carbon Dioxide 24 20 - 32 mmol/L    Anion Gap 9 3 - 14 mmol/L    Glucose 103 (H) 70 - 99 mg/dL    Urea Nitrogen 8 7 - 30 mg/dL    Creatinine 0.41 (L) 0.52 - 1.04 mg/dL    GFR Estimate >90 >60 mL/min/1.7m2    GFR Estimate If Black >90 >60 mL/min/1.7m2    Calcium 8.8 8.5 - 10.1 mg/dL    Bilirubin Total 1.0 0.2 - 1.3 mg/dL    Albumin 3.7 3.4 - 5.0 g/dL    Protein Total 6.9 6.8 - 8.8 g/dL    Alkaline Phosphatase 151 (H) 40 - 150 U/L    ALT 60 (H) 0 - 50 U/L    AST 53 (H) 0 - 45 U/L   CBC with platelets differential   Result Value Ref Range    WBC 6.7 4.0 - 11.0 10e9/L    RBC Count 3.82 3.8 - 5.2 10e12/L    Hemoglobin 12.2 11.7 - 15.7 g/dL    Hematocrit 36.9 35.0 - 47.0 %    MCV 97 78 - 100 fl    MCH 31.9 26.5 - 33.0 pg    MCHC 33.1 31.5 - 36.5 g/dL    RDW 17.1 (H) 10.0 - 15.0 %     Platelet Count 193 150 - 450 10e9/L    Diff Method Automated Method     % Neutrophils 66.2 %    % Lymphocytes 20.0 %    % Monocytes 12.3 %    % Eosinophils 0.5 %    % Basophils 0.8 %    % Immature Granulocytes 0.2 %    Absolute Neutrophil 4.4 1.6 - 8.3 10e9/L    Absolute Lymphocytes 1.3 0.8 - 5.3 10e9/L    Absolute Monocytes 0.8 0.0 - 1.3 10e9/L    Absolute Eosinophils 0.0 0.0 - 0.7 10e9/L    Absolute Basophils 0.1 0.0 - 0.2 10e9/L    Abs Immature Granulocytes 0.0 0 - 0.4 10e9/L     Results for orders placed or performed in visit on 01/19/18   CT Chest/Abdomen/Pelvis w Contrast    Narrative    Body CT CAP EXAMINATION: CT CHEST/ABDOMEN/PELVIS W CONTRAST, 1/19/2018  2:46 PM    TECHNIQUE:  Helical CT images from the thoracic inlet through the  symphysis pubis were obtained  with contrast. Contrast dose: 68 ml  isovue 370    COMPARISON: 10/19/2017    HISTORY: follow up to gemza treatment for pancreatic cancer.;  Pancreatic adenocarcinoma (H)    FINDINGS:    Chest:   Prominent coronary artery calcifications. Innumerable pulmonary  moderate masses with groundglass halo.    Right lower lobe mass has decreased in size and now with cavitation  series 7 image 70 measures 2.1 x 2.4 cm compared to  2.9 x 3.0 cm.  Right lower lobe mass has decreased in size and now with cavitation  measures 2.4 x 2.3 cm series 7 image 78 compared to 3.4 x 2.3 cm  Left lower lobe nodule has decreased in size and now with cavitation  measuring 1.1 x 1.2 cm series 7 image 70 compared to 1.2 x 1.3 cm.     Many of the nodules now have cavitation. Overall number of nodules is  stable.     Abdomen and pelvis:     Poorly defined hypoattenuating mass in the pancreatic head and neck.  Mass has decreased in size now measuring 5.0 x 5.1 x 4.1 cm compared  to 5.8 x 5.6 x 4.6 cm. Metallic common bile duct stent. Pancreatic  duct is dilated more distally. Is much as 7 cm. Atrophy of the tail of  the pancreas.     Vascular involvement changed  significantly.  Celiac axis: Encasement of the distal celiac artery trifurcation  without significant narrowing. Left gastric artery origin is narrowed.  Hepatic artery: Encasement of the common and proper hepatic arteries  as well as the gastroduodenal artery. Abutment of the proper hepatic  artery at the bifurcation but no encasement.  Superior mesenteric artery: Encasement of the origin of the SMA  without significant narrowing.  Superior mesenteric vein: Encasement with mild narrowing.  Portal vein: Encasement of the long segment with moderate severe  narrowing of the portal vein at the confluence  Splenic artery and vein: Encasement with mild narrowing.    Thickened bilateral adrenal glands but no focal lesion. Large cyst in  the right kidney but no lesions. Spleen appears normal. 1 cm  gastrohepatic lymph node is not seen on this study.  No peritoneal lymph nodes or ascites  Liver: Liver completely fatty replaced. The 1.9 x 1.6 cm in  enhancement at the junction of hepatic segments 7 and 8. Series 3  image 105 unchanged. Likely intrahepatic portal venous hepatic shunt.  Cholelithiasis. Calcified uterine fibroids. 4.8 x 6.0 cm cm right  adnexal cyst with punctate mural calcifications. Slightly increased  versus a change in orientation. Previously at the same level it  measured 5.3 x 5.1 cm. Series 3 image 225. Colonic diverticulosis  without diverticulitis.    Bones and soft tissues: Degenerative changes throughout the hips,  shoulders and lumbar spine. Bones are demineralized but no suspicious  bony lesions are identified.      Impression    IMPRESSION: In this patient with known pancreatic cancer, overall  appearance suggests improvement    1. Numerous pulmonary masses. The overall number is relatively stable.  Majority of the larger masses in the lung have decreased in size and  now have cavitation.  2. Involvement of the pancreatic mass with surrounding structures is  relatively stable. The overall size of  the tumor has decreased  slightly in size.  3. Complete fatty replacement of the liver. Stable enhancing lesion.  4. Slight interval increased measurements of the right adnexal cyst. I  believe that this is related to orientation and not actual  enlargement.    MICHEAL ESPARZA MD   Creatinine POCT   Result Value Ref Range    Creatinine 0.3 (L) 0.52 - 1.04 mg/dL    GFR Estimate >90 >60 mL/min/1.7m2    GFR Estimate If Black >90 >60 mL/min/1.7m2     Assessment and Plan:   Stage IV Pancreatic cancer-Pt continues on treatment with Gemzar on days 1,8, and 15 of 28 day plan which started 11/3/2017. We reviewed the CT CAP after cycle 3 which shows good stability to improvement with the Gemzar plan. Since pt is tolerating this well- we will continue with this plan - cycle 4 today.   She will return for day 8 and 15 infusions. Next visit with provider prior to cycle 5 with treatment labs.   Weight loss/fatigue-  Pt did see Dietician on 1/19 but continues with slow weight loss. Reviewed need for snacks or calorie rish fluids to help with weight. Continued walking over short distances in her hallways is needed to maintain strength. Pt very accepting of mild fatigue with treatment and her disease.   Elevated liver enzymes- though imaging showed no new issues with the liver, she does have mild elevation today- will continue to monitor.   Hypokalemia-Potassium now within normal now she is back to taking her oral potassium tabs daily.  This was a 25min visit with > 50% in counseling and coordinating care including education and management of concerns.    Nai Simmons,CNP

## 2018-02-01 NOTE — MR AVS SNAPSHOT
After Visit Summary   2018    Talya Gatica    MRN: [id number]           Patient Information     Date Of Birth          1940        Visit Information        Provider Department      2018 1:30 PM BAY 2 [facility]        Today's Diagnoses     Hypokalemia    -  1    Pancreatic adenocarcinoma (H)           Follow-ups after your visit        Who to contact     If you have questions or need follow up information about today's clinic visit or your schedule please contact [facility] directly at [phone].  Normal or non-critical lab and imaging results will be communicated to you by MOD Systemshart, letter or phone within 4 business days after the clinic has received the results. If you do not hear from us within 7 days, please contact the clinic through MOD Systemshart or phone. If you have a critical or abnormal lab result, we will notify you by phone as soon as possible.  Submit refill requests through Klout or call your pharmacy and they will forward the refill request to us. Please allow 3 business days for your refill to be completed.          Additional Information About Your Visit        MyChart Information     Klout is an electronic gateway that provides easy, online access to your medical records. With Klout, you can request a clinic appointment, read your test results, renew a prescription or communicate with your care team.     To sign up for Klout visit the website at [email or web address]   You will be asked to enter the access code listed below, as well as some personal information. Please follow the directions to create your username and password.     Your access code is: [email or web address]  Expires: 4/3/2018  2:06 PM     Your access code will  in 90 days. If you need help or a new code, please contact your [facility] or call [phone] for assistance.        Care EveryWhere ID     This is your Care  EveryWhere ID. This could be used by other organizations to access your [facility] medical records  [id number]         Blood Pressure from Last 3 Encounters:   02/01/18 162/63   01/18/18 131/83   01/05/18 105/67    Weight from Last 3 Encounters:   02/01/18 49.2 kg (108 lb 6.4 oz)   01/18/18 50.5 kg (111 lb 6.4 oz)   01/05/18 52.7 kg (116 lb 3.2 oz)              Today, you had the following     No orders found for display       Primary Care Provider Office Phone # Fax #    Pancho Rl Cope [phone] [address] [facility] [address]        Equal Access to Services     JOCELINE JORDAN : Hadii feliciano suárez hadasho Solenoraali, waaxda luqadaha, qaybta kaalmada adeegyada, carrie kaur . So [facility] [phone].    ATENCIÓN: Si habla español, tiene a ornelas disposición servicios gratuitos de asistencia lingüística. [facility] [phone].    We comply with applicable federal civil rights laws and [place] laws. We do not discriminate on the basis of race, color, national origin, age, disability, sex, sexual orientation, or gender identity.            Thank you!     Thank you for choosing [facility]  for your care. Our goal is always to provide you with excellent care. Hearing back from our patients is one way we can continue to improve our services. Please take a few minutes to complete the written survey that you may receive in the mail after your visit with us. Thank you!             Your Updated Medication List - Protect others around you: Learn how to safely use, store and throw away your medicines at www.disposemymeds.org.          This list is accurate as of 2/1/18  3:44 PM.  Always use your most recent med list.                   Brand Name Dispense Instructions for use Diagnosis    amylase-lipase-protease 74347-40177 UNITS Cpep per EC capsule    CREON    180 capsule    Take 1 capsule (24,000 Units) by mouth 3 times daily (with meals)    Pancreatic  adenocarcinoma (H)       BIOTIN PO           latanoprost 0.005 % ophthalmic solution    XALATAN    3 Bottle    Place 1 drop into both eyes At Bedtime    Glaucoma suspect, bilateral       Potassium Chloride ER 20 MEQ Tbcr     90 tablet    Take 1 tablet (20 mEq) by mouth daily    Hypokalemia       prochlorperazine 10 MG tablet    COMPAZINE    30 tablet    Take 0.5 tablets (5 mg) by mouth every 6 hours as needed (Nausea/Vomiting)    Pancreatic adenocarcinoma (H)       WOMENS 50+ MULTI VITAMIN/MIN PO

## 2018-02-01 NOTE — PROGRESS NOTES
"Oncology Rooming Note    February 1, 2018 1:44 PM   Talya Gatica is a 77 year old female who presents for:    Chief Complaint   Patient presents with     Oncology Clinic Visit     Initial Vitals: There were no vitals taken for this visit. Estimated body mass index is 22.13 kg/(m^2) as calculated from the following:    Height as of 12/29/17: 1.511 m (4' 11.49\").    Weight as of 1/18/18: 50.5 kg (111 lb 6.4 oz). There is no height or weight on file to calculate BSA.  Data Unavailable Comment: Data Unavailable   No LMP recorded. Patient is postmenopausal.  Allergies reviewed: Yes  Medications reviewed: Yes    Medications: Medication refills not needed today.  Pharmacy name entered into [facility]: [id number] - [place], [place] - [address] AT [facility]    Clinical concerns: Results of scans.     Sheree Calhoun RN              "

## 2018-02-01 NOTE — LETTER
2/1/2018         RE: Talya Gatica  [address]  [facility] [address]        Dear Colleague,    Thank you for referring your patient, Talya Gatica, to the [facility]. Please see a copy of my visit note below.    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  Treatment:   11/3/2017 began treatment with Gemzar- days 1,8,15 of 28 day plan    Interval History: Ms. Sanchez comes to clinic for review prior to cycle 4 of Gemzar plan.Pt had imaging completed after cycle 3 of Gemzar and reviewed that this generally looks positive with improvement to the pancreatic mass, pulmonary disease as well. She feels she is tolerating treatment well with some weakness or decreased stamina noted but feels her quality of life is still good and wants to continue with treatment for now. She denies pain, nausea and is eating well . She has had some constipation with treatment and is not using dulcolax as needed but most days at this time.she is sleeping well and feels good support form those around her.   Rest of comprehensive and complete ROS is reviewed and is negative.   Past Medical History:   Diagnosis Date     AR (allergic rhinitis)      Baker's cyst      DJD (degenerative joint disease)      Elevated cholesterol      Glaucoma      Menopause      Vertigo      Current Outpatient Prescriptions   Medication     Potassium Chloride ER 20 MEQ TBCR     prochlorperazine (COMPAZINE) 10 MG tablet     amylase-lipase-protease (CREON) 37335-81046 UNITS CPEP per EC capsule     BIOTIN PO     latanoprost (XALATAN) 0.005 % ophthalmic solution     Multiple Vitamins-Minerals  (WOMENS 50+ MULTI VITAMIN/MIN PO)     No current facility-administered medications for this visit.      Allergies   Allergen Reactions     Codeine Camsylate        Physical Exam:/63 (BP Location: Left arm)  Pulse 72  Temp 97.6  F (36.4  C) (Oral)  Resp 16  Wt 49.2 kg (108 lb 6.4 oz)  SpO2 100%  BMI 21.54 kg/m2   ECOG PS-1   Constitutional: Alert, moving well with cane for support or one assist due to dizziness.   ENT: Eyes bright, No mouth sores  Neck: Supple, No adenopathy.Thyroid symmetric  Cardiac: Heart rate and rhythm is regular and strong with possible mild aortic murmur again noted  Respiratory: Breathing easy. Lung sounds clear to auscultation  GI: Abdomen is soft, non-tender, BS normal. No masses or organomegaly  MS: Muscle tone fair- uses cane for support- able to get to exam table with light assistance, extremities normal with no edema.   Skin: No suspicious lesions or rashes or bruising  Neuro: Sensory grossly WNL, does have dizziness which pt feels is not new- no falls.   Lymph: Normal ant/post cervical, axillary, supraclavicular nodes  Psych: Mentation appears normal and affect normal/bright and talkative with good eye contact.     Laboratory Results:   Results for orders placed or performed in visit on 02/01/18   Comprehensive metabolic panel   Result Value Ref Range    Sodium 143 133 - 144 mmol/L    Potassium 3.6 3.4 - 5.3 mmol/L    Chloride 110 (H) 94 - 109 mmol/L    Carbon Dioxide 24 20 - 32 mmol/L    Anion Gap 9 3 - 14 mmol/L    Glucose 103 (H) 70 - 99 mg/dL    Urea Nitrogen 8 7 - 30 mg/dL    Creatinine 0.41 (L) 0.52 - 1.04 mg/dL    GFR Estimate >90 >60 mL/min/1.7m2    GFR Estimate If Black >90 >60 mL/min/1.7m2    Calcium 8.8 8.5 - 10.1 mg/dL    Bilirubin Total 1.0 0.2 - 1.3 mg/dL    Albumin 3.7 3.4 - 5.0 g/dL    Protein Total 6.9 6.8 - 8.8 g/dL    Alkaline Phosphatase 151 (H) 40 - 150 U/L    ALT 60 (H) 0 - 50 U/L    AST 53 (H) 0 - 45 U/L   CBC with platelets differential   Result  Value Ref Range    WBC 6.7 4.0 - 11.0 10e9/L    RBC Count 3.82 3.8 - 5.2 10e12/L    Hemoglobin 12.2 11.7 - 15.7 g/dL    Hematocrit 36.9 35.0 - 47.0 %    MCV 97 78 - 100 fl    MCH 31.9 26.5 - 33.0 pg    MCHC 33.1 31.5 - 36.5 g/dL    RDW 17.1 (H) 10.0 - 15.0 %    Platelet Count 193 150 - 450 10e9/L    Diff Method Automated Method     % Neutrophils 66.2 %    % Lymphocytes 20.0 %    % Monocytes 12.3 %    % Eosinophils 0.5 %    % Basophils 0.8 %    % Immature Granulocytes 0.2 %    Absolute Neutrophil 4.4 1.6 - 8.3 10e9/L    Absolute Lymphocytes 1.3 0.8 - 5.3 10e9/L    Absolute Monocytes 0.8 0.0 - 1.3 10e9/L    Absolute Eosinophils 0.0 0.0 - 0.7 10e9/L    Absolute Basophils 0.1 0.0 - 0.2 10e9/L    Abs Immature Granulocytes 0.0 0 - 0.4 10e9/L     Results for orders placed or performed in visit on 01/19/18   CT Chest/Abdomen/Pelvis w Contrast    Narrative    Body CT CAP EXAMINATION: CT CHEST/ABDOMEN/PELVIS W CONTRAST, 1/19/2018  2:46 PM    TECHNIQUE:  Helical CT images from the thoracic inlet through the  symphysis pubis were obtained  with contrast. Contrast dose: 68 ml  isovue 370    COMPARISON: 10/19/2017    HISTORY: follow up to gemzar treatment for pancreatic cancer.;  Pancreatic adenocarcinoma (H)    FINDINGS:    Chest:   Prominent coronary artery calcifications. Innumerable pulmonary  moderate masses with groundglass halo.    Right lower lobe mass has decreased in size and now with cavitation  series 7 image 70 measures 2.1 x 2.4 cm compared to  2.9 x 3.0 cm.  Right lower lobe mass has decreased in size and now with cavitation  measures 2.4 x 2.3 cm series 7 image 78 compared to 3.4 x 2.3 cm  Left lower lobe nodule has decreased in size and now with cavitation  measuring 1.1 x 1.2 cm series 7 image 70 compared to 1.2 x 1.3 cm.     Many of the nodules now have cavitation. Overall number of nodules is  stable.     Abdomen and pelvis:     Poorly defined hypoattenuating mass in the pancreatic head and neck.  Mass has  decreased in size now measuring 5.0 x 5.1 x 4.1 cm compared  to 5.8 x 5.6 x 4.6 cm. Metallic common bile duct stent. Pancreatic  duct is dilated more distally. Is much as 7 cm. Atrophy of the tail of  the pancreas.     Vascular involvement changed significantly.  Celiac axis: Encasement of the distal celiac artery trifurcation  without significant narrowing. Left gastric artery origin is narrowed.  Hepatic artery: Encasement of the common and proper hepatic arteries  as well as the gastroduodenal artery. Abutment of the proper hepatic  artery at the bifurcation but no encasement.  Superior mesenteric artery: Encasement of the origin of the SMA  without significant narrowing.  Superior mesenteric vein: Encasement with mild narrowing.  Portal vein: Encasement of the long segment with moderate severe  narrowing of the portal vein at the confluence  Splenic artery and vein: Encasement with mild narrowing.    Thickened bilateral adrenal glands but no focal lesion. Large cyst in  the right kidney but no lesions. Spleen appears normal. 1 cm  gastrohepatic lymph node is not seen on this study.  No peritoneal lymph nodes or ascites  Liver: Liver completely fatty replaced. The 1.9 x 1.6 cm in  enhancement at the junction of hepatic segments 7 and 8. Series 3  image 105 unchanged. Likely intrahepatic portal venous hepatic shunt.  Cholelithiasis. Calcified uterine fibroids. 4.8 x 6.0 cm cm right  adnexal cyst with punctate mural calcifications. Slightly increased  versus a change in orientation. Previously at the same level it  measured 5.3 x 5.1 cm. Series 3 image 225. Colonic diverticulosis  without diverticulitis.    Bones and soft tissues: Degenerative changes throughout the hips,  shoulders and lumbar spine. Bones are demineralized but no suspicious  bony lesions are identified.      Impression    IMPRESSION: In this patient with known pancreatic cancer, overall  appearance suggests improvement    1. Numerous pulmonary  masses. The overall number is relatively stable.  Majority of the larger masses in the lung have decreased in size and  now have cavitation.  2. Involvement of the pancreatic mass with surrounding structures is  relatively stable. The overall size of the tumor has decreased  slightly in size.  3. Complete fatty replacement of the liver. Stable enhancing lesion.  4. Slight interval increased measurements of the right adnexal cyst. I  believe that this is related to orientation and not actual  enlargement.    MICHEAL ESPARZA MD   Creatinine POCT   Result Value Ref Range    Creatinine 0.3 (L) 0.52 - 1.04 mg/dL    GFR Estimate >90 >60 mL/min/1.7m2    GFR Estimate If Black >90 >60 mL/min/1.7m2     Assessment and Plan:   Stage IV Pancreatic cancer-Pt continues on treatment with Gemzar on days 1,8, and 15 of 28 day plan which started 11/3/2017. We reviewed the CT CAP after cycle 3 which shows good stability to improvement with the Gemzar plan. Since pt is tolerating this well- we will continue with this plan - cycle 4 today.   She will return for day 8 and 15 infusions. Next visit with provider prior to cycle 5 with treatment labs.   Weight loss/fatigue-  Pt did see Dietician on 1/19 but continues with slow weight loss. Reviewed need for snacks or calorie rish fluids to help with weight. Continued walking over short distances in her hallways is needed to maintain strength. Pt very accepting of mild fatigue with treatment and her disease.   Elevated liver enzymes- though imaging showed no new issues with the liver, she does have mild elevation today- will continue to monitor.   Hypokalemia-Potassium now within normal now she is back to taking her oral potassium tabs daily.  This was a 25min visit with > 50% in counseling and coordinating care including education and management of concerns.    Nai Simmons,[facility]      Oncology Rooming Note    February 1, 2018 1:44 PM   Talya Gatica is a 77 year old female who presents  "for:    Chief Complaint   Patient presents with     Oncology Clinic Visit     Initial Vitals: There were no vitals taken for this visit. Estimated body mass index is 22.13 kg/(m^2) as calculated from the following:    Height as of 12/29/17: 1.511 m (4' 11.49\").    Weight as of 1/18/18: 50.5 kg (111 lb 6.4 oz). There is no height or weight on file to calculate BSA.  Data Unavailable Comment: Data Unavailable   No LMP recorded. Patient is postmenopausal.  Allergies reviewed: Yes  Medications reviewed: Yes    Medications: Medication refills not needed today.  Pharmacy name entered into [employer]: [facility] [place], [place] - [address] AT [facility]    Clinical concerns: Results of scans.     Sheree Calhoun RN                Again, thank you for allowing me to participate in the care of your patient.        Sincerely,        Nai Simmons NP, APRN CNP    "

## 2018-02-01 NOTE — MR AVS SNAPSHOT
After Visit Summary   2/1/2018    Talya Gatica    MRN: [id number]           Patient Information     Date Of Birth          1940        Visit Information        Provider Department      2/1/2018 12:45 PM Nai Simmons APRN CNP [facility]        Today's Diagnoses     Pancreatic adenocarcinoma (H)    -  1    Hypokalemia        Loss of weight        Fatigue, unspecified type        Elevated liver enzymes           Follow-ups after your visit        Your next 10 appointments already scheduled     Feb 08, 2018  1:30 PM CST   LAB with LAB ONC [facility] ([facility])    [address] [place] [address]   [phone]           Please do not eat 10-12 hours before your appointment if you are coming in fasting for labs on lipids, cholesterol, or glucose (sugar). This does not apply to pregnant women. Water, hot tea and black coffee (with nothing added) are okay. Do not drink other fluids, diet soda or chew gum.            Feb 08, 2018  2:00 PM CST   Level 2 with Onley 7 [facility] ([facility])    [address] [place] [address]   [phone]            Feb 15, 2018  1:30 PM CST   LAB with LAB ONC [facility] ([facility])    [address] [place] [id number] [phone]           Please do not eat 10-12 hours before your appointment if you are coming in fasting for labs on lipids, cholesterol, or glucose (sugar). This does not apply to pregnant women. Water, hot tea and black coffee (with nothing added) are okay. Do not drink other fluids, diet soda or chew gum.            Feb 15, 2018  2:00 PM CST   Level 2 with Onley 2 [facility])    [address] [place] [address] [phone]            Mar 01, 2018 10:45 AM CST   LAB with LAB  ONC [facility] ([facility])    [address] [facility] [address]   [phone]           Please do not eat 10-12 hours before your appointment if you are coming in fasting for labs on lipids, cholesterol, or glucose (sugar). This does not apply to pregnant women. Water, hot tea and black coffee (with nothing added) are okay. Do not drink other fluids, diet soda or chew gum.            Mar 01, 2018 11:15 AM CST   Return Visit with LISA Mendieta CNP   [facility])    [address] [place] [address] [phone]            Mar 01, 2018 12:30 PM CST   Level 2 with BAY 1 INFUSION   [facility])    [address] [place] [address] [phone]            Mar 08, 2018 10:30 AM CST   LAB with LAB ONC [facility])    [address] [place] [address] [phone]           Please do not eat 10-12 hours before your appointment if you are coming in fasting for labs on lipids, cholesterol, or glucose (sugar). This does not apply to pregnant women. Water, hot tea and black coffee (with nothing added) are okay. Do not drink other fluids, diet soda or chew gum.            Mar 08, 2018 11:00 AM CST   Level 2 with BAY 7 INFUSION   [facility])    [address] [place] [address] [phone]            Mar 15, 2018 11:00 AM CDT   Level 2 with BAY 6 INFUSION   [facility])    [address] [place] [address] [phone]              Who to contact     If you have questions or need follow up information about today's clinic visit or your schedule please contact [facility] directly at [phone].  Normal or non-critical lab and  imaging results will be communicated to you by [email or web address], letter or phone within 4 business days after the clinic has received the results. If you do not hear from us within 7 days, please contact the clinic through Skadoosht or phone. If you have a critical or abnormal lab result, we will notify you by phone as soon as possible.  Submit refill requests through QReca! or call your pharmacy and they will forward the refill request to us. Please allow 3 business days for your refill to be completed.          Additional Information About Your Visit        QReca! Information     QReca! is an electronic gateway that provides easy, online access to your medical records. With QReca!, you can request a clinic appointment, read your test results, renew a prescription or communicate with your care team.     To sign up for QReca! visit the website at [email or web address]   You will be asked to enter the access code listed below, as well as some personal information. Please follow the directions to create your username and password.     Your access code is: [email or web address]  Expires: 4/3/2018  2:06 PM     Your access code will  in 90 days. If you need help or a new code, please contact your [facility] or call [phone] for assistance.        Care EveryWhere ID     This is your Care EveryWhere ID. This could be used by other organizations to access your [place] medical records  [id number]        Your Vitals Were     Pulse Temperature Respirations Pulse Oximetry BMI (Body Mass Index)       72 97.6  F (36.4  C) (Oral) 16 100% 21.54 kg/m2        Blood Pressure from Last 3 Encounters:   18 162/63   18 131/83   18 105/67    Weight from Last 3 Encounters:   18 49.2 kg (108 lb 6.4 oz)   18 50.5 kg (111 lb 6.4 oz)   18 52.7 kg (116 lb 3.2 oz)              Today, you had the following     No orders found for display       Primary Care Provider Office Phone  # Fax #    Pancho Cope [phone] [address] [facility] [address]        Equal Access to Services     JOCELINE JORDAN : Joleen suárez jaxo Solenoraali, waarmandda luqadaha, qaybta kaalmada patricio, carrie wilson patriciaterence pickens natasha wheatley. So [facility] [place] [phone].    ATENCIÓN: Si habla español, tiene a ornelas disposición servicios gratuitos de asistencia lingüística. Llame al [phone].    We comply with applicable federal civil rights laws and [place] laws. We do not discriminate on the basis of race, color, national origin, age, disability, sex, sexual orientation, or gender identity.            Thank you!     Thank you for choosing [facility]  for your care. Our goal is always to provide you with excellent care. Hearing back from our patients is one way we can continue to improve our services. Please take a few minutes to complete the written survey that you may receive in the mail after your visit with us. Thank you!             Your Updated Medication List - Protect others around you: Learn how to safely use, store and throw away your medicines at www.disposemymeds.org.          This list is accurate as of 2/1/18 11:59 PM.  Always use your most recent med list.                   Brand Name Dispense Instructions for use Diagnosis    amylase-lipase-protease [id number] UNITS Cpep per EC capsule    CREON    180 capsule    Take 1 capsule (24,000 Units) by mouth 3 times daily (with meals)    Pancreatic adenocarcinoma (H)       BIOTIN PO           latanoprost 0.005 % ophthalmic solution    XALATAN    3 Bottle    Place 1 drop into both eyes At Bedtime    Glaucoma suspect, bilateral       Potassium Chloride ER 20 MEQ Tbcr     90 tablet    Take 1 tablet (20 mEq) by mouth daily    Hypokalemia       prochlorperazine 10 MG tablet    COMPAZINE    30 tablet    Take 0.5 tablets (5 mg) by mouth every 6 hours as needed (Nausea/Vomiting)    Pancreatic adenocarcinoma (H)        WOMENS 50+ MULTI VITAMIN/MIN PO

## 2018-02-01 NOTE — PROGRESS NOTES
Infusion Nursing Note:  Talya Gatica presents today for Hial[place]zar.    Patient seen by provider today: Yes: Nai Simmons NP   present during visit today: Not Applicable.    Note: N/A.    Intravenous Access:  Peripheral IV placed.    Treatment Conditions:  Lab Results   Component Value Date    HGB 12.2 02/01/2018     Lab Results   Component Value Date    WBC 6.7 02/01/2018      Lab Results   Component Value Date    ANEU 4.4 02/01/2018     Lab Results   Component Value Date     02/01/2018      Lab Results   Component Value Date     02/01/2018                   Lab Results   Component Value Date    POTASSIUM 3.6 02/01/2018           No results found for: MAG         Lab Results   Component Value Date    CR 0.41 02/01/2018                   Lab Results   Component Value Date    BERNARDO 8.8 02/01/2018                Lab Results   Component Value Date    BILITOTAL 1.0 02/01/2018           Lab Results   Component Value Date    ALBUMIN 3.7 02/01/2018                    Lab Results   Component Value Date    ALT 60 02/01/2018           Lab Results   Component Value Date    AST 53 02/01/2018       Results reviewed, labs MET treatment parameters, ok to proceed with treatment.      Post Infusion Assessment:  Patient tolerated infusion without incident.  No evidence of extravasations.  Access discontinued per protocol.    Discharge Plan:   Message in for next appointment to be made for 2/9/18.  Patient discharged in stable condition accompanied by: self and daughter.  Departure Mode: Ambulatory with cane.    Ann Bolanos RN

## 2018-02-07 ENCOUNTER — MEDICAL CORRESPONDENCE (OUTPATIENT)
Dept: HEALTH INFORMATION MANAGEMENT | Facility: CLINIC | Age: 78
End: 2018-02-07

## 2018-02-08 ENCOUNTER — INFUSION THERAPY VISIT (OUTPATIENT)
Dept: INFUSION THERAPY | Facility: CLINIC | Age: 78
End: 2018-02-08
Payer: COMMERCIAL

## 2018-02-08 VITALS
BODY MASS INDEX: 21.66 KG/M2 | OXYGEN SATURATION: 99 % | WEIGHT: 109 LBS | RESPIRATION RATE: 16 BRPM | DIASTOLIC BLOOD PRESSURE: 66 MMHG | HEART RATE: 98 BPM | SYSTOLIC BLOOD PRESSURE: 128 MMHG | TEMPERATURE: 97.9 F

## 2018-02-08 DIAGNOSIS — C25.9 PANCREATIC ADENOCARCINOMA (H): Primary | ICD-10-CM

## 2018-02-08 DIAGNOSIS — E87.6 HYPOKALEMIA: Primary | ICD-10-CM

## 2018-02-08 DIAGNOSIS — C25.9 PANCREATIC ADENOCARCINOMA (H): ICD-10-CM

## 2018-02-08 LAB
BASOPHILS # BLD AUTO: 0 10E9/L (ref 0–0.2)
BASOPHILS NFR BLD AUTO: 0.5 %
DIFFERENTIAL METHOD BLD: ABNORMAL
EOSINOPHIL # BLD AUTO: 0 10E9/L (ref 0–0.7)
EOSINOPHIL NFR BLD AUTO: 0.5 %
ERYTHROCYTE [DISTWIDTH] IN BLOOD BY AUTOMATED COUNT: 15.9 % (ref 10–15)
HCT VFR BLD AUTO: 32.6 % (ref 35–47)
HGB BLD-MCNC: 11 G/DL (ref 11.7–15.7)
IMM GRANULOCYTES # BLD: 0 10E9/L (ref 0–0.4)
IMM GRANULOCYTES NFR BLD: 0 %
LYMPHOCYTES # BLD AUTO: 0.9 10E9/L (ref 0.8–5.3)
LYMPHOCYTES NFR BLD AUTO: 42.3 %
MCH RBC QN AUTO: 32.4 PG (ref 26.5–33)
MCHC RBC AUTO-ENTMCNC: 33.7 G/DL (ref 31.5–36.5)
MCV RBC AUTO: 96 FL (ref 78–100)
MONOCYTES # BLD AUTO: 0.3 10E9/L (ref 0–1.3)
MONOCYTES NFR BLD AUTO: 12.6 %
NEUTROPHILS # BLD AUTO: 1 10E9/L (ref 1.6–8.3)
NEUTROPHILS NFR BLD AUTO: 44.1 %
PLATELET # BLD AUTO: 191 10E9/L (ref 150–450)
POTASSIUM SERPL-SCNC: 3.8 MMOL/L (ref 3.4–5.3)
RBC # BLD AUTO: 3.4 10E12/L (ref 3.8–5.2)
WBC # BLD AUTO: 2.2 10E9/L (ref 4–11)

## 2018-02-08 PROCEDURE — 36415 COLL VENOUS BLD VENIPUNCTURE: CPT | Performed by: NURSE PRACTITIONER

## 2018-02-08 PROCEDURE — 85025 COMPLETE CBC W/AUTO DIFF WBC: CPT | Performed by: NURSE PRACTITIONER

## 2018-02-08 PROCEDURE — 84132 ASSAY OF SERUM POTASSIUM: CPT | Performed by: NURSE PRACTITIONER

## 2018-02-08 PROCEDURE — 99207 ZZC NO CHARGE LOS: CPT | Performed by: INTERNAL MEDICINE

## 2018-02-08 ASSESSMENT — PAIN SCALES - GENERAL: PAINLEVEL: NO PAIN (0)

## 2018-02-08 NOTE — MR AVS SNAPSHOT
After Visit Summary   2/8/2018    Talya Gatica    MRN: [id number]           Patient Information     Date Of Birth          1940        Visit Information        Provider Department      2/8/2018 2:00 PM [address]        Today's Diagnoses     Pancreatic adenocarcinoma (H)    -  1       Follow-ups after your visit        Your next 10 appointments already scheduled     Feb 15, 2018  1:30 PM CST   LAB with LAB ONC [facility])    [address] [address] [phone]           Please do not eat 10-12 hours before your appointment if you are coming in fasting for labs on lipids, cholesterol, or glucose (sugar). This does not apply to pregnant women. Water, hot tea and black coffee (with nothing added) are okay. Do not drink other fluids, diet soda or chew gum.            Feb 15, 2018  2:00 PM CST   Level 2 with [facility] [facility])    [address] [place] [address]   [phone]            Mar 01, 2018 10:45 AM CST   LAB with LAB ONC [facility])    [address] [facility] [address] [phone]           Please do not eat 10-12 hours before your appointment if you are coming in fasting for labs on lipids, cholesterol, or glucose (sugar). This does not apply to pregnant women. Water, hot tea and black coffee (with nothing added) are okay. Do not drink other fluids, diet soda or chew gum.            Mar 01, 2018 11:15 AM CST   Return Visit with LISA Mendieta [facility])    [address] [facility] [address]   [phone]            Mar 01, 2018 12:30 PM CST   Level 2 with [facility])    [address] [place] [address]  [facility] [address]   [phone]            Mar 08, 2018  1:30 PM CST   LAB with LAB ONC [facility] ([facility])    [id number] [facility] [address]   [phone]           Please do not eat 10-12 hours before your appointment if you are coming in fasting for labs on lipids, cholesterol, or glucose (sugar). This does not apply to pregnant women. Water, hot tea and black coffee (with nothing added) are okay. Do not drink other fluids, diet soda or chew gum.            Mar 08, 2018  2:00 PM CST   Level 2 with [facility]   [facility]    [address] [facility] [address]   [phone]            Mar 15, 2018  1:30 PM CDT   LAB with LAB ONC [facility] ([facility])    [address] [facility] [address]   [phone]           Please do not eat 10-12 hours before your appointment if you are coming in fasting for labs on lipids, cholesterol, or glucose (sugar). This does not apply to pregnant women. Water, hot tea and black coffee (with nothing added) are okay. Do not drink other fluids, diet soda or chew gum.            Mar 15, 2018  2:00 PM CDT   Level 2 with BAY 4 INFUSION   [facility]    [id number] [facility] [address]   [phone]              Who to contact     If you have questions or need follow up information about today's clinic visit or your schedule please contact [facility] directly at [phone].  Normal or non-critical lab and imaging results will be communicated to you by MyChart, letter or phone within 4 business days after the clinic has received the results. If you do not hear from us within 7 days, please contact the clinic through MyChart or phone. If you have a critical or abnormal lab result, we will notify you by phone as soon  as possible.  Submit refill requests through thePlatform or call your pharmacy and they will forward the refill request to us. Please allow 3 business days for your refill to be completed.          Additional Information About Your Visit        thePlatform Information     thePlatform is an electronic gateway that provides easy, online access to your medical records. With thePlatform, you can request a clinic appointment, read your test results, renew a prescription or communicate with your care team.     To sign up for thePlatform visit the website at [email or web address]   You will be asked to enter the access code listed below, as well as some personal information. Please follow the directions to create your username and password.     Your access code is: [email or web address]  Expires: 4/3/2018  2:06 PM     Your access code will  in 90 days. If you need help or a new code, please contact your [facility] or call [phone] for assistance.        Care EveryWhere ID     This is your Care EveryWhere ID. This could be used by other organizations to access your [facility] medical records  [id number]        Your Vitals Were     Pulse Temperature Respirations Pulse Oximetry BMI (Body Mass Index)       98 97.9  F (36.6  C) (Oral) 16 99% 21.66 kg/m2        Blood Pressure from Last 3 Encounters:   18 128/66   18 162/63   18 131/83    Weight from Last 3 Encounters:   18 49.4 kg (109 lb)   18 49.2 kg (108 lb 6.4 oz)   18 50.5 kg (111 lb 6.4 oz)              Today, you had the following     No orders found for display       Primary Care Provider Office Phone # Fax #    Pancho Cope [phone] [address] [address]        Equal Access to Services     JOCELINE JORDAN : Joleen camarao Solenoraali, waaxda luqadaha, qaybta kaalmada adeegyada, carrie wheatley. So [facility] [phone].    ATENCIÓN: Cassidy connor  español, tiene a ornelas disposición servicios gratuitos de asistencia lingüística. Kristen dias [phone].    We comply with applicable federal civil rights laws and [place] laws. We do not discriminate on the basis of race, color, national origin, age, disability, sex, sexual orientation, or gender identity.            Thank you!     Thank you for choosing [facility]  for your care. Our goal is always to provide you with excellent care. Hearing back from our patients is one way we can continue to improve our services. Please take a few minutes to complete the written survey that you may receive in the mail after your visit with us. Thank you!             Your Updated Medication List - Protect others around you: Learn how to safely use, store and throw away your medicines at www.disposemymeds.org.          This list is accurate as of 2/8/18  4:25 PM.  Always use your most recent med list.                   Brand Name Dispense Instructions for use Diagnosis    amylase-lipase-protease [address] UNITS Cpep per EC capsule    CREON    180 capsule    Take 1 capsule (24,000 Units) by mouth 3 times daily (with meals)    Pancreatic adenocarcinoma (H)       BIOTIN PO           latanoprost 0.005 % ophthalmic solution    XALATAN    3 Bottle    Place 1 drop into both eyes At Bedtime    Glaucoma suspect, bilateral       Potassium Chloride ER 20 MEQ Tbcr     90 tablet    Take 1 tablet (20 mEq) by mouth daily    Hypokalemia       prochlorperazine 10 MG tablet    COMPAZINE    30 tablet    Take 0.5 tablets (5 mg) by mouth every 6 hours as needed (Nausea/Vomiting)    Pancreatic adenocarcinoma (H)       WOMENS 50+ MULTI VITAMIN/MIN PO

## 2018-02-08 NOTE — PROGRESS NOTES
"Infusion Nursing Note:  Talya Gatica presents today for Gemzar- HELD.    Patient seen by provider today: No   present during visit today: Not Applicable.    Note:     Pt presents today for scheduled chemotherapy, however, unfortunately, her ANC does not meet parameters. She denies any fevers/chills or infectious symptoms. Amira admits feeling \"weak\", this is not new and she able to continue with her desired activity as usual day to day. Discussed with CATALINA Simmons NP.       GIBRAN Simmons NP/cassandra Hernandes RN. Cancel chemo today due to ANC 1.0. 2/8/18 @ 1400    Reviewed rationale for canceling chemo today; advised patient to continue with careful handwashing and of exposure to others with illness. Pt will call day/night with temp 100.5 or with new or concerning symptoms. Pt verbalized understanding to return next week for Day 15 Gemzar.     Intravenous Access:    Treatment Conditions:  Lab Results   Component Value Date    HGB 11.0 02/08/2018     Lab Results   Component Value Date    WBC 2.2 02/08/2018      Lab Results   Component Value Date    ANEU 1.0  DOES not meet parameters 02/08/2018     Lab Results   Component Value Date     02/08/2018      Lab Results   Component Value Date     02/01/2018                   Lab Results   Component Value Date    POTASSIUM 3.8 02/08/2018       Post Infusion Assessment:.    Discharge Plan:   Discharge instructions reviewed with: Patient.  Patient discharged in stable condition accompanied by: self.  Departure Mode: Ambulatory.    Cassandra Hernandes RN                        "

## 2018-02-15 ENCOUNTER — INFUSION THERAPY VISIT (OUTPATIENT)
Dept: INFUSION THERAPY | Facility: CLINIC | Age: 78
End: 2018-02-15
Payer: COMMERCIAL

## 2018-02-15 VITALS
HEART RATE: 61 BPM | RESPIRATION RATE: 18 BRPM | SYSTOLIC BLOOD PRESSURE: 117 MMHG | OXYGEN SATURATION: 96 % | DIASTOLIC BLOOD PRESSURE: 72 MMHG | TEMPERATURE: 98.6 F | BODY MASS INDEX: 21.54 KG/M2 | WEIGHT: 108.4 LBS

## 2018-02-15 DIAGNOSIS — C25.9 PANCREATIC ADENOCARCINOMA (H): ICD-10-CM

## 2018-02-15 DIAGNOSIS — E87.6 HYPOKALEMIA: Primary | ICD-10-CM

## 2018-02-15 LAB
BASOPHILS # BLD AUTO: 0 10E9/L (ref 0–0.2)
BASOPHILS NFR BLD AUTO: 0.7 %
DIFFERENTIAL METHOD BLD: ABNORMAL
EOSINOPHIL # BLD AUTO: 0 10E9/L (ref 0–0.7)
EOSINOPHIL NFR BLD AUTO: 0.4 %
ERYTHROCYTE [DISTWIDTH] IN BLOOD BY AUTOMATED COUNT: 16.3 % (ref 10–15)
HCT VFR BLD AUTO: 34.8 % (ref 35–47)
HGB BLD-MCNC: 11.4 G/DL (ref 11.7–15.7)
IMM GRANULOCYTES # BLD: 0 10E9/L (ref 0–0.4)
IMM GRANULOCYTES NFR BLD: 0.2 %
LYMPHOCYTES # BLD AUTO: 1 10E9/L (ref 0.8–5.3)
LYMPHOCYTES NFR BLD AUTO: 18.2 %
MCH RBC QN AUTO: 31.2 PG (ref 26.5–33)
MCHC RBC AUTO-ENTMCNC: 32.8 G/DL (ref 31.5–36.5)
MCV RBC AUTO: 95 FL (ref 78–100)
MONOCYTES # BLD AUTO: 0.6 10E9/L (ref 0–1.3)
MONOCYTES NFR BLD AUTO: 11.2 %
NEUTROPHILS # BLD AUTO: 3.8 10E9/L (ref 1.6–8.3)
NEUTROPHILS NFR BLD AUTO: 69.3 %
PLATELET # BLD AUTO: 284 10E9/L (ref 150–450)
POTASSIUM SERPL-SCNC: 3.6 MMOL/L (ref 3.4–5.3)
RBC # BLD AUTO: 3.65 10E12/L (ref 3.8–5.2)
WBC # BLD AUTO: 5.4 10E9/L (ref 4–11)

## 2018-02-15 PROCEDURE — 36415 COLL VENOUS BLD VENIPUNCTURE: CPT | Performed by: NURSE PRACTITIONER

## 2018-02-15 PROCEDURE — 85025 COMPLETE CBC W/AUTO DIFF WBC: CPT | Performed by: NURSE PRACTITIONER

## 2018-02-15 PROCEDURE — 96375 TX/PRO/DX INJ NEW DRUG ADDON: CPT | Performed by: INTERNAL MEDICINE

## 2018-02-15 PROCEDURE — 99207 ZZC NO CHARGE LOS: CPT

## 2018-02-15 PROCEDURE — 96413 CHEMO IV INFUSION 1 HR: CPT | Performed by: INTERNAL MEDICINE

## 2018-02-15 PROCEDURE — 84132 ASSAY OF SERUM POTASSIUM: CPT | Performed by: NURSE PRACTITIONER

## 2018-02-15 RX ADMIN — Medication 250 ML: at 14:59

## 2018-02-15 ASSESSMENT — PAIN SCALES - GENERAL: PAINLEVEL: NO PAIN (0)

## 2018-02-15 NOTE — MR AVS SNAPSHOT
After Visit Summary   2/15/2018    Talya Gatica    MRN: [id number]           Patient Information     Date Of Birth          1940        Visit Information        Provider Department      2/15/2018 2:00 PM [place] [facility]        Today's Diagnoses     Hypokalemia    -  1    Pancreatic adenocarcinoma (H)           Follow-ups after your visit        Your next 10 appointments already scheduled     Mar 01, 2018 10:45 AM CST   LAB with LAB ONC [facility] ([facility])    [address] [place] [address]   [phone]           Please do not eat 10-12 hours before your appointment if you are coming in fasting for labs on lipids, cholesterol, or glucose (sugar). This does not apply to pregnant women. Water, hot tea and black coffee (with nothing added) are okay. Do not drink other fluids, diet soda or chew gum.            Mar 01, 2018 11:15 AM CST   Return Visit with LISA Mendieta [facility])    [address] [place] [address]   [phone]            Mar 01, 2018 12:30 PM CST   Level 2 with [address])    [address] [place] [address]   [phone]            Mar 08, 2018  1:30 PM CST   LAB with LAB ONC [facility])    [address] [place] [address]   [phone]           Please do not eat 10-12 hours before your appointment if you are coming in fasting for labs on lipids, cholesterol, or glucose (sugar). This does not apply to pregnant women. Water, hot tea and black coffee (with nothing added) are okay. Do not drink other fluids, diet soda or chew gum.            Mar 08, 2018  2:00 PM CST   Level 2 with [place] [facility] ([facility])     [address] [place] [address] [phone]            Mar 15, 2018  1:30 PM CDT   LAB with LAB ONC [facility] ([facility])    [address] [place] [address] [phone]           Please do not eat 10-12 hours before your appointment if you are coming in fasting for labs on lipids, cholesterol, or glucose (sugar). This does not apply to pregnant women. Water, hot tea and black coffee (with nothing added) are okay. Do not drink other fluids, diet soda or chew gum.            Mar 15, 2018  2:00 PM CDT   Level 2 with BAY 4 INFUSION   [facility] ([facility])    [address] [place] [address] [phone]              Who to contact     If you have questions or need follow up information about today's clinic visit or your schedule please contact [facility] directly at [phone].  Normal or non-critical lab and imaging results will be communicated to you by [employer], letter or phone within 4 business days after the clinic has received the results. If you do not hear from us within 7 days, please contact the clinic through [employer] or phone. If you have a critical or abnormal lab result, we will notify you by phone as soon as possible.  Submit refill requests through Workbooks or call your pharmacy and they will forward the refill request to us. Please allow 3 business days for your refill to be completed.          Additional Information About Your Visit        Workbooks Information     Workbooks is an electronic gateway that provides easy, online access to your medical records. With Workbooks, you can request a clinic appointment, read your test results, renew a prescription or communicate with your care team.     To sign up for Workbooks visit the website at [email or web address]   You will be asked to enter the access code listed below, as well as some personal  information. Please follow the directions to create your username and password.     Your access code is: [email or web address]  Expires: 4/3/2018  2:06 PM     Your access code will  in 90 days. If you need help or a new code, please contact your [facility] or call [phone] for assistance.        Care EveryWhere ID     This is your Care EveryWhere ID. This could be used by other organizations to access your [facility] medical records  [id number]        Your Vitals Were     Pulse Temperature Respirations Pulse Oximetry BMI (Body Mass Index)       61 98.6  F (37  C) (Oral) 18 96% 21.54 kg/m2        Blood Pressure from Last 3 Encounters:   02/15/18 117/72   18 128/66   18 162/63    Weight from Last 3 Encounters:   02/15/18 49.2 kg (108 lb 6.4 oz)   18 49.4 kg (109 lb)   18 49.2 kg (108 lb 6.4 oz)              Today, you had the following     No orders found for display       Primary Care Provider Office Phone # Fax #    Pancho Cope [phone] [address] [address]        Equal Access to Services     JOCELINE JORDAN : Hadii aad ku hadasho Soomaali, waaxda luqadaha, qaybta kaalmada adeegyada, waxay idiin hayanu wheatley. So [facility] [phone].    ATENCIÓN: Si habla español, tiene a ornelas disposición servicios gratuitos de asistencia lingüística. Llame al [phone].    We comply with applicable federal civil rights laws and [place] laws. We do not discriminate on the basis of race, color, national origin, age, disability, sex, sexual orientation, or gender identity.            Thank you!     Thank you for choosing [facility]  for your care. Our goal is always to provide you with excellent care. Hearing back from our patients is one way we can continue to improve our services. Please take a few minutes to complete the written survey that you may receive in the mail after your visit with us.  Thank you!             Your Updated Medication List - Protect others around you: Learn how to safely use, store and throw away your medicines at www.disposemymeds.org.          This list is accurate as of 2/15/18 11:59 PM.  Always use your most recent med list.                   Brand Name Dispense Instructions for use Diagnosis    amylase-lipase-protease [id number] UNITS Cpep per EC capsule    CREON    180 capsule    Take 1 capsule (24,000 Units) by mouth 3 times daily (with meals)    Pancreatic adenocarcinoma (H)       BIOTIN PO           latanoprost 0.005 % ophthalmic solution    XALATAN    3 Bottle    Place 1 drop into both eyes At Bedtime    Glaucoma suspect, bilateral       Potassium Chloride ER 20 MEQ Tbcr     90 tablet    Take 1 tablet (20 mEq) by mouth daily    Hypokalemia       prochlorperazine 10 MG tablet    COMPAZINE    30 tablet    Take 0.5 tablets (5 mg) by mouth every 6 hours as needed (Nausea/Vomiting)    Pancreatic adenocarcinoma (H)       WOMENS 50+ MULTI VITAMIN/MIN PO

## 2018-02-15 NOTE — PROGRESS NOTES
Infusion Nursing Note:  Talya Gatica presents today for Gemzar.    Patient seen by provider today: No   present during visit today: Not Applicable.    Note: N/A.    Intravenous Access:  Peripheral IV placed.    Treatment Conditions:  Results reviewed, labs MET treatment parameters, ok to proceed with treatment.      Post Infusion Assessment:  Patient tolerated infusion without incident.    Discharge Plan:   RTC as scheduled for next cycle.    FREDIS LINARES RN

## 2018-02-19 ENCOUNTER — TELEPHONE (OUTPATIENT)
Dept: OPHTHALMOLOGY | Facility: CLINIC | Age: 78
End: 2018-02-19

## 2018-02-19 DIAGNOSIS — C25.9 PANCREATIC ADENOCARCINOMA (H): ICD-10-CM

## 2018-02-19 DIAGNOSIS — H40.003 GLAUCOMA SUSPECT, BILATERAL: ICD-10-CM

## 2018-02-19 RX ORDER — LATANOPROST 50 UG/ML
1 SOLUTION/ DROPS OPHTHALMIC AT BEDTIME
Qty: 3 BOTTLE | Refills: 4 | Status: SHIPPED | OUTPATIENT
Start: 2018-02-19 | End: 2018-02-20

## 2018-02-19 RX ORDER — LATANOPROST 50 UG/ML
1 SOLUTION/ DROPS OPHTHALMIC AT BEDTIME
Qty: 3 BOTTLE | Refills: 4 | Status: CANCELLED | OUTPATIENT
Start: 2018-02-19

## 2018-02-19 NOTE — TELEPHONE ENCOUNTER
Reason for call:  Medication   If this is a refill request, has the caller requested the refill from the pharmacy already? Yes  Will the patient be using a [facility]? No  Name of the pharmacy and phone number for the current request: [facility]     Name of the medication requested: latanoprost (XALATAN) 0.005 % ophthalmic solution    Other request: Patient is out of drops and unable to find her supply due to a recent move. She is wondering if she is still supposed to be using the drops or if Dr. Guillen discontinued. Would like a refill sent to pharmacy if appropriate.     Phone number to reach patient:  Home number on file [phone] (home)    Best Time:  anytime    Can we leave a detailed message on this number?  YES

## 2018-02-19 NOTE — TELEPHONE ENCOUNTER
Recommend refilling Latanoprost 0.005% instill 1 drop both eyes at bedtime. Per last visit note continue same eye medications.

## 2018-02-19 NOTE — TELEPHONE ENCOUNTER
Called patient and left message. That Dr. Guillen still did want her taking eye drop and we would send out a new Rx to [facility] in [place].

## 2018-02-20 DIAGNOSIS — H40.003 GLAUCOMA SUSPECT, BILATERAL: ICD-10-CM

## 2018-02-20 RX ORDER — LATANOPROST 50 UG/ML
1 SOLUTION/ DROPS OPHTHALMIC AT BEDTIME
Qty: 3 BOTTLE | Refills: 4 | Status: SHIPPED | OUTPATIENT
Start: 2018-02-20

## 2018-03-01 ENCOUNTER — ONCOLOGY VISIT (OUTPATIENT)
Dept: ONCOLOGY | Facility: CLINIC | Age: 78
End: 2018-03-01
Payer: COMMERCIAL

## 2018-03-01 ENCOUNTER — INFUSION THERAPY VISIT (OUTPATIENT)
Dept: INFUSION THERAPY | Facility: CLINIC | Age: 78
End: 2018-03-01
Payer: COMMERCIAL

## 2018-03-01 VITALS
HEART RATE: 76 BPM | WEIGHT: 108 LBS | OXYGEN SATURATION: 99 % | SYSTOLIC BLOOD PRESSURE: 118 MMHG | BODY MASS INDEX: 21.2 KG/M2 | HEIGHT: 60 IN | DIASTOLIC BLOOD PRESSURE: 83 MMHG | TEMPERATURE: 99 F

## 2018-03-01 DIAGNOSIS — R63.4 LOSS OF WEIGHT: ICD-10-CM

## 2018-03-01 DIAGNOSIS — E87.6 HYPOKALEMIA: ICD-10-CM

## 2018-03-01 DIAGNOSIS — C25.9 PANCREATIC ADENOCARCINOMA (H): Primary | ICD-10-CM

## 2018-03-01 DIAGNOSIS — R53.83 FATIGUE, UNSPECIFIED TYPE: ICD-10-CM

## 2018-03-01 DIAGNOSIS — C25.9 PANCREATIC ADENOCARCINOMA (H): ICD-10-CM

## 2018-03-01 DIAGNOSIS — E87.6 HYPOKALEMIA: Primary | ICD-10-CM

## 2018-03-01 DIAGNOSIS — R74.8 ELEVATED LIVER ENZYMES: ICD-10-CM

## 2018-03-01 DIAGNOSIS — R19.5 LOOSE STOOLS: ICD-10-CM

## 2018-03-01 LAB
ALBUMIN SERPL-MCNC: 3.6 G/DL (ref 3.4–5)
ALP SERPL-CCNC: 153 U/L (ref 40–150)
ALT SERPL W P-5'-P-CCNC: 53 U/L (ref 0–50)
ANION GAP SERPL CALCULATED.3IONS-SCNC: 8 MMOL/L (ref 3–14)
AST SERPL W P-5'-P-CCNC: 46 U/L (ref 0–45)
BASOPHILS # BLD AUTO: 0 10E9/L (ref 0–0.2)
BASOPHILS NFR BLD AUTO: 0.4 %
BILIRUB SERPL-MCNC: 0.9 MG/DL (ref 0.2–1.3)
BUN SERPL-MCNC: 6 MG/DL (ref 7–30)
CALCIUM SERPL-MCNC: 8.9 MG/DL (ref 8.5–10.1)
CHLORIDE SERPL-SCNC: 110 MMOL/L (ref 94–109)
CO2 SERPL-SCNC: 24 MMOL/L (ref 20–32)
CREAT SERPL-MCNC: 0.49 MG/DL (ref 0.52–1.04)
DIFFERENTIAL METHOD BLD: ABNORMAL
EOSINOPHIL # BLD AUTO: 0 10E9/L (ref 0–0.7)
EOSINOPHIL NFR BLD AUTO: 0.4 %
ERYTHROCYTE [DISTWIDTH] IN BLOOD BY AUTOMATED COUNT: 16.3 % (ref 10–15)
GFR SERPL CREATININE-BSD FRML MDRD: >90 ML/MIN/1.7M2
GLUCOSE SERPL-MCNC: 100 MG/DL (ref 70–99)
HCT VFR BLD AUTO: 34.9 % (ref 35–47)
HGB BLD-MCNC: 11.6 G/DL (ref 11.7–15.7)
IMM GRANULOCYTES # BLD: 0 10E9/L (ref 0–0.4)
IMM GRANULOCYTES NFR BLD: 0.2 %
LYMPHOCYTES # BLD AUTO: 0.9 10E9/L (ref 0.8–5.3)
LYMPHOCYTES NFR BLD AUTO: 16.8 %
MCH RBC QN AUTO: 30.8 PG (ref 26.5–33)
MCHC RBC AUTO-ENTMCNC: 33.2 G/DL (ref 31.5–36.5)
MCV RBC AUTO: 93 FL (ref 78–100)
MONOCYTES # BLD AUTO: 0.6 10E9/L (ref 0–1.3)
MONOCYTES NFR BLD AUTO: 11 %
NEUTROPHILS # BLD AUTO: 3.9 10E9/L (ref 1.6–8.3)
NEUTROPHILS NFR BLD AUTO: 71.2 %
PLATELET # BLD AUTO: 213 10E9/L (ref 150–450)
POTASSIUM SERPL-SCNC: 3.5 MMOL/L (ref 3.4–5.3)
PROT SERPL-MCNC: 6.9 G/DL (ref 6.8–8.8)
RBC # BLD AUTO: 3.77 10E12/L (ref 3.8–5.2)
SODIUM SERPL-SCNC: 142 MMOL/L (ref 133–144)
WBC # BLD AUTO: 5.5 10E9/L (ref 4–11)

## 2018-03-01 PROCEDURE — 96367 TX/PROPH/DG ADDL SEQ IV INF: CPT | Performed by: NURSE PRACTITIONER

## 2018-03-01 PROCEDURE — 85025 COMPLETE CBC W/AUTO DIFF WBC: CPT | Performed by: NURSE PRACTITIONER

## 2018-03-01 PROCEDURE — 80053 COMPREHEN METABOLIC PANEL: CPT | Performed by: NURSE PRACTITIONER

## 2018-03-01 PROCEDURE — 99214 OFFICE O/P EST MOD 30 MIN: CPT | Mod: 25 | Performed by: NURSE PRACTITIONER

## 2018-03-01 PROCEDURE — 99207 ZZC NO CHARGE LOS: CPT

## 2018-03-01 PROCEDURE — 36415 COLL VENOUS BLD VENIPUNCTURE: CPT | Performed by: NURSE PRACTITIONER

## 2018-03-01 PROCEDURE — 96413 CHEMO IV INFUSION 1 HR: CPT | Performed by: NURSE PRACTITIONER

## 2018-03-01 RX ORDER — SODIUM CHLORIDE 9 MG/ML
1000 INJECTION, SOLUTION INTRAVENOUS CONTINUOUS PRN
Status: CANCELLED
Start: 2018-03-15

## 2018-03-01 RX ORDER — ALBUTEROL SULFATE 0.83 MG/ML
2.5 SOLUTION RESPIRATORY (INHALATION)
Status: CANCELLED | OUTPATIENT
Start: 2018-03-15

## 2018-03-01 RX ORDER — SODIUM CHLORIDE 9 MG/ML
1000 INJECTION, SOLUTION INTRAVENOUS CONTINUOUS PRN
Status: CANCELLED
Start: 2018-03-08

## 2018-03-01 RX ORDER — DIPHENHYDRAMINE HYDROCHLORIDE 50 MG/ML
50 INJECTION INTRAMUSCULAR; INTRAVENOUS
Status: CANCELLED
Start: 2018-03-01

## 2018-03-01 RX ORDER — ALBUTEROL SULFATE 0.83 MG/ML
2.5 SOLUTION RESPIRATORY (INHALATION)
Status: CANCELLED | OUTPATIENT
Start: 2018-03-01

## 2018-03-01 RX ORDER — METHYLPREDNISOLONE SODIUM SUCCINATE 125 MG/2ML
125 INJECTION, POWDER, LYOPHILIZED, FOR SOLUTION INTRAMUSCULAR; INTRAVENOUS
Status: CANCELLED
Start: 2018-03-08

## 2018-03-01 RX ORDER — METHYLPREDNISOLONE SODIUM SUCCINATE 125 MG/2ML
125 INJECTION, POWDER, LYOPHILIZED, FOR SOLUTION INTRAMUSCULAR; INTRAVENOUS
Status: CANCELLED
Start: 2018-03-15

## 2018-03-01 RX ORDER — EPINEPHRINE 1 MG/ML
0.3 INJECTION, SOLUTION INTRAMUSCULAR; SUBCUTANEOUS EVERY 5 MIN PRN
Status: CANCELLED | OUTPATIENT
Start: 2018-03-15

## 2018-03-01 RX ORDER — ALBUTEROL SULFATE 90 UG/1
1-2 AEROSOL, METERED RESPIRATORY (INHALATION)
Status: CANCELLED
Start: 2018-03-01

## 2018-03-01 RX ORDER — EPINEPHRINE 0.3 MG/.3ML
0.3 INJECTION SUBCUTANEOUS EVERY 5 MIN PRN
Status: CANCELLED | OUTPATIENT
Start: 2018-03-01

## 2018-03-01 RX ORDER — METHYLPREDNISOLONE SODIUM SUCCINATE 125 MG/2ML
125 INJECTION, POWDER, LYOPHILIZED, FOR SOLUTION INTRAMUSCULAR; INTRAVENOUS
Status: CANCELLED
Start: 2018-03-01

## 2018-03-01 RX ORDER — ALBUTEROL SULFATE 90 UG/1
1-2 AEROSOL, METERED RESPIRATORY (INHALATION)
Status: CANCELLED
Start: 2018-03-08

## 2018-03-01 RX ORDER — ALBUTEROL SULFATE 0.83 MG/ML
2.5 SOLUTION RESPIRATORY (INHALATION)
Status: CANCELLED | OUTPATIENT
Start: 2018-03-08

## 2018-03-01 RX ORDER — SODIUM CHLORIDE 9 MG/ML
1000 INJECTION, SOLUTION INTRAVENOUS CONTINUOUS PRN
Status: CANCELLED
Start: 2018-03-01

## 2018-03-01 RX ORDER — DIPHENHYDRAMINE HYDROCHLORIDE 50 MG/ML
50 INJECTION INTRAMUSCULAR; INTRAVENOUS
Status: CANCELLED
Start: 2018-03-08

## 2018-03-01 RX ORDER — DIPHENHYDRAMINE HYDROCHLORIDE 50 MG/ML
50 INJECTION INTRAMUSCULAR; INTRAVENOUS
Status: CANCELLED
Start: 2018-03-15

## 2018-03-01 RX ORDER — EPINEPHRINE 0.3 MG/.3ML
0.3 INJECTION SUBCUTANEOUS EVERY 5 MIN PRN
Status: CANCELLED | OUTPATIENT
Start: 2018-03-15

## 2018-03-01 RX ORDER — MEPERIDINE HYDROCHLORIDE 50 MG/ML
25 INJECTION INTRAMUSCULAR; INTRAVENOUS; SUBCUTANEOUS EVERY 30 MIN PRN
Status: CANCELLED | OUTPATIENT
Start: 2018-03-08

## 2018-03-01 RX ORDER — MEPERIDINE HYDROCHLORIDE 50 MG/ML
25 INJECTION INTRAMUSCULAR; INTRAVENOUS; SUBCUTANEOUS EVERY 30 MIN PRN
Status: CANCELLED | OUTPATIENT
Start: 2018-03-15

## 2018-03-01 RX ORDER — ALBUTEROL SULFATE 90 UG/1
1-2 AEROSOL, METERED RESPIRATORY (INHALATION)
Status: CANCELLED
Start: 2018-03-15

## 2018-03-01 RX ORDER — EPINEPHRINE 1 MG/ML
0.3 INJECTION, SOLUTION INTRAMUSCULAR; SUBCUTANEOUS EVERY 5 MIN PRN
Status: CANCELLED | OUTPATIENT
Start: 2018-03-01

## 2018-03-01 RX ORDER — MEPERIDINE HYDROCHLORIDE 50 MG/ML
25 INJECTION INTRAMUSCULAR; INTRAVENOUS; SUBCUTANEOUS EVERY 30 MIN PRN
Status: CANCELLED | OUTPATIENT
Start: 2018-03-01

## 2018-03-01 RX ORDER — EPINEPHRINE 0.3 MG/.3ML
0.3 INJECTION SUBCUTANEOUS EVERY 5 MIN PRN
Status: CANCELLED | OUTPATIENT
Start: 2018-03-08

## 2018-03-01 RX ORDER — EPINEPHRINE 1 MG/ML
0.3 INJECTION, SOLUTION INTRAMUSCULAR; SUBCUTANEOUS EVERY 5 MIN PRN
Status: CANCELLED | OUTPATIENT
Start: 2018-03-08

## 2018-03-01 RX ADMIN — Medication 250 ML: at 12:42

## 2018-03-01 ASSESSMENT — PAIN SCALES - GENERAL: PAINLEVEL: NO PAIN (0)

## 2018-03-01 NOTE — PROGRESS NOTES
Oncology Follow Up Visit: March 1, 2018      Oncologist: Dr Jim Soares  PCP: Pancho Cope    Diagnosis: Stage IV Pancreatic cancer  Talya Gatica is a 76 yo who c/o jaundice in 7/2017 was found to have  adenocarcinoma of the head of pancreas causing biliary obstruction and several pulmonary nodules consistent with metastasis- initially told days to 3 months of life.  Treatment:   11/3/2017 began treatment with Gemzar- days 1,8,15 of 28 day plan    Interval History: Ms. Sanchez comes to clinic for review prior to cycle 5 of Gemzar plan.Pt shares she has noted fatigue post treatment  And some weakness but no falls- continues using cane for assistance. She  Denies pain, SOB, chest issues, fever or illness, or neuropathies and overall feels she is doing well at this time. She is eating well she feels but is not gaining weight and talks about Recent prime rib dinner and other high fat meals but is not gaining weight though does not want to use any insurance she feels it causes her diarrhea and is afraid of having diarrhea. She does have an occasional loose stool but never more than 1 at a time  And states she's taking her fluids well.  Rest of comprehensive and complete ROS is reviewed and is negative.   Past Medical History:   Diagnosis Date     AR (allergic rhinitis)      Baker's cyst      DJD (degenerative joint disease)      Elevated cholesterol      Glaucoma      Menopause      Vertigo      Current Outpatient Prescriptions   Medication     latanoprost (XALATAN) 0.005 % ophthalmic solution     Potassium Chloride ER 20 MEQ TBCR     prochlorperazine (COMPAZINE) 10 MG tablet     amylase-lipase-protease (CREON) 43[address]-806[address] UNITS CPEP per EC capsule     BIOTIN PO     Multiple Vitamins-Minerals (WOMENS 50+ MULTI VITAMIN/MIN PO)     No current facility-administered medications for this visit.      Allergies   Allergen Reactions     Codeine Camsylate        Physical Exam:/83  Pulse 76  Temp 99  F (37.2  " C)  Ht 1.511 m (4' 11.5\")  Wt 49 kg (108 lb)  SpO2 99%  BMI 21.45 kg/m2   ECOG PS-1   Constitutional: Alert, moving well using cane or one assist .   ENT: Eyes bright, No mouth sores- May have some hearing loss  Neck: Supple, No adenopathy.Thyroid symmetric  Cardiac: Heart rate and rhythm is regular and strong with mild aortic murmur again noted  Respiratory: Breathing easy. Lung sounds clear to auscultation  GI: Abdomen is soft, non-tender, BS normal. No masses or organomegaly  MS: Muscle tone fair- uses cane for support- able to get to exam table with light assistance, extremities normal with no edema.   Skin: No suspicious lesions or rashes or bruising  Neuro: Sensory grossly WNL, gait is steady and no dizziness today  Lymph: Normal ant/post cervical, axillary, supraclavicular nodes  Psych: Mentation appears normal and affect normal/bright with easy conversation.     Laboratory Results:   Results for orders placed or performed in visit on 03/01/18   Comprehensive metabolic panel   Result Value Ref Range    Sodium 142 133 - 144 mmol/L    Potassium 3.5 3.4 - 5.3 mmol/L    Chloride 110 (H) 94 - 109 mmol/L    Carbon Dioxide 24 20 - 32 mmol/L    Anion Gap 8 3 - 14 mmol/L    Glucose 100 (H) 70 - 99 mg/dL    Urea Nitrogen 6 (L) 7 - 30 mg/dL    Creatinine 0.49 (L) 0.52 - 1.04 mg/dL    GFR Estimate >90 >60 mL/min/1.7m2    GFR Estimate If Black >90 >60 mL/min/1.7m2    Calcium 8.9 8.5 - 10.1 mg/dL    Bilirubin Total 0.9 0.2 - 1.3 mg/dL    Albumin 3.6 3.4 - 5.0 g/dL    Protein Total 6.9 6.8 - 8.8 g/dL    Alkaline Phosphatase 153 (H) 40 - 150 U/L    ALT 53 (H) 0 - 50 U/L    AST 46 (H) 0 - 45 U/L   CBC with platelets differential   Result Value Ref Range    WBC 5.5 4.0 - 11.0 10e9/L    RBC Count 3.77 (L) 3.8 - 5.2 10e12/L    Hemoglobin 11.6 (L) 11.7 - 15.7 g/dL    Hematocrit 34.9 (L) 35.0 - 47.0 %    MCV 93 78 - 100 fl    MCH 30.8 26.5 - 33.0 pg    MCHC 33.2 31.5 - 36.5 g/dL    RDW 16.3 (H) 10.0 - 15.0 %    Platelet Count " 213 150 - 450 10e9/L    Diff Method Automated Method     % Neutrophils 71.2 %    % Lymphocytes 16.8 %    % Monocytes 11.0 %    % Eosinophils 0.4 %    % Basophils 0.4 %    % Immature Granulocytes 0.2 %    Absolute Neutrophil 3.9 1.6 - 8.3 10e9/L    Absolute Lymphocytes 0.9 0.8 - 5.3 10e9/L    Absolute Monocytes 0.6 0.0 - 1.3 10e9/L    Absolute Eosinophils 0.0 0.0 - 0.7 10e9/L    Absolute Basophils 0.0 0.0 - 0.2 10e9/L    Abs Immature Granulocytes 0.0 0 - 0.4 10e9/L     Assessment and Plan:   Stage IV Pancreatic cancer-Pt continues on treatment with Gemzar on days 1, 8, and 15 of 28 day plan which started 11/3/2017. Patient is tolerating this treatment well. Cytopenias are minimal with the plan currently. She is meeting goals to continue with cycle 5 today.  She will return for day 8 and 15 infusions. Next visit with provider prior to cycle 6 with treatment labs.She will have CT prior to visit with Dr. Soares on 4/2.  Fatigue/Weight loss-Mild weight loss continues -she see dietician on 1/19 but continues with slow weight loss. Reviewed diet and encouraged supplements or added snacks. Encouraged continued exercise with her walking in  to maintain strength.   Occasional loose stools- pt is using the creon with meals.  States she is tolerant of the loose stools but warned to keep up fluids to prevent dehydration.   Elevated liver enzymes- mild but continues with treatment though stable-No new issues with the liver noted with last imaging. Will continue to monitor.   This was a 25min visit with > 50% in counseling and coordinating care including education and management of concerns.    Nai Simmons,CNP

## 2018-03-01 NOTE — MR AVS SNAPSHOT
After Visit Summary   3/1/2018    Talya Gatica    MRN: [id number]           Patient Information     Date Of Birth          1940        Visit Information        Provider Department      3/1/2018 11:15 AM Nai Simmons APRN [facility]        Today's Diagnoses     Pancreatic adenocarcinoma (H)    -  1    Fatigue, unspecified type        Loss of weight        Loose stools        Elevated liver enzymes           Follow-ups after your visit        Your next 10 appointments already scheduled     Mar 08, 2018  1:30 PM CST   LAB with LAB ONC [facility] ([facility])    [address] [place] [address]   [phone]           Please do not eat 10-12 hours before your appointment if you are coming in fasting for labs on lipids, cholesterol, or glucose (sugar). This does not apply to pregnant women. Water, hot tea and black coffee (with nothing added) are okay. Do not drink other fluids, diet soda or chew gum.            Mar 08, 2018  2:00 PM CST   Level 2 with [place] [facility])    [address] [place] [address] [phone]            Mar 15, 2018  1:30 PM CDT   LAB with LAB ONC [facility])    [address] [address] [address] [phone]           Please do not eat 10-12 hours before your appointment if you are coming in fasting for labs on lipids, cholesterol, or glucose (sugar). This does not apply to pregnant women. Water, hot tea and black coffee (with nothing added) are okay. Do not drink other fluids, diet soda or chew gum.            Mar 15, 2018  2:00 PM CDT   Level 2 with [place] [facility])    [address] [place] [address] [phone]            Mar 29, 2018  2:00 PM CDT   (Arrive by  1:45 PM)   CT CHEST ABDOMEN PELVIS W/O & W CONTRAST with MGCT1   [facility] ([facility])    [address] [place] [id number] [phone]           Please bring any scans or X-rays taken at other hospitals, if similar tests were done. Also bring a list of your medicines, including vitamins, minerals and over-the-counter drugs. It is safest to leave personal items at home.  Be sure to tell your doctor:   If you have any allergies.   If there s any chance you are pregnant.   If you are breastfeeding.  You may have contrast for this exam. To prepare:   Do not eat or drink for 2 hours before your exam. If you need to take medicine, you may take it with small sips of water. (We may ask you to take liquid medicine as well.)   The day before your exam, drink extra fluids at least six 8-ounce glasses (unless your doctor tells you to restrict your fluids).   You will be given instructions on how to drink the contrast.  Patients over 70 or patients with diabetes or kidney problems:   If you haven t had a blood test (creatinine test) within the last 30 days, the Cardiologist/Radiologist may require you to get this test prior to your exam.  If you have diabetes:   Continue to take your metformin medication on the day of your exam  Please wear loose clothing, such as a sweat suit or jogging clothes. Avoid snaps, zippers and other metal. We may ask you to undress and put on a hospital gown.  If you have any questions, please call the Imaging Department where you will have your exam.            Apr 02, 2018  7:30 AM CDT   LAB with LAB ONC [facility] ([facility])    [address] [place] [address] [phone]           Please do not eat 10-12 hours before your appointment if you are coming in fasting for labs on lipids, cholesterol, or glucose (sugar). This does not apply to pregnant women. Water, hot tea and black  coffee (with nothing added) are okay. Do not drink other fluids, diet soda or chew gum.            Apr 02, 2018  8:00 AM CDT   Return Visit with Jim Soares MD   [facility]    [id number] [facility] [address] [phone]            Apr 02, 2018  8:30 AM CDT   Level 2 with BAY 5 INFUSION   [facility] ([facility])    [address] [facility] [phone] [phone]            Apr 09, 2018  1:30 PM CDT   LAB with LAB ONC [facility] ([facility])    [address] [place] [address] [phone]           Please do not eat 10-12 hours before your appointment if you are coming in fasting for labs on lipids, cholesterol, or glucose (sugar). This does not apply to pregnant women. Water, hot tea and black coffee (with nothing added) are okay. Do not drink other fluids, diet soda or chew gum.            Apr 09, 2018  2:00 PM CDT   Level 2 with [place] [facility] ([facility])    [address] [address] [address] [phone]              Who to contact     If you have questions or need follow up information about today's clinic visit or your schedule please contact [facility] directly at [phone].  Normal or non-critical lab and imaging results will be communicated to you by MyChart, letter or phone within 4 business days after the clinic has received the results. If you do not hear from us within 7 days, please contact the clinic through MyChart or phone. If you have a critical or abnormal lab result, we will notify you by phone as soon as possible.  Submit refill requests through Simple Emotion or call your pharmacy and they will forward the refill request to us. Please allow 3 business days for your refill to be completed.          Additional Information About Your Visit    "     MyChart Information     Paperspine is an electronic gateway that provides easy, online access to your medical records. With Paperspine, you can request a clinic appointment, read your test results, renew a prescription or communicate with your care team.     To sign up for Paperspine visit the website at [email or web address]   You will be asked to enter the access code listed below, as well as some personal information. Please follow the directions to create your username and password.     Your access code is: [email or web address]  Expires: 4/3/2018  2:06 PM     Your access code will  in 90 days. If you need help or a new code, please contact your [facility] or call [phone] for assistance.        Care EveryWhere ID     This is your Care EveryWhere ID. This could be used by other organizations to access your [place] medical records  [id number]        Your Vitals Were     Pulse Temperature Height Pulse Oximetry BMI (Body Mass Index)       76 99  F (37.2  C) 1.511 m (4' 11.5\") 99% 21.45 kg/m2        Blood Pressure from Last 3 Encounters:   18 118/83   02/15/18 117/72   18 128/66    Weight from Last 3 Encounters:   18 49 kg (108 lb)   02/15/18 49.2 kg (108 lb 6.4 oz)   18 49.4 kg (109 lb)               Primary Care Provider Office Phone # Fax #    Pancho Rl Cope [phone] [address] [facility] [address]        Equal Access to Services     [facility] NIKKI : Hadii aad ku hadasho Soomaali, waaxda luqadaha, qaybta kaalmada patricio, carrie kaur . So [facility] [phone].    ATENCIÓN: Si habla español, tiene a ornelas disposición servicios gratuitos de asistencia lingüística. Llame al [phone].    We comply with applicable federal civil rights laws and [place] laws. We do not discriminate on the basis of race, color, national origin, age, disability, sex, sexual orientation, or gender " identity.            Thank you!     Thank you for choosing [facility]  for your care. Our goal is always to provide you with excellent care. Hearing back from our patients is one way we can continue to improve our services. Please take a few minutes to complete the written survey that you may receive in the mail after your visit with us. Thank you!             Your Updated Medication List - Protect others around you: Learn how to safely use, store and throw away your medicines at www.disposemymeds.org.          This list is accurate as of 3/1/18 11:59 PM.  Always use your most recent med list.                   Brand Name Dispense Instructions for use Diagnosis    amylase-lipase-protease 385[address]23 UNITS Cpep per EC capsule    CREON    180 capsule    Take 1 capsule (24,000 Units) by mouth 3 times daily (with meals)    Pancreatic adenocarcinoma (H)       BIOTIN PO           latanoprost 0.005 % ophthalmic solution    XALATAN    3 Bottle    Place 1 drop into both eyes At Bedtime    Glaucoma suspect, bilateral       Potassium Chloride ER 20 MEQ Tbcr     90 tablet    Take 1 tablet (20 mEq) by mouth daily    Hypokalemia       prochlorperazine 10 MG tablet    COMPAZINE    30 tablet    Take 0.5 tablets (5 mg) by mouth every 6 hours as needed (Nausea/Vomiting)    Pancreatic adenocarcinoma (H)       WOMENS 50+ MULTI VITAMIN/MIN PO

## 2018-03-01 NOTE — LETTER
3/1/2018         RE: Talya Gatica  [address]   [place] [address]        Dear Colleague,    Thank you for referring your patient, Talya Gatica, to the [facility]. Please see a copy of my visit note below.    sg    Oncology Follow Up Visit: March 1, 2018      Oncologist: Dr Jim Soares  PCP: Pancho Cope    Diagnosis: Stage IV Pancreatic cancer  Talya Gatica is a 78 yo who c/o jaundice in 7/2017 was found to have  adenocarcinoma of the head of pancreas causing biliary obstruction and several pulmonary nodules consistent with metastasis- initially told days to 3 months of life.  Treatment:   11/3/2017 began treatment with Gemzar- days 1,8,15 of 28 day plan    Interval History: Ms. Sanchez comes to clinic for review prior to cycle 5 of Gemzar plan.Pt shares she has noted fatigue post treatment  And some weakness but no falls- continues using cane for assistance. She  Denies pain, SOB, chest issues, fever or illness, or neuropathies and overall feels she is doing well at this time. She is eating well she feels but is not gaining weight and talks about Recent prime rib dinner and other high fat meals but is not gaining weight though does not want to use any insurance she feels it causes her diarrhea and is afraid of having diarrhea. She does have an occasional loose stool but never more than 1 at a time  And states she's taking her fluids well.  Rest of comprehensive and complete ROS is reviewed and is negative.   Past Medical History:   Diagnosis Date     AR (allergic rhinitis)      Baker's cyst      DJD (degenerative joint disease)      Elevated cholesterol      Glaucoma      Menopause      Vertigo      Current Outpatient Prescriptions   Medication     latanoprost (XALATAN) 0.005 % ophthalmic solution     Potassium Chloride ER 20 MEQ TBCR     prochlorperazine (COMPAZINE) 10 MG tablet     amylase-lipase-protease (CREON) [address]678-[address] UNITS CPEP per EC capsule      "BIOTIN PO     Multiple Vitamins-Minerals (WOMENS 50+ MULTI VITAMIN/MIN PO)     No current facility-administered medications for this visit.      Allergies   Allergen Reactions     Codeine Camsylate        Physical Exam:/83  Pulse 76  Temp 99  F (37.2  C)  Ht 1.511 m (4' 11.5\")  Wt 49 kg (108 lb)  SpO2 99%  BMI 21.45 kg/m2   ECOG PS-1   Constitutional: Alert, moving well using cane or one assist .   ENT: Eyes bright, No mouth sores- May have some hearing loss  Neck: Supple, No adenopathy.Thyroid symmetric  Cardiac: Heart rate and rhythm is regular and strong with mild aortic murmur again noted  Respiratory: Breathing easy. Lung sounds clear to auscultation  GI: Abdomen is soft, non-tender, BS normal. No masses or organomegaly  MS: Muscle tone fair- uses cane for support- able to get to exam table with light assistance, extremities normal with no edema.   Skin: No suspicious lesions or rashes or bruising  Neuro: Sensory grossly WNL, gait is steady and no dizziness today  Lymph: Normal ant/post cervical, axillary, supraclavicular nodes  Psych: Mentation appears normal and affect normal/bright with easy conversation.     Laboratory Results:   Results for orders placed or performed in visit on 03/01/18   Comprehensive metabolic panel   Result Value Ref Range    Sodium 142 133 - 144 mmol/L    Potassium 3.5 3.4 - 5.3 mmol/L    Chloride 110 (H) 94 - 109 mmol/L    Carbon Dioxide 24 20 - 32 mmol/L    Anion Gap 8 3 - 14 mmol/L    Glucose 100 (H) 70 - 99 mg/dL    Urea Nitrogen 6 (L) 7 - 30 mg/dL    Creatinine 0.49 (L) 0.52 - 1.04 mg/dL    GFR Estimate >90 >60 mL/min/1.7m2    GFR Estimate If Black >90 >60 mL/min/1.7m2    Calcium 8.9 8.5 - 10.1 mg/dL    Bilirubin Total 0.9 0.2 - 1.3 mg/dL    Albumin 3.6 3.4 - 5.0 g/dL    Protein Total 6.9 6.8 - 8.8 g/dL    Alkaline Phosphatase 153 (H) 40 - 150 U/L    ALT 53 (H) 0 - 50 U/L    AST 46 (H) 0 - 45 U/L   CBC with platelets differential   Result Value Ref Range    WBC 5.5 " 4.0 - 11.0 10e9/L    RBC Count 3.77 (L) 3.8 - 5.2 10e12/L    Hemoglobin 11.6 (L) 11.7 - 15.7 g/dL    Hematocrit 34.9 (L) 35.0 - 47.0 %    MCV 93 78 - 100 fl    MCH 30.8 26.5 - 33.0 pg    MCHC 33.2 31.5 - 36.5 g/dL    RDW 16.3 (H) 10.0 - 15.0 %    Platelet Count 213 150 - 450 10e9/L    Diff Method Automated Method     % Neutrophils 71.2 %    % Lymphocytes 16.8 %    % Monocytes 11.0 %    % Eosinophils 0.4 %    % Basophils 0.4 %    % Immature Granulocytes 0.2 %    Absolute Neutrophil 3.9 1.6 - 8.3 10e9/L    Absolute Lymphocytes 0.9 0.8 - 5.3 10e9/L    Absolute Monocytes 0.6 0.0 - 1.3 10e9/L    Absolute Eosinophils 0.0 0.0 - 0.7 10e9/L    Absolute Basophils 0.0 0.0 - 0.2 10e9/L    Abs Immature Granulocytes 0.0 0 - 0.4 10e9/L     Assessment and Plan:   Stage IV Pancreatic cancer-Pt continues on treatment with Gemzar on days 1, 8, and 15 of 28 day plan which started 11/3/2017. Patient is tolerating this treatment well. Cytopenias are minimal with the plan currently. She is meeting goals to continue with cycle 5 today.  She will return for day 8 and 15 infusions. Next visit with provider prior to cycle 6 with treatment labs.She will have CT prior to visit with Dr. Soaers on 4/2.  Fatigue/Weight loss-Mild weight loss continues -she see dietician on 1/19 but continues with slow weight loss. Reviewed diet and encouraged supplements or added snacks. Encouraged continued exercise with her walking in  to maintain strength.   Occasional loose stools- pt is using the creon with meals.  States she is tolerant of the loose stools but warned to keep up fluids to prevent dehydration.   Elevated liver enzymes- mild but continues with treatment though stable-No new issues with the liver noted with last imaging. Will continue to monitor.   This was a 25min visit with > 50% in counseling and coordinating care including education and management of concerns.    Nai Simmons,CNP      Again, thank you for allowing me to participate in the  care of your patient.        Sincerely,        Nai Simmons, RICHAR, APRN CNP

## 2018-03-01 NOTE — PROGRESS NOTES
Infusion Nursing Note:  Talya Gatica presents today for C5 of Gemzar.   Patient seen by provider today: Yes: Nai Siddiqui NP   present during visit today: Not Applicable.    Note: Pt states she is doing well today, denies any problems with her last treatment, seen by Nai Suarez NP today, will treat as ordered.    Intravenous Access:  Peripheral IV placed.    Treatment Conditions:  Lab Results   Component Value Date    HGB 11.6 03/01/2018     Lab Results   Component Value Date    WBC 5.5 03/01/2018      Lab Results   Component Value Date    ANEU 3.9 03/01/2018     Lab Results   Component Value Date     03/01/2018      Lab Results   Component Value Date     03/01/2018                   Lab Results   Component Value Date    POTASSIUM 3.5 03/01/2018           No results found for: MAG         Lab Results   Component Value Date    CR 0.49 03/01/2018                   Lab Results   Component Value Date    BERNARDO 8.9 03/01/2018                Lab Results   Component Value Date    BILITOTAL 0.9 03/01/2018           Lab Results   Component Value Date    ALBUMIN 3.6 03/01/2018                    Lab Results   Component Value Date    ALT 53 03/01/2018           Lab Results   Component Value Date    AST 46 03/01/2018       Results reviewed, labs MET treatment parameters, ok to proceed with treatment.      Post Infusion Assessment:  Patient tolerated infusion without incident.  Blood return noted pre and post infusion.  Site patent and intact, free from redness, edema or discomfort.  No evidence of extravasations.  Access discontinued per protocol.    Discharge Plan:   Return 03/08/18 and 03/15/18 for treatment.  Discharge instructions reviewed with: Patient.  Patient discharged in stable condition accompanied by: self.  Departure Mode: Ambulatory.    Conchis Hill RN

## 2018-03-01 NOTE — MR AVS SNAPSHOT
After Visit Summary   3/1/2018    Talya Gatica    MRN: [id number]           Patient Information     Date Of Birth          1940        Visit Information        Provider Department      3/1/2018 12:30 PM [place] [facility]        Today's Diagnoses     Hypokalemia    -  1    Pancreatic adenocarcinoma (H)           Follow-ups after your visit        Your next 10 appointments already scheduled     Mar 08, 2018  1:30 PM CST   LAB with LAB ONC [facility])    [address] [address] [place] [address] [phone]           Please do not eat 10-12 hours before your appointment if you are coming in fasting for labs on lipids, cholesterol, or glucose (sugar). This does not apply to pregnant women. Water, hot tea and black coffee (with nothing added) are okay. Do not drink other fluids, diet soda or chew gum.            Mar 08, 2018  2:00 PM CST   Level 2 with [place] [facility] ([facility])    [address] [facility] [id number] [phone]            Mar 15, 2018  1:30 PM CDT   LAB with LAB ONC [facility])    [address] [facility] [address] [phone]           Please do not eat 10-12 hours before your appointment if you are coming in fasting for labs on lipids, cholesterol, or glucose (sugar). This does not apply to pregnant women. Water, hot tea and black coffee (with nothing added) are okay. Do not drink other fluids, diet soda or chew gum.            Mar 15, 2018  2:00 PM CDT   Level 2 with [place] [facility] ([facility])    [address] [address] [place] [address] [phone]              Future tests that were ordered for you today     Open Future Orders        Priority Expected Expires Ordered    CT Chest abdomen pelvis w &  w/o contrast Routine  3/1/2019 3/1/2018            Who to contact     If you have questions or need follow up information about today's clinic visit or your schedule please contact [facility] directly at [phone].  Normal or non-critical lab and imaging results will be communicated to you by Naikuhart, letter or phone within 4 business days after the clinic has received the results. If you do not hear from us within 7 days, please contact the clinic through Naikuhart or phone. If you have a critical or abnormal lab result, we will notify you by phone as soon as possible.  Submit refill requests through PinBridge or call your pharmacy and they will forward the refill request to us. Please allow 3 business days for your refill to be completed.          Additional Information About Your Visit        PinBridge Information     PinBridge is an electronic gateway that provides easy, online access to your medical records. With PinBridge, you can request a clinic appointment, read your test results, renew a prescription or communicate with your care team.     To sign up for PinBridge visit the website at [email or web address]   You will be asked to enter the access code listed below, as well as some personal information. Please follow the directions to create your username and password.     Your access code is: [email or web address]  Expires: 4/3/2018  2:06 PM     Your access code will  in 90 days. If you need help or a new code, please contact your [facility] or call [phone] for assistance.        Care EveryWhere ID     This is your Care EveryWhere ID. This could be used by other organizations to access your [place] medical records  [id number]         Blood Pressure from Last 3 Encounters:   18 118/83   02/15/18 117/72   18 128/66    Weight from Last 3 Encounters:   18 49 kg (108 lb)   02/15/18 49.2 kg (108 lb 6.4 oz)   18 49.4 kg (109 lb)               Today, you had the following     No orders found for display       Primary Care Provider Office Phone # Fax #    Pancho Cope [phone] [address] [facility] [address]        Equal Access to Services     JOCELINE JORDAN : Hadii feliciano ku hadleonardoo Soomaali, waaxda luqadaha, qaybta kaalmada adeegyada, carrie tellon kristi pickens laPennyanu wheatley. So [facility] [phone].    ATENCIÓN: Si habla español, tiene a ornelas disposición servicios gratuitos de asistencia lingüística. Llame al [phone].    We comply with applicable federal civil rights laws and [place] laws. We do not discriminate on the basis of race, color, national origin, age, disability, sex, sexual orientation, or gender identity.            Thank you!     Thank you for choosing [facility]  for your care. Our goal is always to provide you with excellent care. Hearing back from our patients is one way we can continue to improve our services. Please take a few minutes to complete the written survey that you may receive in the mail after your visit with us. Thank you!             Your Updated Medication List - Protect others around you: Learn how to safely use, store and throw away your medicines at www.disposemymeds.org.          This list is accurate as of 3/1/18  2:15 PM.  Always use your most recent med list.                   Brand Name Dispense Instructions for use Diagnosis    amylase-lipase-protease 37234-85811 UNITS Cpep per EC capsule    CREON    180 capsule    Take 1 capsule (24,000 Units) by mouth 3 times daily (with meals)    Pancreatic adenocarcinoma (H)       BIOTIN PO           latanoprost 0.005 % ophthalmic solution    XALATAN    3 Bottle    Place 1 drop into both eyes At Bedtime    Glaucoma suspect, bilateral       Potassium Chloride ER 20 MEQ Tbcr     90 tablet    Take 1 tablet (20 mEq) by mouth daily    Hypokalemia       prochlorperazine 10 MG tablet    COMPAZINE    30 tablet    Take 0.5  tablets (5 mg) by mouth every 6 hours as needed (Nausea/Vomiting)    Pancreatic adenocarcinoma (H)       WOMENS 50+ MULTI VITAMIN/MIN PO

## 2018-03-01 NOTE — NURSING NOTE
"Oncology Rooming Note    March 1, 2018 11:13 AM   Talya Gatica is a 77 year old female who presents for:    Chief Complaint   Patient presents with     Oncology Clinic Visit     Follow up prior to treatment     Initial Vitals: /83  Pulse 76  Temp 99  F (37.2  C)  Ht 1.511 m (4' 11.5\")  Wt 49 kg (108 lb)  SpO2 99%  BMI 21.45 kg/m2 Estimated body mass index is 21.45 kg/(m^2) as calculated from the following:    Height as of this encounter: 1.511 m (4' 11.5\").    Weight as of this encounter: 49 kg (108 lb). Body surface area is 1.43 meters squared.  No Pain (0) Comment: Data Unavailable   No LMP recorded. Patient is postmenopausal.  Allergies reviewed: Yes  Medications reviewed: Yes    Medications: Medication refills not needed today.  Pharmacy name entered into [facility]: [id number] - [place], [place] - [address] AT [facility] [address]        5 minutes for nursing intake (face to face time)     Carly Bobby LPN              "

## 2018-03-06 ENCOUNTER — TELEPHONE (OUTPATIENT)
Dept: ONCOLOGY | Facility: CLINIC | Age: 78
End: 2018-03-06

## 2018-03-06 ENCOUNTER — CARE COORDINATION (OUTPATIENT)
Dept: ONCOLOGY | Facility: CLINIC | Age: 78
End: 2018-03-06

## 2018-03-06 NOTE — TELEPHONE ENCOUNTER
Patient calling to report she has a productive cough with green phlegm. Also green nasal discharge. Denies fever/chills, sore throat or dyspnea. Started about 4 days ago. Patient questioning if she should have chemo this week. Discussed symptoms with Nai Simmons CNP who advised patient defer treatment this week. Encouraged patient to rest and push fluids. Call if she develops fever or symptoms worsen. Patient expressed understanding and denies further questions.  Miriam Small  RN, BSN, OCN

## 2018-03-13 ENCOUNTER — CARE COORDINATION (OUTPATIENT)
Dept: ONCOLOGY | Facility: CLINIC | Age: 78
End: 2018-03-13

## 2018-03-13 NOTE — PROGRESS NOTES
Patient called today asking if the CT scan for 3/29 is scheduled too early - as patient had to miss early March chemotherapy infusion - currently reschedule for 3/15.  She is also unable to keep her April 2 infusion appointments as she will be out of town and appointments are too early for her.  She states she remembers per discussion with providers, Dr. Soares and Nia, NP stating that patient needs to have a life and taking time off would be ok to do.  Discussed with Nai; would be ok to move up next infusion to April 19 and same day see Dr. Soares with Ct scan a few days prior to visit.  Advised patient we would work on scheduling and be in touch with her and changes.

## 2018-03-15 ENCOUNTER — INFUSION THERAPY VISIT (OUTPATIENT)
Dept: INFUSION THERAPY | Facility: CLINIC | Age: 78
End: 2018-03-15
Payer: COMMERCIAL

## 2018-03-15 VITALS
OXYGEN SATURATION: 98 % | DIASTOLIC BLOOD PRESSURE: 70 MMHG | RESPIRATION RATE: 18 BRPM | BODY MASS INDEX: 21.49 KG/M2 | TEMPERATURE: 97.7 F | HEART RATE: 57 BPM | SYSTOLIC BLOOD PRESSURE: 123 MMHG | WEIGHT: 108.2 LBS

## 2018-03-15 DIAGNOSIS — C25.9 PANCREATIC ADENOCARCINOMA (H): ICD-10-CM

## 2018-03-15 DIAGNOSIS — E87.6 HYPOKALEMIA: Primary | ICD-10-CM

## 2018-03-15 LAB
BASOPHILS # BLD AUTO: 0 10E9/L (ref 0–0.2)
BASOPHILS NFR BLD AUTO: 0.3 %
DIFFERENTIAL METHOD BLD: ABNORMAL
EOSINOPHIL # BLD AUTO: 0 10E9/L (ref 0–0.7)
EOSINOPHIL NFR BLD AUTO: 0.5 %
ERYTHROCYTE [DISTWIDTH] IN BLOOD BY AUTOMATED COUNT: 17.3 % (ref 10–15)
HCT VFR BLD AUTO: 31.9 % (ref 35–47)
HGB BLD-MCNC: 11 G/DL (ref 11.7–15.7)
IMM GRANULOCYTES # BLD: 0 10E9/L (ref 0–0.4)
IMM GRANULOCYTES NFR BLD: 0.3 %
LYMPHOCYTES # BLD AUTO: 1.3 10E9/L (ref 0.8–5.3)
LYMPHOCYTES NFR BLD AUTO: 14.3 %
MCH RBC QN AUTO: 30.6 PG (ref 26.5–33)
MCHC RBC AUTO-ENTMCNC: 34.5 G/DL (ref 31.5–36.5)
MCV RBC AUTO: 89 FL (ref 78–100)
MONOCYTES # BLD AUTO: 1.1 10E9/L (ref 0–1.3)
MONOCYTES NFR BLD AUTO: 12.1 %
NEUTROPHILS # BLD AUTO: 6.4 10E9/L (ref 1.6–8.3)
NEUTROPHILS NFR BLD AUTO: 72.5 %
PLATELET # BLD AUTO: 292 10E9/L (ref 150–450)
POTASSIUM SERPL-SCNC: 3 MMOL/L (ref 3.4–5.3)
RBC # BLD AUTO: 3.6 10E12/L (ref 3.8–5.2)
WBC # BLD AUTO: 8.8 10E9/L (ref 4–11)

## 2018-03-15 PROCEDURE — 85025 COMPLETE CBC W/AUTO DIFF WBC: CPT | Performed by: NURSE PRACTITIONER

## 2018-03-15 PROCEDURE — 84132 ASSAY OF SERUM POTASSIUM: CPT | Performed by: NURSE PRACTITIONER

## 2018-03-15 PROCEDURE — 96413 CHEMO IV INFUSION 1 HR: CPT | Performed by: INTERNAL MEDICINE

## 2018-03-15 PROCEDURE — 96375 TX/PRO/DX INJ NEW DRUG ADDON: CPT | Performed by: INTERNAL MEDICINE

## 2018-03-15 PROCEDURE — 36415 COLL VENOUS BLD VENIPUNCTURE: CPT | Performed by: NURSE PRACTITIONER

## 2018-03-15 PROCEDURE — 99207 ZZC NO CHARGE LOS: CPT

## 2018-03-15 RX ADMIN — Medication 250 ML: at 14:40

## 2018-03-15 ASSESSMENT — PAIN SCALES - GENERAL: PAINLEVEL: NO PAIN (0)

## 2018-03-15 NOTE — PROGRESS NOTES
Infusion Nursing Note:  Talya Gatica presents today for Gemzar.    Patient seen by provider today: No   present during visit today: Not Applicable.    Note: serum K+ low.  Pt to take 40mEq from home supply and recheck on Monday.  Pt to do walk in lab at [facility].   Gemzar infused over 40 min as patient c/o slight burning at 30min infusion rate.    Intravenous Access:  Peripheral IV placed.    Treatment Conditions:  Results reviewed, labs MET treatment parameters, ok to proceed with treatment.    Post Infusion Assessment:  Patient tolerated infusion without incident.    Discharge Plan:   RTC as scheduled    FREDIS LINARES RN

## 2018-03-15 NOTE — MR AVS SNAPSHOT
After Visit Summary   3/15/2018    Talya Gatica    MRN: [id number]           Patient Information     Date Of Birth          1940        Visit Information        Provider Department      3/15/2018 2:00 PM [address]        Today's Diagnoses     Hypokalemia    -  1    Pancreatic adenocarcinoma (H)           Follow-ups after your visit        Your next 10 appointments already scheduled     Apr 11, 2018 11:30 AM CDT   (Arrive by 11:15 AM)   CT CHEST ABDOMEN PELVIS W/O & W CONTRAST with MGCT[facility]   [facility]    [id number] [place] [address] [phone]           Please bring any scans or X-rays taken at other hospitals, if similar tests were done. Also bring a list of your medicines, including vitamins, minerals and over-the-counter drugs. It is safest to leave personal items at home.  Be sure to tell your doctor:   If you have any allergies.   If there s any chance you are pregnant.   If you are breastfeeding.  You may have contrast for this exam. To prepare:   Do not eat or drink for 2 hours before your exam. If you need to take medicine, you may take it with small sips of water. (We may ask you to take liquid medicine as well.)   The day before your exam, drink extra fluids at least six 8-ounce glasses (unless your doctor tells you to restrict your fluids).   You will be given instructions on how to drink the contrast.  Patients over 70 or patients with diabetes or kidney problems:   If you haven t had a blood test (creatinine test) within the last 30 days, the Cardiologist/Radiologist may require you to get this test prior to your exam.  If you have diabetes:   Continue to take your metformin medication on the day of your exam  Please wear loose clothing, such as a sweat suit or jogging clothes. Avoid snaps, zippers and other metal. We may ask you to undress and put on a hospital gown.  If you  have any questions, please call the Imaging Department where you will have your exam.            Apr 11, 2018 12:30 PM CDT   LAB with LAB ONC [facility] ([facility])    [address] [facility] [address] [phone]           Please do not eat 10-12 hours before your appointment if you are coming in fasting for labs on lipids, cholesterol, or glucose (sugar). This does not apply to pregnant women. Water, hot tea and black coffee (with nothing added) are okay. Do not drink other fluids, diet soda or chew gum.            Apr 11, 2018  1:00 PM CDT   Level 2 with [place] [facility] ([facility])    [address] [facility] [address]   [phone]            Apr 18, 2018  1:00 PM CDT   LAB with LAB ONC [facility] ([facility])    [address] [facility] [address] [phone]           Please do not eat 10-12 hours before your appointment if you are coming in fasting for labs on lipids, cholesterol, or glucose (sugar). This does not apply to pregnant women. Water, hot tea and black coffee (with nothing added) are okay. Do not drink other fluids, diet soda or chew gum.            Apr 18, 2018  1:30 PM CDT   Level 2 with [place] [facility] ([facility])    [address] [facility] [address] [phone]            Apr 19, 2018 10:30 AM CDT   Return Visit with Jim Soares MD   [facility])    [address] [facility] [address]   [phone]            Apr 25, 2018  1:30 PM CDT   LAB with LAB ONC [facility] ([facility])    [address] [facility] [address]   [phone]           Please do not eat 10-12 hours before your appointment if you are coming in fasting for labs on  lipids, cholesterol, or glucose (sugar). This does not apply to pregnant women. Water, hot tea and black coffee (with nothing added) are okay. Do not drink other fluids, diet soda or chew gum.            2018  2:00 PM CDT   Level 2 with [address] ([facility])    [address] [place] [address] [phone]              Who to contact     If you have questions or need follow up information about today's clinic visit or your schedule please contact [facility] directly at [phone].  Normal or non-critical lab and imaging results will be communicated to you by Newsgrapehart, letter or phone within 4 business days after the clinic has received the results. If you do not hear from us within 7 days, please contact the clinic through Newsgrapehart or phone. If you have a critical or abnormal lab result, we will notify you by phone as soon as possible.  Submit refill requests through SemEquip or call your pharmacy and they will forward the refill request to us. Please allow 3 business days for your refill to be completed.          Additional Information About Your Visit        SemEquip Information     SemEquip is an electronic gateway that provides easy, online access to your medical records. With SemEquip, you can request a clinic appointment, read your test results, renew a prescription or communicate with your care team.     To sign up for SemEquip visit the website at [email or web address]   You will be asked to enter the access code listed below, as well as some personal information. Please follow the directions to create your username and password.     Your access code is: [email or web address]  Expires: 4/3/2018  3:06 PM     Your access code will  in 90 days. If you need help or a new code, please contact your [facility] or call [phone] for assistance.        Care EveryWhere ID     This is your  Care EveryWhere ID. This could be used by other organizations to access your [place] medical records  [id number]        Your Vitals Were     Pulse Temperature Respirations Pulse Oximetry BMI (Body Mass Index)       57 97.7  F (36.5  C) (Oral) 18 98% 21.49 kg/m2        Blood Pressure from Last 3 Encounters:   03/15/18 123/70   03/01/18 118/83   02/15/18 117/72    Weight from Last 3 Encounters:   03/15/18 49.1 kg (108 lb 3.2 oz)   03/01/18 49 kg (108 lb)   02/15/18 49.2 kg (108 lb 6.4 oz)              Today, you had the following     No orders found for display       Primary Care Provider Office Phone # Fax #    Pancho Cope [phone] [address] [facility] [address]        Equal Access to Services     [facility]: Hadii feliciano suárez hadasho Soomaali, waaxda luqadaha, qaybta kaalmada adeegyada, carrie kaur . So [facility] [phone].    ATENCIÓN: Si habla español, tiene a ornelas disposición servicios gratuitos de asistencia lingüística. Llame al [phone].    We comply with applicable federal civil rights laws and [place] laws. We do not discriminate on the basis of race, color, national origin, age, disability, sex, sexual orientation, or gender identity.            Thank you!     Thank you for choosing [facility]  for your care. Our goal is always to provide you with excellent care. Hearing back from our patients is one way we can continue to improve our services. Please take a few minutes to complete the written survey that you may receive in the mail after your visit with us. Thank you!             Your Updated Medication List - Protect others around you: Learn how to safely use, store and throw away your medicines at www.disposemymeds.org.          This list is accurate as of 3/15/18 11:59 PM.  Always use your most recent med list.                   Brand Name Dispense Instructions for use Diagnosis    amylase-lipase-protease  08028-26493 UNITS Cpep per EC capsule    CREON    180 capsule    Take 1 capsule (24,000 Units) by mouth 3 times daily (with meals)    Pancreatic adenocarcinoma (H)       BIOTIN PO           latanoprost 0.005 % ophthalmic solution    XALATAN    3 Bottle    Place 1 drop into both eyes At Bedtime    Glaucoma suspect, bilateral       Potassium Chloride ER 20 MEQ Tbcr     90 tablet    Take 1 tablet (20 mEq) by mouth daily    Hypokalemia       prochlorperazine 10 MG tablet    COMPAZINE    30 tablet    Take 0.5 tablets (5 mg) by mouth every 6 hours as needed (Nausea/Vomiting)    Pancreatic adenocarcinoma (H)       WOMENS 50+ MULTI VITAMIN/MIN PO

## 2018-03-19 DIAGNOSIS — Z23 ENCOUNTER FOR IMMUNIZATION: ICD-10-CM

## 2018-03-19 DIAGNOSIS — C25.9 PANCREATIC ADENOCARCINOMA (H): ICD-10-CM

## 2018-03-19 PROCEDURE — 36415 COLL VENOUS BLD VENIPUNCTURE: CPT | Performed by: INTERNAL MEDICINE

## 2018-03-19 PROCEDURE — 80053 COMPREHEN METABOLIC PANEL: CPT | Performed by: INTERNAL MEDICINE

## 2018-03-20 LAB
ALBUMIN SERPL-MCNC: 3.1 G/DL (ref 3.4–5)
ALP SERPL-CCNC: 152 U/L (ref 40–150)
ALT SERPL W P-5'-P-CCNC: 57 U/L (ref 0–50)
ANION GAP SERPL CALCULATED.3IONS-SCNC: 10 MMOL/L (ref 3–14)
AST SERPL W P-5'-P-CCNC: 63 U/L (ref 0–45)
BILIRUB SERPL-MCNC: 0.8 MG/DL (ref 0.2–1.3)
BUN SERPL-MCNC: 7 MG/DL (ref 7–30)
CALCIUM SERPL-MCNC: 8.5 MG/DL (ref 8.5–10.1)
CHLORIDE SERPL-SCNC: 108 MMOL/L (ref 94–109)
CO2 SERPL-SCNC: 22 MMOL/L (ref 20–32)
CREAT SERPL-MCNC: 0.48 MG/DL (ref 0.52–1.04)
GFR SERPL CREATININE-BSD FRML MDRD: >90 ML/MIN/1.7M2
GLUCOSE SERPL-MCNC: 87 MG/DL (ref 70–99)
POTASSIUM SERPL-SCNC: 3.3 MMOL/L (ref 3.4–5.3)
PROT SERPL-MCNC: 6.3 G/DL (ref 6.8–8.8)
SODIUM SERPL-SCNC: 140 MMOL/L (ref 133–144)

## 2018-03-22 DIAGNOSIS — E87.6 HYPOKALEMIA: ICD-10-CM

## 2018-03-22 RX ORDER — POTASSIUM CHLORIDE 1500 MG/1
20 TABLET, EXTENDED RELEASE ORAL DAILY
Qty: 90 TABLET | Refills: 0 | Status: SHIPPED | OUTPATIENT
Start: 2018-03-22

## 2018-03-22 NOTE — TELEPHONE ENCOUNTER
Patient calling to receive results for potassium 3-19-18. Wondering if she should continue taking KCL. Per Nai Simmons CNP pt should continue daily KCL. New Rx sent to preferred pharmacy.  Miriam Small  RN, BSN, OCN

## 2018-04-06 RX ORDER — ALBUTEROL SULFATE 90 UG/1
1-2 AEROSOL, METERED RESPIRATORY (INHALATION)
Status: CANCELLED
Start: 2018-04-11

## 2018-04-06 RX ORDER — METHYLPREDNISOLONE SODIUM SUCCINATE 125 MG/2ML
125 INJECTION, POWDER, LYOPHILIZED, FOR SOLUTION INTRAMUSCULAR; INTRAVENOUS
Status: CANCELLED
Start: 2018-04-11

## 2018-04-06 RX ORDER — EPINEPHRINE 1 MG/ML
0.3 INJECTION, SOLUTION INTRAMUSCULAR; SUBCUTANEOUS EVERY 5 MIN PRN
Status: CANCELLED | OUTPATIENT
Start: 2018-04-11

## 2018-04-06 RX ORDER — MEPERIDINE HYDROCHLORIDE 50 MG/ML
25 INJECTION INTRAMUSCULAR; INTRAVENOUS; SUBCUTANEOUS EVERY 30 MIN PRN
Status: CANCELLED | OUTPATIENT
Start: 2018-04-11

## 2018-04-06 RX ORDER — DIPHENHYDRAMINE HYDROCHLORIDE 50 MG/ML
50 INJECTION INTRAMUSCULAR; INTRAVENOUS
Status: CANCELLED
Start: 2018-04-11

## 2018-04-06 RX ORDER — EPINEPHRINE 0.3 MG/.3ML
0.3 INJECTION SUBCUTANEOUS EVERY 5 MIN PRN
Status: CANCELLED | OUTPATIENT
Start: 2018-04-11

## 2018-04-06 RX ORDER — SODIUM CHLORIDE 9 MG/ML
1000 INJECTION, SOLUTION INTRAVENOUS CONTINUOUS PRN
Status: CANCELLED
Start: 2018-04-11

## 2018-04-06 RX ORDER — ALBUTEROL SULFATE 0.83 MG/ML
2.5 SOLUTION RESPIRATORY (INHALATION)
Status: CANCELLED | OUTPATIENT
Start: 2018-04-11

## 2018-04-11 ENCOUNTER — RADIANT APPOINTMENT (OUTPATIENT)
Dept: CT IMAGING | Facility: CLINIC | Age: 78
End: 2018-04-11
Attending: NURSE PRACTITIONER
Payer: COMMERCIAL

## 2018-04-11 ENCOUNTER — INFUSION THERAPY VISIT (OUTPATIENT)
Dept: INFUSION THERAPY | Facility: CLINIC | Age: 78
End: 2018-04-11
Payer: COMMERCIAL

## 2018-04-11 ENCOUNTER — DOCUMENTATION ONLY (OUTPATIENT)
Dept: SPIRITUAL SERVICES | Facility: CLINIC | Age: 78
End: 2018-04-11

## 2018-04-11 VITALS
BODY MASS INDEX: 19.8 KG/M2 | WEIGHT: 99.7 LBS | RESPIRATION RATE: 18 BRPM | SYSTOLIC BLOOD PRESSURE: 135 MMHG | OXYGEN SATURATION: 96 % | DIASTOLIC BLOOD PRESSURE: 78 MMHG | TEMPERATURE: 97.7 F | HEART RATE: 78 BPM

## 2018-04-11 DIAGNOSIS — C25.9 PANCREATIC ADENOCARCINOMA (H): ICD-10-CM

## 2018-04-11 DIAGNOSIS — Z71.81 SPIRITUAL OR RELIGIOUS COUNSELING: Primary | ICD-10-CM

## 2018-04-11 DIAGNOSIS — E87.6 HYPOKALEMIA: Primary | ICD-10-CM

## 2018-04-11 LAB
ALBUMIN SERPL-MCNC: 3.5 G/DL (ref 3.4–5)
ALP SERPL-CCNC: 133 U/L (ref 40–150)
ALT SERPL W P-5'-P-CCNC: 46 U/L (ref 0–50)
ANION GAP SERPL CALCULATED.3IONS-SCNC: 7 MMOL/L (ref 3–14)
AST SERPL W P-5'-P-CCNC: 51 U/L (ref 0–45)
BASOPHILS # BLD AUTO: 0.1 10E9/L (ref 0–0.2)
BASOPHILS NFR BLD AUTO: 1.1 %
BILIRUB SERPL-MCNC: 0.8 MG/DL (ref 0.2–1.3)
BUN SERPL-MCNC: 5 MG/DL (ref 7–30)
CALCIUM SERPL-MCNC: 9 MG/DL (ref 8.5–10.1)
CHLORIDE SERPL-SCNC: 113 MMOL/L (ref 94–109)
CO2 SERPL-SCNC: 22 MMOL/L (ref 20–32)
CREAT SERPL-MCNC: 0.47 MG/DL (ref 0.52–1.04)
DIFFERENTIAL METHOD BLD: ABNORMAL
EOSINOPHIL # BLD AUTO: 0 10E9/L (ref 0–0.7)
EOSINOPHIL NFR BLD AUTO: 0.7 %
ERYTHROCYTE [DISTWIDTH] IN BLOOD BY AUTOMATED COUNT: 18.1 % (ref 10–15)
GFR SERPL CREATININE-BSD FRML MDRD: >90 ML/MIN/1.7M2
GLUCOSE SERPL-MCNC: 91 MG/DL (ref 70–99)
HCT VFR BLD AUTO: 38.4 % (ref 35–47)
HGB BLD-MCNC: 12.6 G/DL (ref 11.7–15.7)
IMM GRANULOCYTES # BLD: 0 10E9/L (ref 0–0.4)
IMM GRANULOCYTES NFR BLD: 0.2 %
LYMPHOCYTES # BLD AUTO: 1.4 10E9/L (ref 0.8–5.3)
LYMPHOCYTES NFR BLD AUTO: 26.9 %
MCH RBC QN AUTO: 30.9 PG (ref 26.5–33)
MCHC RBC AUTO-ENTMCNC: 32.8 G/DL (ref 31.5–36.5)
MCV RBC AUTO: 94 FL (ref 78–100)
MONOCYTES # BLD AUTO: 0.6 10E9/L (ref 0–1.3)
MONOCYTES NFR BLD AUTO: 10.8 %
NEUTROPHILS # BLD AUTO: 3.2 10E9/L (ref 1.6–8.3)
NEUTROPHILS NFR BLD AUTO: 60.3 %
PLATELET # BLD AUTO: 263 10E9/L (ref 150–450)
POTASSIUM SERPL-SCNC: 3.8 MMOL/L (ref 3.4–5.3)
PROT SERPL-MCNC: 6.9 G/DL (ref 6.8–8.8)
RBC # BLD AUTO: 4.08 10E12/L (ref 3.8–5.2)
SODIUM SERPL-SCNC: 142 MMOL/L (ref 133–144)
WBC # BLD AUTO: 5.4 10E9/L (ref 4–11)

## 2018-04-11 PROCEDURE — 74177 CT ABD & PELVIS W/CONTRAST: CPT | Performed by: RADIOLOGY

## 2018-04-11 PROCEDURE — 36415 COLL VENOUS BLD VENIPUNCTURE: CPT | Performed by: NURSE PRACTITIONER

## 2018-04-11 PROCEDURE — 80053 COMPREHEN METABOLIC PANEL: CPT | Performed by: NURSE PRACTITIONER

## 2018-04-11 PROCEDURE — 96413 CHEMO IV INFUSION 1 HR: CPT | Performed by: INTERNAL MEDICINE

## 2018-04-11 PROCEDURE — 71260 CT THORAX DX C+: CPT | Performed by: RADIOLOGY

## 2018-04-11 PROCEDURE — 85025 COMPLETE CBC W/AUTO DIFF WBC: CPT | Performed by: NURSE PRACTITIONER

## 2018-04-11 PROCEDURE — 99207 ZZC NO CHARGE LOS: CPT

## 2018-04-11 PROCEDURE — 96367 TX/PROPH/DG ADDL SEQ IV INF: CPT | Performed by: INTERNAL MEDICINE

## 2018-04-11 RX ORDER — IOPAMIDOL 755 MG/ML
66 INJECTION, SOLUTION INTRAVASCULAR ONCE
Status: COMPLETED | OUTPATIENT
Start: 2018-04-11 | End: 2018-04-11

## 2018-04-11 RX ADMIN — Medication 250 ML: at 13:58

## 2018-04-11 RX ADMIN — IOPAMIDOL 66 ML: 755 INJECTION, SOLUTION INTRAVASCULAR at 12:47

## 2018-04-11 NOTE — PROGRESS NOTES
Infusion Nursing Note:   Talya KD Gatica presents today for Gemzar.    Patient seen by provider today: No   present during visit today: Not Applicable.    Note: N/A.    Intravenous Access:  Peripheral IV placed.    Treatment Conditions:  Lab Results   Component Value Date    HGB 12.6 04/11/2018     Lab Results   Component Value Date    WBC 5.4 04/11/2018      Lab Results   Component Value Date    ANEU 3.2 04/11/2018     Lab Results   Component Value Date     04/11/2018      Lab Results   Component Value Date     04/11/2018                   Lab Results   Component Value Date    POTASSIUM 3.8 04/11/2018           No results found for: MAG         Lab Results   Component Value Date    CR 0.47 04/11/2018                   Lab Results   Component Value Date    BERNARDO 9.0 04/11/2018                Lab Results   Component Value Date    BILITOTAL 0.8 04/11/2018           Lab Results   Component Value Date    ALBUMIN 3.5 04/11/2018                    Lab Results   Component Value Date    ALT 46 04/11/2018           Lab Results   Component Value Date    AST 51 04/11/2018       Results reviewed, labs MET treatment parameters, ok to proceed with treatment.      Post Infusion Assessment:  Patient tolerated infusion without incident.  Site patent and intact, free from redness, edema or discomfort.  No evidence of extravasations.  Access discontinued per protocol.    Discharge Plan:   Discharge instructions reviewed with: Patient.  Patient and/or family verbalized understanding of discharge instructions and all questions answered.  Patient discharged in stable condition accompanied by: self.  Departure Mode: Ambulatory.    Ann Gomez RN

## 2018-04-11 NOTE — MR AVS SNAPSHOT
After Visit Summary   4/11/2018    Talya Gatica    MRN: [id number]           Patient Information     Date Of Birth          1940        Visit Information        Provider Department      4/11/2018 1:00 PM [address]        Today's Diagnoses     Hypokalemia    -  1    Pancreatic adenocarcinoma (H)           Follow-ups after your visit        Your next 10 appointments already scheduled     Apr 18, 2018 11:45 AM CDT   LAB with LAB ONC [facility])    [address] [place] [address]   [phone]           Please do not eat 10-12 hours before your appointment if you are coming in fasting for labs on lipids, cholesterol, or glucose (sugar). This does not apply to pregnant women. Water, hot tea and black coffee (with nothing added) are okay. Do not drink other fluids, diet soda or chew gum.            Apr 18, 2018 12:15 PM CDT   Return Visit with LISA Mendieta [facility])    [address] [place] [address]   [phone]            Apr 18, 2018  1:30 PM CDT   Level 2 with [address])    [address] [place] [address]   [phone]            Apr 25, 2018  1:30 PM CDT   LAB with LAB ONC [facility])    [address] [place] [address]   [phone]           Please do not eat 10-12 hours before your appointment if you are coming in fasting for labs on lipids, cholesterol, or glucose (sugar). This does not apply to pregnant women. Water, hot tea and black coffee (with nothing added) are okay. Do not drink other fluids, diet soda or chew gum.            Apr 25, 2018  2:00 PM CDT   Level 2 with [address] ([facility])     [address] [place] [address] [phone]              Who to contact     If you have questions or need follow up information about today's clinic visit or your schedule please contact [facility] directly at [phone].  Normal or non-critical lab and imaging results will be communicated to you by MyChart, letter or phone within 4 business days after the clinic has received the results. If you do not hear from us within 7 days, please contact the clinic through MyChart or phone. If you have a critical or abnormal lab result, we will notify you by phone as soon as possible.  Submit refill requests through Tale Me Stories or call your pharmacy and they will forward the refill request to us. Please allow 3 business days for your refill to be completed.          Additional Information About Your Visit        V[employer] Technologies[employer]mage Information     Tale Me Stories is an electronic gateway that provides easy, online access to your medical records. With Tale Me Stories, you can request a clinic appointment, read your test results, renew a prescription or communicate with your care team.     To sign up for Tale Me Stories visit the website at [email or web address]   You will be asked to enter the access code listed below, as well as some personal information. Please follow the directions to create your username and password.     Your access code is: [email or web address]  Expires: 7/10/2018  2:55 PM     Your access code will  in 90 days. If you need help or a new code, please contact your [facility] or call [phone] for assistance.        Care EveryWhere ID     This is your Care EveryWhere ID. This could be used by other organizations to access your [place] medical records  [id number]        Your Vitals Were     Pulse Temperature Respirations Pulse Oximetry BMI (Body Mass Index)       78 97.7  F (36.5  C) (Oral) 18 96% 19.8 kg/m2        Blood Pressure from Last 3 Encounters:   18  135/78   03/15/18 123/70   03/01/18 118/83    Weight from Last 3 Encounters:   04/11/18 45.2 kg (99 lb 11.2 oz)   03/15/18 49.1 kg (108 lb 3.2 oz)   03/01/18 49 kg (108 lb)              Today, you had the following     No orders found for display       Primary Care Provider Office Phone # Fax #    Pancho Rl Cope [phone] [address] [facility] [address]        Equal Access to Services     [facility]: Hadii aad ku hadasho Soomaali, waaxda luqadaha, qaybta kaalmada adeegyada, waxay shanicein hayanu kaur . So [facility] [phone].    ATENCIÓN: Si habla español, tiene a ornelas disposición servicios gratuitos de asistencia lingüística. [facility] [phone].    We comply with applicable federal civil rights laws and [place] laws. We do not discriminate on the basis of race, color, national origin, age, disability, sex, sexual orientation, or gender identity.            Thank you!     Thank you for choosing [facility]  for your care. Our goal is always to provide you with excellent care. Hearing back from our patients is one way we can continue to improve our services. Please take a few minutes to complete the written survey that you may receive in the mail after your visit with us. Thank you!             Your Updated Medication List - Protect others around you: Learn how to safely use, store and throw away your medicines at www.disposemymeds.org.          This list is accurate as of 4/11/18  2:55 PM.  Always use your most recent med list.                   Brand Name Dispense Instructions for use Diagnosis    amylase-lipase-protease 33994-02280 units Cpep per EC capsule    CREON    180 capsule    Take 1 capsule (24,000 Units) by mouth 3 times daily (with meals)    Pancreatic adenocarcinoma (H)       BIOTIN PO           latanoprost 0.005 % ophthalmic solution    XALATAN    3 Bottle    Place 1 drop into both eyes At Bedtime    Glaucoma suspect, bilateral        Potassium Chloride ER 20 MEQ Tbcr     90 tablet    Take 1 tablet (20 mEq) by mouth daily    Hypokalemia       prochlorperazine 10 MG tablet    COMPAZINE    30 tablet    Take 0.5 tablets (5 mg) by mouth every 6 hours as needed (Nausea/Vomiting)    Pancreatic adenocarcinoma (H)       WOMENS 50+ MULTI VITAMIN/MIN PO

## 2018-04-11 NOTE — PROGRESS NOTES
SPIRITUAL HEALTH SERVICES  SPIRITUAL ASSESSMENT Progress Note  [facility]    PRIMARY FOCUS:     Emotional/spiritual/Zoroastrian distress    Support for coping    ILLNESS CIRCUMSTANCES:   Reviewed documentation. Reflective conversation shared with Talya Gatica which integrated elements of illness and family narratives.     Context of Serious Illness/Symptom(s) - Cancer    Resources for Support - Daughter - johana Fowler    DISTRESS:     Emotional/Spiritual/Existential Distress - Pt expressed concern about her single daughter not having anyone in her life to care for her.  Pt also voiced her disappointment about Janeth's Dad (pt's former ) poor relationship with his daughter.    Hoahaoism Distress - Unknown    Social/Cultural/Economic Distress - Unknown    SPIRITUAL/Mormon COPING:     Alevism/Johana - Congregation - member of [facility] in [place]    Spiritual Practice(s) - Scientologist, prayer, devotional reading.   provided a prayer and gave pt a prayer shawl.    Emotional/Relational/Existential Connections - Pt's daughter is her main source of support.    GOALS OF CARE:    Goals of Care - Manage the cancer    Meaning/Sense-Making - Pt's johana is central to her meaning-making.    PLAN:  is available for pt/family needs.    Christophe Rodarte M.Div., [facility]  Staff   Office tel: [phone]

## 2018-04-18 ENCOUNTER — ONCOLOGY VISIT (OUTPATIENT)
Dept: ONCOLOGY | Facility: CLINIC | Age: 78
End: 2018-04-18
Payer: COMMERCIAL

## 2018-04-18 ENCOUNTER — DOCUMENTATION ONLY (OUTPATIENT)
Dept: SPIRITUAL SERVICES | Facility: CLINIC | Age: 78
End: 2018-04-18

## 2018-04-18 VITALS
RESPIRATION RATE: 20 BRPM | WEIGHT: 101.1 LBS | SYSTOLIC BLOOD PRESSURE: 134 MMHG | OXYGEN SATURATION: 98 % | TEMPERATURE: 98 F | BODY MASS INDEX: 20.08 KG/M2 | DIASTOLIC BLOOD PRESSURE: 76 MMHG | HEART RATE: 84 BPM

## 2018-04-18 DIAGNOSIS — R63.4 LOSS OF WEIGHT: ICD-10-CM

## 2018-04-18 DIAGNOSIS — R19.5 LOOSE STOOLS: ICD-10-CM

## 2018-04-18 DIAGNOSIS — C25.9 PANCREATIC ADENOCARCINOMA (H): ICD-10-CM

## 2018-04-18 DIAGNOSIS — C25.9 PANCREATIC ADENOCARCINOMA (H): Primary | ICD-10-CM

## 2018-04-18 DIAGNOSIS — T45.1X5A CHEMOTHERAPY-INDUCED NEUTROPENIA (H): ICD-10-CM

## 2018-04-18 DIAGNOSIS — D70.1 CHEMOTHERAPY-INDUCED NEUTROPENIA (H): ICD-10-CM

## 2018-04-18 DIAGNOSIS — E87.6 HYPOKALEMIA: Primary | ICD-10-CM

## 2018-04-18 DIAGNOSIS — Z71.81 SPIRITUAL OR RELIGIOUS COUNSELING: Primary | ICD-10-CM

## 2018-04-18 LAB
BASOPHILS # BLD AUTO: 0 10E9/L (ref 0–0.2)
BASOPHILS NFR BLD AUTO: 1 %
DIFFERENTIAL METHOD BLD: ABNORMAL
EOSINOPHIL # BLD AUTO: 0 10E9/L (ref 0–0.7)
EOSINOPHIL NFR BLD AUTO: 1 %
ERYTHROCYTE [DISTWIDTH] IN BLOOD BY AUTOMATED COUNT: 16.7 % (ref 10–15)
HCT VFR BLD AUTO: 31.1 % (ref 35–47)
HGB BLD-MCNC: 10.4 G/DL (ref 11.7–15.7)
IMM GRANULOCYTES # BLD: 0 10E9/L (ref 0–0.4)
IMM GRANULOCYTES NFR BLD: 0.5 %
LYMPHOCYTES # BLD AUTO: 1 10E9/L (ref 0.8–5.3)
LYMPHOCYTES NFR BLD AUTO: 48.8 %
MCH RBC QN AUTO: 31 PG (ref 26.5–33)
MCHC RBC AUTO-ENTMCNC: 33.4 G/DL (ref 31.5–36.5)
MCV RBC AUTO: 93 FL (ref 78–100)
MONOCYTES # BLD AUTO: 0.3 10E9/L (ref 0–1.3)
MONOCYTES NFR BLD AUTO: 12.7 %
NEUTROPHILS # BLD AUTO: 0.7 10E9/L (ref 1.6–8.3)
NEUTROPHILS NFR BLD AUTO: 36 %
PLATELET # BLD AUTO: 168 10E9/L (ref 150–450)
POTASSIUM SERPL-SCNC: 3.7 MMOL/L (ref 3.4–5.3)
RBC # BLD AUTO: 3.36 10E12/L (ref 3.8–5.2)
WBC # BLD AUTO: 2.1 10E9/L (ref 4–11)

## 2018-04-18 PROCEDURE — 84132 ASSAY OF SERUM POTASSIUM: CPT | Performed by: NURSE PRACTITIONER

## 2018-04-18 PROCEDURE — 99214 OFFICE O/P EST MOD 30 MIN: CPT | Performed by: NURSE PRACTITIONER

## 2018-04-18 PROCEDURE — 36415 COLL VENOUS BLD VENIPUNCTURE: CPT | Performed by: NURSE PRACTITIONER

## 2018-04-18 PROCEDURE — 85025 COMPLETE CBC W/AUTO DIFF WBC: CPT | Performed by: NURSE PRACTITIONER

## 2018-04-18 ASSESSMENT — PAIN SCALES - GENERAL: PAINLEVEL: NO PAIN (0)

## 2018-04-18 NOTE — PROGRESS NOTES
Oncology Follow Up Visit: April 18, 2018      Oncologist: Dr Jim Soares  PCP: Pancho Cope    Diagnosis: Stage IV Pancreatic cancer  Talya Gatica is a 76 yo who c/o jaundice in 7/2017 was found to have  adenocarcinoma of the head of pancreas causing biliary obstruction and several pulmonary nodules consistent with metastasis- initially told days to 3 months of life.  Treatment:   11/3/2017 began treatment with Gemzar- days 1,8,15 of 28 day plan    Interval History: Ms. Sanchez comes to clinic for review  of recent scans for her pancreatic cancer.Pt has completed 5 cycles of Gemzar and has started week 1 of cycle 6 prior to scan. Pt states she is feeling well and getting out for activities. She is walking hallways for strengthening- using cane for assistance and has had no falls. She denies any pain,nausea, SOB. Bowels have been under good control with use of the creon 1-2 hours after meals and has been able to maintain weight or even increase weight over time. She denies neuropathy or trouble sleeping though aware she does get anxious about disease.States she is feeling too good to be dying.   Rest of comprehensive and complete ROS is reviewed and is negative.   Past Medical History:   Diagnosis Date     AR (allergic rhinitis)      Baker's cyst      DJD (degenerative joint disease)      Elevated cholesterol      Glaucoma      Menopause      Vertigo      Current Outpatient Prescriptions   Medication     amylase-lipase-protease (CREON) 18530-64781 UNITS CPEP per EC capsule     BIOTIN PO     latanoprost (XALATAN) 0.005 % ophthalmic solution     Multiple Vitamins-Minerals (WOMENS 50+ MULTI VITAMIN/MIN PO)     Potassium Chloride ER 20 MEQ TBCR     prochlorperazine (COMPAZINE) 10 MG tablet     No current facility-administered medications for this visit.      Allergies   Allergen Reactions     Codeine Camsylate        Physical Exam:/76  Pulse 84  Temp 98  F (36.7  C) (Oral)  Resp 20  Wt 45.9 kg  (101 lb 1.6 oz)  SpO2 98%  BMI 20.08 kg/m2   ECOG PS-1   Constitutional: Alert, moving well using cane though slow to start. Cooperative.   ENT: Eyes bright, No mouth sores. [place].  Neck: Supple, No adenopathy.Thyroid symmetric  Cardiac: Heart rate and rhythm is regular and strong with mild aortic murmur again noted  Respiratory: Breathing easy. Lung sounds clear to auscultation at this time with clear voice  GI: Abdomen is soft, non-tender, BS normal. No masses or organomegaly  MS: Muscle tone fair- uses cane for support- able to get to exam table with light assistance, extremities normal with no edema.   Skin: No suspicious lesions or rashes or bruising  Neuro: Sensory grossly WNL, gait is steady and no dizziness today  Lymph: Normal ant/post cervical, axillary, supraclavicular nodes  Psych: Mentation appears normal and affect normal/bright with easy conversation.     Laboratory Results:   4/11/2018 CT CAP:see full report in chart  Right lower lobe mass series 7 image 59 measures 3.5 x 2.5 compared to  2.1 x 2.3 cm.  Right lower lobe mass series 7 image 18 measures 3.2 x 2.3 cm compared  to 2.5 x 1.8 cm.  Left lower lobe mass series 7 image 86 and measures 5.5 x 3.5 cm  compared to 4.0 x 2.2  ---------------------------  Poorly defined hypoattenuating mass in the pancreatic head and neck.  Stable mass measuring 5.0 x 5.1 x 4.1 cm. Metallic common bile duct  stent. Stable distal pancreatic ductal dilation. Atrophy of the tail  of the pancreas    IMPRESSION:   1. Increased size of numerous pulmonary metastasis.  2. Stable pancreatic mass with extensive vascular encasement as above.  3. Stable diffuse steatosis. Stable enhancing intrahepatic shunt.  Results for orders placed or performed in visit on 04/18/18   Potassium   Result Value Ref Range    Potassium 3.7 3.4 - 5.3 mmol/L   CBC with platelets differential   Result Value Ref Range    WBC 2.1 (L) 4.0 - 11.0 10e9/L    RBC Count 3.36 (L) 3.8 - 5.2 10e12/L     Hemoglobin 10.4 (L) 11.7 - 15.7 g/dL    Hematocrit 31.1 (L) 35.0 - 47.0 %    MCV 93 78 - 100 fl    MCH 31.0 26.5 - 33.0 pg    MCHC 33.4 31.5 - 36.5 g/dL    RDW 16.7 (H) 10.0 - 15.0 %    Platelet Count 168 150 - 450 10e9/L    Diff Method Automated Method     % Neutrophils 36.0 %    % Lymphocytes 48.8 %    % Monocytes 12.7 %    % Eosinophils 1.0 %    % Basophils 1.0 %    % Immature Granulocytes 0.5 %    Absolute Neutrophil 0.7 (L) 1.6 - 8.3 10e9/L    Absolute Lymphocytes 1.0 0.8 - 5.3 10e9/L    Absolute Monocytes 0.3 0.0 - 1.3 10e9/L    Absolute Eosinophils 0.0 0.0 - 0.7 10e9/L    Absolute Basophils 0.0 0.0 - 0.2 10e9/L    Abs Immature Granulocytes 0.0 0 - 0.4 10e9/L     Assessment and Plan:   Stage IV Pancreatic cancer-Pt completed 5 cycles and started into 6th cycle of Gemzar treatment but has seen definate progression of the disease to lung masses with recent imaging. We reviewed that this means the disease continued to progress while on the Gemzar and that we will hold the drug today since not working and noted to have neutropenia with ANC of 0.7 today anyway. Pt is feeling well and was surprised by progression.   We did discuss that she always has the option of opting out of further treatment and discussed the concept of Hospice. She states she is feeling well and strong and would consider other treatment.  WIll have her meet with Dr Soares for options for treatment in near future if able or have return visit with this provider in 1 week for discussion of options.  Occasional loose stools- pt is using the creon after meals and seeing good control of bowels now.   Weight loss- pt has been pushing foods to help with weight but has lost week in last 3 months. Reviewed need to snack between meals to maintain weight. Reviewed high calorie but nutritious foods.   This was a 25min visit with > 50% in counseling and coordinating care including education and management of concerns.    Nai Simmons,CNP

## 2018-04-18 NOTE — LETTER
4/18/2018         RE: Talya Gatica  [address]  [place] [facility] [place] [address]        Dear Colleague,    Thank you for referring your patient, Talya Gatica, to the [facility]. Please see a copy of my visit note below.    Oncology Follow Up Visit: April 18, 2018      Oncologist: Dr Jim Soares  PCP: Pancho Cope    Diagnosis: Stage IV Pancreatic cancer  Talya Gatica is a 78 yo who c/o jaundice in 7/2017 was found to have  adenocarcinoma of the head of pancreas causing biliary obstruction and several pulmonary nodules consistent with metastasis- initially told days to 3 months of life.  Treatment:   11/3/2017 began treatment with Gemzar- days 1,8,15 of 28 day plan    Interval History: Ms. Sanchez comes to clinic for review  of recent scans for her pancreatic cancer.Pt has completed 5 cycles of Gemzar and has started week 1 of cycle 6 prior to scan. Pt states she is feeling well and getting out for activities. She is walking hallways for strengthening- using cane for assistance and has had no falls. She denies any pain,nausea, SOB. Bowels have been under good control with use of the creon 1-2 hours after meals and has been able to maintain weight or even increase weight over time. She denies neuropathy or trouble sleeping though aware she does get anxious about disease.States she is feeling too good to be dying.   Rest of comprehensive and complete ROS is reviewed and is negative.   Past Medical History:   Diagnosis Date     AR (allergic rhinitis)      Baker's cyst      DJD (degenerative joint disease)      Elevated cholesterol      Glaucoma      Menopause      Vertigo      Current Outpatient Prescriptions   Medication     amylase-lipase-protease (CREON) 14200-83130 UNITS CPEP per EC capsule     BIOTIN PO     latanoprost (XALATAN) 0.005 % ophthalmic solution     Multiple Vitamins-Minerals (WOMENS 50+ MULTI VITAMIN/MIN PO)     Potassium Chloride ER 20 MEQ TBCR      prochlorperazine (COMPAZINE) 10 MG tablet     No current facility-administered medications for this visit.      Allergies   Allergen Reactions     Codeine Camsylate        Physical Exam:/76  Pulse 84  Temp 98  F (36.7  C) (Oral)  Resp 20  Wt 45.9 kg (101 lb 1.6 oz)  SpO2 98%  BMI 20.08 kg/m2   ECOG PS-1   Constitutional: Alert, moving well using cane though slow to start. [place].   ENT: Eyes bright, No mouth sores. [place].  Neck: Supple, No adenopathy.Thyroid symmetric  Cardiac: Heart rate and rhythm is regular and strong with mild aortic murmur again noted  Respiratory: Breathing easy. Lung sounds clear to auscultation at this time with clear voice  GI: Abdomen is soft, non-tender, BS normal. No masses or organomegaly  MS: Muscle tone fair- uses cane for support- able to get to exam table with light assistance, extremities normal with no edema.   Skin: No suspicious lesions or rashes or bruising  Neuro: Sensory grossly WNL, gait is steady and no dizziness today  Lymph: Normal ant/post cervical, axillary, supraclavicular nodes  Psych: Mentation appears normal and affect normal/bright with easy conversation.     Laboratory Results:   4/11/2018 CT CAP:see full report in chart  Right lower lobe mass series 7 image 59 measures 3.5 x 2.5 compared to  2.1 x 2.3 cm.  Right lower lobe mass series 7 image 18 measures 3.2 x 2.3 cm compared  to 2.5 x 1.8 cm.  Left lower lobe mass series 7 image 86 and measures 5.5 x 3.5 cm  compared to 4.0 x 2.2  ---------------------------  Poorly defined hypoattenuating mass in the pancreatic head and neck.  Stable mass measuring 5.0 x 5.1 x 4.1 cm. Metallic common bile duct  stent. Stable distal pancreatic ductal dilation. Atrophy of the tail  of the pancreas    IMPRESSION:   1. Increased size of numerous pulmonary metastasis.  2. Stable pancreatic mass with extensive vascular encasement as above.  3. Stable diffuse steatosis. Stable enhancing intrahepatic shunt.  Results for  orders placed or performed in visit on 04/18/18   Potassium   Result Value Ref Range    Potassium 3.7 3.4 - 5.3 mmol/L   CBC with platelets differential   Result Value Ref Range    WBC 2.1 (L) 4.0 - 11.0 10e9/L    RBC Count 3.36 (L) 3.8 - 5.2 10e12/L    Hemoglobin 10.4 (L) 11.7 - 15.7 g/dL    Hematocrit 31.1 (L) 35.0 - 47.0 %    MCV 93 78 - 100 fl    MCH 31.0 26.5 - 33.0 pg    MCHC 33.4 31.5 - 36.5 g/dL    RDW 16.7 (H) 10.0 - 15.0 %    Platelet Count 168 150 - 450 10e9/L    Diff Method Automated Method     % Neutrophils 36.0 %    % Lymphocytes 48.8 %    % Monocytes 12.7 %    % Eosinophils 1.0 %    % Basophils 1.0 %    % Immature Granulocytes 0.5 %    Absolute Neutrophil 0.7 (L) 1.6 - 8.3 10e9/L    Absolute Lymphocytes 1.0 0.8 - 5.3 10e9/L    Absolute Monocytes 0.3 0.0 - 1.3 10e9/L    Absolute Eosinophils 0.0 0.0 - 0.7 10e9/L    Absolute Basophils 0.0 0.0 - 0.2 10e9/L    Abs Immature Granulocytes 0.0 0 - 0.4 10e9/L     Assessment and Plan:   Stage IV Pancreatic cancer-Pt completed 5 cycles and started into 6th cycle of Gemzar treatment but has seen definate progression of the disease to lung masses with recent imaging. We reviewed that this means the disease continued to progress while on the Gemzar and that we will hold the drug today since not working and noted to have neutropenia with ANC of 0.7 today anyway. Pt is feeling well and was surprised by progression.   We did discuss that she always has the option of opting out of further treatment and discussed the concept of Hospice. She states she is feeling well and strong and would consider other treatment.  WIll have her meet with Dr Soares for options for treatment in near future if able or have return visit with this provider in 1 week for discussion of options.  Occasional loose stools- pt is using the creon after meals and seeing good control of bowels now.   Weight loss- pt has been pushing foods to help with weight but has lost week in last 3 months. Reviewed  need to snack between meals to maintain weight. Reviewed high calorie but nutritious foods.   This was a 25min visit with > 50% in counseling and coordinating care including education and management of concerns.    Nai Simmons CNP      SPIRITUAL HEALTH SERVICES  SPIRITUAL ASSESSMENT Progress Note  [facility]    (Follow up)/Staff referral - Nurse reported pt was not doing her chemo today and could use some emotional support.  Pt stated she needed to wait until next week to find out from the oncologist what her next step might be.  She said she didn't want to alarm he daughter that she might be out of options.   encouraged her to be upfront with her daughter, but pt responded she just couldn't do that in light of what her daughter had scheduled for the next few days, but would do so after she talked with the oncologist.   replied pt needed to do what she thought was best.   concluded the conversation with prayer and sang a verse of Amazing Deana.   will follow up in the future.    Christophe Rodarte M.Div., [facility]  Staff   Office tel: [phone]      Again, thank you for allowing me to participate in the care of your patient.        Sincerely,        Nai Simmons NP, APRN CNP

## 2018-04-18 NOTE — PROGRESS NOTES
SPIRITUAL HEALTH SERVICES  SPIRITUAL ASSESSMENT Progress Note  [facility]    (Follow up)/Staff referral - Nurse reported pt was not doing her chemo today and could use some emotional support.  Pt stated she needed to wait until next week to find out from the oncologist what her next step might be.  She said she didn't want to alarm he daughter that she might be out of options.   encouraged her to be upfront with her daughter, but pt responded she just couldn't do that in light of what her daughter had scheduled for the next few days, but would do so after she talked with the oncologist.   replied pt needed to do what she thought was best.   concluded the conversation with prayer and sang a verse of Amazing Deana.   will follow up in the future.    Christophe Rodarte M.Div., [facility]  Staff   Office tel: [phone]

## 2018-04-18 NOTE — MR AVS SNAPSHOT
After Visit Summary   4/18/2018    Talya Gatica    MRN: [id number]           Patient Information     Date Of Birth          1940        Visit Information        Provider Department      4/18/2018 12:15 PM Nai Simmons APRN CNP [facility]        Today's Diagnoses     Pancreatic adenocarcinoma (H)    -  1    Chemotherapy-induced neutropenia (H)        Loose stools        Loss of weight           Follow-ups after your visit        Your next 10 appointments already scheduled     Apr 25, 2018  1:30 PM CDT   LAB with LAB ONC [facility] ([facility])    [address] [place] [id number] [phone]           Please do not eat 10-12 hours before your appointment if you are coming in fasting for labs on lipids, cholesterol, or glucose (sugar). This does not apply to pregnant women. Water, hot tea and black coffee (with nothing added) are okay. Do not drink other fluids, diet soda or chew gum.            Apr 25, 2018  2:00 PM CDT   Level 2 with [place] 4 INFUSION   [facility]    [address] [place] [address] [id number] [phone]              Who to contact     If you have questions or need follow up information about today's clinic visit or your schedule please contact [facility] directly at [phone].  Normal or non-critical lab and imaging results will be communicated to you by MyChart, letter or phone within 4 business days after the clinic has received the results. If you do not hear from us within 7 days, please contact the clinic through MyChart or phone. If you have a critical or abnormal lab result, we will notify you by phone as soon as possible.  Submit refill requests through [employer] or call your pharmacy and they will forward the refill request to us. Please allow 3 business days for your refill to be completed.          Additional  Information About Your Visit        [employer] Information     [employer] is an electronic gateway that provides easy, online access to your medical records. With [employer], you can request a clinic appointment, read your test results, renew a prescription or communicate with your care team.     To sign up for [employer] visit the website at [email or web address]   You will be asked to enter the access code listed below, as well as some personal information. Please follow the directions to create your username and password.     Your access code is: [email or web address]  Expires: 7/10/2018  2:55 PM     Your access code will  in 90 days. If you need help or a new code, please contact your [facility] or call [phone] for assistance.        Care EveryWhere ID     This is your Care EveryWhere ID. This could be used by other organizations to access your [place] medical records  [id number]        Your Vitals Were     Pulse Temperature Respirations Pulse Oximetry BMI (Body Mass Index)       84 98  F (36.7  C) (Oral) 20 98% 20.08 kg/m2        Blood Pressure from Last 3 Encounters:   18 134/76   18 135/78   03/15/18 123/70    Weight from Last 3 Encounters:   18 45.9 kg (101 lb 1.6 oz)   18 45.2 kg (99 lb 11.2 oz)   03/15/18 49.1 kg (108 lb 3.2 oz)              Today, you had the following     No orders found for display       Primary Care Provider Office Phone # Fax #    Pancho Cope [phone] [address] [facility] [address]        Equal Access to Services     JOCELINE JORDAN : Hadnikhil Sun, unique jaquez, carrie latham. So [facility] [phone].    ATENCIÓN: Si habla español, tiene a ornelas disposición servicios gratuitos de asistencia lingüística. Kristen al [phone].    We comply with applicable federal civil rights laws and [place] laws. We do not discriminate  on the basis of race, color, national origin, age, disability, sex, sexual orientation, or gender identity.            Thank you!     Thank you for choosing [facility]  for your care. Our goal is always to provide you with excellent care. Hearing back from our patients is one way we can continue to improve our services. Please take a few minutes to complete the written survey that you may receive in the mail after your visit with us. Thank you!             Your Updated Medication List - Protect others around you: Learn how to safely use, store and throw away your medicines at www.disposemymeds.org.          This list is accurate as of 4/18/18  5:14 PM.  Always use your most recent med list.                   Brand Name Dispense Instructions for use Diagnosis    amylase-lipase-protease 07139-38209 units Cpep per EC capsule    CREON    180 capsule    Take 1 capsule (24,000 Units) by mouth 3 times daily (with meals)    Pancreatic adenocarcinoma (H)       BIOTIN PO           latanoprost 0.005 % ophthalmic solution    XALATAN    3 Bottle    Place 1 drop into both eyes At Bedtime    Glaucoma suspect, bilateral       Potassium Chloride ER 20 MEQ Tbcr     90 tablet    Take 1 tablet (20 mEq) by mouth daily    Hypokalemia       prochlorperazine 10 MG tablet    COMPAZINE    30 tablet    Take 0.5 tablets (5 mg) by mouth every 6 hours as needed (Nausea/Vomiting)    Pancreatic adenocarcinoma (H)       WOMENS 50+ MULTI VITAMIN/MIN PO

## 2018-04-18 NOTE — NURSING NOTE
"Oncology Rooming Note    April 18, 2018 12:24 PM   Talya Gatica is a 77 year old female who presents for:    Chief Complaint   Patient presents with     Oncology Clinic Visit     Initial Vitals: /76  Pulse 84  Temp 98  F (36.7  C) (Oral)  Resp 20  Wt 45.9 kg (101 lb 1.6 oz)  SpO2 98%  BMI 20.08 kg/m2 Estimated body mass index is 20.08 kg/(m^2) as calculated from the following:    Height as of 3/1/18: 1.511 m (4' 11.5\").    Weight as of this encounter: 45.9 kg (101 lb 1.6 oz). Body surface area is 1.39 meters squared.  No Pain (0) Comment: Data Unavailable   No LMP recorded. Patient is postmenopausal.  Allergies reviewed: Yes  Medications reviewed: Yes    Medications: Medication refills not needed today.  Pharmacy name entered into [facility]: [id number] - [place], [place] - [address] AT [facility]    Clinical concerns: none NP was notified.    8 minutes for nursing intake (face to face time)     FREDIS LINARES RN              "

## 2018-05-07 ENCOUNTER — ONCOLOGY VISIT (OUTPATIENT)
Dept: ONCOLOGY | Facility: CLINIC | Age: 78
End: 2018-05-07
Payer: COMMERCIAL

## 2018-05-07 ENCOUNTER — MEDICAL CORRESPONDENCE (OUTPATIENT)
Dept: HEALTH INFORMATION MANAGEMENT | Facility: CLINIC | Age: 78
End: 2018-05-07

## 2018-05-07 ENCOUNTER — CARE COORDINATION (OUTPATIENT)
Dept: ONCOLOGY | Facility: CLINIC | Age: 78
End: 2018-05-07

## 2018-05-07 VITALS
HEIGHT: 60 IN | HEART RATE: 70 BPM | TEMPERATURE: 97.8 F | DIASTOLIC BLOOD PRESSURE: 83 MMHG | SYSTOLIC BLOOD PRESSURE: 152 MMHG | OXYGEN SATURATION: 97 % | BODY MASS INDEX: 19.44 KG/M2 | WEIGHT: 99 LBS

## 2018-05-07 DIAGNOSIS — C25.9 PANCREATIC ADENOCARCINOMA (H): ICD-10-CM

## 2018-05-07 PROBLEM — C78.01 MALIGNANT NEOPLASM METASTATIC TO BOTH LUNGS (H): Status: ACTIVE | Noted: 2018-05-07

## 2018-05-07 PROBLEM — C78.02 MALIGNANT NEOPLASM METASTATIC TO BOTH LUNGS (H): Status: ACTIVE | Noted: 2018-05-07

## 2018-05-07 PROCEDURE — 99215 OFFICE O/P EST HI 40 MIN: CPT | Performed by: INTERNAL MEDICINE

## 2018-05-07 RX ORDER — SODIUM CHLORIDE 9 MG/ML
1000 INJECTION, SOLUTION INTRAVENOUS CONTINUOUS PRN
Status: CANCELLED
Start: 2018-05-16

## 2018-05-07 RX ORDER — EPINEPHRINE 1 MG/ML
0.3 INJECTION, SOLUTION INTRAMUSCULAR; SUBCUTANEOUS EVERY 5 MIN PRN
Status: CANCELLED | OUTPATIENT
Start: 2018-05-16

## 2018-05-07 RX ORDER — EPINEPHRINE 0.3 MG/.3ML
0.3 INJECTION SUBCUTANEOUS EVERY 5 MIN PRN
Status: CANCELLED | OUTPATIENT
Start: 2018-05-16

## 2018-05-07 RX ORDER — ALBUTEROL SULFATE 90 UG/1
1-2 AEROSOL, METERED RESPIRATORY (INHALATION)
Status: CANCELLED
Start: 2018-05-16

## 2018-05-07 RX ORDER — METHYLPREDNISOLONE SODIUM SUCCINATE 125 MG/2ML
125 INJECTION, POWDER, LYOPHILIZED, FOR SOLUTION INTRAMUSCULAR; INTRAVENOUS
Status: CANCELLED
Start: 2018-05-16

## 2018-05-07 RX ORDER — ALBUTEROL SULFATE 0.83 MG/ML
2.5 SOLUTION RESPIRATORY (INHALATION)
Status: CANCELLED | OUTPATIENT
Start: 2018-05-16

## 2018-05-07 RX ORDER — DIPHENHYDRAMINE HYDROCHLORIDE 50 MG/ML
50 INJECTION INTRAMUSCULAR; INTRAVENOUS
Status: CANCELLED
Start: 2018-05-16

## 2018-05-07 RX ORDER — MEPERIDINE HYDROCHLORIDE 50 MG/ML
25 INJECTION INTRAMUSCULAR; INTRAVENOUS; SUBCUTANEOUS EVERY 30 MIN PRN
Status: CANCELLED | OUTPATIENT
Start: 2018-05-16

## 2018-05-07 RX ORDER — LORAZEPAM 2 MG/ML
0.5 INJECTION INTRAMUSCULAR EVERY 4 HOURS PRN
Status: CANCELLED
Start: 2018-05-16

## 2018-05-07 ASSESSMENT — PAIN SCALES - GENERAL: PAINLEVEL: NO PAIN (0)

## 2018-05-07 NOTE — PROGRESS NOTES
"[facility]  HEMATOLOGY AND ONCOLOGY    FOLLOW-UP VISIT NOTE    PATIENT NAME: Talya Gatica MRN # [id number]  DATE OF VISIT: May 7, 2018 YOB: 1940    REFERRING PROVIDER: No referring provider defined for this encounter.    CANCER TYPE: Pancreatic adenocarcinoma  STAGE: IV; extensive lung metastasis    TREATMENT SUMMARY:  - 7/12/17 US guided FNA of pancreatic mass consistent with pancreatic adenocarcinoma    CURRENT INTERVENTIONS:  Progression on gemcitabine    SUBJECTIVE   Talya Gatica is being followed for pancreatic cancer    She is being seen with restaging scans. She has no new complains since last visit. She again has a number of questions for me.     ROS is significant only for a post nasal drip.       PAST MEDICAL HISTORY     Past Medical History:   Diagnosis Date     AR (allergic rhinitis)      Baker's cyst      DJD (degenerative joint disease)      Elevated cholesterol      Glaucoma      Menopause      Vertigo          CURRENT OUTPATIENT MEDICATIONS     Current Outpatient Prescriptions   Medication Sig     amylase-lipase-protease (CREON) 74387-02892 UNITS CPEP per EC capsule Take 1 capsule (24,000 Units) by mouth 3 times daily (with meals)     BIOTIN PO      latanoprost (XALATAN) 0.005 % ophthalmic solution Place 1 drop into both eyes At Bedtime     Multiple Vitamins-Minerals (WOMENS 50+ MULTI VITAMIN/MIN PO)      Potassium Chloride ER 20 MEQ TBCR Take 1 tablet (20 mEq) by mouth daily     prochlorperazine (COMPAZINE) 10 MG tablet Take 0.5 tablets (5 mg) by mouth every 6 hours as needed (Nausea/Vomiting) (Patient not taking: Reported on 3/15/2018)     No current facility-administered medications for this visit.         ALLERGIES      Allergies   Allergen Reactions     Codeine Camsylate         REVIEW OF SYSTEMS   As above in the HPI, o/w complete 12-point ROS was negative.     PHYSICAL EXAM   /83  Pulse 70  Temp 97.8  F (36.6  C)  Ht 1.511 m (4' 11.5\")  Wt 44.9 " kg (99 lb)  SpO2 97%  BMI 19.66 kg/m2  GEN: NAD  HEENT: PERRL, EOMI, no icterus, injection or pallor. Oropharynx is clear.  LYMPHATICs: no cervical or supraclavicular lymphadenopathy; no other abn lymphadenopathy  PULMONARY: clear with good air entry bilaterally  CARDIOVASCULAR: regular, no murmurs, rubs, or gallops  GASTROINTESTINAL: soft, non-tender, non-distended, normal bowel sounds, no hepatosplenomegaly by percussion or palpation  MUSCULOSKELTAL: warm, well perfused, no edema  NEURO: awake, alert and oriented to time place and person, cranial nerves intact - II - XII, no focal neurologic deficits  SKIN: no rashes     LABORATORY AND IMAGING STUDIES     Recent Labs   Lab Test  04/18/18   1155  04/11/18   1301  03/19/18   1735  03/15/18   1337  03/01/18   1049   02/01/18   1318   12/29/17   1155   NA   --   142  140   --   142   --   143   --   145*   POTASSIUM  3.7  3.8  3.3*  3.0*  3.5   < >  3.6   < >  2.9*   CHLORIDE   --   113*  108   --   110*   --   110*   --   108   CO2   --   22  22   --   24   --   24   --   27   ANIONGAP   --   7  10   --   8   --   9   --   10   BUN   --   5*  7   --   6*   --   8   --   5*   CR   --   0.47*  0.48*   --   0.49*   --   0.41*   --   0.37*   GLC   --   91  87   --   100*   --   103*   --   110*   BERNARDO   --   9.0  8.5   --   8.9   --   8.8   --   8.3*    < > = values in this interval not displayed.     No results for input(s): MAG, PHOS in the last 92586 hours.  Recent Labs   Lab Test  04/18/18   1155  04/11/18   1301  03/15/18   1337  03/01/18   1049  02/15/18   1341   WBC  2.1*  5.4  8.8  5.5  5.4   HGB  10.4*  12.6  11.0*  11.6*  11.4*   PLT  168  263  292  213  284   MCV  93  94  89  93  95   NEUTROPHIL  36.0  60.3  72.5  71.2  69.3     Recent Labs   Lab Test  04/11/18   1301  03/19/18   1735  03/01/18   1049   BILITOTAL  0.8  0.8  0.9   ALKPHOS  133  152*  153*   ALT  46  57*  53*   AST  51*  63*  46*   ALBUMIN  3.5  3.1*  3.6     No results found for: TSH  Recent  Labs   Lab Test  10/30/17   1342   CEA  4.8*     Results for orders placed or performed in visit on 04/11/18   CT Chest/Abdomen/Pelvis w Contrast    Narrative    Body CT CAP EXAMINATION: CT CHEST/ABDOMEN/PELVIS W CONTRAST, 4/11/2018  12:49 PM    TECHNIQUE:  Helical CT images from the thoracic inlet through the  symphysis pubis were obtained  with contrast. Contrast dose: 66 ml  isovue     COMPARISON: 1/19/2018 10/19/2017    HISTORY: needs follow up after continuation of Gemzar for her  metastatic pancreatic cancer; Pancreatic adenocarcinoma (H)    FINDINGS:    Chest:   Prominent coronary artery calcifications. No mediastinal or hilar  adenopathy. Innumerable pulmonary cavitary masses with groundglass  halo have increased in size.    Right lower lobe mass series 7 image 59 measures 3.5 x 2.5 compared to  2.1 x 2.3 cm.  Right lower lobe mass series 7 image 18 measures 3.2 x 2.3 cm compared  to 2.5 x 1.8 cm.  Left lower lobe mass series 7 image 86 and measures 5.5 x 3.5 cm  compared to 4.0 x 2.2    Overall number of nodules is stable.     Abdomen and pelvis:     Poorly defined hypoattenuating mass in the pancreatic head and neck.  Stable mass measuring 5.0 x 5.1 x 4.1 cm. Metallic common bile duct  stent. Stable distal pancreatic ductal dilation. Atrophy of the tail  of the pancreas.     Vascular involvement is similar to prior.  Celiac axis: Encasement of the distal celiac artery trifurcation  without significant narrowing. Left gastric artery origin is narrowed.  Hepatic artery: Encasement of the common and proper hepatic arteries  as well as the gastroduodenal artery. Abutment of the proper hepatic  artery at the bifurcation but no encasement.  Superior mesenteric artery: Encasement of the origin of the SMA  without significant narrowing.  Superior mesenteric vein: Encasement with mild narrowing.  Portal vein: Encasement of a long segment with moderate severe  narrowing of the portal vein at the confluence  Splenic  artery and vein: Encasement with mild narrowing.    Thickened bilateral adrenal glands but no focal lesion. Large cyst in  the right kidney, stable. Spleen appears normal. Stable 1 cm  gastrohepatic lymph node. No other lymphadenopathy.  Diffuse steatosis. Intrahepatic portal venous to hepatic vein shunt  measuring 1.9 x 1.6 cm at the junction of hepatic segments 7 and 8,  unchanged (series 6 image 40).  Cholelithiasis. Calcified uterine fibroids. 4.8 x 6.0 cm cm right  adnexal cyst with punctate mural calcifications, stable. Colonic  diverticulosis without diverticulitis.    Bones and soft tissues: Degenerative changes throughout the hips,  shoulders and lumbar spine. Diffuse bone demineralization.       Impression    IMPRESSION:   1. Increased size of numerous pulmonary metastasis.  2. Stable pancreatic mass with extensive vascular encasement as above.  3. Stable diffuse steatosis. Stable enhancing intrahepatic shunt.      I have personally reviewed the examination and initial interpretation  and I agree with the findings.    HIEU WASSERMAN MD               ASSESSMENT AND PLAN   1. Pancreatic cancer - extensive metastatis to lung  2. Weight loss  3. ECOG PS 1  4. No medical comorbidity    I had a lengthy discussion with Talya, who again comes alone for this clinic visit.  She had a number of questions for me which were answered for her.  I reviewed actual images from her restaging CT scan and compared it to her previous scans.  She does have disease progression in the lungs.  She is aware of this progression as this was reviewed with her by Nai. She is very worried of getting very SOB over the next few months. She has no SOB for now. She denies cough too. While there are numerous lesions in lung, it is not necessary that she would have SOB even with disease progression. We could try alternate regimens to control her disease and possibly could control and even reverse her disease.     I reviewed  chemotherapy regimen of FOLFIRI. I will dose reduce her 5 FU by 15% and assess how well she tolerates the regimen. I will have her return in a week after her chemotherapy for labs. She might need neulasta with this chemotherapy. I reviewed side effects with the individual agents, rationale, benefits and alternatives. Talya is interested in getting this chemotherapy and is eager to proceed. She will need a port for the 5FU which is administered over 2 days (46 hrs).     I will see her in 4 weeks time with labs prior to visit. We would plan to restage her in 2-3 months time depending on how well she is tolerating regimen (closer to 2 months if she is having toxicity or concerns for progression).     Additional chemotherapy teaching will be done by Tiffanie.     Over 45 min of direct face to face time spent with patient with more than 50% time spent in counseling and coordinating care.

## 2018-05-07 NOTE — MR AVS SNAPSHOT
After Visit Summary   5/7/2018    Talya Gatica    MRN: [id number]           Patient Information     Date Of Birth          1940        Visit Information        Provider Department      5/7/2018 2:30 PM Jim Soares MD [facility]        Today's Diagnoses     Pancreatic adenocarcinoma (H)           Follow-ups after your visit        Your next 10 appointments already scheduled     May 16, 2018 11:45 AM CDT   Return Visit with [facility]   [facility] ([facility])    [address] [facility] [address] [phone]            May 16, 2018 12:15 PM CDT   Return Visit with LISA Mendieta CNP   [facility])    [address] [facility] [address] [phone]            May 16, 2018 12:45 PM CDT   Folfiri with B[facility] INFUSION   [facility])    [address] [facility] [address] [phone]            May 23, 2018  2:45 PM CDT   Return Visit with [facility]   [facility])    [address] [facility] [address] [phone]            May 23, 2018  3:15 PM CDT   Return Visit with LISA Mendieta CNP   [facility])    [address] [facility] [address] [phone]            May 30, 2018 11:45 AM CDT   Return Visit with [facility]   [facility] ([facility])    [address] [facility] [address] [phone]            May 30, 2018 12:15 PM CDT   Return Visit with LISA Mendieta CNP   [facility] ([facility])    [address] [facility] [address] [phone]            May 30, 2018  1:00 PM CDT   Folfiri with B[facility] INFUSION    [facility]    [address] [place] [id number] [phone]            2018 10:00 AM CDT   Return Visit with [facility] [place] [facility]   [facility]    [address] [facility] [address] [phone]              Who to contact     If you have questions or need follow up information about today's clinic visit or your schedule please contact [facility] directly at [phone].  Normal or non-critical lab and imaging results will be communicated to you by [employer], letter or phone within 4 business days after the clinic has received the results. If you do not hear from us within 7 days, please contact the clinic through [employer] or phone. If you have a critical or abnormal lab result, we will notify you by phone as soon as possible.  Submit refill requests through [employer] or call your pharmacy and they will forward the refill request to us. Please allow 3 business days for your refill to be completed.          Additional Information About Your Visit        [employer] Information     [employer] is an electronic gateway that provides easy, online access to your medical records. With [employer], you can request a clinic appointment, read your test results, renew a prescription or communicate with your care team.     To sign up for [employer] visit the website at [email or web address]   You will be asked to enter the access code listed below, as well as some personal information. Please follow the directions to create your username and password.     Your access code is: [email or web address]  Expires: 7/10/2018  2:55 PM     Your access code will  in 90 days. If you need help or a new code, please contact your [facility] or call [phone] for assistance.        Care EveryWhere ID     This is your Care EveryWhere ID. This could be used by other  "organizations to access your [place] medical records  [id number]        Your Vitals Were     Pulse Temperature Height Pulse Oximetry BMI (Body Mass Index)       70 97.8  F (36.6  C) 1.511 m (4' 11.5\") 97% 19.66 kg/m2        Blood Pressure from Last 3 Encounters:   05/07/18 152/83   04/18/18 134/76   04/11/18 135/78    Weight from Last 3 Encounters:   05/07/18 44.9 kg (99 lb)   04/18/18 45.9 kg (101 lb 1.6 oz)   04/11/18 45.2 kg (99 lb 11.2 oz)              Today, you had the following     No orders found for display         Today's Medication Changes          These changes are accurate as of 5/7/18 11:59 PM.  If you have any questions, ask your nurse or doctor.               Stop taking these medicines if you haven't already. Please contact your care team if you have questions.     prochlorperazine 10 MG tablet   Commonly known as:  COMPAZINE   Stopped by:  Jim Soares MD                Where to get your medicines      These medications were sent to [id number] - [place], [place] - [address] AT [facility] [address]  [address], [place] [address]     Phone:  [phone]     amylase-lipase-protease [address]1400-02129 units Cpep per EC capsule                Primary Care Provider Office Phone # Fax #    Pancho Rl Cope [phone] [address] [facility] [address]        Equal Access to Services     KATHRYN JORDAN AH: Hadii feliciano suárez hadasho Soomaali, waaxda luqadaha, qaybta kaalmada patricio, waxay idimarcelo wheatley. So [facility] [phone].    ATENCIÓN: Si habla español, tiene a ornelas disposición servicios gratuitos de asistencia lingüística. Kristen dias [phone].    We comply with applicable federal civil rights laws and [place] laws. We do not discriminate on the basis of race, color, national origin, age, disability, sex, sexual orientation, or gender identity.            Thank you!     Thank you for choosing [facility]  for your care. Our goal is always to provide you with excellent care. Hearing back from our patients is one way we can continue to improve our services. Please take a few minutes to complete the written survey that you may receive in the mail after your visit with us. Thank you!             Your Updated Medication List - Protect others around you: Learn how to safely use, store and throw away your medicines at www.disposemymeds.org.          This list is accurate as of 5/7/18 11:59 PM.  Always use your most recent med list.                   Brand Name Dispense Instructions for use Diagnosis    amylase-lipase-protease 68005-80778 units Cpep per EC capsule    CREON    180 capsule    Take 1 capsule (24,000 Units) by mouth 3 times daily (with meals)    Pancreatic adenocarcinoma (H)       BIOTIN PO           latanoprost 0.005 % ophthalmic solution    XALATAN    3 Bottle    Place 1 drop into both eyes At Bedtime    Glaucoma suspect, bilateral       Potassium Chloride ER 20 MEQ Tbcr     90 tablet    Take 1 tablet (20 mEq) by mouth daily    Hypokalemia       WOMENS 50+ MULTI VITAMIN/MIN PO

## 2018-05-07 NOTE — LETTER
5/7/2018         RE: Talya Gatica  [address]  [facility] [place] [facility] [address]        Dear Colleague,    Thank you for referring your patient, Talya Gatica, to the [facility]. Please see a copy of my visit note below.    [facility]  HEMATOLOGY AND ONCOLOGY    FOLLOW-UP VISIT NOTE    PATIENT NAME: Talya Gatica MRN # [id number]  DATE OF VISIT: May 7, 2018 YOB: 1940    REFERRING PROVIDER: No referring provider defined for this encounter.    CANCER TYPE: Pancreatic adenocarcinoma  STAGE: IV; extensive lung metastasis    TREATMENT SUMMARY:  - 7/12/17 US guided FNA of pancreatic mass consistent with pancreatic adenocarcinoma    CURRENT INTERVENTIONS:  Progression on gemcitabine    SUBJECTIVE   Talya Gatica is being followed for pancreatic cancer    She is being seen with restaging scans. She has no new complains since last visit. She again has a number of questions for me.     ROS is significant only for a post nasal drip.       PAST MEDICAL HISTORY     Past Medical History:   Diagnosis Date     AR (allergic rhinitis)      Baker's cyst      DJD (degenerative joint disease)      Elevated cholesterol      Glaucoma      Menopause      Vertigo          CURRENT OUTPATIENT MEDICATIONS     Current Outpatient Prescriptions   Medication Sig     amylase-lipase-protease (CREON) 60344-40962 UNITS CPEP per EC capsule Take 1 capsule (24,000 Units) by mouth 3 times daily (with meals)     BIOTIN PO      latanoprost (XALATAN) 0.005 % ophthalmic solution Place 1 drop into both eyes At Bedtime     Multiple Vitamins-Minerals (WOMENS 50+ MULTI VITAMIN/MIN PO)      Potassium Chloride ER 20 MEQ TBCR Take 1 tablet (20 mEq) by mouth daily     prochlorperazine (COMPAZINE) 10 MG tablet Take 0.5 tablets (5 mg) by mouth every 6 hours as needed (Nausea/Vomiting) (Patient not taking: Reported on 3/15/2018)     No current facility-administered medications for this visit.          "ALLERGIES      Allergies   Allergen Reactions     Codeine Camsylate         REVIEW OF SYSTEMS   As above in the HPI, o/w complete 12-point ROS was negative.     PHYSICAL EXAM   /83  Pulse 70  Temp 97.8  F (36.6  C)  Ht 1.511 m (4' 11.5\")  Wt 44.9 kg (99 lb)  SpO2 97%  BMI 19.66 kg/m2  GEN: NAD  HEENT: PERRL, EOMI, no icterus, injection or pallor. Oropharynx is clear.  LYMPHATICs: no cervical or supraclavicular lymphadenopathy; no other abn lymphadenopathy  PULMONARY: clear with good air entry bilaterally  CARDIOVASCULAR: regular, no murmurs, rubs, or gallops  GASTROINTESTINAL: soft, non-tender, non-distended, normal bowel sounds, no hepatosplenomegaly by percussion or palpation  MUSCULOSKELTAL: warm, well perfused, no edema  NEURO: awake, alert and oriented to time place and person, cranial nerves intact - II - XII, no focal neurologic deficits  SKIN: no rashes     LABORATORY AND IMAGING STUDIES     Recent Labs   Lab Test  04/18/18   1155  04/11/18   1301  03/19/18   1735  03/15/18   1337  03/01/18   1049   02/01/18   1318   12/29/17   1155   NA   --   142  140   --   142   --   143   --   145*   POTASSIUM  3.7  3.8  3.3*  3.0*  3.5   < >  3.6   < >  2.9*   CHLORIDE   --   113*  108   --   110*   --   110*   --   108   CO2   --   22  22   --   24   --   24   --   27   ANIONGAP   --   7  10   --   8   --   9   --   10   BUN   --   5*  7   --   6*   --   8   --   5*   CR   --   0.47*  0.48*   --   0.49*   --   0.41*   --   0.37*   GLC   --   91  87   --   100*   --   103*   --   110*   BERNARDO   --   9.0  8.5   --   8.9   --   8.8   --   8.3*    < > = values in this interval not displayed.     No results for input(s): MAG, PHOS in the last 21825 hours.  Recent Labs   Lab Test  04/18/18   1155  04/11/18   1301  03/15/18   1337  03/01/18   1049  02/15/18   1341   WBC  2.1*  5.4  8.8  5.5  5.4   HGB  10.4*  12.6  11.0*  11.6*  11.4*   PLT  168  263  292  213  284   MCV  93  94  89  93  95   NEUTROPHIL  36.0  60.3 "  72.5  71.2  69.3     Recent Labs   Lab Test  04/11/18   1301  03/19/18   1735  03/01/18   1049   BILITOTAL  0.8  0.8  0.9   ALKPHOS  133  152*  153*   ALT  46  57*  53*   AST  51*  63*  46*   ALBUMIN  3.5  3.1*  3.6     No results found for: TSH  Recent Labs   Lab Test  10/30/17   1342   CEA  4.8*     Results for orders placed or performed in visit on 04/11/18   CT Chest/Abdomen/Pelvis w Contrast    Narrative    Body CT CAP EXAMINATION: CT CHEST/ABDOMEN/PELVIS W CONTRAST, 4/11/2018  12:49 PM    TECHNIQUE:  Helical CT images from the thoracic inlet through the  symphysis pubis were obtained  with contrast. Contrast dose: 66 ml  isovue     COMPARISON: 1/19/2018 10/19/2017    HISTORY: needs follow up after continuation of Gemzar for her  metastatic pancreatic cancer; Pancreatic adenocarcinoma (H)    FINDINGS:    Chest:   Prominent coronary artery calcifications. No mediastinal or hilar  adenopathy. Innumerable pulmonary cavitary masses with groundglass  halo have increased in size.    Right lower lobe mass series 7 image 59 measures 3.5 x 2.5 compared to  2.1 x 2.3 cm.  Right lower lobe mass series 7 image 18 measures 3.2 x 2.3 cm compared  to 2.5 x 1.8 cm.  Left lower lobe mass series 7 image 86 and measures 5.5 x 3.5 cm  compared to 4.0 x 2.2    Overall number of nodules is stable.     Abdomen and pelvis:     Poorly defined hypoattenuating mass in the pancreatic head and neck.  Stable mass measuring 5.0 x 5.1 x 4.1 cm. Metallic common bile duct  stent. Stable distal pancreatic ductal dilation. Atrophy of the tail  of the pancreas.     Vascular involvement is similar to prior.  Celiac axis: Encasement of the distal celiac artery trifurcation  without significant narrowing. Left gastric artery origin is narrowed.  Hepatic artery: Encasement of the common and proper hepatic arteries  as well as the gastroduodenal artery. Abutment of the proper hepatic  artery at the bifurcation but no encasement.  Superior mesenteric  artery: Encasement of the origin of the SMA  without significant narrowing.  Superior mesenteric vein: Encasement with mild narrowing.  Portal vein: Encasement of a long segment with moderate severe  narrowing of the portal vein at the confluence  Splenic artery and vein: Encasement with mild narrowing.    Thickened bilateral adrenal glands but no focal lesion. Large cyst in  the right kidney, stable. Spleen appears normal. Stable 1 cm  gastrohepatic lymph node. No other lymphadenopathy.  Diffuse steatosis. Intrahepatic portal venous to hepatic vein shunt  measuring 1.9 x 1.6 cm at the junction of hepatic segments 7 and 8,  unchanged (series 6 image 40).  Cholelithiasis. Calcified uterine fibroids. 4.8 x 6.0 cm cm right  adnexal cyst with punctate mural calcifications, stable. Colonic  diverticulosis without diverticulitis.    Bones and soft tissues: Degenerative changes throughout the hips,  shoulders and lumbar spine. Diffuse bone demineralization.       Impression    IMPRESSION:   1. Increased size of numerous pulmonary metastasis.  2. Stable pancreatic mass with extensive vascular encasement as above.  3. Stable diffuse steatosis. Stable enhancing intrahepatic shunt.      I have personally reviewed the examination and initial interpretation  and I agree with the findings.    HIEU WASSERMAN MD               ASSESSMENT AND PLAN   1. Pancreatic cancer - extensive metastatis to lung  2. Weight loss  3. ECOG PS 1  4. No medical comorbidity    I had a lengthy discussion with Talya, who again comes alone for this clinic visit.  She had a number of questions for me which were answered for her.  I reviewed actual images from her restaging CT scan and compared it to her previous scans.  She does have disease progression in the lungs.  She is aware of this progression as this was reviewed with her by Nai. She is very worried of getting very SOB over the next few months. She has no SOB for now. She denies cough too.  While there are numerous lesions in lung, it is not necessary that she would have SOB even with disease progression. We could try alternate regimens to control her disease and possibly could control and even reverse her disease.     I reviewed chemotherapy regimen of FOLFIRI. I will dose reduce her 5 FU by 15% and assess how well she tolerates the regimen. I will have her return in a week after her chemotherapy for labs. She might need neulasta with this chemotherapy. I reviewed side effects with the individual agents, rationale, benefits and alternatives. Talya is interested in getting this chemotherapy and is eager to proceed. She will need a port for the 5FU which is administered over 2 days (46 hrs).     I will see her in 4 weeks time with labs prior to visit. We would plan to restage her in 2-3 months time depending on how well she is tolerating regimen (closer to 2 months if she is having toxicity or concerns for progression).     Additional chemotherapy teaching will be done by Tiffanie.     Over 45 min of direct face to face time spent with patient with more than 50% time spent in counseling and coordinating care.      Again, thank you for allowing me to participate in the care of your patient.        Sincerely,        Jim Soares MD

## 2018-05-07 NOTE — NURSING NOTE
"Oncology Rooming Note    May 7, 2018 2:31 PM   Talya Gatica is a 77 year old female who presents for:    Chief Complaint   Patient presents with     Oncology Clinic Visit     discuss treatment options     Initial Vitals: /83  Pulse 70  Temp 97.8  F (36.6  C)  Ht 1.511 m (4' 11.5\")  Wt 44.9 kg (99 lb)  SpO2 97%  BMI 19.66 kg/m2 Estimated body mass index is 19.66 kg/(m^2) as calculated from the following:    Height as of this encounter: 1.511 m (4' 11.5\").    Weight as of this encounter: 44.9 kg (99 lb). Body surface area is 1.37 meters squared.  No Pain (0) Comment: Data Unavailable   No LMP recorded. Patient is postmenopausal.  Allergies reviewed: Yes  Medications reviewed: Yes    Medications: MEDICATION REFILLS NEEDED TODAY. Provider was notified.  Pharm[facility] entered into [facility]MAN[facility] - [place], [place] - [address] AT [facility] [address]        5 minutes for nursing intake (face to face time)     Carly Bobby LPN              "

## 2018-05-08 ENCOUNTER — CARE COORDINATION (OUTPATIENT)
Dept: ONCOLOGY | Facility: CLINIC | Age: 78
End: 2018-05-08

## 2018-05-08 NOTE — PROGRESS NOTES
Met with patient today after visit with Dr. Soares to discuss a new chemotherapy plan due to increased size of pulmonary nodules.  Plan is to go with FOLFIRI.  Writer reviewed this regimen with patient - 5FU, Irinotecan and Leukovorin, including side effects, when to call MD.  She was provided with Emergency phone numbers and list of When to Call the Doctor.  Specific side effect of diarrhea was reviewed.  I showed her the PORT and reviewed this procedure with her and about the 5FU being connected for approximately 46 hours, where PORT would be placed and instructions about going to [facility] [place] [facility] for it.  She would like to discuss with her people that drive her but agrees with proceeding.  We/she will be in touch with us about setting her up for her chemotherapy.

## 2018-05-08 NOTE — PROGRESS NOTES
Patient called to state she scheduled her own PORT placement at [facility] for 5/14 @ 2:00.  I called her to confirm and that she should be all set but that we were planning to get that set up for her after we heard back from her about transportation.  Orders for PORT faxed to [phone].  Patient would like to proceed with scheduling of FOLFIRI in the near future.  Patient would prefer to schedule in the afternoon - message sent to our , Jesi.

## 2018-05-09 ENCOUNTER — HOME INFUSION (PRE-WILLOW HOME INFUSION) (OUTPATIENT)
Dept: PHARMACY | Facility: CLINIC | Age: 78
End: 2018-05-09

## 2018-05-09 NOTE — PROGRESS NOTES
Therapy: Chemo  Insurance: [employer]  Ded: $  Met: $    Co-Insurance: 80%  Max Out of Pocket: $3000  Met: $170    Please contact Intake with any questions, [phone] or In Basket pool, [facility] ([phone]).  In reference to clinic referral made on 5/9/18 to check Chemo therapy

## 2018-05-15 DIAGNOSIS — C78.02 MALIGNANT NEOPLASM METASTATIC TO BOTH LUNGS (H): Primary | ICD-10-CM

## 2018-05-15 DIAGNOSIS — C78.01 MALIGNANT NEOPLASM METASTATIC TO BOTH LUNGS (H): Primary | ICD-10-CM

## 2018-05-15 DIAGNOSIS — C25.9 PANCREATIC ADENOCARCINOMA (H): ICD-10-CM

## 2018-05-15 RX ORDER — LORAZEPAM 0.5 MG/1
0.5 TABLET ORAL EVERY 4 HOURS PRN
Qty: 30 TABLET | Refills: 2 | Status: SHIPPED | OUTPATIENT
Start: 2018-05-15 | End: 2018-01-01

## 2018-05-15 RX ORDER — PROCHLORPERAZINE MALEATE 10 MG
5 TABLET ORAL EVERY 6 HOURS PRN
Qty: 30 TABLET | Refills: 2 | Status: SHIPPED | OUTPATIENT
Start: 2018-05-15 | End: 2018-01-01

## 2018-05-15 RX ORDER — ONDANSETRON 8 MG/1
8 TABLET, FILM COATED ORAL EVERY 8 HOURS PRN
Qty: 10 TABLET | Refills: 2 | Status: SHIPPED | OUTPATIENT
Start: 2018-05-15 | End: 2018-01-01

## 2018-05-15 RX ORDER — LOPERAMIDE HCL 2 MG
CAPSULE ORAL
Qty: 30 CAPSULE | Refills: 0 | Status: SHIPPED | OUTPATIENT
Start: 2018-05-15 | End: 2018-01-01

## 2018-05-15 NOTE — PROGRESS NOTES
"Oncology Follow Up Visit: May 16, 2018       Oncologist: Dr Jim Soares  PCP: Pancho Cope    Diagnosis: Stage IV Pancreatic cancer  Talya Gatica is a 76 yo who c/o jaundice in 7/2017 was found to have  adenocarcinoma of the head of pancreas causing biliary obstruction and several pulmonary nodules consistent with metastasis- initially told days to 3 months of life.  Treatment:   11/3/2017 began treatment with Gemzar- days 1,8,15 of 28 day plan x 6 cycles  5/16/2018 to begin treatment with FOLFIRI with dose reduction of the 5 FU from start of plan by 15%.    Interval History: Ms. Sanchez comes to clinic alone today for review of symptoms to start a new treatment for her pancreatic cancer with FOLFIRI. Pt had port placed which was accessed today without problems - reviewed emla cream. Reports she \"plays around\" with dosing the creon and is still having some loose stools but feel much better control but is eating 2 x daily most days and has been able to improve weight slowly. She otherwise has been feeling well and has no complaints. Her worry today is if she would be very sick after treatment and if she would be able to continue with her plans for [place] this weekend.   Rest of comprehensive and complete ROS is reviewed and is negative.   Past Medical History:   Diagnosis Date     AR (allergic rhinitis)      Baker's cyst      DJD (degenerative joint disease)      Elevated cholesterol      Glaucoma      Menopause      Vertigo      Current Outpatient Prescriptions   Medication     amylase-lipase-protease (CREON) 51015-99431 units CPEP per EC capsule     BIOTIN PO     latanoprost (XALATAN) 0.005 % ophthalmic solution     loperamide (IMODIUM) 2 MG capsule     LORazepam (ATIVAN) 0.5 MG tablet     Multiple Vitamins-Minerals (WOMENS 50+ MULTI VITAMIN/MIN PO)     ondansetron (ZOFRAN) 8 MG tablet     Potassium Chloride ER 20 MEQ TBCR     prochlorperazine (COMPAZINE) 10 MG tablet     No current " "facility-administered medications for this visit.      Allergies   Allergen Reactions     Codeine Camsylate        Physical Exam: /83  Pulse 75  Temp 98.1  F (36.7  C)  Ht 1.511 m (4' 11.49\")  Wt 44.9 kg (99 lb)  SpO2 97%  BMI 19.67 kg/m2  ECOG PS-1   Constitutional: Alert, moving well using cane though slow to start and needing one assist for exam table transfer.  ENT: Eyes bright, No mouth sores. Sac and [place].  Neck: Supple, No adenopathy.Thyroid symmetric  Cardiac: Heart rate and rhythm is regular and strong with aortic murmur again noted  Respiratory: Breathing easy. Lung sounds clear to auscultation. No cough  Port with no redness or swelling, Access went well for first time use.   GI: Abdomen is soft, non-tender, BS normal. No masses or organomegaly  MS: Muscle tone fair- uses cane for support, extremities normal with no edema.   Skin: No suspicious lesions or rashes or bruising  Neuro: Sensory grossly WNL, gait is steady and no dizziness today  Lymph: Normal ant/post cervical, axillary, supraclavicular nodes  Psych: Mentation appears normal and affect normal/bright with easy conversation.     Laboratory Results:   Results for orders placed or performed in visit on 05/16/18   CBC with platelets differential   Result Value Ref Range    WBC 5.0 4.0 - 11.0 10e9/L    RBC Count 3.68 (L) 3.8 - 5.2 10e12/L    Hemoglobin 11.4 (L) 11.7 - 15.7 g/dL    Hematocrit 34.4 (L) 35.0 - 47.0 %    MCV 94 78 - 100 fl    MCH 31.0 26.5 - 33.0 pg    MCHC 33.1 31.5 - 36.5 g/dL    RDW 15.8 (H) 10.0 - 15.0 %    Platelet Count 167 150 - 450 10e9/L    Diff Method Automated Method     % Neutrophils 64.9 %    % Lymphocytes 21.6 %    % Monocytes 11.1 %    % Eosinophils 1.4 %    % Basophils 0.8 %    % Immature Granulocytes 0.2 %    Absolute Neutrophil 3.2 1.6 - 8.3 10e9/L    Absolute Lymphocytes 1.1 0.8 - 5.3 10e9/L    Absolute Monocytes 0.6 0.0 - 1.3 10e9/L    Absolute Eosinophils 0.1 0.0 - 0.7 10e9/L    Absolute Basophils 0.0 0.0 - 0.2 " 10e9/L    Abs Immature Granulocytes 0.0 0 - 0.4 10e9/L   Bilirubin  total   Result Value Ref Range    Bilirubin Total 0.5 0.2 - 1.3 mg/dL     Assessment and Plan:   Stage IV Pancreatic cancer-Pt showed progression of cancer with 4/11/2018 imaging after starting 6th cycle of Gemzar plan. Today she will start plan of Liposomal Irinotecan and with 15 % reduction in 5FU at very start of plan.  We reviewed the concept of premedications and expected administration of chemotherapy. She is starting with good lab report today. Assured pt that we do not expect her to have severe fatigue as she was fearing though some fatigue, loose stools or nausea may be experienced or no side effects. She will be staying with daughter first days after treatment.   We will see her back in 1 week to review side effects and treat the symptoms- will get labs per treatment plan.  Occasional loose stools- pt is using the creon with meals and seeing good control of bowels now. Did talk about imodium for help with loose stools if already taking the creon as expected and seeing loose stools worsen.   Weight loss- Pt is now seeing improved weight . Reviewed need to eat regular meals and she is able to snack between meals. Reviewed high calorie but nutritious foods.   This was a 25 min visit with > 50% in counseling and coordinating care including education and management of concerns.    Nai Simmons,CNP

## 2018-05-16 ENCOUNTER — ONCOLOGY VISIT (OUTPATIENT)
Dept: ONCOLOGY | Facility: CLINIC | Age: 78
End: 2018-05-16
Payer: COMMERCIAL

## 2018-05-16 ENCOUNTER — INFUSION THERAPY VISIT (OUTPATIENT)
Dept: INFUSION THERAPY | Facility: CLINIC | Age: 78
End: 2018-05-16
Payer: COMMERCIAL

## 2018-05-16 ENCOUNTER — HOME INFUSION (PRE-WILLOW HOME INFUSION) (OUTPATIENT)
Dept: PHARMACY | Facility: CLINIC | Age: 78
End: 2018-05-16

## 2018-05-16 VITALS
SYSTOLIC BLOOD PRESSURE: 134 MMHG | OXYGEN SATURATION: 97 % | BODY MASS INDEX: 19.96 KG/M2 | HEART RATE: 75 BPM | HEIGHT: 59 IN | DIASTOLIC BLOOD PRESSURE: 83 MMHG | WEIGHT: 99 LBS | TEMPERATURE: 98.1 F

## 2018-05-16 DIAGNOSIS — C25.9 PANCREATIC ADENOCARCINOMA (H): Primary | ICD-10-CM

## 2018-05-16 DIAGNOSIS — C25.9 PANCREATIC ADENOCARCINOMA (H): ICD-10-CM

## 2018-05-16 DIAGNOSIS — C78.01 MALIGNANT NEOPLASM METASTATIC TO BOTH LUNGS (H): Primary | ICD-10-CM

## 2018-05-16 DIAGNOSIS — C78.02 MALIGNANT NEOPLASM METASTATIC TO BOTH LUNGS (H): Primary | ICD-10-CM

## 2018-05-16 DIAGNOSIS — R63.4 LOSS OF WEIGHT: ICD-10-CM

## 2018-05-16 DIAGNOSIS — R19.5 LOOSE STOOLS: ICD-10-CM

## 2018-05-16 LAB
BASOPHILS # BLD AUTO: 0 10E9/L (ref 0–0.2)
BASOPHILS NFR BLD AUTO: 0.8 %
BILIRUB SERPL-MCNC: 0.5 MG/DL (ref 0.2–1.3)
DIFFERENTIAL METHOD BLD: ABNORMAL
EOSINOPHIL # BLD AUTO: 0.1 10E9/L (ref 0–0.7)
EOSINOPHIL NFR BLD AUTO: 1.4 %
ERYTHROCYTE [DISTWIDTH] IN BLOOD BY AUTOMATED COUNT: 15.8 % (ref 10–15)
HCT VFR BLD AUTO: 34.4 % (ref 35–47)
HGB BLD-MCNC: 11.4 G/DL (ref 11.7–15.7)
IMM GRANULOCYTES # BLD: 0 10E9/L (ref 0–0.4)
IMM GRANULOCYTES NFR BLD: 0.2 %
LYMPHOCYTES # BLD AUTO: 1.1 10E9/L (ref 0.8–5.3)
LYMPHOCYTES NFR BLD AUTO: 21.6 %
MCH RBC QN AUTO: 31 PG (ref 26.5–33)
MCHC RBC AUTO-ENTMCNC: 33.1 G/DL (ref 31.5–36.5)
MCV RBC AUTO: 94 FL (ref 78–100)
MONOCYTES # BLD AUTO: 0.6 10E9/L (ref 0–1.3)
MONOCYTES NFR BLD AUTO: 11.1 %
NEUTROPHILS # BLD AUTO: 3.2 10E9/L (ref 1.6–8.3)
NEUTROPHILS NFR BLD AUTO: 64.9 %
PLATELET # BLD AUTO: 167 10E9/L (ref 150–450)
RBC # BLD AUTO: 3.68 10E12/L (ref 3.8–5.2)
WBC # BLD AUTO: 5 10E9/L (ref 4–11)

## 2018-05-16 PROCEDURE — 85025 COMPLETE CBC W/AUTO DIFF WBC: CPT | Performed by: INTERNAL MEDICINE

## 2018-05-16 PROCEDURE — 99214 OFFICE O/P EST MOD 30 MIN: CPT | Mod: 25 | Performed by: NURSE PRACTITIONER

## 2018-05-16 PROCEDURE — 96413 CHEMO IV INFUSION 1 HR: CPT | Performed by: INTERNAL MEDICINE

## 2018-05-16 PROCEDURE — 96375 TX/PRO/DX INJ NEW DRUG ADDON: CPT | Performed by: INTERNAL MEDICINE

## 2018-05-16 PROCEDURE — 99207 ZZC NO CHARGE NURSE ONLY: CPT

## 2018-05-16 PROCEDURE — 96415 CHEMO IV INFUSION ADDL HR: CPT | Performed by: INTERNAL MEDICINE

## 2018-05-16 PROCEDURE — 96416 CHEMO PROLONG INFUSE W/PUMP: CPT | Performed by: INTERNAL MEDICINE

## 2018-05-16 PROCEDURE — 82247 BILIRUBIN TOTAL: CPT | Performed by: INTERNAL MEDICINE

## 2018-05-16 RX ORDER — LIDOCAINE/PRILOCAINE 2.5 %-2.5%
CREAM (GRAM) TOPICAL PRN
Qty: 30 G | Refills: 1 | Status: SHIPPED | OUTPATIENT
Start: 2018-05-16

## 2018-05-16 RX ORDER — HEPARIN SODIUM (PORCINE) LOCK FLUSH IV SOLN 100 UNIT/ML 100 UNIT/ML
5 SOLUTION INTRAVENOUS
Status: DISCONTINUED | OUTPATIENT
Start: 2018-05-16 | End: 2018-05-16 | Stop reason: HOSPADM

## 2018-05-16 RX ADMIN — HEPARIN SODIUM (PORCINE) LOCK FLUSH IV SOLN 100 UNIT/ML 5 ML: 100 SOLUTION at 12:03

## 2018-05-16 RX ADMIN — Medication 250 ML: at 13:14

## 2018-05-16 ASSESSMENT — PAIN SCALES - GENERAL: PAINLEVEL: NO PAIN (0)

## 2018-05-16 NOTE — MR AVS SNAPSHOT
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)    -  1    Pancreatic adenocarcinoma (H)           Follow-ups after your visit        Your next 10 appointments already scheduled     May 16, 2018 12:45 PM CDT   [facility] with [facility] INF[facility]ION   [facility]    [address] [facility] [address]   [phone]            May 23, 2018  2:45 PM CDT   Return Visit with [facility] ONLY CANCER CENTER   [facility]    [address] [facility] [address]   [phone]            May 23, 2018  3:15 PM CDT   Return Visit with LISA Mendieta CNP   [facility])    [address] [facility] [address]   [phone]            May 30, 2018 11:45 AM CDT   Return Visit with [facility]URSE ONLY CANCER CENTER   [facility]    [address] [facility] [address] [phone]            May 30, 2018 12:15 PM CDT   Return Visit with LISA Mendieta CNP   [facility])    [address] [facility] [address]   [phone]            May 30, 2018  1:00 PM CDT   Folfiri with [facility] 4 INFUSION   [facility]    [address] [facility] [address]   [phone]            Jun 06, 2018 10:00 AM CDT   Return Visit with [facility]URSE ONLY CANCER CENTER   [facility]    [address] [facility] [address] [phone]              Who to  contact     If you have questions or need follow up information about today's clinic visit or your schedule please contact [facility] directly at [phone].  Normal or non-critical lab and imaging results will be communicated to you by MyChart, letter or phone within 4 business days after the clinic has received the results. If you do not hear from us within 7 days, please contact the clinic through MyChart or phone. If you have a critical or abnormal lab result, we will notify you by phone as soon as possible.  Submit refill requests through OMG or call your pharmacy and they will forward the refill request to us. Please allow 3 business days for your refill to be completed.          Additional Information About Your Visit        OMG Information     OMG is an electronic gateway that provides easy, online access to your medical records. With OMG, you can request a clinic appointment, read your test results, renew a prescription or communicate with your care team.     To sign up for OMG visit the website at [email or web address]   You will be asked to enter the access code listed below, as well as some personal information. Please follow the directions to create your username and password.     Your access code is: [email or web address]  Expires: 7/10/2018  2:55 PM     Your access code will  in 90 days. If you need help or a new code, please contact your [facility] or call [phone] for assistance.        Care EveryWhere ID     This is your Care EveryWhere ID. This could be used by other organizations to access your [facility] medical records  [id number]         Blood Pressure from Last 3 Encounters:   18 152/83   18 134/76   18 135/78    Weight from Last 3 Encounters:   18 44.9 kg (99 lb)   18 45.9 kg (101 lb 1.6 oz)   18 45.2 kg (99 lb 11.2 oz)              We Performed the Following     Bilirubin  total      CBC with platelets differential        Primary Care Provider Office Phone # Fax #    Pancho Cope [phone] [address] [facility] [address]        Equal Access to Services     JOCELINE JORDAN : Hadnikhil feliciano ku jaxo Soomaali, waaxda luqadaha, qaybta kaalmada adeterenceyada, carrie pickens laPennyanu wheatley. So [facility] [phone].    ATENCIÓN: Si habla español, tiene a ornelas disposición servicios gratuitos de asistencia lingüística. Llame al [phone].    We comply with applicable federal civil rights laws and [place] laws. We do not discriminate on the basis of race, color, national origin, age, disability, sex, sexual orientation, or gender identity.            Thank you!     Thank you for choosing [facility]  for your care. Our goal is always to provide you with excellent care. Hearing back from our patients is one way we can continue to improve our services. Please take a few minutes to complete the written survey that you may receive in the mail after your visit with us. Thank you!             Your Updated Medication List - Protect others around you: Learn how to safely use, store and throw away your medicines at www.disposemymeds.org.          This list is accurate as of 5/16/18 12:20 PM.  Always use your most recent med list.                   Brand Name Dispense Instructions for use Diagnosis    amylase-lipase-protease 46401-03602 units Cpep per EC capsule    CREON    180 capsule    Take 1 capsule (24,000 Units) by mouth 3 times daily (with meals)    Pancreatic adenocarcinoma (H)       BIOTIN PO           latanoprost 0.005 % ophthalmic solution    XALATAN    3 Bottle    Place 1 drop into both eyes At Bedtime    Glaucoma suspect, bilateral       loperamide 2 MG capsule    IMODIUM    30 capsule    2 caps at 1st sign of diarrhea & 1 cap every 2hrs until 12hrs diarrhea free. 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

## 2018-05-16 NOTE — MR AVS SNAPSHOT
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)    -  1    Pancreatic adenocarcinoma (H)           Follow-ups after your visit        Your next 10 appointments already scheduled     May 23, 2018  2:45 PM CDT   Return Visit with [facility]   [facility] ([facility])    [address] [place] [address]   [phone]            May 23, 2018  3:15 PM CDT   Return Visit with LISA Mendieta CNP   [facility] [place]note 2[place]    [address] [address] [address] [phone]            May 30, 2018 11:45 AM CDT   Return Visit with [facility]   [facility] ([facility])    [address] [address] [phone]            May 30, 2018 12:15 PM CDT   Return Visit with LISA Mendieta CNP   [facility] [place]note 2[place]    [address] [place] [address] [phone]            May 30, 2018  1:00 PM CDT   [facility] with [place] [facility])    [address] [facility] [address] [phone]            Jun 06, 2018 10:00 AM CDT   Return Visit with [facility] ONLY [facility]ER [facility]ENTER   [facility] ([facility])    [address] [facility] [address] [phone]              Who to contact     If you have questions or need follow up information about today's clinic visit or your schedule please contact [facility] directly at [phone].  Normal or non-critical lab and  imaging results will be communicated to you by [employer], letter or phone within 4 business days after the clinic has received the results. If you do not hear from us within 7 days, please contact the clinic through [employer] or phone. If you have a critical or abnormal lab result, we will notify you by phone as soon as possible.  Submit refill requests through [employer] or call your pharmacy and they will forward the refill request to us. Please allow 3 business days for your refill to be completed.          Additional Information About Your Visit        [employer] Information     [employer] is an electronic gateway that provides easy, online access to your medical records. With [employer], you can request a clinic appointment, read your test results, renew a prescription or communicate with your care team.     To sign up for [employer] visit the website at [email or web address]   You will be asked to enter the access code listed below, as well as some personal information. Please follow the directions to create your username and password.     Your access code is: [email or web address]  Expires: 7/10/2018  2:55 PM     Your access code will  in 90 days. If you need help or a new code, please contact your [facility] or call [phone] for assistance.        Care EveryWhere ID     This is your Care EveryWhere ID. This could be used by other organizations to access your [facility] medical records  [id number]         Blood Pressure from Last 3 Encounters:   18 134/83   18 152/83   18 134/76    Weight from Last 3 Encounters:   18 44.9 kg (99 lb)   18 44.9 kg (99 lb)   18 45.9 kg (101 lb 1.6 oz)              Today, you had the following     No orders found for display         Today's Medication Changes          These changes are accurate as of 18  3:56 PM.  If you have any questions, ask your nurse or doctor.               Start taking these medicines.         Dose/Directions    lidocaine-prilocaine cream   Commonly known as:  EMLA   Used for:  Pancreatic adenocarcinoma (H)   Started by:  Nai Simmons APRN CNP        Apply topically as needed for moderate pain (use dollop of cream to saran wrap 30 min prior to use of port)   Quantity:  30 g   Refills:  1            Where to get your medicines      These medications were sent to [facility] - [place], [place] - [address], [address]  [address], [facility] [address]     Phone:  [phone]     lidocaine-prilocaine cream                Primary Care Provider Office Phone # Fax #    Pancho Cope [phone] [address] [address] [facility] [address]        Equal Access to Services     [facility] : Hadii feliciano suárez hadasho Soomaali, waaxda luqadaha, qaybta kaalmada adeegyada, carrie kaur . So [facility] [phone].    ATENCIÓN: Si habla español, tiene a ornelas disposición servicios gratuitos de asistencia lingüística. [facility] [phone].    We comply with applicable federal civil rights laws and [place] laws. We do not discriminate on the basis of race, color, national origin, age, disability, sex, sexual orientation, or gender identity.            Thank you!     Thank you for choosing [facility]  for your care. Our goal is always to provide you with excellent care. Hearing back from our patients is one way we can continue to improve our services. Please take a few minutes to complete the written survey that you may receive in the mail after your visit with us. Thank you!             Your Updated Medication List - Protect others around you: Learn how to safely use, store and throw away your medicines at www.disposemymeds.org.          This list is accurate as of 5/16/18  3:56 PM.  Always use your most recent med list.                   Brand Name Dispense Instructions for use Diagnosis     amylase-lipase-protease 75245-[address]27 units Cpep per EC capsule    CREON    180 capsule    Take 1 capsule (24,000 Units) by mouth 3 times daily (with meals)    Pancreatic adenocarcinoma (H)       BIOTIN PO           latanoprost 0.005 % ophthalmic solution    XALATAN    3 Bottle    Place 1 drop into both eyes At Bedtime    Glaucoma suspect, bilateral       lidocaine-prilocaine cream    EMLA    30 g    Apply topically as needed for moderate pain (use dollop of cream to saran wrap 30 min prior to use of port)    Pancreatic adenocarcinoma (H)       loperamide 2 MG capsule    IMODIUM    30 capsule    2 caps at 1st sign of diarrhea & 1 cap every 2hrs until 12hrs diarrhea free. 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

## 2018-05-16 NOTE — PROGRESS NOTES
Infusion Nursing Note:  Talya Gatica presents today for C1D1 onivyde/fluorouracil.    Patient seen by provider today: Yes: Nai Simmons NP   present during visit today: Not Applicable.    Note: New chemotherapy drugs reviewed verbally and written information given to the patient. All questions answered. 24 hour phone line number reviewed with patient. Daughter is going to purchase imodium today to use prn.    Intravenous Access:  Implanted Port.    Treatment Conditions:  Lab Results   Component Value Date    HGB 11.4 05/16/2018     Lab Results   Component Value Date    WBC 5.0 05/16/2018      Lab Results   Component Value Date    ANEU 3.2 05/16/2018     Lab Results   Component Value Date     05/16/2018      Lab Results   Component Value Date     04/11/2018                   Lab Results   Component Value Date    POTASSIUM 3.7 04/18/2018           No results found for: MAG         Lab Results   Component Value Date    CR 0.47 04/11/2018                   Lab Results   Component Value Date    BERNARDO 9.0 04/11/2018                Lab Results   Component Value Date    BILITOTAL 0.5 05/16/2018           Lab Results   Component Value Date    ALBUMIN 3.5 04/11/2018                    Lab Results   Component Value Date    ALT 46 04/11/2018           Lab Results   Component Value Date    AST 51 04/11/2018       Results reviewed, labs MET treatment parameters, ok to proceed with treatment.      Post Infusion Assessment:  Patient tolerated infusion without incident.  Blood return noted pre and post infusion.  Site patent and intact, free from redness, edema or discomfort.  No evidence of extravasations.  Port needle in place for 46 hours chemotherapy.    Discharge Plan:   Patient discharged in stable condition accompanied by: daughter.  Departure Mode: Ambulatory and using her cane.  Shantel (ABRAM) RN notified of home disconnect due 5/18/18 at 1pm.  Verbally reviewed next appointment with Nai  5/23/18. Lab check that day as well.    Brielle Govea RN

## 2018-05-16 NOTE — LETTER
"    5/16/2018         RE: Talya Gatica  [address]  [facility] [address]        Dear Colleague,    Thank you for referring your patient, Talya Gatica, to the [facility]. Please see a copy of my visit note below.    Oncology Follow Up Visit: May 16, 2018       Oncologist: Dr Jim Soares  PCP: Pancho Cope    Diagnosis: Stage IV Pancreatic cancer  Talya Gatica is a 78 yo who c/o jaundice in 7/2017 was found to have  adenocarcinoma of the head of pancreas causing biliary obstruction and several pulmonary nodules consistent with metastasis- initially told days to 3 months of life.  Treatment:   11/3/2017 began treatment with Gemzar- days 1,8,15 of 28 day plan x 6 cycles  5/16/2018 to begin treatment with FOLFIRI with dose reduction of the 5 FU from start of plan by 15%.    Interval History: Ms. Sanchez comes to clinic alone today for review of symptoms to start a new treatment for her pancreatic cancer with FOLFIRI. Pt had port placed which was accessed today without problems - reviewed emla cream. Reports she \"plays around\" with dosing the creon and is still having some loose stools but feel much better control but is eating 2 x daily most days and has been able to improve weight slowly. She otherwise has been feeling well and has no complaints. Her worry today is if she would be very sick after treatment and if she would be able to continue with her plans for [place] this weekend.   Rest of comprehensive and complete ROS is reviewed and is negative.   Past Medical History:   Diagnosis Date     AR (allergic rhinitis)      Baker's cyst      DJD (degenerative joint disease)      Elevated cholesterol      Glaucoma      Menopause      Vertigo      Current Outpatient Prescriptions   Medication     amylase-lipase-protease (CREON) 10007-12890 units CPEP per EC capsule     BIOTIN PO     latanoprost (XALATAN) 0.005 % ophthalmic solution     loperamide (IMODIUM) 2 MG capsule " "    LORazepam (ATIVAN) 0.5 MG tablet     Multiple Vitamins-Minerals (WOMENS 50+ MULTI VITAMIN/MIN PO)     ondansetron (ZOFRAN) 8 MG tablet     Potassium Chloride ER 20 MEQ TBCR     prochlorperazine (COMPAZINE) 10 MG tablet     No current facility-administered medications for this visit.      Allergies   Allergen Reactions     Codeine Camsylate        Physical Exam: /83  Pulse 75  Temp 98.1  F (36.7  C)  Ht 1.511 m (4' 11.49\")  Wt 44.9 kg (99 lb)  SpO2 97%  BMI 19.67 kg/m2  ECOG PS-1   Constitutional: Alert, moving well using cane though slow to start and needing one assist for exam table transfer.  ENT: Eyes bright, No mouth sores. [place].  Neck: Supple, No adenopathy.Thyroid symmetric  Cardiac: Heart rate and rhythm is regular and strong with aortic murmur again noted  Respiratory: Breathing easy. Lung sounds clear to auscultation. No cough  Port with no redness or swelling, Access went well for first time use.   GI: Abdomen is soft, non-tender, BS normal. No masses or organomegaly  MS: Muscle tone fair- uses cane for support, extremities normal with no edema.   Skin: No suspicious lesions or rashes or bruising  Neuro: Sensory grossly WNL, gait is steady and no dizziness today  Lymph: Normal ant/post cervical, axillary, supraclavicular nodes  Psych: Mentation appears normal and affect normal/bright with easy conversation.     Laboratory Results:   Results for orders placed or performed in visit on 05/16/18   CBC with platelets differential   Result Value Ref Range    WBC 5.0 4.0 - 11.0 10e9/L    RBC Count 3.68 (L) 3.8 - 5.2 10e12/L    Hemoglobin 11.4 (L) 11.7 - 15.7 g/dL    Hematocrit 34.4 (L) 35.0 - 47.0 %    MCV 94 78 - 100 fl    MCH 31.0 26.5 - 33.0 pg    MCHC 33.1 31.5 - 36.5 g/dL    RDW 15.8 (H) 10.0 - 15.0 %    Platelet Count 167 150 - 450 10e9/L    Diff Method Automated Method     % Neutrophils 64.9 %    % Lymphocytes 21.6 %    % Monocytes 11.1 %    % Eosinophils 1.4 %    % Basophils 0.8 %    % " Immature Granulocytes 0.2 %    Absolute Neutrophil 3.2 1.6 - 8.3 10e9/L    Absolute Lymphocytes 1.1 0.8 - 5.3 10e9/L    Absolute Monocytes 0.6 0.0 - 1.3 10e9/L    Absolute Eosinophils 0.1 0.0 - 0.7 10e9/L    Absolute Basophils 0.0 0.0 - 0.2 10e9/L    Abs Immature Granulocytes 0.0 0 - 0.4 10e9/L   Bilirubin  total   Result Value Ref Range    Bilirubin Total 0.5 0.2 - 1.3 mg/dL     Assessment and Plan:   Stage IV Pancreatic cancer-Pt showed progression of cancer with 4/11/2018 imaging after starting 6th cycle of Gemzar plan. Today she will start plan of Liposomal Irinotecan and with 15 % reduction in 5FU at very start of plan.  We reviewed the concept of premedications and expected administration of chemotherapy. She is starting with good lab report today. Assured pt that we do not expect her to have severe fatigue as she was fearing though some fatigue, loose stools or nausea may be experienced or no side effects. She will be staying with daughter first days after treatment.   We will see her back in 1 week to review side effects and treat the symptoms- will get labs per treatment plan.  Occasional loose stools- pt is using the creon with meals and seeing good control of bowels now. Did talk about imodium for help with loose stools if already taking the creon as expected and seeing loose stools worsen.   Weight loss- Pt is now seeing improved weight . Reviewed need to eat regular meals and she is able to snack between meals. Reviewed high calorie but nutritious foods.   This was a 25 min visit with > 50% in counseling and coordinating care including education and management of concerns.    Nai Simmons CNP      Again, thank you for allowing me to participate in the care of your patient.        Sincerely,        Nai Simmons, NP, APRN CNP

## 2018-05-16 NOTE — NURSING NOTE
"Oncology Rooming Note    May 16, 2018 12:24 PM   Talya Gatica is a 77 year old female who presents for:    Chief Complaint   Patient presents with     Oncology Clinic Visit     follow up prior to treatment     Initial Vitals: /83  Pulse 75  Temp 98.1  F (36.7  C)  Ht 1.511 m (4' 11.49\")  Wt 44.9 kg (99 lb)  SpO2 97%  BMI 19.67 kg/m2 Estimated body mass index is 19.67 kg/(m^2) as calculated from the following:    Height as of this encounter: 1.511 m (4' 11.49\").    Weight as of this encounter: 44.9 kg (99 lb). Body surface area is 1.37 meters squared.  No Pain (0) Comment: Data Unavailable   No LMP recorded. Patient is postmenopausal.  Allergies reviewed: Yes  Medications reviewed: Yes    Medications: Medication refills not needed today.  Pharmacy name entered into [facility]: [facility] - [place], [place] - [address] AT [facility] [address]        5 minutes for nursing intake (face to face time)     Carly Bobby LPN              "

## 2018-05-16 NOTE — MR AVS SNAPSHOT
After Visit Summary   5/16/2018    Talya Gatica    MRN: [id number]           Patient Information     Date Of Birth          1940        Visit Information        Provider Department      5/16/2018 12:15 PM Nai Simmons APRN CNP [facility]        Today's Diagnoses     Pancreatic adenocarcinoma (H)    -  1    Loose stools        Loss of weight           Follow-ups after your visit        Your next 10 appointments already scheduled     May 23, 2018  2:45 PM CDT   Return Visit with NURSE ONLY CANCER CENTER   [facility])    [address] [place] [address]   [phone]            May 23, 2018  3:15 PM CDT   Return Visit with LISA Mendieta CNP   [facility])    [address] [place] [address] [phone]            May 30, 2018 11:45 AM CDT   Return Visit with NURSE ONLY CANCER CENTER   [facility])    [address] [place] [address] [phone]            May 30, 2018 12:15 PM CDT   Return Visit with LISA Mendieta CNP   [facility])    [address] [place] [address] [phone]            May 30, 2018  1:00 PM CDT   Folfiri with BAY 4 INFUSION   [facility])    [address] [place] [address] [phone]            Jun 06, 2018 10:00 AM CDT   Return Visit with NURSE ONLY CANCER CENTER   [facility] ([facility])    [address] [place] [address] [phone]              Who to contact     If you have questions or need follow up information about today's clinic visit or your schedule please contact [facility] directly at [phone].  Normal or non-critical lab  "and imaging results will be communicated to you by MyChart, letter or phone within 4 business days after the clinic has received the results. If you do not hear from us within 7 days, please contact the clinic through [employer] or phone. If you have a critical or abnormal lab result, we will notify you by phone as soon as possible.  Submit refill requests through [employer] or call your pharmacy and they will forward the refill request to us. Please allow 3 business days for your refill to be completed.          Additional Information About Your Visit        [employer] Information     [employer] is an electronic gateway that provides easy, online access to your medical records. With [employer], you can request a clinic appointment, read your test results, renew a prescription or communicate with your care team.     To sign up for [employer] visit the website at [email or web address]   You will be asked to enter the access code listed below, as well as some personal information. Please follow the directions to create your username and password.     Your access code is: [email or web address]  Expires: 7/10/2018  2:55 PM     Your access code will  in 90 days. If you need help or a new code, please contact your [facility] or call [phone] for assistance.        Care EveryWhere ID     This is your Care EveryWhere ID. This could be used by other organizations to access your [place] medical records  [id number]        Your Vitals Were     Pulse Temperature Height Pulse Oximetry BMI (Body Mass Index)       75 98.1  F (36.7  C) 1.511 m (4' 11.49\") 97% 19.67 kg/m2        Blood Pressure from Last 3 Encounters:   18 134/83   18 152/83   18 134/76    Weight from Last 3 Encounters:   18 44.9 kg (99 lb)   18 44.9 kg (99 lb)   18 45.9 kg (101 lb 1.6 oz)              Today, you had the following     No orders found for display         Today's Medication Changes        "   These changes are accurate as of 5/16/18  2:16 PM.  If you have any questions, ask your nurse or doctor.               Start taking these medicines.        Dose/Directions    lidocaine-prilocaine cream   Commonly known as:  EMLA   Used for:  Pancreatic adenocarcinoma (H)   Started by:  Nai Simmons APRN CNP        Apply topically as needed for moderate pain (use dollop of cream to saran wrap 30 min prior to use of port)   Quantity:  30 g   Refills:  1            Where to get your medicines      These medications were sent to [facility] - [place], [place] - [address], [address]  [address], [address], [facility] [address]     Phone:  [phone]     lidocaine-prilocaine cream                Primary Care Provider Office Phone # Fax #    Pancho Cope [phone] [address] [facility] [address]        Equal Access to Services     [facility]enc[facility] HospitalCINDY : Hadii aad ku hadasho Soomaali, waaxda luqadaha, qaybta kaalmada adeegyada, waxay idiin hayaan kristi kaur . So [facility] [phone].    ATENCIÓN: Si nikolas espeloise, tiene a ornelas disposición servicios gratuitos de asistencia lingüística. Llame al [phone].    We comply with applicable federal civil rights laws and [place] laws. We do not discriminate on the basis of race, color, national origin, age, disability, sex, sexual orientation, or gender identity.            Thank you!     Thank you for choosing [facility]  for your care. Our goal is always to provide you with excellent care. Hearing back from our patients is one way we can continue to improve our services. Please take a few minutes to complete the written survey that you may receive in the mail after your visit with us. Thank you!             Your Updated Medication List - Protect others around you: Learn how to safely use, store and throw away your medicines at www.disposemymeds.org.          This list is  accurate as of 5/16/18  2:16 PM.  Always use your most recent med list.                   Brand Name Dispense Instructions for use Diagnosis    amylase-lipase-protease [address]9546-53161 units Cpep per EC capsule    CREON    180 capsule    Take 1 capsule (24,000 Units) by mouth 3 times daily (with meals)    Pancreatic adenocarcinoma (H)       BIOTIN PO           latanoprost 0.005 % ophthalmic solution    XALATAN    3 Bottle    Place 1 drop into both eyes At Bedtime    Glaucoma suspect, bilateral       lidocaine-prilocaine cream    EMLA    30 g    Apply topically as needed for moderate pain (use dollop of cream to saran wrap 30 min prior to use of port)    Pancreatic adenocarcinoma (H)       loperamide 2 MG capsule    IMODIUM    30 capsule    2 caps at 1st sign of diarrhea & 1 cap every 2hrs until 12hrs diarrhea free. 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

## 2018-05-17 ENCOUNTER — HOME INFUSION (PRE-WILLOW HOME INFUSION) (OUTPATIENT)
Dept: PHARMACY | Facility: CLINIC | Age: 78
End: 2018-05-17

## 2018-05-17 ENCOUNTER — TELEPHONE (OUTPATIENT)
Dept: PEDIATRICS | Facility: CLINIC | Age: 78
End: 2018-05-17

## 2018-05-17 NOTE — TELEPHONE ENCOUNTER
**Please Do Not Close This Encounter**               ** Until This Has Been Addressed**          PRIOR AUTHORIZATION REQUIED PER INSURANCE       PATIENT:Talya Gatica YOB: 1931          MEDICATION: Lidocaine-Prilocaine 2.5-2.5% Cream                    SIG: Apply topically as needed for moderate pain (use dollop of cream to saran wrap 30 min prior to use of port)         NDC: [address]-81      INSURANCE: [employer] D       INSURANCE PHONE #: [phone]      ID #: [id number]      INSURANCE REJECT: Prior Auth Req      PHARMACY:[facility]      PHARMACY NPI:[id number]      PHARMACY PHONE #:[phone]                                                          Please let us know when Prior Auth approved/denied, prescriber refusal to complete prior auth or if you would like to change medication/discontinue                                                            Thank you

## 2018-05-17 NOTE — TELEPHONE ENCOUNTER
Central Prior Authorization Team   Phone: [phone]    PA Initiation    Medication: Emla-Initiated-  Insurance Company: [employer] - Phone [phone] Fax [phone]  Pharmacy Filling the Rx: [facility]  Filling Pharmacy Phone: [phone]  Filling Pharmacy Fax:    Start Date: 5/17/2018

## 2018-05-17 NOTE — PROGRESS NOTES
This is a recent snapshot of the patient's [facility] medical record.  For current drug dose and complete information and questions, call [phone] or In Basket pool, fv home infusion ([id number])  CSN Number:  [id number]

## 2018-05-18 ENCOUNTER — HOME INFUSION (PRE-WILLOW HOME INFUSION) (OUTPATIENT)
Dept: PHARMACY | Facility: CLINIC | Age: 78
End: 2018-05-18

## 2018-05-18 NOTE — PROGRESS NOTES
This is a recent snapshot of the patient's [facility] medical record.  For current drug dose and complete information and questions, call [phone] or In Basket pool, fv home infusion ([id number])  CSN Number:  [id number]

## 2018-05-18 NOTE — PROGRESS NOTES
This is a recent snapshot of the patient's [facility] medical record.  For current drug dose and complete information and questions, call [phone] or In [facility] pool, fv home infusion ([id number])  CSN Number:  [id number]

## 2018-05-21 NOTE — PROGRESS NOTES
This is a recent snapshot of the patient's [facility] medical record.  For current drug dose and complete information and questions, call [phone] or In Basket pool, fv home infusion ([id number])  CSN Number:  [id number]

## 2018-05-23 ENCOUNTER — ONCOLOGY VISIT (OUTPATIENT)
Dept: ONCOLOGY | Facility: CLINIC | Age: 78
End: 2018-05-23
Payer: COMMERCIAL

## 2018-05-23 VITALS
BODY MASS INDEX: 18.83 KG/M2 | DIASTOLIC BLOOD PRESSURE: 82 MMHG | SYSTOLIC BLOOD PRESSURE: 127 MMHG | WEIGHT: 94.8 LBS | TEMPERATURE: 97.7 F | OXYGEN SATURATION: 98 % | HEART RATE: 71 BPM | RESPIRATION RATE: 16 BRPM

## 2018-05-23 DIAGNOSIS — Z95.828 PORT CATHETER IN PLACE: ICD-10-CM

## 2018-05-23 DIAGNOSIS — R63.4 LOSS OF WEIGHT: ICD-10-CM

## 2018-05-23 DIAGNOSIS — C78.02 MALIGNANT NEOPLASM METASTATIC TO BOTH LUNGS (H): Primary | ICD-10-CM

## 2018-05-23 DIAGNOSIS — C25.9 PANCREATIC ADENOCARCINOMA (H): ICD-10-CM

## 2018-05-23 DIAGNOSIS — C78.01 MALIGNANT NEOPLASM METASTATIC TO BOTH LUNGS (H): Primary | ICD-10-CM

## 2018-05-23 LAB
ANION GAP SERPL CALCULATED.3IONS-SCNC: 7 MMOL/L (ref 3–14)
BASOPHILS # BLD AUTO: 0 10E9/L (ref 0–0.2)
BASOPHILS NFR BLD AUTO: 0.2 %
BUN SERPL-MCNC: 8 MG/DL (ref 7–30)
CALCIUM SERPL-MCNC: 8.8 MG/DL (ref 8.5–10.1)
CHLORIDE SERPL-SCNC: 108 MMOL/L (ref 94–109)
CO2 SERPL-SCNC: 25 MMOL/L (ref 20–32)
CREAT SERPL-MCNC: 0.44 MG/DL (ref 0.52–1.04)
DIFFERENTIAL METHOD BLD: ABNORMAL
EOSINOPHIL # BLD AUTO: 0.1 10E9/L (ref 0–0.7)
EOSINOPHIL NFR BLD AUTO: 2 %
ERYTHROCYTE [DISTWIDTH] IN BLOOD BY AUTOMATED COUNT: 14.7 % (ref 10–15)
GFR SERPL CREATININE-BSD FRML MDRD: >90 ML/MIN/1.7M2
GLUCOSE SERPL-MCNC: 109 MG/DL (ref 70–99)
HCT VFR BLD AUTO: 34 % (ref 35–47)
HGB BLD-MCNC: 11.4 G/DL (ref 11.7–15.7)
IMM GRANULOCYTES # BLD: 0 10E9/L (ref 0–0.4)
IMM GRANULOCYTES NFR BLD: 0.5 %
LYMPHOCYTES # BLD AUTO: 1.3 10E9/L (ref 0.8–5.3)
LYMPHOCYTES NFR BLD AUTO: 24.3 %
MCH RBC QN AUTO: 30.8 PG (ref 26.5–33)
MCHC RBC AUTO-ENTMCNC: 33.5 G/DL (ref 31.5–36.5)
MCV RBC AUTO: 92 FL (ref 78–100)
MONOCYTES # BLD AUTO: 0.4 10E9/L (ref 0–1.3)
MONOCYTES NFR BLD AUTO: 6.5 %
NEUTROPHILS # BLD AUTO: 3.7 10E9/L (ref 1.6–8.3)
NEUTROPHILS NFR BLD AUTO: 66.5 %
PLATELET # BLD AUTO: 201 10E9/L (ref 150–450)
POTASSIUM SERPL-SCNC: 3.9 MMOL/L (ref 3.4–5.3)
RBC # BLD AUTO: 3.7 10E12/L (ref 3.8–5.2)
SODIUM SERPL-SCNC: 140 MMOL/L (ref 133–144)
WBC # BLD AUTO: 5.5 10E9/L (ref 4–11)

## 2018-05-23 PROCEDURE — 85025 COMPLETE CBC W/AUTO DIFF WBC: CPT | Performed by: INTERNAL MEDICINE

## 2018-05-23 PROCEDURE — 99214 OFFICE O/P EST MOD 30 MIN: CPT | Performed by: NURSE PRACTITIONER

## 2018-05-23 PROCEDURE — 80048 BASIC METABOLIC PNL TOTAL CA: CPT | Performed by: NURSE PRACTITIONER

## 2018-05-23 PROCEDURE — 99207 ZZC NO CHARGE NURSE ONLY: CPT

## 2018-05-23 RX ORDER — HEPARIN SODIUM (PORCINE) LOCK FLUSH IV SOLN 100 UNIT/ML 100 UNIT/ML
5 SOLUTION INTRAVENOUS
Status: DISCONTINUED | OUTPATIENT
Start: 2018-05-23 | End: 2018-05-23 | Stop reason: HOSPADM

## 2018-05-23 RX ADMIN — HEPARIN SODIUM (PORCINE) LOCK FLUSH IV SOLN 100 UNIT/ML 5 ML: 100 SOLUTION at 14:52

## 2018-05-23 ASSESSMENT — PAIN SCALES - GENERAL: PAINLEVEL: NO PAIN (0)

## 2018-05-23 NOTE — PROGRESS NOTES
Oncology Follow Up Visit: May 23, 2018       Oncologist: Dr Jim Soares  PCP: Pancho Cope    Diagnosis: Stage IV Pancreatic cancer  Talya Gatica is a 78 yo who c/o jaundice in 7/2017 was found to have  adenocarcinoma of the head of pancreas causing biliary obstruction and several pulmonary nodules consistent with metastasis- initially told days to 3 months of life.  Treatment:   11/3/2017 began treatment with Gemzar- days 1,8,15 of 28 day plan x 6 cycles  5/16/2018 to begin treatment with FOLFIRI with dose reduction of the 5 FU from start of plan by 15%.    Interval History: Ms. Sanchez comes to clinic alone today for review of symptoms 1 week after start a new treatment for her pancreatic cancer with FOLFIRI. Pt is aware she has lost more weight this week and states she has not had nausea but has had loss of appetite.she mentions that she tends to eat smaller meals but states she is eats with daughter some of the time. She has had only 1 loose stool- took liquid imodium and had no more loose stools. Pt also asking more questions about the port and its care. States she is not taking creon unless eating high fat meal.    Rest of comprehensive and complete ROS is reviewed and is negative.   Past Medical History:   Diagnosis Date     AR (allergic rhinitis)      Baker's cyst      DJD (degenerative joint disease)      Elevated cholesterol      Glaucoma      Menopause      Vertigo      Current Outpatient Prescriptions   Medication     amylase-lipase-protease (CREON) 38403-27088 units CPEP per EC capsule     BIOTIN PO     latanoprost (XALATAN) 0.005 % ophthalmic solution     lidocaine-prilocaine (EMLA) cream     loperamide (IMODIUM) 2 MG capsule     LORazepam (ATIVAN) 0.5 MG tablet     Multiple Vitamins-Minerals (WOMENS 50+ MULTI VITAMIN/MIN PO)     ondansetron (ZOFRAN) 8 MG tablet     Potassium Chloride ER 20 MEQ TBCR     prochlorperazine (COMPAZINE) 10 MG tablet     No current facility-administered  medications for this visit.      Allergies   Allergen Reactions     Codeine Camsylate        Physical Exam: /82  Pulse 71  Temp 97.7  F (36.5  C) (Oral)  Resp 16  Wt 43 kg (94 lb 12.8 oz)  SpO2 98%  BMI 18.83 kg/m2 - has lost more weight this week and is now under weight.   ECOG PS-1   Constitutional: Alert, moving slowly using cane though slow to start and needing one assist for transfer.wlakiong going better when someone takes her purse form her  ENT: Eyes bright, No mouth sores. [place].  Neck: Supple, No adenopathy.Thyroid symmetric  Cardiac: Heart rate and rhythm is regular and strong with aortic murmur again noted  Respiratory: Breathing easy. Lung sounds clear to auscultation. No cough  Port with no redness or swelling, Access went well for first time use.   GI: Abdomen is soft, non-tender, BS normal. No masses or organomegaly  MS: Muscle tone fair- uses cane for support, extremities normal with no edema.   Skin: No suspicious lesions or rashes or bruising  Neuro: Sensory grossly WNL, gait is steady- denies dizziness.  Lymph: Normal ant/post cervical, axillary, supraclavicular nodes  Psych: Mentation appears normal and affect normal/bright with easy conversation.     Laboratory Results:    Ref. Range 5/23/2018 14:52   Sodium Latest Ref Range: 133 - 144 mmol/L 140   Potassium Latest Ref Range: 3.4 - 5.3 mmol/L 3.9   Chloride Latest Ref Range: 94 - 109 mmol/L 108   Carbon Dioxide Latest Ref Range: 20 - 32 mmol/L 25   Urea Nitrogen Latest Ref Range: 7 - 30 mg/dL 8   Creatinine Latest Ref Range: 0.52 - 1.04 mg/dL 0.44 (L)   GFR Estimate Latest Ref Range: >60 mL/min/1.7m2 >90   GFR Estimate If Black Latest Ref Range: >60 mL/min/1.7m2 >90   Calcium Latest Ref Range: 8.5 - 10.1 mg/dL 8.8   Anion Gap Latest Ref Range: 3 - 14 mmol/L 7   Glucose Latest Ref Range: 70 - 99 mg/dL 109 (H)   WBC Latest Ref Range: 4.0 - 11.0 10e9/L 5.5   Hemoglobin Latest Ref Range: 11.7 - 15.7 g/dL 11.4 (L)   Hematocrit Latest Ref  Range: 35.0 - 47.0 % 34.0 (L)   Platelet Count Latest Ref Range: 150 - 450 10e9/L 201   RBC Count Latest Ref Range: 3.8 - 5.2 10e12/L 3.70 (L)   MCV Latest Ref Range: 78 - 100 fl 92   MCH Latest Ref Range: 26.5 - 33.0 pg 30.8   MCHC Latest Ref Range: 31.5 - 36.5 g/dL 33.5   RDW Latest Ref Range: 10.0 - 15.0 % 14.7   Diff Method Unknown Automated Method   % Neutrophils Latest Units: % 66.5   % Lymphocytes Latest Units: % 24.3   % Monocytes Latest Units: % 6.5   % Eosinophils Latest Units: % 2.0   % Basophils Latest Units: % 0.2   % Immature Granulocytes Latest Units: % 0.5   Absolute Neutrophil Latest Ref Range: 1.6 - 8.3 10e9/L 3.7   Absolute Lymphocytes Latest Ref Range: 0.8 - 5.3 10e9/L 1.3   Absolute Monocytes Latest Ref Range: 0.0 - 1.3 10e9/L 0.4   Absolute Eosinophils Latest Ref Range: 0.0 - 0.7 10e9/L 0.1   Absolute Basophils Latest Ref Range: 0.0 - 0.2 10e9/L 0.0   Abs Immature Granulocytes Latest Ref Range: 0 - 0.4 10e9/L 0.0     Assessment and Plan:   Stage IV Pancreatic cancer-Pt started plan of Liposomal Irinotecan and with 15 % reduction in 5FU one week previous. Today she is seen to have lost weight with poor intake though labs remain acceptable and see that she is not dehydrated.  Reviewed that she should try to something every 2 hours and continue to push fluids but limit drinking after 6 pm to keep from frequent urination overnight. Warned that she must stop weight loss or we will need to hold treatments in the future. Given samples of ensure and told she could try taking 1/2 bottle at a time and should not cause loose stools. Reviewed protein foods.   Discussed she may take off bandage off port after a couple hours post infusion and may shower with port without worry.   Pt will return on 5//30 for evaluation prior to next cycle.   This was a 25 min visit with > 50% in counseling and coordinating care including education and management of concerns.    Nai Simmons,CNP

## 2018-05-23 NOTE — MR AVS SNAPSHOT
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)    -  1    Pancreatic adenocarcinoma (H)           Follow-ups after your visit        Your next 10 appointments already scheduled     May 23, 2018  3:15 PM CDT   Return Visit with LISA Mendieta CNP   [facility])    [address] [place] [address]   [phone]            May 30, 2018 11:45 AM CDT   Return Visit with NURSE ONLY CANCER CENTER   [facility] ([facility])    [address] [place] [address] [phone]            May 30, 2018 12:15 PM CDT   Return Visit with LISA Mendieta CNP   [facility])    [address] [place] [place] [address] [phone]            May 30, 2018  1:00 PM CDT   Folfiri with BAY 4 INFUSION   [facility])    [address] [place] [address] [phone]            Jun 06, 2018 10:00 AM CDT   Return Visit with NURSE ONLY CANCER CENTER   [facility])    [address] [place] [address] [phone]              Who to contact     If you have questions or need follow up information about today's clinic visit or your schedule please contact [facility] directly at [phone].  Normal or non-critical lab and imaging results will be communicated to you by MyChart, letter or phone within 4 business days after the clinic has received the results. If you do not hear from us within 7 days, please contact the  clinic through [employer] or phone. If you have a critical or abnormal lab result, we will notify you by phone as soon as possible.  Submit refill requests through [employer] or call your pharmacy and they will forward the refill request to us. Please allow 3 business days for your refill to be completed.          Additional Information About Your Visit        [employer] Information     [employer] is an electronic gateway that provides easy, online access to your medical records. With [employer], you can request a clinic appointment, read your test results, renew a prescription or communicate with your care team.     To sign up for [employer] visit the website at [email or web address]   You will be asked to enter the access code listed below, as well as some personal information. Please follow the directions to create your username and password.     Your access code is: [email or web address]  Expires: 7/10/2018  2:55 PM     Your access code will  in 90 days. If you need help or a new code, please contact your [facility] or call [phone] for assistance.        Care EveryWhere ID     This is your Care EveryWhere ID. This could be used by other organizations to access your [place] medical records  [id number]         Blood Pressure from Last 3 Encounters:   18 134/83   18 152/83   18 134/76    Weight from Last 3 Encounters:   18 44.9 kg (99 lb)   18 44.9 kg (99 lb)   18 45.9 kg (101 lb 1.6 oz)              We Performed the Following     CBC with platelets differential        Primary Care Provider Office Phone # Fax #    Pancho Cope [phone] [address] [facility] [address]        Equal Access to Services     JOCELINE JORDAN : Joleen Sun, unique jaquez, avani curry, carrie wheatley. So [facility] [phone].    ATENCIÓN: Si habla español, tiene a ornelas disposición  servicios gratuitos de asistencia lingüística. Kristen dias [phone].    We comply with applicable federal civil rights laws and [place] laws. We do not discriminate on the basis of race, color, national origin, age, disability, sex, sexual orientation, or gender identity.            Thank you!     Thank you for choosing [facility]  for your care. Our goal is always to provide you with excellent care. Hearing back from our patients is one way we can continue to improve our services. Please take a few minutes to complete the written survey that you may receive in the mail after your visit with us. Thank you!             Your Updated Medication List - Protect others around you: Learn how to safely use, store and throw away your medicines at www.disposemymeds.org.          This list is accurate as of 5/23/18  3:09 PM.  Always use your most recent med list.                   Brand Name Dispense Instructions for use Diagnosis    amylase-lipase-protease 78565-16323 units Cpep per EC capsule    CREON    180 capsule    Take 1 capsule (24,000 Units) by mouth 3 times daily (with meals)    Pancreatic adenocarcinoma (H)       BIOTIN PO           latanoprost 0.005 % ophthalmic solution    XALATAN    3 Bottle    Place 1 drop into both eyes At Bedtime    Glaucoma suspect, bilateral       lidocaine-prilocaine cream    EMLA    30 g    Apply topically as needed for moderate pain (use dollop of cream to saran wrap 30 min prior to use of port)    Pancreatic adenocarcinoma (H)       loperamide 2 MG capsule    IMODIUM    30 capsule    2 caps at 1st sign of diarrhea & 1 cap every 2hrs until 12hrs diarrhea free. 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), Pancreatic adenocarcinoma (H)       LORazepam 0.5 MG tablet    ATIVAN    30 tablet    Take 1 tablet (0.5 mg) by mouth every 4 hours as needed (Anxiety, Nausea/Vomiting or Sleep)    Malignant neoplasm  metastatic to both lungs (H), Pancreatic adenocarcinoma (H)       ondansetron 8 MG tablet    ZOFRAN    10 tablet    Take 1 tablet (8 mg) by mouth every 8 hours as needed (nausea/vomiting)    Malignant neoplasm metastatic to both lungs (H), Pancreatic adenocarcinoma (H)       Potassium Chloride ER 20 MEQ Tbcr     90 tablet    Take 1 tablet (20 mEq) by mouth daily    Hypokalemia       prochlorperazine 10 MG tablet    COMPAZINE    30 tablet    Take 0.5 tablets (5 mg) by mouth every 6 hours as needed (Nausea/Vomiting)    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

## 2018-05-23 NOTE — LETTER
5/23/2018         RE: Talya Gatica  [address]   [place] [address]        Dear Colleague,    Thank you for referring your patient, Talya Gatica, to the [facility]. Please see a copy of my visit note below.    Oncology Follow Up Visit: May 23, 2018       Oncologist: Dr Jim Soares  PCP: Pancho Cope    Diagnosis: Stage IV Pancreatic cancer  Talya Gatica is a 78 yo who c/o jaundice in 7/2017 was found to have  adenocarcinoma of the head of pancreas causing biliary obstruction and several pulmonary nodules consistent with metastasis- initially told days to 3 months of life.  Treatment:   11/3/2017 began treatment with Gemzar- days 1,8,15 of 28 day plan x 6 cycles  5/16/2018 to begin treatment with FOLFIRI with dose reduction of the 5 FU from start of plan by 15%.    Interval History: Ms. Sanchez comes to clinic alone today for review of symptoms 1 week after start a new treatment for her pancreatic cancer with FOLFIRI. Pt is aware she has lost more weight this week and states she has not had nausea but has had loss of appetite.she mentions that she tends to eat smaller meals but states she is eats with daughter some of the time. She has had only 1 loose stool- took liquid imodium and had no more loose stools. Pt also asking more questions about the port and its care. States she is not taking creon unless eating high fat meal.    Rest of comprehensive and complete ROS is reviewed and is negative.   Past Medical History:   Diagnosis Date     AR (allergic rhinitis)      Baker's cyst      DJD (degenerative joint disease)      Elevated cholesterol      Glaucoma      Menopause      Vertigo      Current Outpatient Prescriptions   Medication     amylase-lipase-protease (CREON) 86508-27774 units CPEP per EC capsule     BIOTIN PO     latanoprost (XALATAN) 0.005 % ophthalmic solution     lidocaine-prilocaine (EMLA) cream     loperamide (IMODIUM) 2 MG capsule      LORazepam (ATIVAN) 0.5 MG tablet     Multiple Vitamins-Minerals (WOMENS 50+ MULTI VITAMIN/MIN PO)     ondansetron (ZOFRAN) 8 MG tablet     Potassium Chloride ER 20 MEQ TBCR     prochlorperazine (COMPAZINE) 10 MG tablet     No current facility-administered medications for this visit.      Allergies   Allergen Reactions     Codeine Camsylate        Physical Exam: /82  Pulse 71  Temp 97.7  F (36.5  C) (Oral)  Resp 16  Wt 43 kg (94 lb 12.8 oz)  SpO2 98%  BMI 18.83 kg/m2 - has lost more weight this week and is now under weight.   ECOG PS-1   Constitutional: Alert, moving slowly using cane though slow to start and needing one assist for transfer.wlakiong going better when someone takes her purse form her  ENT: Eyes bright, No mouth sores. [place].  Neck: Supple, No adenopathy.Thyroid symmetric  Cardiac: Heart rate and rhythm is regular and strong with aortic murmur again noted  Respiratory: Breathing easy. Lung sounds clear to auscultation. No cough  Port with no redness or swelling, Access went well for first time use.   GI: Abdomen is soft, non-tender, BS normal. No masses or organomegaly  MS: Muscle tone fair- uses cane for support, extremities normal with no edema.   Skin: No suspicious lesions or rashes or bruising  Neuro: Sensory grossly WNL, gait is steady- denies dizziness.  Lymph: Normal ant/post cervical, axillary, supraclavicular nodes  Psych: Mentation appears normal and affect normal/bright with easy conversation.     Laboratory Results:    Ref. Range 5/23/2018 14:52   Sodium Latest Ref Range: 133 - 144 mmol/L 140   Potassium Latest Ref Range: 3.4 - 5.3 mmol/L 3.9   Chloride Latest Ref Range: 94 - 109 mmol/L 108   Carbon Dioxide Latest Ref Range: 20 - 32 mmol/L 25   Urea Nitrogen Latest Ref Range: 7 - 30 mg/dL 8   Creatinine Latest Ref Range: 0.52 - 1.04 mg/dL 0.44 (L)   GFR Estimate Latest Ref Range: >60 mL/min/1.7m2 >90   GFR Estimate If Black Latest Ref Range: >60 mL/min/1.7m2 >90   Calcium Latest  Ref Range: 8.5 - 10.1 mg/dL 8.8   Anion Gap Latest Ref Range: 3 - 14 mmol/L 7   Glucose Latest Ref Range: 70 - 99 mg/dL 109 (H)   WBC Latest Ref Range: 4.0 - 11.0 10e9/L 5.5   Hemoglobin Latest Ref Range: 11.7 - 15.7 g/dL 11.4 (L)   Hematocrit Latest Ref Range: 35.0 - 47.0 % 34.0 (L)   Platelet Count Latest Ref Range: 150 - 450 10e9/L 201   RBC Count Latest Ref Range: 3.8 - 5.2 10e12/L 3.70 (L)   MCV Latest Ref Range: 78 - 100 fl 92   MCH Latest Ref Range: 26.5 - 33.0 pg 30.8   MCHC Latest Ref Range: 31.5 - 36.5 g/dL 33.5   RDW Latest Ref Range: 10.0 - 15.0 % 14.7   Diff Method Unknown Automated Method   % Neutrophils Latest Units: % 66.5   % Lymphocytes Latest Units: % 24.3   % Monocytes Latest Units: % 6.5   % Eosinophils Latest Units: % 2.0   % Basophils Latest Units: % 0.2   % Immature Granulocytes Latest Units: % 0.5   Absolute Neutrophil Latest Ref Range: 1.6 - 8.3 10e9/L 3.7   Absolute Lymphocytes Latest Ref Range: 0.8 - 5.3 10e9/L 1.3   Absolute Monocytes Latest Ref Range: 0.0 - 1.3 10e9/L 0.4   Absolute Eosinophils Latest Ref Range: 0.0 - 0.7 10e9/L 0.1   Absolute Basophils Latest Ref Range: 0.0 - 0.2 10e9/L 0.0   Abs Immature Granulocytes Latest Ref Range: 0 - 0.4 10e9/L 0.0     Assessment and Plan:   Stage IV Pancreatic cancer-Pt started plan of Liposomal Irinotecan and with 15 % reduction in 5FU one week previous. Today she is seen to have lost weight with poor intake though labs remain acceptable and see that she is not dehydrated.  Reviewed that she should try to something every 2 hours and continue to push fluids but limit drinking after 6 pm to keep from frequent urination overnight. Warned that she must stop weight loss or we will need to hold treatments in the future. Given samples of ensure and told she could try taking 1/2 bottle at a time and should not cause loose stools. Reviewed protein foods.   Discussed she may take off bandage off port after a couple hours post infusion and may shower with  port without worry.   Pt will return on 5//30 for evaluation prior to next cycle.   This was a 25 min visit with > 50% in counseling and coordinating care including education and management of concerns.    Nai Simmons CNP      Again, thank you for allowing me to participate in the care of your patient.        Sincerely,        Nai Simmons, RICHAR, APRN CNP

## 2018-05-23 NOTE — PROGRESS NOTES
"Patient's name and  were verified.  See Doc Flowsheet - IV assess for details.  IVAD accessed with 20G 3/4\" haley gripper plus needle  blood return positive: YES  Site without redness, tenderness or swelling: YES  flushed with 30cc NS and 5cc 100u/ml heparin  Needle: De-accessed    Comments: Labs drawn.  Patient tolerated procedure without incident.    Miriam Small  RN, BSN, OCN        "

## 2018-05-23 NOTE — MR AVS SNAPSHOT
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)    -  1    Loss of weight        Port catheter in place           Follow-ups after your visit        Your next 10 appointments already scheduled     May 30, 2018 11:45 AM CDT   Return Visit with NURSE ONLY CANCER CENTER   [facility])    [address] [place] [id number]   [phone]            May 30, 2018 12:15 PM CDT   Return Visit with LISA Mendieta CNP   [facility] ([facility])    [address] [place] [id number] [phone]            May 30, 2018  1:00 PM CDT   Folfiri with BAY 4 INFUSION   [facility])    [address] [place] [address] [phone]            Jun 06, 2018 10:00 AM CDT   Return Visit with [facility]URSE ONLY CANCER CENTER   [facility])    [address] [place] [address] [phone]              Who to contact     If you have questions or need follow up information about today's clinic visit or your schedule please contact [facility] directly at [phone].  Normal or non-critical lab and imaging results will be communicated to you by MyChart, letter or phone within 4 business days after the clinic has received the results. If you do not hear from us within 7 days, please contact the clinic through MyChart or phone. If you have a critical or abnormal lab result, we will notify you by phone as soon as possible.  Submit refill requests through Flirq or call your pharmacy  and they will forward the refill request to us. Please allow 3 business days for your refill to be completed.          Additional Information About Your Visit        [employer] Information     Ritani is an electronic gateway that provides easy, online access to your medical records. With Ritani, you can request a clinic appointment, read your test results, renew a prescription or communicate with your care team.     To sign up for Ritani visit the website at [email or web address]   You will be asked to enter the access code listed below, as well as some personal information. Please follow the directions to create your username and password.     Your access code is: [email or web address]  Expires: 7/10/2018  2:55 PM     Your access code will  in 90 days. If you need help or a new code, please contact your [facility] or call [phone] for assistance.        Care EveryWhere ID     This is your Care EveryWhere ID. This could be used by other organizations to access your [place] medical records  [id number]        Your Vitals Were     Pulse Temperature Respirations Pulse Oximetry BMI (Body Mass Index)       71 97.7  F (36.5  C) (Oral) 16 98% 18.83 kg/m2        Blood Pressure from Last 3 Encounters:   18 127/82   18 134/83   18 152/83    Weight from Last 3 Encounters:   18 43 kg (94 lb 12.8 oz)   18 44.9 kg (99 lb)   18 44.9 kg (99 lb)              We Performed the Following     Basic metabolic panel        Primary Care Provider Office Phone # Fax #    Pancho Cope [phone] [address] [address] [facility] [address]        Equal Access to Services     KATHRYN JORDAN : Hadii aad ku jaxo Solenoraali, waaxda luqadaha, qaybta kaalmada adeegyada, carrie wheatley. So [facility] [phone].    ATENCIÓN: Si habla español, tiene a ornelas disposición servicios gratuitos de asistencia lingüística. Llame al  [phone].    We comply with applicable federal civil rights laws and [place] laws. We do not discriminate on the basis of race, color, national origin, age, disability, sex, sexual orientation, or gender identity.            Thank you!     Thank you for choosing [facility]  for your care. Our goal is always to provide you with excellent care. Hearing back from our patients is one way we can continue to improve our services. Please take a few minutes to complete the written survey that you may receive in the mail after your visit with us. Thank you!             Your Updated Medication List - Protect others around you: Learn how to safely use, store and throw away your medicines at www.disposemymeds.org.          This list is accurate as of 5/23/18  8:52 PM.  Always use your most recent med list.                   Brand Name Dispense Instructions for use Diagnosis    amylase-lipase-protease 48153-78508 units Cpep per EC capsule    CREON    180 capsule    Take 1 capsule (24,000 Units) by mouth 3 times daily (with meals)    Pancreatic adenocarcinoma (H)       BIOTIN PO           latanoprost 0.005 % ophthalmic solution    XALATAN    3 Bottle    Place 1 drop into both eyes At Bedtime    Glaucoma suspect, bilateral       lidocaine-prilocaine cream    EMLA    30 g    Apply topically as needed for moderate pain (use dollop of cream to saran wrap 30 min prior to use of port)    Pancreatic adenocarcinoma (H)       loperamide 2 MG capsule    IMODIUM    30 capsule    2 caps at 1st sign of diarrhea & 1 cap every 2hrs until 12hrs diarrhea free. 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

## 2018-05-23 NOTE — NURSING NOTE
"Oncology Rooming Note    May 23, 2018 3:30 PM   Talya Gatica is a 77 year old female who presents for:    Chief Complaint   Patient presents with     Oncology Clinic Visit     f/u post first treatment     Initial Vitals: /82  Pulse 71  Temp 97.7  F (36.5  C) (Oral)  Resp 16  Wt 43 kg (94 lb 12.8 oz)  SpO2 98%  BMI 18.83 kg/m2 Estimated body mass index is 18.83 kg/(m^2) as calculated from the following:    Height as of 5/16/18: 1.511 m (4' 11.49\").    Weight as of this encounter: 43 kg (94 lb 12.8 oz). Body surface area is 1.34 meters squared.  No Pain (0) Comment: Data Unavailable   No LMP recorded. Patient is postmenopausal.  Allergies reviewed: Yes  Medications reviewed: Yes    Medications: Medication refills not needed today.  Pharmacy name entered into [facility] [facility]: [email or web address] - [place], [place] - [address] AT [facility]    Clinical concerns: Weight loss Nai was notified.    8 minutes for nursing intake (face to face time)     ROSEY GARCIA RN              "

## 2018-05-24 ENCOUNTER — HOME INFUSION (PRE-WILLOW HOME INFUSION) (OUTPATIENT)
Dept: PHARMACY | Facility: CLINIC | Age: 78
End: 2018-05-24

## 2018-05-25 NOTE — PROGRESS NOTES
This is a recent snapshot of the patient's [facility] medical record.  For current drug dose and complete information and questions, call [phone] or In Basket pool, [facility] home infusion ([id number])  CSN Number:  [id number]

## 2018-05-30 ENCOUNTER — ONCOLOGY VISIT (OUTPATIENT)
Dept: ONCOLOGY | Facility: CLINIC | Age: 78
End: 2018-05-30
Payer: COMMERCIAL

## 2018-05-30 ENCOUNTER — DOCUMENTATION ONLY (OUTPATIENT)
Dept: SPIRITUAL SERVICES | Facility: CLINIC | Age: 78
End: 2018-05-30

## 2018-05-30 ENCOUNTER — INFUSION THERAPY VISIT (OUTPATIENT)
Dept: INFUSION THERAPY | Facility: CLINIC | Age: 78
End: 2018-05-30
Payer: COMMERCIAL

## 2018-05-30 ENCOUNTER — HOME INFUSION (PRE-WILLOW HOME INFUSION) (OUTPATIENT)
Dept: PHARMACY | Facility: CLINIC | Age: 78
End: 2018-05-30

## 2018-05-30 VITALS
HEART RATE: 75 BPM | BODY MASS INDEX: 19.44 KG/M2 | OXYGEN SATURATION: 97 % | WEIGHT: 99 LBS | SYSTOLIC BLOOD PRESSURE: 139 MMHG | DIASTOLIC BLOOD PRESSURE: 80 MMHG | HEIGHT: 60 IN | TEMPERATURE: 97.8 F

## 2018-05-30 DIAGNOSIS — R19.5 LOOSE STOOLS: ICD-10-CM

## 2018-05-30 DIAGNOSIS — Z71.81 SPIRITUAL OR RELIGIOUS COUNSELING: Primary | ICD-10-CM

## 2018-05-30 DIAGNOSIS — C25.9 PANCREATIC ADENOCARCINOMA (H): ICD-10-CM

## 2018-05-30 DIAGNOSIS — C78.01 MALIGNANT NEOPLASM METASTATIC TO BOTH LUNGS (H): ICD-10-CM

## 2018-05-30 DIAGNOSIS — R63.4 LOSS OF WEIGHT: ICD-10-CM

## 2018-05-30 DIAGNOSIS — C78.02 MALIGNANT NEOPLASM METASTATIC TO BOTH LUNGS (H): Primary | ICD-10-CM

## 2018-05-30 DIAGNOSIS — C78.02 MALIGNANT NEOPLASM METASTATIC TO BOTH LUNGS (H): ICD-10-CM

## 2018-05-30 DIAGNOSIS — C78.01 MALIGNANT NEOPLASM METASTATIC TO BOTH LUNGS (H): Primary | ICD-10-CM

## 2018-05-30 DIAGNOSIS — C25.9 PANCREATIC ADENOCARCINOMA (H): Primary | ICD-10-CM

## 2018-05-30 LAB
BASOPHILS # BLD AUTO: 0 10E9/L (ref 0–0.2)
BASOPHILS NFR BLD AUTO: 0.7 %
BILIRUB SERPL-MCNC: 0.7 MG/DL (ref 0.2–1.3)
DIFFERENTIAL METHOD BLD: ABNORMAL
EOSINOPHIL # BLD AUTO: 0.2 10E9/L (ref 0–0.7)
EOSINOPHIL NFR BLD AUTO: 2.6 %
ERYTHROCYTE [DISTWIDTH] IN BLOOD BY AUTOMATED COUNT: 16.1 % (ref 10–15)
HCT VFR BLD AUTO: 32.5 % (ref 35–47)
HGB BLD-MCNC: 11.1 G/DL (ref 11.7–15.7)
IMM GRANULOCYTES # BLD: 0 10E9/L (ref 0–0.4)
IMM GRANULOCYTES NFR BLD: 0.3 %
LYMPHOCYTES # BLD AUTO: 1.1 10E9/L (ref 0.8–5.3)
LYMPHOCYTES NFR BLD AUTO: 18 %
MCH RBC QN AUTO: 31.2 PG (ref 26.5–33)
MCHC RBC AUTO-ENTMCNC: 34.2 G/DL (ref 31.5–36.5)
MCV RBC AUTO: 91 FL (ref 78–100)
MONOCYTES # BLD AUTO: 0.5 10E9/L (ref 0–1.3)
MONOCYTES NFR BLD AUTO: 8.8 %
NEUTROPHILS # BLD AUTO: 4.2 10E9/L (ref 1.6–8.3)
NEUTROPHILS NFR BLD AUTO: 69.6 %
PLATELET # BLD AUTO: 251 10E9/L (ref 150–450)
RBC # BLD AUTO: 3.56 10E12/L (ref 3.8–5.2)
WBC # BLD AUTO: 6.1 10E9/L (ref 4–11)

## 2018-05-30 PROCEDURE — 99207 ZZC NO CHARGE NURSE ONLY: CPT

## 2018-05-30 PROCEDURE — 96416 CHEMO PROLONG INFUSE W/PUMP: CPT | Performed by: INTERNAL MEDICINE

## 2018-05-30 PROCEDURE — 96367 TX/PROPH/DG ADDL SEQ IV INF: CPT | Performed by: INTERNAL MEDICINE

## 2018-05-30 PROCEDURE — 99214 OFFICE O/P EST MOD 30 MIN: CPT | Mod: 25 | Performed by: NURSE PRACTITIONER

## 2018-05-30 PROCEDURE — 99207 ZZC NO CHARGE LOS: CPT

## 2018-05-30 PROCEDURE — 82247 BILIRUBIN TOTAL: CPT | Performed by: NURSE PRACTITIONER

## 2018-05-30 PROCEDURE — 85025 COMPLETE CBC W/AUTO DIFF WBC: CPT | Performed by: NURSE PRACTITIONER

## 2018-05-30 PROCEDURE — 96413 CHEMO IV INFUSION 1 HR: CPT | Performed by: INTERNAL MEDICINE

## 2018-05-30 PROCEDURE — 96415 CHEMO IV INFUSION ADDL HR: CPT | Performed by: INTERNAL MEDICINE

## 2018-05-30 RX ORDER — METHYLPREDNISOLONE SODIUM SUCCINATE 125 MG/2ML
125 INJECTION, POWDER, LYOPHILIZED, FOR SOLUTION INTRAMUSCULAR; INTRAVENOUS
Status: CANCELLED
Start: 2018-05-30

## 2018-05-30 RX ORDER — ALBUTEROL SULFATE 0.83 MG/ML
2.5 SOLUTION RESPIRATORY (INHALATION)
Status: CANCELLED | OUTPATIENT
Start: 2018-05-30

## 2018-05-30 RX ORDER — EPINEPHRINE 1 MG/ML
0.3 INJECTION, SOLUTION INTRAMUSCULAR; SUBCUTANEOUS EVERY 5 MIN PRN
Status: CANCELLED | OUTPATIENT
Start: 2018-05-30

## 2018-05-30 RX ORDER — HEPARIN SODIUM (PORCINE) LOCK FLUSH IV SOLN 100 UNIT/ML 100 UNIT/ML
5 SOLUTION INTRAVENOUS
Status: DISCONTINUED | OUTPATIENT
Start: 2018-05-30 | End: 2018-05-30 | Stop reason: HOSPADM

## 2018-05-30 RX ORDER — EPINEPHRINE 0.3 MG/.3ML
0.3 INJECTION SUBCUTANEOUS EVERY 5 MIN PRN
Status: CANCELLED | OUTPATIENT
Start: 2018-05-30

## 2018-05-30 RX ORDER — DIPHENHYDRAMINE HYDROCHLORIDE 50 MG/ML
50 INJECTION INTRAMUSCULAR; INTRAVENOUS
Status: CANCELLED
Start: 2018-05-30

## 2018-05-30 RX ORDER — MEPERIDINE HYDROCHLORIDE 25 MG/ML
25 INJECTION INTRAMUSCULAR; INTRAVENOUS; SUBCUTANEOUS EVERY 30 MIN PRN
Status: CANCELLED | OUTPATIENT
Start: 2018-05-30

## 2018-05-30 RX ORDER — ALBUTEROL SULFATE 90 UG/1
1-2 AEROSOL, METERED RESPIRATORY (INHALATION)
Status: CANCELLED
Start: 2018-05-30

## 2018-05-30 RX ORDER — SODIUM CHLORIDE 9 MG/ML
1000 INJECTION, SOLUTION INTRAVENOUS CONTINUOUS PRN
Status: CANCELLED
Start: 2018-05-30

## 2018-05-30 RX ORDER — LORAZEPAM 2 MG/ML
0.5 INJECTION INTRAMUSCULAR EVERY 4 HOURS PRN
Status: CANCELLED
Start: 2018-05-30

## 2018-05-30 RX ADMIN — HEPARIN SODIUM (PORCINE) LOCK FLUSH IV SOLN 100 UNIT/ML 5 ML: 100 SOLUTION at 11:50

## 2018-05-30 RX ADMIN — Medication 250 ML: at 13:25

## 2018-05-30 ASSESSMENT — PAIN SCALES - GENERAL: PAINLEVEL: NO PAIN (0)

## 2018-05-30 NOTE — MR AVS SNAPSHOT
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)        Loose stools        Loss of weight           Follow-ups after your visit        Your next 10 appointments already scheduled     Jun 06, 2018 10:00 AM CDT   Return Visit with [facility] ONLY CANCER CENTER   [facility] ([facility])    [address] [place] [address]   [phone]            Jun 14, 2018  8:15 AM CDT   Return Visit with [facility]URSE ONLY CANCER CENTER   [facility] ([facility])    [address] [place] [address] [phone]            Jun 14, 2018  8:45 AM CDT   Return Visit with LISA Mendieta CNP   [facility] ([facility])    [address] [place] [address] [phone]            Jun 14, 2018 10:00 AM CDT   Folfiri with BAY 5 INFUSION   [facility] ([facility])    [address] [place] [address] [phone]            Jun 20, 2018 10:30 AM CDT   Return Visit with [facility]URSE ONLY CANCER CENTER   [facility] [place]    [address] [place] [address] [phone]              Who to contact     If you have questions or need follow up information about today's clinic visit or your schedule please contact [facility] directly at [phone].  Normal or non-critical lab and imaging results will be communicated to you by MyChart, letter or phone within 4 business days after the clinic has received the results. If you do not  "hear from us within 7 days, please contact the clinic through [employer] or phone. If you have a critical or abnormal lab result, we will notify you by phone as soon as possible.  Submit refill requests through Send the Trend or call your pharmacy and they will forward the refill request to us. Please allow 3 business days for your refill to be completed.          Additional Information About Your Visit        Care EveryWhere ID     This is your Care EveryWhere ID. This could be used by other organizations to access your [place] medical records  [id number]        Your Vitals Were     Pulse Temperature Height Pulse Oximetry BMI (Body Mass Index)       75 97.8  F (36.6  C) 1.511 m (4' 11.5\") 97% 19.66 kg/m2        Blood Pressure from Last 3 Encounters:   05/30/18 139/80   05/23/18 127/82   05/16/18 134/83    Weight from Last 3 Encounters:   05/30/18 44.9 kg (99 lb)   05/23/18 43 kg (94 lb 12.8 oz)   05/16/18 44.9 kg (99 lb)              Today, you had the following     No orders found for display       Primary Care Provider Office Phone # Fax #    Pancho Cope [phone] [address] [facility] [address]        Equal Access to Services     JOCELINE JORDAN : Hadii aad ku hadasho Soomaali, waaxda luqadaha, qaybta kaalmada adeegyada, waxay idiin haymoen kristi wheatley. So [facility] [phone].    ATENCIÓN: Si habla español, tiene a ornelas disposición servicios gratuitos de asistencia lingüística. Llame al [phone].    We comply with applicable federal civil rights laws and [place] laws. We do not discriminate on the basis of race, color, national origin, age, disability, sex, sexual orientation, or gender identity.            Thank you!     Thank you for choosing [facility]  for your care. Our goal is always to provide you with excellent care. Hearing back from our patients is one way we can continue to improve our services. Please take a few minutes to complete the " written survey that you may receive in the mail after your visit with us. Thank you!             Your Updated Medication List - Protect others around you: Learn how to safely use, store and throw away your medicines at www.disposemymeds.org.          This list is accurate as of 5/30/18 11:59 PM.  Always use your most recent med list.                   Brand Name Dispense Instructions for use Diagnosis    amylase-lipase-protease 88387-18099 units Cpep per EC capsule    CREON    180 capsule    Take 1 capsule (24,000 Units) by mouth 3 times daily (with meals)    Pancreatic adenocarcinoma (H)       BIOTIN PO           latanoprost 0.005 % ophthalmic solution    XALATAN    3 Bottle    Place 1 drop into both eyes At Bedtime    Glaucoma suspect, bilateral       lidocaine-prilocaine cream    EMLA    30 g    Apply topically as needed for moderate pain (use dollop of cream to saran wrap 30 min prior to use of port)    Pancreatic adenocarcinoma (H)       loperamide 2 MG capsule    IMODIUM    30 capsule    2 caps at 1st sign of diarrhea & 1 cap every 2hrs until 12hrs diarrhea free. 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

## 2018-05-30 NOTE — PROGRESS NOTES
"Infusion Nursing Note:  Talya Gatica presents today for C2 Folfiri.    Patient seen by provider today: Yes: Nai Simmons NP   present during visit today: Not Applicable.    Note: N/A.    Intravenous Access:  Implanted Port.    Treatment Conditions:  Lab Results   Component Value Date    HGB 11.1 05/30/2018     Lab Results   Component Value Date    WBC 6.1 05/30/2018      Lab Results   Component Value Date    ANEU 4.2 05/30/2018     Lab Results   Component Value Date     05/30/2018      Lab Results   Component Value Date     05/23/2018                   Lab Results   Component Value Date    POTASSIUM 3.9 05/23/2018           No results found for: MAG         Lab Results   Component Value Date    CR 0.44 05/23/2018                   Lab Results   Component Value Date    BERNARDO 8.8 05/23/2018                Lab Results   Component Value Date    BILITOTAL 0.7 05/30/2018           Lab Results   Component Value Date    ALBUMIN 3.5 04/11/2018                    Lab Results   Component Value Date    ALT 46 04/11/2018           Lab Results   Component Value Date    AST 51 04/11/2018       Results reviewed, labs MET treatment parameters, ok to proceed with treatment.      Post Infusion Assessment:  Patient tolerated infusion without incident.  Blood return noted pre and post infusion.  Prior to discharge: Port is secured in place with tegaderm and flushed with 10cc NS with positive blood return noted.  Continuous home infusion CADD pump connected.    All connectors secured in place and clamps taped open.    Pump started, \"running\" noted on display (CADD): YES.  Patient instructed to call our clinic or [facility] with any questions or concerns at home.  Patient verbalized understanding.    Patient set up for pump disconnect at home with [facility] on 6/1/18 at 1330.            Discharge Plan:   Patient will return for labs on 6/6/18 for labs. Message in for next treatment " to be scheduled.  Patient discharged in stable condition accompanied by: self and daughter.  Departure Mode: Ambulatory with cane.    Ann Bolanos RN

## 2018-05-30 NOTE — LETTER
5/30/2018         RE: Talya Gatica  [address]   [place] [address]        Dear Colleague,    Thank you for referring your patient, Talya Gatica, to the [facility]. Please see a copy of my visit note below.    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  Treatment:   11/3/2017 began treatment with Gemzar- days 1,8,15 of 28 day plan x 6 cycles  5/16/2018 to begin treatment with FOLFIRI with dose reduction of the 5 FU from start of plan by 15%.    Interval History: Ms. Sanchez comes to clinic alone today for toxicity check prior to cycle 2 of FOLFIRI. Patient comes today sharing that she is feeling so much better with much of her energy returned as well as appetite and bowels returning to normal as well. She shares she had 2 episodes of loose stools this week they were well cared for with the Imodium and she did take Imodium today to control any urges during her time away from her home.. Her weakness continues but she doesn't feel as weak as before and is back to moving around the house more with better energy. She denies any pain nausea shortness of breath fevers neuropathies skin changes or trouble sleeping.  Rest of comprehensive and complete ROS is reviewed and is negative.   Past Medical History:   Diagnosis Date     AR (allergic rhinitis)      Baker's cyst      DJD (degenerative joint disease)      Elevated cholesterol      Glaucoma      Menopause      Vertigo      Current Outpatient Prescriptions   Medication     amylase-lipase-protease (CREON) 40116-62137 units CPEP per EC capsule     BIOTIN PO     latanoprost (XALATAN) 0.005 %  "ophthalmic solution     lidocaine-prilocaine (EMLA) cream     loperamide (IMODIUM) 2 MG capsule     LORazepam (ATIVAN) 0.5 MG tablet     Multiple Vitamins-Minerals (WOMENS 50+ MULTI VITAMIN/MIN PO)     ondansetron (ZOFRAN) 8 MG tablet     Potassium Chloride ER 20 MEQ TBCR     prochlorperazine (COMPAZINE) 10 MG tablet     No current facility-administered medications for this visit.      Allergies   Allergen Reactions     Codeine Camsylate        Physical Exam: /80  Pulse 75  Temp 97.8  F (36.6  C)  Ht 1.511 m (4' 11.5\")  Wt 44.9 kg (99 lb)  SpO2 97%  BMI 19.66 kg/m2   ECOG PS-1   Constitutional: Alert, moving slowly using cane though slow to start and needing one assist for transfer.  ENT: Eyes bright, No mouth sores. [place].  Neck: Supple, No adenopathy.Thyroid symmetric  Cardiac: Heart rate and rhythm is regular and strong with aortic murmur again noted  Respiratory: Breathing easy. Lung sounds clear to auscultation. No cough  Port with no redness or swelling, Access went well for first time use.   GI: Abdomen is soft, non-tender, BS normal. No masses or organomegaly  MS: Muscle tone fair- uses cane for support, extremities normal with no edema.   Skin: No suspicious lesions or rashes or bruising  Neuro: Sensory grossly WNL, gait is steady- denies dizziness.  Lymph: Normal ant/post cervical, axillary, supraclavicular nodes  Psych: Mentation appears normal and affect normal/bright with ongoing conversation.     Laboratory Results:   Results for orders placed or performed in visit on 05/30/18   CBC with platelets differential   Result Value Ref Range    WBC 6.1 4.0 - 11.0 10e9/L    RBC Count 3.56 (L) 3.8 - 5.2 10e12/L    Hemoglobin 11.1 (L) 11.7 - 15.7 g/dL    Hematocrit 32.5 (L) 35.0 - 47.0 %    MCV 91 78 - 100 fl    MCH 31.2 26.5 - 33.0 pg    MCHC 34.2 31.5 - 36.5 g/dL    RDW 16.1 (H) 10.0 - 15.0 %    Platelet Count 251 150 - 450 10e9/L    Diff Method Automated Method     % Neutrophils 69.6 %    % " Lymphocytes 18.0 %    % Monocytes 8.8 %    % Eosinophils 2.6 %    % Basophils 0.7 %    % Immature Granulocytes 0.3 %    Absolute Neutrophil 4.2 1.6 - 8.3 10e9/L    Absolute Lymphocytes 1.1 0.8 - 5.3 10e9/L    Absolute Monocytes 0.5 0.0 - 1.3 10e9/L    Absolute Eosinophils 0.2 0.0 - 0.7 10e9/L    Absolute Basophils 0.0 0.0 - 0.2 10e9/L    Abs Immature Granulocytes 0.0 0 - 0.4 10e9/L   Bilirubin  total   Result Value Ref Range    Bilirubin Total 0.7 0.2 - 1.3 mg/dL     Assessment and Plan:   Stage IV Pancreatic cancer-Pt started plan of Liposomal Irinotecan and with 15 % reduction in 5FU 2 weeks ago and first week  Was noted to have significant fatigue along with some loose stools and weakness as well as decreased appetite with weight loss. Patient's weight has returned to her previous level and she is showing much more energy in her movement and her conversations. She does meet goals to continue with treatment today for cycle 2 no changes to be made in plan.  We'll see patient return in 2 weeks time or before with new symptoms of concern  Bowel changes-patient has seen both loose stools and some constipation due to treatment of the loose stools with the Imodium. She is aware to use Imodium - 2 tabs after first loose stool with this plan and then use up to 8 daily if needed but to hold on the Imodium if she is noting constipation. Reviewed need to take adequate fluids every day to prevent dehydration which can cause weakness and fatigue.  Nutrition and weight loss-patient did lose weight last week with appetite changes related to plan. She did increase her proteins and fluids and has been able to make back the weight lost previously. Encouraged use of protein supplements or trialing eating every 2 hours to help with intake.  This was a 25 min visit with > 50% in counseling and coordinating care including education and management of concerns.    Nai Simmons,CNP      Again, thank you for allowing me to participate  in the care of your patient.        Sincerely,        Nai Simmons, NP, APRN CNP

## 2018-05-30 NOTE — PROGRESS NOTES
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  Treatment:   11/3/2017 began treatment with Gemzar- days 1,8,15 of 28 day plan x 6 cycles  5/16/2018 to begin treatment with FOLFIRI with dose reduction of the 5 FU from start of plan by 15%.    Interval History: Ms. Sanchez comes to clinic alone today for toxicity check prior to cycle 2 of FOLFIRI. Patient comes today sharing that she is feeling so much better with much of her energy returned as well as appetite and bowels returning to normal as well. She shares she had 2 episodes of loose stools this week they were well cared for with the Imodium and she did take Imodium today to control any urges during her time away from her home.. Her weakness continues but she doesn't feel as weak as before and is back to moving around the house more with better energy. She denies any pain nausea shortness of breath fevers neuropathies skin changes or trouble sleeping.  Rest of comprehensive and complete ROS is reviewed and is negative.   Past Medical History:   Diagnosis Date     AR (allergic rhinitis)      Baker's cyst      DJD (degenerative joint disease)      Elevated cholesterol      Glaucoma      Menopause      Vertigo      Current Outpatient Prescriptions   Medication     amylase-lipase-protease (CREON) 16619-51339 units CPEP per EC capsule     BIOTIN PO     latanoprost (XALATAN) 0.005 % ophthalmic solution     lidocaine-prilocaine (EMLA) cream     loperamide (IMODIUM) 2 MG capsule     LORazepam (ATIVAN) 0.5 MG tablet     Multiple Vitamins-Minerals (WOMENS 50+ MULTI VITAMIN/MIN PO)     ondansetron (ZOFRAN) 8 MG tablet     Potassium Chloride ER  "20 MEQ TBCR     prochlorperazine (COMPAZINE) 10 MG tablet     No current facility-administered medications for this visit.      Allergies   Allergen Reactions     Codeine Camsylate        Physical Exam: /80  Pulse 75  Temp 97.8  F (36.6  C)  Ht 1.511 m (4' 11.5\")  Wt 44.9 kg (99 lb)  SpO2 97%  BMI 19.66 kg/m2   ECOG PS-1   Constitutional: Alert, moving slowly using cane though slow to start and needing one assist for transfer.  ENT: Eyes bright, No mouth sores. [place].  Neck: Supple, No adenopathy.Thyroid symmetric  Cardiac: Heart rate and rhythm is regular and strong with aortic murmur again noted  Respiratory: Breathing easy. Lung sounds clear to auscultation. No cough  Port with no redness or swelling, Access went well for first time use.   GI: Abdomen is soft, non-tender, BS normal. No masses or organomegaly  MS: Muscle tone fair- uses cane for support, extremities normal with no edema.   Skin: No suspicious lesions or rashes or bruising  Neuro: Sensory grossly WNL, gait is steady- denies dizziness.  Lymph: Normal ant/post cervical, axillary, supraclavicular nodes  Psych: Mentation appears normal and affect normal/bright with ongoing conversation.     Laboratory Results:   Results for orders placed or performed in visit on 05/30/18   CBC with platelets differential   Result Value Ref Range    WBC 6.1 4.0 - 11.0 10e9/L    RBC Count 3.56 (L) 3.8 - 5.2 10e12/L    Hemoglobin 11.1 (L) 11.7 - 15.7 g/dL    Hematocrit 32.5 (L) 35.0 - 47.0 %    MCV 91 78 - 100 fl    MCH 31.2 26.5 - 33.0 pg    MCHC 34.2 31.5 - 36.5 g/dL    RDW 16.1 (H) 10.0 - 15.0 %    Platelet Count 251 150 - 450 10e9/L    Diff Method Automated Method     % Neutrophils 69.6 %    % Lymphocytes 18.0 %    % Monocytes 8.8 %    % Eosinophils 2.6 %    % Basophils 0.7 %    % Immature Granulocytes 0.3 %    Absolute Neutrophil 4.2 1.6 - 8.3 10e9/L    Absolute Lymphocytes 1.1 0.8 - 5.3 10e9/L    Absolute Monocytes 0.5 0.0 - 1.3 10e9/L    Absolute " Eosinophils 0.2 0.0 - 0.7 10e9/L    Absolute Basophils 0.0 0.0 - 0.2 10e9/L    Abs Immature Granulocytes 0.0 0 - 0.4 10e9/L   Bilirubin  total   Result Value Ref Range    Bilirubin Total 0.7 0.2 - 1.3 mg/dL     Assessment and Plan:   Stage IV Pancreatic cancer-Pt started plan of Liposomal Irinotecan and with 15 % reduction in 5FU 2 weeks ago and first week  Was noted to have significant fatigue along with some loose stools and weakness as well as decreased appetite with weight loss. Patient's weight has returned to her previous level and she is showing much more energy in her movement and her conversations. She does meet goals to continue with treatment today for cycle 2 no changes to be made in plan.  We'll see patient return in 2 weeks time or before with new symptoms of concern  Bowel changes-patient has seen both loose stools and some constipation due to treatment of the loose stools with the Imodium. She is aware to use Imodium - 2 tabs after first loose stool with this plan and then use up to 8 daily if needed but to hold on the Imodium if she is noting constipation. Reviewed need to take adequate fluids every day to prevent dehydration which can cause weakness and fatigue.  Nutrition and weight loss-patient did lose weight last week with appetite changes related to plan. She did increase her proteins and fluids and has been able to make back the weight lost previously. Encouraged use of protein supplements or trialing eating every 2 hours to help with intake.  This was a 25 min visit with > 50% in counseling and coordinating care including education and management of concerns.    Nai Simmons,CNP

## 2018-05-30 NOTE — PROGRESS NOTES
Power port needle left accessed for treatment. Tolerated lab draw without complaint. [place] drawn-Red/Green/Purple tubes. Double signed by patient and RN. See documentation flowsheet. Sheree Calhoun, RN, BSN, OCN

## 2018-05-30 NOTE — NURSING NOTE
"Oncology Rooming Note    May 30, 2018 12:27 PM   Talya Gatica is a 77 year old female who presents for:    Chief Complaint   Patient presents with     Oncology Clinic Visit     prior to treatment     Initial Vitals: /80  Pulse 75  Temp 97.8  F (36.6  C)  Ht 1.511 m (4' 11.5\")  Wt 44.9 kg (99 lb)  SpO2 97%  BMI 19.66 kg/m2 Estimated body mass index is 19.66 kg/(m^2) as calculated from the following:    Height as of this encounter: 1.511 m (4' 11.5\").    Weight as of this encounter: 44.9 kg (99 lb). Body surface area is 1.37 meters squared.  No Pain (0) Comment: Data Unavailable   No LMP recorded. Patient is postmenopausal.  Allergies reviewed: Yes  Medications reviewed: Yes    Medications: Medication refills not needed today.  Pharmacy name entered into [facility]: [facility] - [place], [place] - [address] AT [facility] [address]        5 minutes for nursing intake (face to face time)     Carly Bobby LPN              "

## 2018-05-30 NOTE — MR AVS SNAPSHOT
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      Your next 10 appointments already scheduled     Jun 06, 2018 10:00 AM CDT   [facility]   [facility]    [address] [place] [address] [phone]              Who to contact     If you have questions or need follow up information about today's clinic visit or your schedule please contact [facility] directly at [phone].  Normal or non-critical lab and imaging results will be communicated to you by MyChart, letter or phone within 4 business days after the clinic has received the results. If you do not hear from us within 7 days, please contact the clinic through MyChart or phone. If you have a critical or abnormal lab result, we will notify you by phone as soon as possible.  Submit refill requests through [employer] or call your pharmacy and they will forward the refill request to us. Please allow 3 business days for your refill to be completed.          Additional Information About Your Visit        Care EveryWhere ID     This is your Care EveryWhere ID. This could be used by other organizations to access your [facility] medical records  [id number]         Blood Pressure from Last 3 Encounters:   05/30/18 139/80   05/23/18 127/82   05/16/18 134/83    Weight from Last 3 Encounters:   05/30/18 44.9 kg (99 lb)   05/23/18 43 kg (94 lb 12.8 oz)   05/16/18 44.9 kg (99 lb)              Today,  you had the following     No orders found for display       Primary Care Provider Office Phone # Fax #    Pancho Rl Cope [phone] [address] [facility] [address]        Equal Access to Services     JOCELINE JORDAN : Hadii feliciano ku jaxo Soomaali, waaxda luqadaha, qaybta kaalmada adeterenceyada, carrie pickens laPennyanu wheatley. So [facility] [phone].    ATENCIÓN: Si habla español, tiene a ornelas disposición servicios gratuitos de asistencia lingüística. Llame al [phone].    We comply with applicable federal civil rights laws and [place] laws. We do not discriminate on the basis of race, color, national origin, age, disability, sex, sexual orientation, or gender identity.            Thank you!     Thank you for choosing [facility]  for your care. Our goal is always to provide you with excellent care. Hearing back from our patients is one way we can continue to improve our services. Please take a few minutes to complete the written survey that you may receive in the mail after your visit with us. Thank you!             Your Updated Medication List - Protect others around you: Learn how to safely use, store and throw away your medicines at www.disposemymeds.org.          This list is accurate as of 5/30/18  3:40 PM.  Always use your most recent med list.                   Brand Name Dispense Instructions for use Diagnosis    amylase-lipase-protease 91050-15872 units Cpep per EC capsule    CREON    180 capsule    Take 1 capsule (24,000 Units) by mouth 3 times daily (with meals)    Pancreatic adenocarcinoma (H)       BIOTIN PO           latanoprost 0.005 % ophthalmic solution    XALATAN    3 Bottle    Place 1 drop into both eyes At Bedtime    Glaucoma suspect, bilateral       lidocaine-prilocaine cream    EMLA    30 g    Apply topically as needed for moderate pain (use dollop of cream to saran wrap 30 min prior to use of port)    Pancreatic adenocarcinoma (H)        loperamide 2 MG capsule    IMODIUM    30 capsule    2 caps at 1st sign of diarrhea & 1 cap every 2hrs until 12hrs diarrhea free. 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

## 2018-05-30 NOTE — PROGRESS NOTES
SPIRITUAL HEALTH SERVICES  SPIRITUAL ASSESSMENT Progress Note  [facility]    (Follow up)  Pt reported she was doing well and was relieved her daughter, Janeth, had turned down her boy friend's marriage.proposal.   provided supportive listening and prayer.  Chaplaincy is available for pt/family needs.    Christophe Rodarte M.Div., [facility]  Staff   Office tel: [phone]

## 2018-05-30 NOTE — MR AVS SNAPSHOT
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)    [address] [place] [id number] [phone]            May 30, 2018  1:00 PM CDT   Folfiri with BAY 4 INFUSION   [facility])    [address] [place] [id number] [phone]            Jun 06, 2018 10:00 AM CDT   Return Visit with [facility]URSE ONLY CANCER CENTER   [facility])    [address] [place] [id number] [phone]              Who to contact     If you have questions or need follow up information about today's clinic visit or your schedule please contact [facility] directly at [phone].  Normal or non-critical lab and imaging results will be communicated to you by MyChart, letter or phone within 4 business days after the clinic has received the results. If you do not hear from us within 7 days, please contact the clinic through MyChart or phone. If you have a critical or abnormal lab result, we will notify you by phone as soon as possible.  Submit refill requests through [employer] or call your pharmacy and they will forward the refill request to us. Please allow 3 business days for your refill to be completed.          Additional Information About Your Visit        Care EveryWhere ID     This is your Care EveryWhere ID. This  could be used by other organizations to access your [facility] medical records  [id number]         Blood Pressure from Last 3 Encounters:   05/23/18 127/82   05/16/18 134/83   05/07/18 152/83    Weight from Last 3 Encounters:   05/23/18 43 kg (94 lb 12.8 oz)   05/16/18 44.9 kg (99 lb)   05/07/18 44.9 kg (99 lb)              We Performed the Following     Bilirubin  total     CBC with platelets differential        Primary Care Provider Office Phone # Fax #    Pancho Cope [phone] [address] [facility] [address]        Equal Access to Services     [facility] Hospital[facility]INDY : Hadii feliciano suárez hadasho Sokirit, waaxda luqadaha, qaybta kaalmada adeterenceyada, carrie kaur . So [facility] [phone].    ATENCIÓN: Si habla español, tiene a ornelas disposición servicios gratuitos de asistencia lingüística. Llame al [phone].    We comply with applicable federal civil rights laws and [place] laws. We do not discriminate on the basis of race, color, national origin, age, disability, sex, sexual orientation, or gender identity.            Thank you!     Thank you for choosing [facility]  for your care. Our goal is always to provide you with excellent care. Hearing back from our patients is one way we can continue to improve our services. Please take a few minutes to complete the written survey that you may receive in the mail after your visit with us. Thank you!             Your Updated Medication List - Protect others around you: Learn how to safely use, store and throw away your medicines at www.disposemymeds.org.          This list is accurate as of 5/30/18 12:03 PM.  Always use your most recent med list.                   Brand Name Dispense Instructions for use Diagnosis    amylase-lipase-protease 83501-14881 units Cpep per EC capsule    CREON    180 capsule    Take 1 capsule (24,000 Units) by mouth 3 times daily (with meals)    Pancreatic adenocarcinoma (H)        BIOTIN PO           latanoprost 0.005 % ophthalmic solution    XALATAN    3 Bottle    Place 1 drop into both eyes At Bedtime    Glaucoma suspect, bilateral       lidocaine-prilocaine cream    EMLA    30 g    Apply topically as needed for moderate pain (use dollop of cream to saran wrap 30 min prior to use of port)    Pancreatic adenocarcinoma (H)       loperamide 2 MG capsule    IMODIUM    30 capsule    2 caps at 1st sign of diarrhea & 1 cap every 2hrs until 12hrs diarrhea free. 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

## 2018-05-31 NOTE — PROGRESS NOTES
This is a recent snapshot of the patient's [facility] medical record.  For current drug dose and complete information and questions, call [phone] or In Basket pool, [facility] ([id number])  CSN Number:  [id number]

## 2018-06-01 ENCOUNTER — HOME INFUSION (PRE-WILLOW HOME INFUSION) (OUTPATIENT)
Dept: PHARMACY | Facility: CLINIC | Age: 78
End: 2018-06-01

## 2018-06-04 NOTE — PROGRESS NOTES
This is a recent snapshot of the patient's [facility] medical record.  For current drug dose and complete information and questions, call [phone] or In Basket pool, fv home infusion ([id number])  CSN Number:  [id number]

## 2018-06-06 ENCOUNTER — ONCOLOGY VISIT (OUTPATIENT)
Dept: ONCOLOGY | Facility: CLINIC | Age: 78
End: 2018-06-06
Payer: COMMERCIAL

## 2018-06-06 DIAGNOSIS — C25.9 PANCREATIC ADENOCARCINOMA (H): ICD-10-CM

## 2018-06-06 DIAGNOSIS — C78.01 MALIGNANT NEOPLASM METASTATIC TO BOTH LUNGS (H): Primary | ICD-10-CM

## 2018-06-06 DIAGNOSIS — C78.02 MALIGNANT NEOPLASM METASTATIC TO BOTH LUNGS (H): Primary | ICD-10-CM

## 2018-06-06 LAB
BASOPHILS # BLD AUTO: 0 10E9/L (ref 0–0.2)
BASOPHILS NFR BLD AUTO: 0.3 %
DIFFERENTIAL METHOD BLD: ABNORMAL
EOSINOPHIL # BLD AUTO: 0.1 10E9/L (ref 0–0.7)
EOSINOPHIL NFR BLD AUTO: 2.2 %
ERYTHROCYTE [DISTWIDTH] IN BLOOD BY AUTOMATED COUNT: 16 % (ref 10–15)
HCT VFR BLD AUTO: 33.3 % (ref 35–47)
HGB BLD-MCNC: 11.2 G/DL (ref 11.7–15.7)
IMM GRANULOCYTES # BLD: 0 10E9/L (ref 0–0.4)
IMM GRANULOCYTES NFR BLD: 0.3 %
LYMPHOCYTES # BLD AUTO: 1 10E9/L (ref 0.8–5.3)
LYMPHOCYTES NFR BLD AUTO: 26.3 %
MCH RBC QN AUTO: 31.1 PG (ref 26.5–33)
MCHC RBC AUTO-ENTMCNC: 33.6 G/DL (ref 31.5–36.5)
MCV RBC AUTO: 93 FL (ref 78–100)
MONOCYTES # BLD AUTO: 0.3 10E9/L (ref 0–1.3)
MONOCYTES NFR BLD AUTO: 7.3 %
NEUTROPHILS # BLD AUTO: 2.4 10E9/L (ref 1.6–8.3)
NEUTROPHILS NFR BLD AUTO: 63.6 %
PLATELET # BLD AUTO: 211 10E9/L (ref 150–450)
RBC # BLD AUTO: 3.6 10E12/L (ref 3.8–5.2)
WBC # BLD AUTO: 3.7 10E9/L (ref 4–11)

## 2018-06-06 PROCEDURE — 36591 DRAW BLOOD OFF VENOUS DEVICE: CPT

## 2018-06-06 PROCEDURE — 85025 COMPLETE CBC W/AUTO DIFF WBC: CPT | Performed by: NURSE PRACTITIONER

## 2018-06-06 RX ORDER — HEPARIN SODIUM (PORCINE) LOCK FLUSH IV SOLN 100 UNIT/ML 100 UNIT/ML
5 SOLUTION INTRAVENOUS
Status: DISCONTINUED | OUTPATIENT
Start: 2018-06-06 | End: 2018-06-06 | Stop reason: HOSPADM

## 2018-06-06 RX ADMIN — HEPARIN SODIUM (PORCINE) LOCK FLUSH IV SOLN 100 UNIT/ML 5 ML: 100 SOLUTION at 10:01

## 2018-06-06 NOTE — PROGRESS NOTES
Tolerated lab draw without complaint. [place] drawn-Red/Green/Purple tubes. Double signed by patient and RN. See documentation flowsheet. Sheree Calhoun, RN, BSN, OCN

## 2018-06-06 NOTE — MR AVS SNAPSHOT
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)    -  1    Pancreatic adenocarcinoma (H)           Follow-ups after your visit        Your next 10 appointments already scheduled     Jun 14, 2018  8:15 AM CDT   Return Visit with NURSE ONLY CANCER CENTER   [facility])    [address] [place] [id number]   [phone]            Jun 14, 2018  8:45 AM CDT   Return Visit with LISA Mendieta CNP   [facility])    [address] [place] [id number] [phone]            Jun 14, 2018 10:00 AM CDT   Folfiri with BAY 5 INFUSION   [facility])    [address] [place] [id number] [phone]            Jun 20, 2018 10:30 AM CDT   Return Visit with NURSE ONLY CANCER CENTER   [facility])    [address] [place] [id number] [phone]              Who to contact     If you have questions or need follow up information about today's clinic visit or your schedule please contact [facility] directly at [phone].  Normal or non-critical lab and imaging results will be communicated to you by MyChart, letter or phone within 4 business days after the clinic has received the results. If you do not hear from us within 7 days, please contact the clinic through MyChart or phone. If you have a critical or abnormal lab result, we will notify you by phone as soon as possible.  Submit refill requests through [employer] or call your pharmacy and they will forward  the refill request to us. Please allow 3 business days for your refill to be completed.          Additional Information About Your Visit        Care EveryWhere ID     This is your Care EveryWhere ID. This could be used by other organizations to access your [place] medical records  [id number]         Blood Pressure from Last 3 Encounters:   05/30/18 139/80   05/23/18 127/82   05/16/18 134/83    Weight from Last 3 Encounters:   05/30/18 44.9 kg (99 lb)   05/23/18 43 kg (94 lb 12.8 oz)   05/16/18 44.9 kg (99 lb)              We Performed the Following     CBC with platelets differential        Primary Care Provider Office Phone # Fax #    Pancho Cope [phone] [address] [facility] [address]        Equal Access to Services     [facility] Hospital[facility] : Hadii feliciano suárez hadasho Soomaali, waaxda luqadaha, qaybta kaalmada adeegyada, carrie kaur . So [facility] [phone].    ATENCIÓN: Si habla español, tiene a ornelas disposición servicios gratuitos de asistencia lingüística. [facility] [phone].    We comply with applicable federal civil rights laws and [place] laws. We do not discriminate on the basis of race, color, national origin, age, disability, sex, sexual orientation, or gender identity.            Thank you!     Thank you for choosing [facility]  for your care. Our goal is always to provide you with excellent care. Hearing back from our patients is one way we can continue to improve our services. Please take a few minutes to complete the written survey that you may receive in the mail after your visit with us. Thank you!             Your Updated Medication List - Protect others around you: Learn how to safely use, store and throw away your medicines at www.disposemymeds.org.          This list is accurate as of 6/6/18 10:13 AM.  Always use your most recent med list.                   Brand Name Dispense Instructions for use Diagnosis     amylase-lipase-protease 93888-38101 units Cpep per EC capsule    CREON    180 capsule    Take 1 capsule (24,000 Units) by mouth 3 times daily (with meals)    Pancreatic adenocarcinoma (H)       BIOTIN PO           latanoprost 0.005 % ophthalmic solution    XALATAN    3 Bottle    Place 1 drop into both eyes At Bedtime    Glaucoma suspect, bilateral       lidocaine-prilocaine cream    EMLA    30 g    Apply topically as needed for moderate pain (use dollop of cream to saran wrap 30 min prior to use of port)    Pancreatic adenocarcinoma (H)       loperamide 2 MG capsule    IMODIUM    30 capsule    2 caps at 1st sign of diarrhea & 1 cap every 2hrs until 12hrs diarrhea free. 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

## 2018-06-13 ENCOUNTER — INFUSION THERAPY VISIT (OUTPATIENT)
Dept: INFUSION THERAPY | Facility: CLINIC | Age: 78
End: 2018-06-13
Payer: COMMERCIAL

## 2018-06-13 ENCOUNTER — ONCOLOGY VISIT (OUTPATIENT)
Dept: ONCOLOGY | Facility: CLINIC | Age: 78
End: 2018-06-13
Payer: COMMERCIAL

## 2018-06-13 VITALS
RESPIRATION RATE: 15 BRPM | OXYGEN SATURATION: 97 % | SYSTOLIC BLOOD PRESSURE: 100 MMHG | HEART RATE: 82 BPM | WEIGHT: 99.1 LBS | TEMPERATURE: 97.7 F | HEIGHT: 59 IN | BODY MASS INDEX: 19.98 KG/M2 | DIASTOLIC BLOOD PRESSURE: 68 MMHG

## 2018-06-13 DIAGNOSIS — E87.6 HYPOKALEMIA: ICD-10-CM

## 2018-06-13 DIAGNOSIS — C78.01 MALIGNANT NEOPLASM METASTATIC TO BOTH LUNGS (H): Primary | ICD-10-CM

## 2018-06-13 DIAGNOSIS — R19.5 LOOSE STOOLS: ICD-10-CM

## 2018-06-13 DIAGNOSIS — C25.9 PANCREATIC ADENOCARCINOMA (H): ICD-10-CM

## 2018-06-13 DIAGNOSIS — C78.02 MALIGNANT NEOPLASM METASTATIC TO BOTH LUNGS (H): Primary | ICD-10-CM

## 2018-06-13 DIAGNOSIS — R63.4 LOSS OF WEIGHT: ICD-10-CM

## 2018-06-13 DIAGNOSIS — J30.2 CHRONIC SEASONAL ALLERGIC RHINITIS, UNSPECIFIED TRIGGER: ICD-10-CM

## 2018-06-13 LAB
BASOPHILS # BLD AUTO: 0 10E9/L (ref 0–0.2)
BASOPHILS NFR BLD AUTO: 0.4 %
BILIRUB SERPL-MCNC: 1 MG/DL (ref 0.2–1.3)
DIFFERENTIAL METHOD BLD: ABNORMAL
EOSINOPHIL # BLD AUTO: 0.1 10E9/L (ref 0–0.7)
EOSINOPHIL NFR BLD AUTO: 0.9 %
ERYTHROCYTE [DISTWIDTH] IN BLOOD BY AUTOMATED COUNT: 18 % (ref 10–15)
HCT VFR BLD AUTO: 31.5 % (ref 35–47)
HGB BLD-MCNC: 10.8 G/DL (ref 11.7–15.7)
IMM GRANULOCYTES # BLD: 0 10E9/L (ref 0–0.4)
IMM GRANULOCYTES NFR BLD: 0.4 %
LYMPHOCYTES # BLD AUTO: 0.9 10E9/L (ref 0.8–5.3)
LYMPHOCYTES NFR BLD AUTO: 13.4 %
MAGNESIUM SERPL-MCNC: 1.8 MG/DL (ref 1.6–2.3)
MCH RBC QN AUTO: 31.4 PG (ref 26.5–33)
MCHC RBC AUTO-ENTMCNC: 34.3 G/DL (ref 31.5–36.5)
MCV RBC AUTO: 92 FL (ref 78–100)
MONOCYTES # BLD AUTO: 0.7 10E9/L (ref 0–1.3)
MONOCYTES NFR BLD AUTO: 10 %
NEUTROPHILS # BLD AUTO: 5.2 10E9/L (ref 1.6–8.3)
NEUTROPHILS NFR BLD AUTO: 74.9 %
PLATELET # BLD AUTO: 222 10E9/L (ref 150–450)
POTASSIUM SERPL-SCNC: 2.9 MMOL/L (ref 3.4–5.3)
RBC # BLD AUTO: 3.44 10E12/L (ref 3.8–5.2)
WBC # BLD AUTO: 6.9 10E9/L (ref 4–11)

## 2018-06-13 PROCEDURE — 84132 ASSAY OF SERUM POTASSIUM: CPT | Performed by: NURSE PRACTITIONER

## 2018-06-13 PROCEDURE — 85025 COMPLETE CBC W/AUTO DIFF WBC: CPT | Performed by: NURSE PRACTITIONER

## 2018-06-13 PROCEDURE — 96413 CHEMO IV INFUSION 1 HR: CPT | Performed by: NURSE PRACTITIONER

## 2018-06-13 PROCEDURE — 82247 BILIRUBIN TOTAL: CPT | Performed by: NURSE PRACTITIONER

## 2018-06-13 PROCEDURE — 99207 ZZC NO CHARGE NURSE ONLY: CPT

## 2018-06-13 PROCEDURE — 96416 CHEMO PROLONG INFUSE W/PUMP: CPT | Performed by: NURSE PRACTITIONER

## 2018-06-13 PROCEDURE — 83735 ASSAY OF MAGNESIUM: CPT | Performed by: NURSE PRACTITIONER

## 2018-06-13 PROCEDURE — 96375 TX/PRO/DX INJ NEW DRUG ADDON: CPT | Performed by: NURSE PRACTITIONER

## 2018-06-13 PROCEDURE — 99207 ZZC NO CHARGE LOS: CPT

## 2018-06-13 PROCEDURE — 99214 OFFICE O/P EST MOD 30 MIN: CPT | Mod: 25 | Performed by: NURSE PRACTITIONER

## 2018-06-13 PROCEDURE — 96415 CHEMO IV INFUSION ADDL HR: CPT | Performed by: NURSE PRACTITIONER

## 2018-06-13 RX ORDER — HEPARIN SODIUM (PORCINE) LOCK FLUSH IV SOLN 100 UNIT/ML 100 UNIT/ML
5 SOLUTION INTRAVENOUS
Status: DISCONTINUED | OUTPATIENT
Start: 2018-06-13 | End: 2018-06-13 | Stop reason: HOSPADM

## 2018-06-13 RX ORDER — DIPHENHYDRAMINE HYDROCHLORIDE 50 MG/ML
50 INJECTION INTRAMUSCULAR; INTRAVENOUS
Status: CANCELLED
Start: 2018-06-13

## 2018-06-13 RX ORDER — LORAZEPAM 2 MG/ML
0.5 INJECTION INTRAMUSCULAR EVERY 4 HOURS PRN
Status: CANCELLED
Start: 2018-06-13

## 2018-06-13 RX ORDER — ALBUTEROL SULFATE 90 UG/1
1-2 AEROSOL, METERED RESPIRATORY (INHALATION)
Status: CANCELLED
Start: 2018-06-13

## 2018-06-13 RX ORDER — POTASSIUM CHLORIDE 750 MG/1
20 TABLET, EXTENDED RELEASE ORAL ONCE
Status: COMPLETED | OUTPATIENT
Start: 2018-06-13 | End: 2018-06-13

## 2018-06-13 RX ORDER — MEPERIDINE HYDROCHLORIDE 25 MG/ML
25 INJECTION INTRAMUSCULAR; INTRAVENOUS; SUBCUTANEOUS EVERY 30 MIN PRN
Status: CANCELLED | OUTPATIENT
Start: 2018-06-13

## 2018-06-13 RX ORDER — ALBUTEROL SULFATE 0.83 MG/ML
2.5 SOLUTION RESPIRATORY (INHALATION)
Status: CANCELLED | OUTPATIENT
Start: 2018-06-13

## 2018-06-13 RX ORDER — EPINEPHRINE 0.3 MG/.3ML
0.3 INJECTION SUBCUTANEOUS EVERY 5 MIN PRN
Status: CANCELLED | OUTPATIENT
Start: 2018-06-13

## 2018-06-13 RX ORDER — SODIUM CHLORIDE 9 MG/ML
1000 INJECTION, SOLUTION INTRAVENOUS CONTINUOUS PRN
Status: CANCELLED
Start: 2018-06-13

## 2018-06-13 RX ORDER — METHYLPREDNISOLONE SODIUM SUCCINATE 125 MG/2ML
125 INJECTION, POWDER, LYOPHILIZED, FOR SOLUTION INTRAMUSCULAR; INTRAVENOUS
Status: CANCELLED
Start: 2018-06-13

## 2018-06-13 RX ORDER — EPINEPHRINE 1 MG/ML
0.3 INJECTION, SOLUTION INTRAMUSCULAR; SUBCUTANEOUS EVERY 5 MIN PRN
Status: CANCELLED | OUTPATIENT
Start: 2018-06-13

## 2018-06-13 RX ORDER — POTASSIUM CHLORIDE 1500 MG/1
TABLET, EXTENDED RELEASE ORAL
Qty: 32 TABLET | Refills: 1 | Status: ON HOLD | OUTPATIENT
Start: 2018-06-13 | End: 2018-01-01

## 2018-06-13 RX ADMIN — POTASSIUM CHLORIDE 20 MEQ: 750 TABLET, EXTENDED RELEASE ORAL at 15:18

## 2018-06-13 RX ADMIN — HEPARIN SODIUM (PORCINE) LOCK FLUSH IV SOLN 100 UNIT/ML 5 ML: 100 SOLUTION at 12:14

## 2018-06-13 RX ADMIN — Medication 250 ML: at 13:19

## 2018-06-13 ASSESSMENT — PAIN SCALES - GENERAL: PAINLEVEL: NO PAIN (0)

## 2018-06-13 NOTE — LETTER
6/13/2018         RE: Talya Gatica  [address]  [place] [facility] [address]        Dear Colleague,    Thank you for referring your patient, Talya Gatica, to the [facility]. Please see a copy of my visit note below.    Oncology Follow Up Visit: June 13, 2018     Oncologist: Dr Jim Soares  PCP: Pancho Cope    Diagnosis: Stage IV Pancreatic cancer  Talya Gatica is a 79 yo who c/o jaundice in 7/2017 was found to have  adenocarcinoma of the head of pancreas causing biliary obstruction and several pulmonary nodules consistent with metastasis- initially told days to 3 months of life.  Treatment:   11/3/2017 began treatment with Gemzar- days 1,8,15 of 28 day plan x 6 cycles  5/16/2018 to begin treatment with FOLFIRI with onivyde at 70 mg/m2 dose reduction of the 5 FU from start of plan by 15%.    Interval History: Ms. Sanchez comes to clinic alone today for toxicity check prior to cycle 3 of FOLFIRI. Pt shares she had occasional diarrhea including yesterday but did use the imodium 2 tabs x 3 doses to stop the stools- she is also using the creon but only with higher fat meals- 1-2 x daily. She has lost another lb this week but is feeling like she is eating better and was good to not loose more after yesterday's loose stools. she is taking in at least 6 glasses of water daily plus other fluids. No blood in stools. She is denying pina, nausea, SOB or fevers, illness. She does relate seasonal allergies are noted and she is using claritin daily but still having some morning green discharge- also noting some voice variation with the discharge but no headaches hearing changes or ear pressure, headaches or nasal discharge.   Rest of comprehensive and complete ROS is reviewed and is negative.   Past Medical History:   Diagnosis Date     AR (allergic rhinitis)      Baker's cyst      DJD (degenerative joint disease)      Elevated cholesterol      Glaucoma      Menopause      Vertigo  "     Current Outpatient Prescriptions   Medication     amylase-lipase-protease (CREON) [id number] units CPEP per EC capsule     BIOTIN PO     latanoprost (XALATAN) 0.005 % ophthalmic solution     lidocaine-prilocaine (EMLA) cream     loperamide (IMODIUM) 2 MG capsule     LORazepam (ATIVAN) 0.5 MG tablet     Multiple Vitamins-Minerals (WOMENS 50+ MULTI VITAMIN/MIN PO)     ondansetron (ZOFRAN) 8 MG tablet     Potassium Chloride ER 20 MEQ TBCR     prochlorperazine (COMPAZINE) 10 MG tablet     No current facility-administered medications for this visit.      Allergies   Allergen Reactions     Codeine Camsylate        Physical Exam: /68  Pulse 82  Temp 97.7  F (36.5  C)  Resp 15  Ht 1.511 m (4' 11.49\")  Wt 45 kg (99 lb 1.6 oz)  SpO2 97%  BMI 19.69 kg/m2   ECOG PS-1   Constitutional: Alert, moving slowly using cane though slow to start and needing one assist for transfer without cane or for long distances.No distress  ENT: Eyes bright, No mouth sores. [place]- has cerumen impaction to both ears.  Neck: Supple, No adenopathy.Thyroid symmetric  Cardiac: Heart rate and rhythm is regular and strong with aortic murmur  Noted as usual.  Respiratory: Breathing easy. Lung sounds clear to auscultation. No cough  Port with no redness or swelling, Access went well for first time use.   GI: Abdomen is soft, non-tender, BS normal. No masses or organomegaly  MS: Muscle tone fair- uses cane for support, extremities normal with no edema.   Skin: No suspicious lesions or rashes or bruising  Neuro: Sensory grossly WNL, gait is steady with cane  Lymph: Normal ant/post cervical, axillary, supraclavicular nodes  Psych: Mentation appears normal and affect normal/bright with ongoing conversation.     Laboratory Results:   Results for orders placed or performed in visit on 06/13/18   CBC with platelets differential   Result Value Ref Range    WBC 6.9 4.0 - 11.0 10e9/L    RBC Count 3.44 (L) 3.8 - 5.2 10e12/L    Hemoglobin 10.8 (L) 11.7 " - 15.7 g/dL    Hematocrit 31.5 (L) 35.0 - 47.0 %    MCV 92 78 - 100 fl    MCH 31.4 26.5 - 33.0 pg    MCHC 34.3 31.5 - 36.5 g/dL    RDW 18.0 (H) 10.0 - 15.0 %    Platelet Count 222 150 - 450 10e9/L    Diff Method Automated Method     % Neutrophils 74.9 %    % Lymphocytes 13.4 %    % Monocytes 10.0 %    % Eosinophils 0.9 %    % Basophils 0.4 %    % Immature Granulocytes 0.4 %    Absolute Neutrophil 5.2 1.6 - 8.3 10e9/L    Absolute Lymphocytes 0.9 0.8 - 5.3 10e9/L    Absolute Monocytes 0.7 0.0 - 1.3 10e9/L    Absolute Eosinophils 0.1 0.0 - 0.7 10e9/L    Absolute Basophils 0.0 0.0 - 0.2 10e9/L    Abs Immature Granulocytes 0.0 0 - 0.4 10e9/L   Bilirubin  total   Result Value Ref Range    Bilirubin Total 1.0 0.2 - 1.3 mg/dL       Assessment and Plan:   Stage IV Pancreatic cancer-Pt started plan of Liposomal Irinotecan and with 15 % reduction in 5FU on 5/16/2018 and appears to be tolerating well with some loose stools and small weight loss with each visit but has good energy and is meeting goals to continue with treatment today.   Will set up cycle 4 and suggest that we will wait until late July prior to next imaging.   We'll see patient return in 2 weeks time or before with new symptoms of concern  Loose stools- intermittent and may be related to irratic creon use or the irinotecan. Discussed that she should be using the creon with larger meals or more fatty meals at least 2 x daily. Then reviewed use of imodium taking 2 tabs after the loose stools and may repeat up to 8 tabs daily if the loose stools continue. Reminded of need for   Seasonal allergies- using claritin but having some voice changes or morning congestion. No signs with exam that this has turned to sinusitis so should continue conservative treatment.   Nutrition and weight loss-patient again lost weight though feels it was related to the loose stools since she feels she is eating well.  She did increase her proteins and fluids. Encouraged use of protein  supplements or trialing eating every 2 hours to help with intake.Refuses to see dietician since does not come on her infusion days.   This was a 25 min visit with > 50% in counseling and coordinating care including education and management of concerns.    Nai Simmons CNP      Again, thank you for allowing me to participate in the care of your patient.        Sincerely,        Nai Simmons, RICHAR, APRN CNP

## 2018-06-13 NOTE — PROGRESS NOTES
Power port needle left accessed for treatment. Tolerated lab draw without complaint. [place] drawn-Red/Green/Purple tubes. Double signed by patient and RN. See documentation flowsheet. Sheree Calhoun, RN, BSN, OCN

## 2018-06-13 NOTE — MR AVS SNAPSHOT
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)    -  1    Pancreatic adenocarcinoma (H)           Follow-ups after your visit        Your next 10 appointments already scheduled     Jun 13, 2018  1:00 PM CDT   Folfiri with BAY 4 INFUSION   [facility]    [address] [place] [id number] [phone]            Jun 20, 2018 10:30 AM CDT   Return Visit with NURSE ONLY CANCER CENTER   [facility] ([facility])    [address] [place] [address] [phone]              Who to contact     If you have questions or need follow up information about today's clinic visit or your schedule please contact [facility] directly at [phone].  Normal or non-critical lab and imaging results will be communicated to you by MyChart, letter or phone within 4 business days after the clinic has received the results. If you do not hear from us within 7 days, please contact the clinic through MyChart or phone. If you have a critical or abnormal lab result, we will notify you by phone as soon as possible.  Submit refill requests through [employer] or call your pharmacy and they will forward the refill request to us. Please allow 3 business days for your refill to be completed.          Additional Information About Your Visit        Care EveryWhere ID     This is your Care EveryWhere ID. This could be used by other organizations to access your [place] medical records  [id number]         Blood Pressure from Last 3 Encounters:   06/13/18 100/68   05/30/18 139/80   05/23/18 127/82    Weight from Last 3  Encounters:   06/13/18 45 kg (99 lb 1.6 oz)   05/30/18 44.9 kg (99 lb)   05/23/18 43 kg (94 lb 12.8 oz)              We Performed the Following     Bilirubin  total     CBC with platelets differential        Primary Care Provider Office Phone # Fax #    Pancho Cope [phone] [address] [facility] [address]        Equal Access to Services     JOCELINE JORDAN : Hadii aad ku hadasho Soomaali, waaxda luqadaha, qaybta kaalmada adeegyada, waxay idiin hayaan adeeg kharash laebenezer . So [facility] [phone].    ATENCIÓN: Si nikolas daniels, tiene a ornelas disposición servicios gratuitos de asistencia lingüística. Llame al [phone].    We comply with applicable federal civil rights laws and [place] laws. We do not discriminate on the basis of race, color, national origin, age, disability, sex, sexual orientation, or gender identity.            Thank you!     Thank you for choosing [facility]  for your care. Our goal is always to provide you with excellent care. Hearing back from our patients is one way we can continue to improve our services. Please take a few minutes to complete the written survey that you may receive in the mail after your visit with us. Thank you!             Your Updated Medication List - Protect others around you: Learn how to safely use, store and throw away your medicines at www.disposemymeds.org.          This list is accurate as of 6/13/18 12:56 PM.  Always use your most recent med list.                   Brand Name Dispense Instructions for use Diagnosis    amylase-lipase-protease 38614-54131 units Cpep per EC capsule    CREON    180 capsule    Take 1 capsule (24,000 Units) by mouth 3 times daily (with meals)    Pancreatic adenocarcinoma (H)       BIOTIN PO           latanoprost 0.005 % ophthalmic solution    XALATAN    3 Bottle    Place 1 drop into both eyes At Bedtime    Glaucoma suspect, bilateral       lidocaine-prilocaine cream    EMLA    30  g    Apply topically as needed for moderate pain (use dollop of cream to saran wrap 30 min prior to use of port)    Pancreatic adenocarcinoma (H)       loperamide 2 MG capsule    IMODIUM    30 capsule    2 caps at 1st sign of diarrhea & 1 cap every 2hrs until 12hrs diarrhea free. 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

## 2018-06-13 NOTE — MR AVS SNAPSHOT
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)    -  1    Loose stools        Loss of weight        Pancreatic adenocarcinoma (H)        Chronic seasonal allergic rhinitis, unspecified trigger        Hypokalemia           Follow-ups after your visit        Your next 10 appointments already scheduled     Jun 28, 2018 10:45 AM CDT   Return Visit with [facility]   [facility]    [address] [place] [id number] [phone]            Jun 28, 2018 11:15 AM CDT   Return Visit with LISA Mendieta CNP   [facility]    [address] [place] [address] [id number] [phone]            Jun 28, 2018 12:30 PM CDT   Folfiri with BAY 10 INFUSION   [facility]    [address] [place] [id number] [phone]            Jul 05, 2018  1:30 PM CDT   Return Visit with [facility] ONLY [facility]   [facility]    [address] [place] [address] [phone]              Who to contact     If you have questions or need follow up information about today's clinic visit or your schedule please contact [facility] directly at [phone].  Normal or non-critical lab and imaging results will be communicated to you by MyChart, letter or phone within 4 business days after the clinic has received the results. If you do not hear from us within 7 days, please contact the clinic through MyChart or phone. If you have a critical or abnormal lab result, we  "will notify you by phone as soon as possible.  Submit refill requests through [employer] or call your pharmacy and they will forward the refill request to us. Please allow 3 business days for your refill to be completed.          Additional Information About Your Visit        Care EveryWhere ID     This is your Care EveryWhere ID. This could be used by other organizations to access your [facility] medical records  [id number]        Your Vitals Were     Pulse Temperature Respirations Height Pulse Oximetry BMI (Body Mass Index)    82 97.7  F (36.5  C) 15 1.511 m (4' 11.49\") 97% 19.69 kg/m2       Blood Pressure from Last 3 Encounters:   06/13/18 100/68   05/30/18 139/80   05/23/18 127/82    Weight from Last 3 Encounters:   06/13/18 45 kg (99 lb 1.6 oz)   05/30/18 44.9 kg (99 lb)   05/23/18 43 kg (94 lb 12.8 oz)              We Performed the Following     Magnesium     Potassium          Today's Medication Changes          These changes are accurate as of 6/13/18 11:59 PM.  If you have any questions, ask your nurse or doctor.               Start taking these medicines.        Dose/Directions    potassium chloride SA 20 MEQ CR tablet   Commonly known as:  KLOR-CON   Used for:  Hypokalemia, Loose stools        Take 1 tab at 6 pm tonight and 1 tab at 9 pm tonight then start 1 tab daily tomorrow morning.   Quantity:  32 tablet   Refills:  1            Where to get your medicines      These medications were sent to [facility] - [place], [place] - [address], [address]  [address], [address], [facility] [address]     Phone:  [phone]     potassium chloride SA 20 MEQ CR tablet                Primary Care Provider Office Phone # Fax #    Pancho Cope [phone] [address] [facility] [address]        Equal Access to Services     JOCELINE JORDAN AH: Hadii feliciano suárez hadasho Soomaali, waaxda luqadaha, qaybta kaalmada adegladis, carrie pickens " laebenezer wheatley. So [facility] [phone].    ATENCIÓN: Si nikolas daniels, tiene a ornelas disposición servicios gratuitos de asistencia lingüística. Kristen dias [phone].    We comply with applicable federal civil rights laws and [place] laws. We do not discriminate on the basis of race, color, national origin, age, disability, sex, sexual orientation, or gender identity.            Thank you!     Thank you for choosing [facility]  for your care. Our goal is always to provide you with excellent care. Hearing back from our patients is one way we can continue to improve our services. Please take a few minutes to complete the written survey that you may receive in the mail after your visit with us. Thank you!             Your Updated Medication List - Protect others around you: Learn how to safely use, store and throw away your medicines at www.disposemymeds.org.          This list is accurate as of 6/13/18 11:59 PM.  Always use your most recent med list.                   Brand Name Dispense Instructions for use Diagnosis    amylase-lipase-protease 64505-60446 units Cpep per EC capsule    CREON    180 capsule    Take 1 capsule (24,000 Units) by mouth 3 times daily (with meals)    Pancreatic adenocarcinoma (H)       BIOTIN PO           latanoprost 0.005 % ophthalmic solution    XALATAN    3 Bottle    Place 1 drop into both eyes At Bedtime    Glaucoma suspect, bilateral       lidocaine-prilocaine cream    EMLA    30 g    Apply topically as needed for moderate pain (use dollop of cream to saran wrap 30 min prior to use of port)    Pancreatic adenocarcinoma (H)       loperamide 2 MG capsule    IMODIUM    30 capsule    2 caps at 1st sign of diarrhea & 1 cap every 2hrs until 12hrs diarrhea free. 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

## 2018-06-13 NOTE — PROGRESS NOTES
Infusion Nursing Note:  Talya YI Shivnaresh presents today for Folfiri.    Patient seen by provider today: No   present during visit today: Not Applicable.    Note: pt took oral K+ in clinic and instructed to  K+ for home administration to complete replacement protocol.  She then will start on daily K+ as she is having continued diarrhea.  Pt states she will be more aggressive with immodium use as she has only been taking a couple pills/day  Intravenous Access:  Implanted Port.    Treatment Conditions:  Results reviewed, labs MET treatment parameters, ok to proceed with treatment.    Post Infusion Assessment:  Patient tolerated infusion without incident.    Discharge Plan:   [facility] per discharge plan/NP visit.  appts still pending.  [facility] for d/c on 6/15.      FREDIS LINARES RN

## 2018-06-13 NOTE — MR AVS SNAPSHOT
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  Normal or non-critical lab and imaging results will be communicated to you by MyChart, letter or phone within 4 business days after the clinic has received the results. If you do not hear from us within 7 days, please contact the clinic through MyChart or phone. If you have a critical or abnormal lab result, we will notify you by phone as soon as possible.  Submit refill requests through [employer] or call your pharmacy and they will forward the refill request to us. Please allow 3 business days for your refill to be completed.          Additional Information About Your Visit        Care EveryWhere ID     This is your Care EveryWhere ID. This could be used by other organizations to access your [facility] medical records  [id number]         Blood Pressure from Last 3 Encounters:   06/13/18 100/68   05/30/18 139/80   05/23/18 127/82    Weight from Last 3 Encounters:   06/13/18 45 kg (99 lb 1.6 oz)   05/30/18 44.9 kg (99 lb)   05/23/18 43 kg (94 lb 12.8 oz)              Today, you had the following     No orders found for display         Today's Medication Changes          These changes are accurate as of 6/13/18 11:59 PM.  If you have any questions, ask your nurse or doctor.               Start taking these medicines.        Dose/Directions    potassium chloride SA 20 MEQ  CR tablet   Commonly known as:  KLOR-CON   Used for:  Hypokalemia, Loose stools        Take 1 tab at 6 pm tonight and 1 tab at 9 pm tonight then start 1 tab daily tomorrow morning.   Quantity:  32 tablet   Refills:  1            Where to get your medicines      These medications were sent to [facility] - [place], [place] - [address], [address]  [address], [address], [facility] [address]     Phone:  [phone]     potassium chloride SA 20 MEQ CR tablet                Primary Care Provider Office Phone # Fax #    Pancho Rl Cope [phone] [address] [facility] [address]        Equal Access to Services     JOCELINE JORDAN : Hadii aad ku hadasho Soomaali, waaxda luqadaha, qaybta kaalmada adeegyada, waxay idiin hayanu wheatley. So [facility] [phone].    ATENCIÓN: Si habla español, tiene a ornelas disposición servicios gratuitos de asistencia lingüística. [facility] [phone].    We comply with applicable federal civil rights laws and [place] laws. We do not discriminate on the basis of race, color, national origin, age, disability, sex, sexual orientation, or gender identity.            Thank you!     Thank you for choosing [facility]  for your care. Our goal is always to provide you with excellent care. Hearing back from our patients is one way we can continue to improve our services. Please take a few minutes to complete the written survey that you may receive in the mail after your visit with us. Thank you!             Your Updated Medication List - Protect others around you: Learn how to safely use, store and throw away your medicines at www.disposemymeds.org.          This list is accurate as of 6/13/18 11:59 PM.  Always use your most recent med list.                   Brand Name Dispense Instructions for use Diagnosis    amylase-lipase-protease 38031-87215 units Cpep per EC capsule    CREON    180 capsule    Take 1  capsule (24,000 Units) by mouth 3 times daily (with meals)    Pancreatic adenocarcinoma (H)       BIOTIN PO           latanoprost 0.005 % ophthalmic solution    XALATAN    3 Bottle    Place 1 drop into both eyes At Bedtime    Glaucoma suspect, bilateral       lidocaine-prilocaine cream    EMLA    30 g    Apply topically as needed for moderate pain (use dollop of cream to saran wrap 30 min prior to use of port)    Pancreatic adenocarcinoma (H)       loperamide 2 MG capsule    IMODIUM    30 capsule    2 caps at 1st sign of diarrhea & 1 cap every 2hrs until 12hrs diarrhea free. 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

## 2018-06-14 ENCOUNTER — HOME INFUSION (PRE-WILLOW HOME INFUSION) (OUTPATIENT)
Dept: PHARMACY | Facility: CLINIC | Age: 78
End: 2018-06-14

## 2018-06-15 ENCOUNTER — HOME INFUSION (PRE-WILLOW HOME INFUSION) (OUTPATIENT)
Dept: PHARMACY | Facility: CLINIC | Age: 78
End: 2018-06-15

## 2018-06-15 ENCOUNTER — TELEPHONE (OUTPATIENT)
Dept: FAMILY MEDICINE | Facility: CLINIC | Age: 78
End: 2018-06-15

## 2018-06-15 NOTE — PROGRESS NOTES
This is a recent snapshot of the patient's [facility] medical record.  For current drug dose and complete information and questions, call [phone] or In [facility] pool, fv home infusion ([id number])  CSN Number:  [id number]

## 2018-06-18 NOTE — PROGRESS NOTES
This is a recent snapshot of the patient's [facility] medical record.  For current drug dose and complete information and questions, call [phone] or In [facility] pool, fv home infusion ([id number])  CSN Number:  [id number]

## 2018-06-21 DIAGNOSIS — C78.01 MALIGNANT NEOPLASM METASTATIC TO BOTH LUNGS (H): Primary | ICD-10-CM

## 2018-06-21 DIAGNOSIS — C78.02 MALIGNANT NEOPLASM METASTATIC TO BOTH LUNGS (H): Primary | ICD-10-CM

## 2018-06-21 DIAGNOSIS — C25.9 PANCREATIC ADENOCARCINOMA (H): ICD-10-CM

## 2018-06-21 RX ORDER — DIPHENHYDRAMINE HYDROCHLORIDE 50 MG/ML
50 INJECTION INTRAMUSCULAR; INTRAVENOUS
Status: CANCELLED
Start: 2018-01-01

## 2018-06-21 RX ORDER — EPINEPHRINE 1 MG/ML
0.3 INJECTION, SOLUTION INTRAMUSCULAR; SUBCUTANEOUS EVERY 5 MIN PRN
Status: CANCELLED | OUTPATIENT
Start: 2018-01-01

## 2018-06-21 RX ORDER — ALBUTEROL SULFATE 90 UG/1
1-2 AEROSOL, METERED RESPIRATORY (INHALATION)
Status: CANCELLED
Start: 2018-01-01

## 2018-06-21 RX ORDER — EPINEPHRINE 0.3 MG/.3ML
0.3 INJECTION SUBCUTANEOUS EVERY 5 MIN PRN
Status: CANCELLED | OUTPATIENT
Start: 2018-01-01

## 2018-06-21 RX ORDER — ALBUTEROL SULFATE 0.83 MG/ML
2.5 SOLUTION RESPIRATORY (INHALATION)
Status: CANCELLED | OUTPATIENT
Start: 2018-01-01

## 2018-06-21 RX ORDER — LORAZEPAM 2 MG/ML
0.5 INJECTION INTRAMUSCULAR EVERY 4 HOURS PRN
Status: CANCELLED
Start: 2018-01-01

## 2018-06-21 RX ORDER — SODIUM CHLORIDE 9 MG/ML
1000 INJECTION, SOLUTION INTRAVENOUS CONTINUOUS PRN
Status: CANCELLED
Start: 2018-01-01

## 2018-06-21 RX ORDER — METHYLPREDNISOLONE SODIUM SUCCINATE 125 MG/2ML
125 INJECTION, POWDER, LYOPHILIZED, FOR SOLUTION INTRAMUSCULAR; INTRAVENOUS
Status: CANCELLED
Start: 2018-01-01

## 2018-06-21 RX ORDER — MEPERIDINE HYDROCHLORIDE 25 MG/ML
25 INJECTION INTRAMUSCULAR; INTRAVENOUS; SUBCUTANEOUS EVERY 30 MIN PRN
Status: CANCELLED | OUTPATIENT
Start: 2018-01-01

## 2018-06-28 NOTE — MR AVS SNAPSHOT
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)    -  1       Follow-ups after your visit        Your next 10 appointments already scheduled     Jun 28, 2018 12:30 PM CDT   Folfiri with BAY 10 INFUSION   [facility])    [address] [facility] [address]   [phone]            Jul 05, 2018  1:30 PM CDT   Return Visit with NURSE ONLY CANCER CENTER   [facility] ([facility])    [address] [facility] [address] [phone]            Jul 10, 2018 12:30 PM CDT   Return Visit with NURSE ONLY CANCER CENTER   [facility] ([facility])    [address] [facility] [address] [phone]            Jul 10, 2018 12:45 PM CDT   Return Visit with LISA Mendieta CNP   [facility])    [address] [facility] [address] [phone]            Jul 10, 2018  2:00 PM CDT   Folfiri with BAY 2 INFUSION   [facility])    [address] [facility] [address] [phone]              Future tests that were ordered for you today     Open Future Orders        Priority Expected Expires Ordered    Magnesium Routine  6/28/2019 6/28/2018    Basic metabolic panel Routine  6/28/2019 6/28/2018            Who to contact     If you have questions or need follow up information about today's clinic visit or your schedule please contact [facility] directly at [phone].  Normal or non-critical lab and imaging results will be communicated  to you by MyChart, letter or phone within 4 business days after the clinic has received the results. If you do not hear from us within 7 days, please contact the clinic through MyChart or phone. If you have a critical or abnormal lab result, we will notify you by phone as soon as possible.  Submit refill requests through [employer] or call your pharmacy and they will forward the refill request to us. Please allow 3 business days for your refill to be completed.          Additional Information About Your Visit        Care EveryWhere ID     This is your Care EveryWhere ID. This could be used by other organizations to access your [place] medical records  [id number]         Blood Pressure from Last 3 Encounters:   06/28/18 154/88   06/13/18 100/68   05/30/18 139/80    Weight from Last 3 Encounters:   06/28/18 43.1 kg (95 lb)   06/13/18 45 kg (99 lb 1.6 oz)   05/30/18 44.9 kg (99 lb)              Today, you had the following     No orders found for display       Primary Care Provider Office Phone # Fax #    Pancho Cope [phone] [address] [facility] [address]        Equal Access to Services     [facility] NIKKI : Hadii aad ku hadasho Solenoraali, waaxda luqadaha, qaybta kaalmada adeegyada, carrie wheatley. So [facility] [phone].    ATENCIÓN: Si habla español, tiene a ornelas disposición servicios gratuitos de asistencia lingüística. [facility] [phone].    We comply with applicable federal civil rights laws and [place] laws. We do not discriminate on the basis of race, color, national origin, age, disability, sex, sexual orientation, or gender identity.            Thank you!     Thank you for choosing [facility]  for your care. Our goal is always to provide you with excellent care. Hearing back from our patients is one way we can continue to improve our services. Please take a few minutes to complete the written survey that you may receive in  the mail after your visit with us. Thank you!             Your Updated Medication List - Protect others around you: Learn how to safely use, store and throw away your medicines at www.disposemymeds.org.          This list is accurate as of 6/28/18 12:10 PM.  Always use your most recent med list.                   Brand Name Dispense Instructions for use Diagnosis    amylase-lipase-protease 44514-49346 units Cpep per EC capsule    CREON    180 capsule    Take 1 capsule (24,000 Units) by mouth 3 times daily (with meals)    Pancreatic adenocarcinoma (H)       BIOTIN PO           CLARITIN PO      Take by mouth daily        latanoprost 0.005 % ophthalmic solution    XALATAN    3 Bottle    Place 1 drop into both eyes At Bedtime    Glaucoma suspect, bilateral       lidocaine-prilocaine cream    EMLA    30 g    Apply topically as needed for moderate pain (use dollop of cream to saran wrap 30 min prior to use of port)    Pancreatic adenocarcinoma (H)       loperamide 2 MG capsule    IMODIUM    30 capsule    2 caps at 1st sign of diarrhea & 1 cap every 2hrs until 12hrs diarrhea free. 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

## 2018-06-28 NOTE — PROGRESS NOTES
"Infusion Nursing Note:  Talya Gatica presents today for Cycle 4 Irinotecan/Fluorouracil CADD pump    Patient seen by provider today: Yes: CATALINA Simmons NP   present during visit today: Not Applicable.    Note: N/A.    Intravenous Access:  Implanted Port.    Treatment Conditions:  Lab Results   Component Value Date    HGB 11.3 06/28/2018     Lab Results   Component Value Date    WBC 5.0 06/28/2018      Lab Results   Component Value Date    ANEU 3.3 06/28/2018     Lab Results   Component Value Date     06/28/2018      Lab Results   Component Value Date     06/28/2018                   Lab Results   Component Value Date    POTASSIUM 3.4 06/28/2018           Lab Results   Component Value Date    MAG 2.2 06/28/2018            Lab Results   Component Value Date    CR 0.46 06/28/2018                   Lab Results   Component Value Date    BERNARDO 8.9 06/28/2018                Lab Results   Component Value Date    BILITOTAL 0.7 06/28/2018           Lab Results   Component Value Date    ALBUMIN 3.3 06/28/2018                    Lab Results   Component Value Date    ALT 29 06/28/2018           Lab Results   Component Value Date    AST 30 06/28/2018       Results reviewed, labs MET treatment parameters, ok to proceed with treatment.      Post Infusion Assessment:  Patient tolerated infusion without incident.  Blood return noted pre and post infusion.  Prior to discharge: Port is secured in place with tegaderm and flushed with 10cc NS with positive blood return noted.  Continuous home infusion CADD pump connected.    All connectors secured in place and clamps taped open.    Pump started, \"running\" noted on display (CADD): YES.  Patient instructed to call our clinic or [facility] with any questions or concerns at home.  Patient verbalized understanding.    Patient set up for pump disconnect at home with [facility] on 6/30 @ 1315      Discharge Plan:   Schedule reviewed with " patient and/or family.  Patient will return 7/5 for next appointment for scan and then 7/19 for her next labs/provider/infusion appointment.  Patient discharged in stable condition accompanied by: self.  Departure Mode: Ambulatory.    Cassandra Hernandes RN

## 2018-06-28 NOTE — LETTER
6/28/2018         RE: Talya Gatica  [address]   [place] [address]        Dear Colleague,    Thank you for referring your patient, Talya Gatica, to the [facility]. Please see a copy of my visit note below.    Oncology Follow Up Visit: June 28, 2018     Oncologist: Dr Jim Soares  PCP: Pancho Cope    Diagnosis: Stage IV Pancreatic cancer  Talya Gatica is a 77 yo who c/o jaundice in 7/2017 was found to have  adenocarcinoma of the head of pancreas causing biliary obstruction and several pulmonary nodules consistent with metastasis- initially told days to 3 months of life.  Treatment:   11/3/2017 began treatment with Gemzar- days 1,8,15 of 28 day plan x 6 cycles  5/16/2018 to begin treatment with FOLFIRI with onivyde at 70 mg/m2 dose reduction of the 5 FU from start of plan by 15%.    Interval History: Ms. Sanchez comes to clinic alone today for toxicity check prior to cycle 4of FOLFIRI. Pt shares she has had some intermittent loose stools and is still not sure when to take her imodium- home care nurse told her floating stools are diarrhea and she is hesitant to take the imodium for fear of constipation. She continues to use the creon with bigger or fatty meals or about 2 x daily. She also feels gassy and is afraid of leakage with the gas. She does feel weak but no falls and continues to care for self but living with daughter most of the time. she feels she is eating well but review shows small portions and 2 meals daily with occasional snack. Denies pain, nausea, SOB, fevers or illness, neuropathy or trouble sleeping. She will be going on vacation with daughter over next scheduled cycle so this will need to be delayed by 1 week.   Rest of comprehensive and complete ROS is reviewed and is negative.   Past Medical History:   Diagnosis Date     AR (allergic rhinitis)      Baker's cyst      DJD (degenerative joint disease)      Elevated cholesterol      Glaucoma   "    Menopause      Vertigo      Current Outpatient Prescriptions   Medication     amylase-lipase-protease (CREON) 06433-25966 units CPEP per EC capsule     BIOTIN PO     latanoprost (XALATAN) 0.005 % ophthalmic solution     lidocaine-prilocaine (EMLA) cream     loperamide (IMODIUM) 2 MG capsule     LORazepam (ATIVAN) 0.5 MG tablet     Multiple Vitamins-Minerals (WOMENS 50+ MULTI VITAMIN/MIN PO)     ondansetron (ZOFRAN) 8 MG tablet     Potassium Chloride ER 20 MEQ TBCR     potassium chloride SA (KLOR-CON) 20 MEQ CR tablet     prochlorperazine (COMPAZINE) 10 MG tablet     No current facility-administered medications for this visit.      Allergies   Allergen Reactions     Codeine Camsylate        Physical Exam: /88  Pulse 81  Temp 98.4  F (36.9  C)  Resp 16  Ht 1.511 m (4' 11.49\")  Wt 43.1 kg (95 lb)  SpO2 95%  BMI 18.87 kg/m2   ECOG PS-1   Constitutional: Alert, moving slowly using cane -needing one assist for transfer without cane or for long distances.No sign of pain.  ENT: Eyes bright, No mouth sores. [place].  Neck: Supple, No adenopathy.Thyroid symmetric  Cardiac: Heart rate and rhythm is regular and strong with aortic murmur  Noted as usual.  Respiratory: Breathing easy. Lung sounds clear to auscultation. No cough  Port with no redness or swelling.  GI: Abdomen is soft, non-tender, BS normal. No masses or organomegaly  MS: Muscle tone fair- uses cane for support, extremities normal with very light edema to the left ankle only   Skin: No suspicious lesions or rashes or bruising  Neuro: Sensory grossly WNL, gait is steady with cane  Lymph: Normal ant/post cervical, axillary, supraclavicular nodes  Psych: Mentation appears normal and affect normal/bright with good conversation.     Laboratory Results:   Results for orders placed or performed in visit on 06/28/18   CBC with platelets differential   Result Value Ref Range    WBC 5.0 4.0 - 11.0 10e9/L    RBC Count 3.56 (L) 3.8 - 5.2 10e12/L    Hemoglobin 11.3 " (L) 11.7 - 15.7 g/dL    Hematocrit 33.4 (L) 35.0 - 47.0 %    MCV 94 78 - 100 fl    MCH 31.7 26.5 - 33.0 pg    MCHC 33.8 31.5 - 36.5 g/dL    RDW 19.1 (H) 10.0 - 15.0 %    Platelet Count 275 150 - 450 10e9/L    Diff Method Automated Method     % Neutrophils 65.6 %    % Lymphocytes 20.2 %    % Monocytes 12.0 %    % Eosinophils 1.2 %    % Basophils 0.8 %    % Immature Granulocytes 0.2 %    Absolute Neutrophil 3.3 1.6 - 8.3 10e9/L    Absolute Lymphocytes 1.0 0.8 - 5.3 10e9/L    Absolute Monocytes 0.6 0.0 - 1.3 10e9/L    Absolute Eosinophils 0.1 0.0 - 0.7 10e9/L    Absolute Basophils 0.0 0.0 - 0.2 10e9/L    Abs Immature Granulocytes 0.0 0 - 0.4 10e9/L   Comprehensive metabolic panel   Result Value Ref Range    Sodium 143 133 - 144 mmol/L    Potassium 3.4 3.4 - 5.3 mmol/L    Chloride 111 (H) 94 - 109 mmol/L    Carbon Dioxide 25 20 - 32 mmol/L    Anion Gap 7 3 - 14 mmol/L    Glucose 95 70 - 99 mg/dL    Urea Nitrogen 7 7 - 30 mg/dL    Creatinine 0.46 (L) 0.52 - 1.04 mg/dL    GFR Estimate >90 >60 mL/min/1.7m2    GFR Estimate If Black >90 >60 mL/min/1.7m2    Calcium 8.9 8.5 - 10.1 mg/dL    Bilirubin Total 0.7 0.2 - 1.3 mg/dL    Albumin 3.3 (L) 3.4 - 5.0 g/dL    Protein Total 6.4 (L) 6.8 - 8.8 g/dL    Alkaline Phosphatase 125 40 - 150 U/L    ALT 29 0 - 50 U/L    AST 30 0 - 45 U/L   Magnesium   Result Value Ref Range    Magnesium 2.2 1.6 - 2.3 mg/dL     Assessment and Plan:   Stage IV Pancreatic cancer-Pt started plan of Liposomal Irinotecan and with 15 % reduction in 5FU on 5/16/2018. She seems to be tolerating treatment every 2 weeks well overall but side effects noted include:   some loose stools and gassy stomach. We again reviewed use of imodium after loose stools and encouraged to continue with the creon with larger meals. Shared that she could try simethicone and see if this would relieve some of the issues.   Pt is meeting goals and will continue with cycle 4 today without any changes in plan.   We'll see patient return  in 3 weeks time - this will be delayed by 1 week for her vacation with daughter to [facility].  Next imaging will be completed after cycle 5 and will need to be reviewed by Dr Soares with visit prior to cycle 6  Hypokalemia- pt needing potassium replacement in past and was asked to start daily 20mEq and is now low normal with visit labs today- she will continue daily dosing.   Nutrition and weight loss-patient showing slow weight loss with each visit.Review of intake showing 2 small meals daily but pt admits concern for loose stools keeping her intake down.   She did increase her proteins and fluids. Encouraged use of protein supplements and again suggested eating every 2 hours to help with intake.Refused dietician visit since does not come on her infusion days.   This was a 25 min visit with > 50% in counseling and coordinating care including education and management of concerns.    Nai Simmons CNP        Again, thank you for allowing me to participate in the care of your patient.        Sincerely,        Nai Simmons, RICHAR, APRN CNP

## 2018-06-28 NOTE — NURSING NOTE
"Oncology Rooming Note    June 28, 2018 11:48 AM   Talya Gatica is a 78 year old female who presents for:    Chief Complaint   Patient presents with     Oncology Clinic Visit     return     Initial Vitals: /88  Pulse 81  Temp 98.4  F (36.9  C)  Resp 16  Ht 1.511 m (4' 11.49\")  Wt 43.1 kg (95 lb)  SpO2 95%  BMI 18.87 kg/m2 Estimated body mass index is 18.87 kg/(m^2) as calculated from the following:    Height as of this encounter: 1.511 m (4' 11.49\").    Weight as of this encounter: 43.1 kg (95 lb). Body surface area is 1.34 meters squared.  No Pain (0) Comment: Data Unavailable   No LMP recorded. Patient is postmenopausal.  Allergies reviewed: Yes  Medications reviewed: Yes    Medications: Medication refills not needed today.  Pharmacy name entered into [facility]: [facility] - [place], [place] - [address] AT [facility] [address]      5 minutes for nursing intake (face to face time)     Carly Bobby LPN              "

## 2018-06-28 NOTE — PROGRESS NOTES
"Patient's name and  were verified.  See Doc Flowsheet - IV assess for details.  IVAD accessed with 20G 3/4\" haley gripper plus needle  blood return positive: YES  Site without redness, tenderness or swelling: YES  flushed with 30cc NS and 5cc 100u/ml heparin  Needle: Left accessed for infusion  Comments: Labs drawn.  Patient tolerated procedure without incident.    Miriam Small  RN, BSN, OCN          "

## 2018-06-28 NOTE — PATIENT INSTRUCTIONS
[place] triage: [phone]    [place] main line: [phone]    Call triage with chills and/or temperature greater than or equal to 100.5, uncontrolled nausea/vomiting, diarrhea, constipation, dizziness, shortness of breath, chest pain, bleeding, unexplained bruising, or any new/concerning symptoms, questions/concerns.     If you are having any concerning symptoms or wish to speak to a provider before your next infusion visit, please call your care coordinator or triage to notify them so we can adequately serve you.              June 2018 Sunday Monday Tuesday Wednesday Thursday Friday Saturday                            1  Happy Birthday!     2       3     4     5     6     RETURN   10:00 AM   (30 min.)   NURSE ONLY CANCER [facility] 7     8     9       10     11     12     13     RETURN   11:45 AM   (30 min.)   NURSE ONLY [facility]     RETURN   12:15 PM   (60 min.)   Nai Simmons APRN CNP   [facility]     FOLFIRI    1:00 PM   (240 min.)   [place] 4 INFUSION   [facility] 14     15     16       17     18     19     20     21     22     23       24     25     26     27     28     RETURN   10:45 AM   (15 min.)   NURSE ONLY CANCER [facility]     RETURN   11:15 AM   (60 min.)   Nai Simmons APRN CNP   [facility]     FOLFIRI   12:30 PM   (180 min.)   [facility] INFUSION   [facility] 29 30 July 2018 Sunday Monday Tuesday Wednesday Thursday Friday Saturday   1     2     3     4     5     NURSE ONLY   12:45 PM   (15 min.)   [facility] IMAGING NURSE   [facility]     CT CHEST/ABDOMEN/PELVIS W    1:00 PM   (30 min.)   MGCT1   [facility]     RETURN    1:30 PM   (30 min.)   NURSE ONLY CANCER [facility] 6     7       8     9     10     11     12     13     14       15      16     17     18     19     RETURN   12:15 PM   (30 min.)   NURSE ONLY CANCER CENTER   [facility]     RETURN   12:45 PM   (60 min.)   Nai Simmons, LISA [facility]     FOLFIRI    1:30 PM   (180 min.)   [address] 20     21       22     23     24     25     RETURN    1:30 PM   (30 min.)   NURSE ONLY CANCER CENTER   [facility]     LEVEL 2    2:00 PM   (120 min.)   [address] 26     27     28       29     30     31                                     Recent Results (from the past 24 hour(s))   CBC with platelets differential    Collection Time: 06/28/18 12:00 PM   Result Value Ref Range    WBC 5.0 4.0 - 11.0 10e9/L    RBC Count 3.56 (L) 3.8 - 5.2 10e12/L    Hemoglobin 11.3 (L) 11.7 - 15.7 g/dL    Hematocrit 33.4 (L) 35.0 - 47.0 %    MCV 94 78 - 100 fl    MCH 31.7 26.5 - 33.0 pg    MCHC 33.8 31.5 - 36.5 g/dL    RDW 19.1 (H) 10.0 - 15.0 %    Platelet Count 275 150 - 450 10e9/L    Diff Method Automated Method     % Neutrophils 65.6 %    % Lymphocytes 20.2 %    % Monocytes 12.0 %    % Eosinophils 1.2 %    % Basophils 0.8 %    % Immature Granulocytes 0.2 %    Absolute Neutrophil 3.3 1.6 - 8.3 10e9/L    Absolute Lymphocytes 1.0 0.8 - 5.3 10e9/L    Absolute Monocytes 0.6 0.0 - 1.3 10e9/L    Absolute Eosinophils 0.1 0.0 - 0.7 10e9/L    Absolute Basophils 0.0 0.0 - 0.2 10e9/L    Abs Immature Granulocytes 0.0 0 - 0.4 10e9/L   Comprehensive metabolic panel    Collection Time: 06/28/18 12:00 PM   Result Value Ref Range    Sodium 143 133 - 144 mmol/L    Potassium 3.4 3.4 - 5.3 mmol/L    Chloride 111 (H) 94 - 109 mmol/L    Carbon Dioxide 25 20 - 32 mmol/L    Anion Gap 7 3 - 14 mmol/L    Glucose 95 70 - 99 mg/dL    Urea Nitrogen 7 7 - 30 mg/dL    Creatinine 0.46 (L) 0.52 - 1.04 mg/dL    GFR Estimate >90 >60 mL/min/1.7m2    GFR Estimate If Black >90 >60 mL/min/1.7m2    Calcium 8.9 8.5 - 10.1 mg/dL     Bilirubin Total 0.7 0.2 - 1.3 mg/dL    Albumin 3.3 (L) 3.4 - 5.0 g/dL    Protein Total 6.4 (L) 6.8 - 8.8 g/dL    Alkaline Phosphatase 125 40 - 150 U/L    ALT 29 0 - 50 U/L    AST 30 0 - 45 U/L   Magnesium    Collection Time: 06/28/18 12:00 PM   Result Value Ref Range    Magnesium 2.2 1.6 - 2.3 mg/dL

## 2018-06-28 NOTE — MR AVS SNAPSHOT
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     If you are having any concerning symptoms or wish to speak to a provider before your next infusion visit, please call your care coordinator or triage to notify them so we can adequately serve you.              June 2018 Sunday Monday Tuesday Wednesday Thursday Friday Saturday                            1  Happy Birthday!     2       3     4     5     6     RETURN   10:00 AM   (30 min.)   NURSE ONLY CANCER [facility] 7     8     9       10     11     12     13     RETURN   11:45 AM   (30 min.)   NURSE ONLY CANCER [facility]     RETURN   12:15 PM   (60 min.)   Nai Simmons APRN CNP   [facility]    1:00 PM   (240 min.)   [address] 14     15     16       17     18     19     20     21     22     23       24     25     26     27     28     RETURN   10:45 AM   (15 min.)   NURSE ONLY CANCER [facility]     RETURN   11:15 AM   (60 min.)   [facility] Simmons [facility]N CNP   [facility]   12:30 PM   (180 min.)   [address] 29 30 July 2018 Sunday  Monday Tuesday Wednesday Thursday Friday Saturday   1     2     3     4     5     NURSE ONLY   12:45 PM   (15 min.)   MG IMAGING NURSE   [facility]     CT CHEST/ABDOMEN/PELVIS W    1:00 PM   (30 min.)   MGCT1   [facility]     RETURN    1:30 PM   (30 min.)   NURSE ONLY CANCER [facility] 6     7       8     9     10     11     12     13     14       15     16     17     18     19     RETURN   12:15 PM   (30 min.)   NURSE ONLY CANCER [facility]     RETURN   12:45 PM   (60 min.)   Nai Simmons, LISA DUNCAN   [facility]     FOLFIRI    1:30 PM   (180 min.)   [facility]   [facility] 20     21       22     23     24     25     RETURN    1:30 PM   (30 min.)   NURSE ONLY CANCER [facility]     LEVEL 2    2:00 PM   (120 min.)   [address] 26     27     28       29     30     31                                     Recent Results (from the past 24 hour(s))   CBC with platelets differential    Collection Time: 06/28/18 12:00 PM   Result Value Ref Range    WBC 5.0 4.0 - 11.0 10e9/L    RBC Count 3.56 (L) 3.8 - 5.2 10e12/L    Hemoglobin 11.3 (L) 11.7 - 15.7 g/dL    Hematocrit 33.4 (L) 35.0 - 47.0 %    MCV 94 78 - 100 fl    MCH 31.7 26.5 - 33.0 pg    MCHC 33.8 31.5 - 36.5 g/dL    RDW 19.1 (H) 10.0 - 15.0 %    Platelet Count 275 150 - 450 10e9/L    Diff Method Automated Method     % Neutrophils 65.6 %    % Lymphocytes 20.2 %    % Monocytes 12.0 %    % Eosinophils 1.2 %    % Basophils 0.8 %    % Immature Granulocytes 0.2 %    Absolute Neutrophil 3.3 1.6 - 8.3 10e9/L    Absolute Lymphocytes 1.0 0.8 - 5.3 10e9/L    Absolute Monocytes 0.6 0.0 - 1.3 10e9/L    Absolute Eosinophils 0.1 0.0 - 0.7 10e9/L    Absolute Basophils 0.0 0.0 - 0.2 10e9/L    Abs Immature Granulocytes 0.0 0 - 0.4 10e9/L   Comprehensive metabolic panel    Collection Time:  06/28/18 12:00 PM   Result Value Ref Range    Sodium 143 133 - 144 mmol/L    Potassium 3.4 3.4 - 5.3 mmol/L    Chloride 111 (H) 94 - 109 mmol/L    Carbon Dioxide 25 20 - 32 mmol/L    Anion Gap 7 3 - 14 mmol/L    Glucose 95 70 - 99 mg/dL    Urea Nitrogen 7 7 - 30 mg/dL    Creatinine 0.46 (L) 0.52 - 1.04 mg/dL    GFR Estimate >90 >60 mL/min/1.7m2    GFR Estimate If Black >90 >60 mL/min/1.7m2    Calcium 8.9 8.5 - 10.1 mg/dL    Bilirubin Total 0.7 0.2 - 1.3 mg/dL    Albumin 3.3 (L) 3.4 - 5.0 g/dL    Protein Total 6.4 (L) 6.8 - 8.8 g/dL    Alkaline Phosphatase 125 40 - 150 U/L    ALT 29 0 - 50 U/L    AST 30 0 - 45 U/L   Magnesium    Collection Time: 06/28/18 12:00 PM   Result Value Ref Range    Magnesium 2.2 1.6 - 2.3 mg/dL                 Follow-ups after your visit        Your next 10 appointments already scheduled     Jul 05, 2018 12:45 PM CDT   Nurse Only with MG IMAGING NURSE   [facility] ([facility])    [address] [place] [address] [phone]            Jul 05, 2018  1:00 PM CDT   CT CHEST/ABDOMEN/PELVIS W CONTRAST with MGCT1   [facility])    [id number] [place] [address] [phone]           Please bring any scans or X-rays taken at other hospitals, if similar tests were done. Also bring a list of your medicines, including vitamins, minerals and over-the-counter drugs. It is safest to leave personal items at home.  Be sure to tell your doctor:   If you have any allergies.   If there s any chance you are pregnant.   If you are breastfeeding.  How to prepare:   Do not eat or drink for 2 hours before your exam. If you need to take medicine, you may take it with small sips of water. (We may ask you to take liquid medicine as well.)   Please wear loose clothing, such as a sweat suit or jogging clothes. Avoid snaps, zippers and other metal. We may ask you to undress and put on a hospital gown.   Please arrive 30 minutes early for your CT. Once in the department you might be asked to drink water 15-20 minutes prior to your exam.  If indicated you may be asked to drink an oral contrast in advance of your CT.  If this is the case, the imaging team will let you know or be in contact with you prior to your appointment  Patients over 70 or patients with diabetes or kidney problems:   If you haven t had a blood test (creatinine test) within the last 30 days, the Cardiologist/Radiologist may require you to get this test prior to your exam.  If you have diabetes:   Continue to take your metformin medication on the day of your exam  If you have any questions, please call the Imaging Department where you will have your exam.            Jul 05, 2018  1:30 PM CDT   Return Visit with NURSE ONLY CANCER CENTER   [facility])    [address] [facility] [address]   [phone]            Jul 19, 2018 12:15 PM CDT   Return Visit with NURSE ONLY CANCER CENTER   [facility])    [address] [facility] [address]   [phone]            Jul 19, 2018 12:45 PM CDT   Return Visit with LISA Mendieta CNP   [facility])    [address] [facility] [address]   [phone]            Jul 19, 2018  1:30 PM CDT   Folfiri with BAY 8 INFUSION   [facility])    [address] [facility] [address]   [phone]            Jul 25, 2018  1:30 PM CDT   Return Visit with NURSE ONLY CANCER CENTER   [facility] ([facility])    [address] [facility] [address]   [phone]            Jul 25, 2018  2:00 PM CDT   Level 2 with BAY 8 INFUSION   [facility])    [address] [facility] [address]    [phone]            Aug 01, 2018 12:15 PM CDT   [facility]    [address] [facility] [address] [phone]            Aug 01, 2018  1:30 PM CDT   Folfiri with BAY 6 INFUSION   [facility]    [address] [facility] [address] [phone]              Future tests that were ordered for you today     Open Future Orders        Priority Expected Expires Ordered    Basic metabolic panel Routine  6/28/2019 6/28/2018            Who to contact     If you have questions or need follow up information about today's clinic visit or your schedule please contact [facility] directly at [phone].  Normal or non-critical lab and imaging results will be communicated to you by MyChart, letter or phone within 4 business days after the clinic has received the results. If you do not hear from us within 7 days, please contact the clinic through MyChart or phone. If you have a critical or abnormal lab result, we will notify you by phone as soon as possible.  Submit refill requests through [employer] or call your pharmacy and they will forward the refill request to us. Please allow 3 business days for your refill to be completed.          Additional Information About Your Visit        Care EveryWhere ID     This is your Care EveryWhere ID. This could be used by other organizations to access your [place] medical records  [id number]         Blood Pressure from Last 3 Encounters:   06/28/18 154/88   06/13/18 100/68   05/30/18 139/80    Weight from Last 3 Encounters:   06/28/18 43.1 kg (95 lb)   06/13/18 45 kg (99 lb 1.6 oz)   05/30/18 44.9 kg (99 lb)              Today, you had the following     No orders found for display       Primary Care Provider Office Phone # Fax #    Pancho Cope [phone] [id number]  [facility] [address]        Equal Access to Services     JOCELINE JORDAN : Hadii aad ku hadleonardodante Madelinekirit, waarmandda urvashidanyelha, qagianata candicegonzalezli curry, carrie coynenaveeneufemia wheatley. So [facility] [phone].    ATENCIÓN: Si habla español, tiene a ornelas disposición servicios gratuitos de asistencia lingüística. Llame al [phone].    We comply with applicable federal civil rights laws and [place] laws. We do not discriminate on the basis of race, color, national origin, age, disability, sex, sexual orientation, or gender identity.            Thank you!     Thank you for choosing [facility]  for your care. Our goal is always to provide you with excellent care. Hearing back from our patients is one way we can continue to improve our services. Please take a few minutes to complete the written survey that you may receive in the mail after your visit with us. Thank you!             Your Updated Medication List - Protect others around you: Learn how to safely use, store and throw away your medicines at www.disposemymeds.org.          This list is accurate as of 6/28/18  3:18 PM.  Always use your most recent med list.                   Brand Name Dispense Instructions for use Diagnosis    amylase-lipase-protease 76907-95417 units Cpep per EC capsule    CREON    180 capsule    Take 1 capsule (24,000 Units) by mouth 3 times daily (with meals)    Pancreatic adenocarcinoma (H)       BIOTIN PO           CLARITIN PO      Take by mouth daily        latanoprost 0.005 % ophthalmic solution    XALATAN    3 Bottle    Place 1 drop into both eyes At Bedtime    Glaucoma suspect, bilateral       lidocaine-prilocaine cream    EMLA    30 g    Apply topically as needed for moderate pain (use dollop of cream to saran wrap 30 min prior to use of port)    Pancreatic adenocarcinoma (H)       loperamide 2 MG capsule    IMODIUM    30 capsule    2 caps at 1st sign of diarrhea & 1 cap every 2hrs until 12hrs  diarrhea free. 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

## 2018-06-28 NOTE — PROGRESS NOTES
Oncology Follow Up Visit: June 28, 2018     Oncologist: Dr Jim Soares  PCP: Pancho Cope    Diagnosis: Stage IV Pancreatic cancer  Talya Gatica is a 79 yo who c/o jaundice in 7/2017 was found to have  adenocarcinoma of the head of pancreas causing biliary obstruction and several pulmonary nodules consistent with metastasis- initially told days to 3 months of life.  Treatment:   11/3/2017 began treatment with Gemzar- days 1,8,15 of 28 day plan x 6 cycles  5/16/2018 to begin treatment with FOLFIRI with onivyde at 70 mg/m2 dose reduction of the 5 FU from start of plan by 15%.    Interval History: Ms. Sanchez comes to clinic alone today for toxicity check prior to cycle 4of FOLFIRI. Pt shares she has had some intermittent loose stools and is still not sure when to take her imodium- home care nurse told her floating stools are diarrhea and she is hesitant to take the imodium for fear of constipation. She continues to use the creon with bigger or fatty meals or about 2 x daily. She also feels gassy and is afraid of leakage with the gas. She does feel weak but no falls and continues to care for self but living with daughter most of the time. she feels she is eating well but review shows small portions and 2 meals daily with occasional snack. Denies pain, nausea, SOB, fevers or illness, neuropathy or trouble sleeping. She will be going on vacation with daughter over next scheduled cycle so this will need to be delayed by 1 week.   Rest of comprehensive and complete ROS is reviewed and is negative.   Past Medical History:   Diagnosis Date     AR (allergic rhinitis)      Baker's cyst      DJD (degenerative joint disease)      Elevated cholesterol      Glaucoma      Menopause      Vertigo      Current Outpatient Prescriptions   Medication     amylase-lipase-protease (CREON) 93204-13832 units CPEP per EC capsule     BIOTIN PO     latanoprost (XALATAN) 0.005 % ophthalmic solution     lidocaine-prilocaine (EMLA)  "cream     loperamide (IMODIUM) 2 MG capsule     LORazepam (ATIVAN) 0.5 MG tablet     Multiple Vitamins-Minerals (WOMENS 50+ MULTI VITAMIN/MIN PO)     ondansetron (ZOFRAN) 8 MG tablet     Potassium Chloride ER 20 MEQ TBCR     potassium chloride SA (KLOR-CON) 20 MEQ CR tablet     prochlorperazine (COMPAZINE) 10 MG tablet     No current facility-administered medications for this visit.      Allergies   Allergen Reactions     Codeine Camsylate        Physical Exam: /88  Pulse 81  Temp 98.4  F (36.9  C)  Resp 16  Ht 1.511 m (4' 11.49\")  Wt 43.1 kg (95 lb)  SpO2 95%  BMI 18.87 kg/m2   ECOG PS-1   Constitutional: Alert, moving slowly using cane -needing one assist for transfer without cane or for long distances.No sign of pain.  ENT: Eyes bright, No mouth sores. [place].  Neck: Supple, No adenopathy.Thyroid symmetric  Cardiac: Heart rate and rhythm is regular and strong with aortic murmur  Noted as usual.  Respiratory: Breathing easy. Lung sounds clear to auscultation. No cough  Port with no redness or swelling.  GI: Abdomen is soft, non-tender, BS normal. No masses or organomegaly  MS: Muscle tone fair- uses cane for support, extremities normal with very light edema to the left ankle only   Skin: No suspicious lesions or rashes or bruising  Neuro: Sensory grossly WNL, gait is steady with cane  Lymph: Normal ant/post cervical, axillary, supraclavicular nodes  Psych: Mentation appears normal and affect normal/bright with good conversation.     Laboratory Results:   Results for orders placed or performed in visit on 06/28/18   CBC with platelets differential   Result Value Ref Range    WBC 5.0 4.0 - 11.0 10e9/L    RBC Count 3.56 (L) 3.8 - 5.2 10e12/L    Hemoglobin 11.3 (L) 11.7 - 15.7 g/dL    Hematocrit 33.4 (L) 35.0 - 47.0 %    MCV 94 78 - 100 fl    MCH 31.7 26.5 - 33.0 pg    MCHC 33.8 31.5 - 36.5 g/dL    RDW 19.1 (H) 10.0 - 15.0 %    Platelet Count 275 150 - 450 10e9/L    Diff Method Automated Method     % " Neutrophils 65.6 %    % Lymphocytes 20.2 %    % Monocytes 12.0 %    % Eosinophils 1.2 %    % Basophils 0.8 %    % Immature Granulocytes 0.2 %    Absolute Neutrophil 3.3 1.6 - 8.3 10e9/L    Absolute Lymphocytes 1.0 0.8 - 5.3 10e9/L    Absolute Monocytes 0.6 0.0 - 1.3 10e9/L    Absolute Eosinophils 0.1 0.0 - 0.7 10e9/L    Absolute Basophils 0.0 0.0 - 0.2 10e9/L    Abs Immature Granulocytes 0.0 0 - 0.4 10e9/L   Comprehensive metabolic panel   Result Value Ref Range    Sodium 143 133 - 144 mmol/L    Potassium 3.4 3.4 - 5.3 mmol/L    Chloride 111 (H) 94 - 109 mmol/L    Carbon Dioxide 25 20 - 32 mmol/L    Anion Gap 7 3 - 14 mmol/L    Glucose 95 70 - 99 mg/dL    Urea Nitrogen 7 7 - 30 mg/dL    Creatinine 0.46 (L) 0.52 - 1.04 mg/dL    GFR Estimate >90 >60 mL/min/1.7m2    GFR Estimate If Black >90 >60 mL/min/1.7m2    Calcium 8.9 8.5 - 10.1 mg/dL    Bilirubin Total 0.7 0.2 - 1.3 mg/dL    Albumin 3.3 (L) 3.4 - 5.0 g/dL    Protein Total 6.4 (L) 6.8 - 8.8 g/dL    Alkaline Phosphatase 125 40 - 150 U/L    ALT 29 0 - 50 U/L    AST 30 0 - 45 U/L   Magnesium   Result Value Ref Range    Magnesium 2.2 1.6 - 2.3 mg/dL     Assessment and Plan:   Stage IV Pancreatic cancer-Pt started plan of Liposomal Irinotecan and with 15 % reduction in 5FU on 5/16/2018. She seems to be tolerating treatment every 2 weeks well overall but side effects noted include:   some loose stools and gassy stomach. We again reviewed use of imodium after loose stools and encouraged to continue with the creon with larger meals. Shared that she could try simethicone and see if this would relieve some of the issues.   Pt is meeting goals and will continue with cycle 4 today without any changes in plan.   We'll see patient return in 3 weeks time - this will be delayed by 1 week for her vacation with daughter to [facility].  Next imaging will be completed after cycle 5 and will need to be reviewed by Dr Soares with visit prior to cycle 6  Hypokalemia- pt needing potassium  replacement in past and was asked to start daily 20mEq and is now low normal with visit labs today- she will continue daily dosing.   Nutrition and weight loss-patient showing slow weight loss with each visit.Review of intake showing 2 small meals daily but pt admits concern for loose stools keeping her intake down.   She did increase her proteins and fluids. Encouraged use of protein supplements and again suggested eating every 2 hours to help with intake.Refused dietician visit since does not come on her infusion days.   This was a 25 min visit with > 50% in counseling and coordinating care including education and management of concerns.    Nai Simmons,CNP

## 2018-06-29 NOTE — PROGRESS NOTES
This is a recent snapshot of the patient's [facility] medical record.  For current drug dose and complete information and questions, call [phone] or In Basket pool, fv home infusion ([id number])  CSN Number:  [id number]

## 2018-07-02 NOTE — PROGRESS NOTES
This is a recent snapshot of the patient's [facility] medical record.  For current drug dose and complete information and questions, call [phone] or In Basket pool, fv home infusion ([id number])  CSN Number:  [id number]

## 2018-07-19 NOTE — MR AVS SNAPSHOT
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)        Hypokalemia        Loss of weight           Follow-ups after your visit        Your next 10 appointments already scheduled     Jul 30, 2018 10:15 AM CDT   Return Visit with NURSE ONLY CANCER CENTER   [facility] ([facility])    [address] [place] [address]   [phone]            Jul 30, 2018 10:45 AM CDT   Return Visit with LISA Mendieta CNP   Formerly named [facility])    [address] [place] [address]   [phone]            Jul 30, 2018 11:30 AM CDT   Folfiri with BAY 4 INFUSION   [facility] ([facility])    [address] [place] [address]   [phone]            Aug 15, 2018 11:45 AM CDT   Return Visit with NURSE ONLY CANCER CENTER   [facility] ([facility])    [address] [facility] [address]   [phone]            Aug 15, 2018 12:15 PM CDT   Return Visit with LISA Mendieta CNP   Formerly named [facility])    [address] [place] [address]   [phone]            Aug 15, 2018  1:30 PM CDT   Folfiri with BAY 6 INFUSION   Formerly named [facility])    [address] [facility] [address] [phone]              Who to contact     If you have questions or need follow up information about today's clinic visit or your schedule please contact [facility] directly at  [phone].  Normal or non-critical lab and imaging results will be communicated to you by MyChart, letter or phone within 4 business days after the clinic has received the results. If you do not hear from us within 7 days, please contact the clinic through MyChart or phone. If you have a critical or abnormal lab result, we will notify you by phone as soon as possible.  Submit refill requests through [employer] or call your pharmacy and they will forward the refill request to us. Please allow 3 business days for your refill to be completed.          Additional Information About Your Visit        Care EveryWhere ID     This is your Care EveryWhere ID. This could be used by other organizations to access your [place] medical records  [id number]        Your Vitals Were     Pulse Temperature Respirations Pulse Oximetry BMI (Body Mass Index)       75 97.2  F (36.2  C) (Oral) 18 98% 19.57 kg/m2        Blood Pressure from Last 3 Encounters:   07/19/18 138/71   06/28/18 154/88   06/13/18 100/68    Weight from Last 3 Encounters:   07/19/18 44.7 kg (98 lb 8 oz)   06/28/18 43.1 kg (95 lb)   06/13/18 45 kg (99 lb 1.6 oz)              Today, you had the following     No orders found for display       Primary Care Provider Office Phone # Fax #    Pancho Cope [phone] [address] [facility] [address]        Equal Access to Services     KATHRYN JORDAN : Hadii aad ku hadasho Soomaali, waaxda luqadaha, qaybta kaalmada adeegyada, carrie kaur . So [facility] [phone].    ATENCIÓN: Si habla español, tiene a ornelas disposición servicios gratuitos de asistencia lingüística. Llame al [phone].    We comply with applicable federal civil rights laws and [place] laws. We do not discriminate on the basis of race, color, national origin, age, disability, sex, sexual orientation, or gender identity.            Thank you!     Thank you for choosing [facility]   for your care. Our goal is always to provide you with excellent care. Hearing back from our patients is one way we can continue to improve our services. Please take a few minutes to complete the written survey that you may receive in the mail after your visit with us. Thank you!             Your Updated Medication List - Protect others around you: Learn how to safely use, store and throw away your medicines at www.disposemymeds.org.          This list is accurate as of 7/19/18  3:01 PM.  Always use your most recent med list.                   Brand Name Dispense Instructions for use Diagnosis    amylase-lipase-protease 08135-96371 units Cpep per EC capsule    CREON    180 capsule    Take 1 capsule (24,000 Units) by mouth 3 times daily (with meals)    Pancreatic adenocarcinoma (H)       BIOTIN PO           CLARITIN PO      Take by mouth daily        latanoprost 0.005 % ophthalmic solution    XALATAN    3 Bottle    Place 1 drop into both eyes At Bedtime    Glaucoma suspect, bilateral       lidocaine-prilocaine cream    EMLA    30 g    Apply topically as needed for moderate pain (use dollop of cream to saran wrap 30 min prior to use of port)    Pancreatic adenocarcinoma (H)       loperamide 2 MG capsule    IMODIUM    30 capsule    2 caps at 1st sign of diarrhea & 1 cap every 2hrs until 12hrs diarrhea free. 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), Pancreatic adenocarcinoma (H)       ondansetron 8 MG tablet    ZOFRAN    10 tablet    Take 1 tablet (8 mg) by mouth every 8 hours as needed (nausea/vomiting)    Malignant neoplasm metastatic to both lungs (H), Pancreatic adenocarcinoma (H)       Potassium Chloride ER 20 MEQ Tbcr      90 tablet    Take 1 tablet (20 mEq) by mouth daily    Hypokalemia       potassium chloride SA 20 MEQ CR tablet    KLOR-CON    32 tablet    Take 1 tab at 6 pm tonight and 1 tab at 9 pm tonight then start 1 tab daily tomorrow morning.    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

## 2018-07-19 NOTE — PROGRESS NOTES
"Oncology Rooming Note    July 19, 2018 12:32 PM   Talya Gatica is a 78 year old female who presents for:    No chief complaint on file.    Initial Vitals: There were no vitals taken for this visit. Estimated body mass index is 18.87 kg/(m^2) as calculated from the following:    Height as of 6/28/18: 1.511 m (4' 11.49\").    Weight as of 6/28/18: 43.1 kg (95 lb). There is no height or weight on file to calculate BSA.  Data Unavailable Comment: Data Unavailable   No LMP recorded. Patient is postmenopausal.  Allergies reviewed: Yes  Medications reviewed: Yes    Medications: Medication refills not needed today.  Pharmacy [facility] entered into [id number] [place], [place] - [address] AT [facility]    Clinical concerns: When to have a dental cleaning?    Sheree Calhoun RN                "

## 2018-07-19 NOTE — PROGRESS NOTES
Port needle left accessed for treatment. Tolerated lab draw without complaint. Port site scrubbed with Chloraprep for 30 seconds. Accessed using sterile technique. [place] drawn-Red/Green/Purple tubes. Double signed by patient and RN. See documentation flowsheet. Sheree Calhoun, RN, BSN, OCN

## 2018-07-19 NOTE — LETTER
7/19/2018         RE: Talya Gatica  [address]  [facility] [address]        Dear Colleague,    Thank you for referring your patient, Talya Gatica, to the [facility]. Please see a copy of my visit note below.    Oncology Follow Up Visit: July 19, 2018     Oncologist: Dr Jim Soares  PCP: Pancho Cope    Diagnosis: Stage IV Pancreatic cancer  Talya Gatica is a 79 yo who c/o jaundice in 7/2017 was found to have  adenocarcinoma of the head of pancreas causing biliary obstruction and several pulmonary nodules consistent with metastasis- initially told days to 3 months of life.  Treatment:   11/3/2017 began treatment with Gemzar- days 1,8,15 of 28 day plan x 6 cycles  5/16/2018 to begin treatment with FOLFIRI with onivyde at 70 mg/m2 dose reduction of the 5 FU from start of plan by 15%.    Interval History: Ms. Sanchez comes to clinic alone today for review of recent imaging and toxicity check prior to cycle 5 of FOLFIRI.Pt states she has been able to tolerate this plan well with no new concerns about side effects today. Pt denies pain, bowel changes, nausea, cough or new chest irritation and is surprised that she is feeling so well.   Rest of comprehensive and complete ROS is reviewed and is negative.   Past Medical History:   Diagnosis Date     AR (allergic rhinitis)      Baker's cyst      DJD (degenerative joint disease)      Elevated cholesterol      Glaucoma      Menopause      Vertigo      Current Outpatient Prescriptions   Medication     amylase-lipase-protease (CREON) 53256-[address] units CPEP per EC capsule     BIOTIN PO     latanoprost (XALATAN) 0.005 % ophthalmic solution     lidocaine-prilocaine (EMLA) cream     loperamide (IMODIUM) 2 MG capsule     Loratadine (CLARITIN PO)     LORazepam (ATIVAN) 0.5 MG tablet     Multiple Vitamins-Minerals (WOMENS 50+ MULTI VITAMIN/MIN PO)     ondansetron (ZOFRAN) 8 MG tablet     Potassium Chloride ER 20 MEQ TBCR      potassium chloride SA (KLOR-CON) 20 MEQ CR tablet     prochlorperazine (COMPAZINE) 10 MG tablet     No current facility-administered medications for this visit.      Facility-Administered Medications Ordered in Other Visits   Medication     heparin 100 UNIT/ML injection 5 mL     sodium chloride (PF) 0.9% PF flush 10-20 mL     Allergies   Allergen Reactions     Codeine Camsylate        Physical Exam: /71 (BP Location: Right arm)  Pulse 75  Temp 97.2  F (36.2  C) (Oral)  Resp 18  Wt 44.7 kg (98 lb 8 oz)  SpO2 98%  BMI 19.57 kg/m2   ECOG PS-1-2  Constitutional: Alert, moving slowly using cane -needing one assist for transfer without cane or for long distances.No sign of pain.  ENT: Eyes bright, No mouth sores. False [place].  Neck: Supple, No adenopathy.Thyroid symmetric  Cardiac: Heart rate and rhythm is regular and strong with aortic murmur  Noted as usual.  Respiratory: Breathing easy. Lung sounds clear to auscultation. No cough  Port with no redness or swelling.  GI: Abdomen is soft, non-tender, BS normal. No masses or organomegaly  MS: Muscle tone fair- uses cane for support, extremities normal with very light edema to ankles  Skin: No suspicious lesions or rashes or bruising  Neuro: Sensory grossly WNL, gait is steady with cane  Lymph: Normal ant/post cervical, axillary, supraclavicular nodes  Psych: Mentation appears normal and affect normal/bright with good conversation.     Laboratory Results:   Results for orders placed or performed in visit on 07/05/18   CT Chest/Abdomen/Pelvis w Contrast    Addendum: 7/17/2018    Addendum performed following the acquisition of CT images through the  chest:    Right chest Port-A-Cath, tip terminating in the low SVC.    Redemonstration of multiple nodules and masses throughout the lung  parenchyma, memory of which exhibit central cavitation. Several of the  lesions, particularly the larger lesions, demonstrate surrounding  groundglass attenuation with interlobular septal  thickening. For  example, mass within the apical segment of the right upper lobe  (series 17, image 47), with soft tissue component measuring  approximately 3.0 x 2.4 cm (previously 2.3 x 2.0 cm). Other lesions  have not significantly changed. For example, cavitary lesion within  the superior segment of the left lower lobe (series 17, image 123) now  measures approximately 1.5 x 2.2 cm.    Visualized thyroid gland is unremarkable.  Atherosclerotic  ossifications of the coronary arteries. Mitral annular  calcifications.. The heart is not enlarged. No pericardial or pleural  effusion. No pneumothorax. The trachea is upper limits of normal in  size, measuring approximately 2.3 cm in the transverse dimension. No  axillary, hilar, or mediastinal lymphadenopathy. The central  tracheobronchial tree appears patent. No mucus plugging or bronchial  wall thickening. Bibasilar traction bronchiectasis.    Impression: (for chest CT portion of the examination): Increase in  size of multiple nodules and masses throughout the lungs, many of  which again show cavitation, consistent with worsening metastatic  disease.    I have personally reviewed the examination and initial interpretation  and I agree with the findings.    THOMAS RAYGOZA MD      Narrative    EXAMINATION: CT CHEST/ABDOMEN/PELVIS W CONTRAST, 7/5/2018 2:16 PM    TECHNIQUE:  Helical CT images from the lung bases through the  symphysis pubis were obtained with contrast using pancreas mass  protocol. Imaging was performed in late arterial, portal venous, and  delayed phases.  Contrast dose: 58 ml isovue 370    COMPARISON: 4/11/2018    HISTORY: Restaging pancreatic cancer with lung metastasis; Malignant  neoplasm metastatic to both lungs (H); Malignant neoplasm metastatic  to both lungs (H); Pancreatic adenocarcinoma (H)    FINDINGS:    Pancreas: Slight increase in size of the poorly defined hypoenhancing  mass arising from the head and neck of the pancreas, now  measuring  approximately 5.0 x 6.8 x 4.5 cm in the AP, transverse, and  craniocaudal dimensions, respectively. The body and tail the pancreas  remain markedly atrophic, with significant dilatation of the upstream  main pancreatic duct, measuring up to 1.1 cm (previously 0.9 cm). The  fat plane between the pancreatic mass in the proximal duodenum has  been lost the no jayne invasion is seen. There is no evidence of  involvement of other local organs.    Vascular involvement:    There is no evidence of arterial or venous thrombus.    Celiac axis: Encased.  Hepatic artery: Again noted is encasement of the common, proper,  right, and left hepatic arteries, as well as the gastroduodenal  artery. Previously, the proper hepatic artery was involved, but not  encased.  Superior mesenteric artery: Encased.  Superior mesenteric vein: Encased.  Portal vein: Encased. Slight increase in narrowing of the proximal  left portal vein. However, no portal vein thrombosis.  Splenic artery and vein: Encasement of the central aspect of the  splenic artery with minimal narrowing; the vessel remains patent. The  central aspect of the splenic vein is also encased with increased  narrowing from the comparison study.    Vascular anatomy: No vascular anomalies.    Peritoneum: Small volume of hypodense free fluid in the deep pelvis.  Fluid is also seen at the villa hepatis.    Liver: Diffuse hepatic steatosis. Early enhancing hepatic vascular  shunt located in segment 7.    Remainder of the abdomen and pelvis: Metallic stent within the common  bile duct, terminating in the third portion of the duodenum.  Pneumobilia, mostly left-sided. Ongoing moderate intrahepatic. Ductal  dilatation, slightly increased from prior There is a large stone  within the gallbladder, which is somewhat distended. No significant  gallbladder wall thickening. Fluid adjacent to the gallbladder, which  communicates with the generalized fluid within the villa hepatis.  The  spleen is not enlarged. Thickening of the adrenal glands bilaterally,  without discrete nodule. Simple right renal cyst. No radiopaque  genitourinary tract stones. The left kidney is unremarkable. The bowel  is of normal caliber. Moderate colonic stool burden. Calcified uterine  fibroids. Right adnexal cyst, unchanged colonic diverticulosis.  Atherosclerotic calcifications of the aortoiliac region without  aneurysmal dilatation. No significant change in the shunt between the  right portal vein and the right hepatic vein.    Lung bases: There are multiple nodules and consolidative opacities in  the visualized lung parenchyma, some of which exhibit cavitation. For  example, in the posterior segment of the right lower lobe, there is a  cavitary metastasis (series 5, image 5), measuring approximately 1.2 x  1.5 cm (previously not cavitary and measuring 1.1 x 1.1 cm).  Additionally, the masslike consolidation in the lateral segment of the  right lower lobe (same image) now measures 3.7 x 2.6 cm (previously  3.3 x 2.0 cm). Large mass within the posterior left lower lobe (series  5, image 14) now measures 4.0 x 6.4 cm (previously  3.5 x 5.5 cm).  Increased cavitary component of the mass.    Incompletely visualized atherosclerotic calcifications of the aortic  valve leaflets and coronary arteries. No pericardial or pleural  effusion.    Bones and soft tissues: Advanced degenerative changes of the spine,  including severe facet osteoarthropathy. Additional degenerative  changes of the hip and sacroiliac joints. Retrolisthesis of L1 on L2.  Grade 1 anterolisthesis of L3 on L4. Grade 2 anterolisthesis of L4 on  L5 and L5 on S1. No acute fracture or aggressive-appearing osseous  lesion. Subcutaneous tissues are notable for multiple calcified  lesions in the fat of the gluteal region.      Impression    IMPRESSION:  1. Increase in size of the primary tumor of the pancreatic head and  neck, consistent with known  adenocarcinoma. Slight increase in  vascular involvement of the tumor, particularly involving the splenic  vein and proper hepatic artery, which are now encased.  2. Worsening pulmonary metastases.  3. Diffuse hepatic steatosis. Unchanged right hepatic lobe vascular  shunt.  4. Colonic diverticulosis.  5. Of note, this examination was initially ordered as a CT of the  chest, abdomen and pelvis. Unfortunately only images of the abdomen  and pelvis were obtained. The patient will be contacted and a CT of  the chest will be scheduled and performed at no additional cost to the  patient.    I have personally reviewed the examination and initial interpretation  and I agree with the findings.    THOMAS RAYGOZA MD   Creatinine POCT   Result Value Ref Range    Creatinine 0.3 (L) 0.52 - 1.04 mg/dL    GFR Estimate >90 >60 mL/min/1.7m2    GFR Estimate If Black >90 >60 mL/min/1.7m2     Results for orders placed or performed in visit on 07/19/18   Basic metabolic panel   Result Value Ref Range    Sodium 145 (H) 133 - 144 mmol/L    Potassium 3.8 3.4 - 5.3 mmol/L    Chloride 114 (H) 94 - 109 mmol/L    Carbon Dioxide 24 20 - 32 mmol/L    Anion Gap 7 3 - 14 mmol/L    Glucose 80 70 - 99 mg/dL    Urea Nitrogen 10 7 - 30 mg/dL    Creatinine 0.38 (L) 0.52 - 1.04 mg/dL    GFR Estimate >90 >60 mL/min/1.7m2    GFR Estimate If Black >90 >60 mL/min/1.7m2    Calcium 8.1 (L) 8.5 - 10.1 mg/dL   Magnesium   Result Value Ref Range    Magnesium 2.3 1.6 - 2.3 mg/dL   CBC with platelets differential   Result Value Ref Range    WBC 4.3 4.0 - 11.0 10e9/L    RBC Count 3.33 (L) 3.8 - 5.2 10e12/L    Hemoglobin 10.9 (L) 11.7 - 15.7 g/dL    Hematocrit 32.6 (L) 35.0 - 47.0 %    MCV 98 78 - 100 fl    MCH 32.7 26.5 - 33.0 pg    MCHC 33.4 31.5 - 36.5 g/dL    RDW 18.9 (H) 10.0 - 15.0 %    Platelet Count 215 150 - 450 10e9/L    Diff Method Automated Method     % Neutrophils 52.6 %    % Lymphocytes 29.8 %    % Monocytes 15.3 %    % Eosinophils 1.4 %    % Basophils  0.7 %    % Immature Granulocytes 0.2 %    Absolute Neutrophil 2.2 1.6 - 8.3 10e9/L    Absolute Lymphocytes 1.3 0.8 - 5.3 10e9/L    Absolute Monocytes 0.7 0.0 - 1.3 10e9/L    Absolute Eosinophils 0.1 0.0 - 0.7 10e9/L    Absolute Basophils 0.0 0.0 - 0.2 10e9/L    Abs Immature Granulocytes 0.0 0 - 0.4 10e9/L   Bilirubin  total   Result Value Ref Range    Bilirubin Total 0.5 0.2 - 1.3 mg/dL     Assessment and Plan:   Stage IV Pancreatic cancer-Pt started plan of Liposomal Irinotecan and with 15 % reduction in 5FU on 5/16/2018. Pt has been tolerating well with some loose stools and gassy stomach as times.  She had CT prior to this visit and reviewed with pt that there is clear progression of disease in both pancreas and in lung metastasis. Pt is not surprised with progression and reports that since she is feeling well she would be interested in discussion of new plan.  We will not completed cycle 5 today since she has progression noted and will set up to see Dr Soares on 7/30 for discussion of further plans. Pt made sure I am aware that she will be out of town and early August and cannot start new chemotherapy until 8/8/2018.   Hypokalemia- resolved with daily use of 20 mEq of potassium daily.   Nutrition and weight loss-pt has gained weight over last weeks and is very proud of this though  Calcium has dropped and albumin would be low. She will start back on calcium supplement now which she had stopped in past for unknown reason.   * encouraged pt to have dental cleaning as she requested prior to restarting an chemotherapy.   This was a 25 min visit with > 50% in counseling and coordinating care including education and management of concerns.    Nai Simmons,[facility]        Oncology Rooming Note    July 19, 2018 12:32 PM   Talya Gatica is a 78 year old female who presents for:    No chief complaint on file.    Initial Vitals: There were no vitals taken for this visit. Estimated body mass index is 18.87 kg/(m^2) as  "calculated from the following:    Height as of 6/28/18: 1.511 m (4' 11.49\").    Weight as of 6/28/18: 43.1 kg (95 lb). There is no height or weight on file to calculate BSA.  Data Unavailable Comment: Data Unavailable   No LMP recorded. Patient is postmenopausal.  Allergies reviewed: Yes  Medications reviewed: Yes    Medications: Medication refills not needed today.  Pharmacy [facility] entered into [employer] software: [id number] - [place], [place] - [address] AT [facility]    Clinical concerns: When to have a dental cleaning?    Sheree Calhoun RN                  Again, thank you for allowing me to participate in the care of your patient.        Sincerely,        Nai Simmons NP, APRN CNP    "

## 2018-07-19 NOTE — PROGRESS NOTES
Oncology Follow Up Visit: July 19, 2018     Oncologist: Dr Jim Soares  PCP: Pancho Cope    Diagnosis: Stage IV Pancreatic cancer  Talya Gatica is a 77 yo who c/o jaundice in 7/2017 was found to have  adenocarcinoma of the head of pancreas causing biliary obstruction and several pulmonary nodules consistent with metastasis- initially told days to 3 months of life.  Treatment:   11/3/2017 began treatment with Gemzar- days 1,8,15 of 28 day plan x 6 cycles  5/16/2018 to begin treatment with FOLFIRI with onivyde at 70 mg/m2 dose reduction of the 5 FU from start of plan by 15%.    Interval History: Ms. Sanchez comes to clinic alone today for review of recent imaging and toxicity check prior to cycle 5 of FOLFIRI.Pt states she has been able to tolerate this plan well with no new concerns about side effects today. Pt denies pain, bowel changes, nausea, cough or new chest irritation and is surprised that she is feeling so well.   Rest of comprehensive and complete ROS is reviewed and is negative.   Past Medical History:   Diagnosis Date     AR (allergic rhinitis)      Baker's cyst      DJD (degenerative joint disease)      Elevated cholesterol      Glaucoma      Menopause      Vertigo      Current Outpatient Prescriptions   Medication     amylase-lipase-protease (CREON) 63830-13864 units CPEP per EC capsule     BIOTIN PO     latanoprost (XALATAN) 0.005 % ophthalmic solution     lidocaine-prilocaine (EMLA) cream     loperamide (IMODIUM) 2 MG capsule     Loratadine (CLARITIN PO)     LORazepam (ATIVAN) 0.5 MG tablet     Multiple Vitamins-Minerals (WOMENS 50+ MULTI VITAMIN/MIN PO)     ondansetron (ZOFRAN) 8 MG tablet     Potassium Chloride ER 20 MEQ TBCR     potassium chloride SA (KLOR-CON) 20 MEQ CR tablet     prochlorperazine (COMPAZINE) 10 MG tablet     No current facility-administered medications for this visit.      Facility-Administered Medications Ordered in Other Visits   Medication     heparin 100  UNIT/ML injection 5 mL     sodium chloride (PF) 0.9% PF flush 10-20 mL     Allergies   Allergen Reactions     Codeine Camsylate        Physical Exam: /71 (BP Location: Right arm)  Pulse 75  Temp 97.2  F (36.2  C) (Oral)  Resp 18  Wt 44.7 kg (98 lb 8 oz)  SpO2 98%  BMI 19.57 kg/m2   ECOG PS-1-2  Constitutional: Alert, moving slowly using cane -needing one assist for transfer without cane or for long distances.No sign of pain.  ENT: Eyes bright, No mouth sores. [place] IRA.  Neck: Supple, No adenopathy.Thyroid symmetric  Cardiac: Heart rate and rhythm is regular and strong with aortic murmur  Noted as usual.  Respiratory: Breathing easy. Lung sounds clear to auscultation. No cough  Port with no redness or swelling.  GI: Abdomen is soft, non-tender, BS normal. No masses or organomegaly  MS: Muscle tone fair- uses cane for support, extremities normal with very light edema to ankles  Skin: No suspicious lesions or rashes or bruising  Neuro: Sensory grossly WNL, gait is steady with cane  Lymph: Normal ant/post cervical, axillary, supraclavicular nodes  Psych: Mentation appears normal and affect normal/bright with good conversation.     Laboratory Results:   Results for orders placed or performed in visit on 07/05/18   CT Chest/Abdomen/Pelvis w Contrast    Addendum: 7/17/2018    Addendum performed following the acquisition of CT images through the  chest:    Right chest Port-A-Cath, tip terminating in the low SVC.    Redemonstration of multiple nodules and masses throughout the lung  parenchyma, memory of which exhibit central cavitation. Several of the  lesions, particularly the larger lesions, demonstrate surrounding  groundglass attenuation with interlobular septal thickening. For  example, mass within the apical segment of the right upper lobe  (series 17, image 47), with soft tissue component measuring  approximately 3.0 x 2.4 cm (previously 2.3 x 2.0 cm). Other lesions  have not significantly changed. For  example, cavitary lesion within  the superior segment of the left lower lobe (series 17, image 123) now  measures approximately 1.5 x 2.2 cm.    Visualized thyroid gland is unremarkable.  Atherosclerotic  ossifications of the coronary arteries. Mitral annular  calcifications.. The heart is not enlarged. No pericardial or pleural  effusion. No pneumothorax. The trachea is upper limits of normal in  size, measuring approximately 2.3 cm in the transverse dimension. No  axillary, hilar, or mediastinal lymphadenopathy. The central  tracheobronchial tree appears patent. No mucus plugging or bronchial  wall thickening. Bibasilar traction bronchiectasis.    Impression: (for chest CT portion of the examination): Increase in  size of multiple nodules and masses throughout the lungs, many of  which again show cavitation, consistent with worsening metastatic  disease.    I have personally reviewed the examination and initial interpretation  and I agree with the findings.    THOMAS RAYGOZA MD      Narrative    EXAMINATION: CT CHEST/ABDOMEN/PELVIS W CONTRAST, 7/5/2018 2:16 PM    TECHNIQUE:  Helical CT images from the lung bases through the  symphysis pubis were obtained with contrast using pancreas mass  protocol. Imaging was performed in late arterial, portal venous, and  delayed phases.  Contrast dose: 58 ml isovue 370    COMPARISON: 4/11/2018    HISTORY: Restaging pancreatic cancer with lung metastasis; Malignant  neoplasm metastatic to both lungs (H); Malignant neoplasm metastatic  to both lungs (H); Pancreatic adenocarcinoma (H)    FINDINGS:    Pancreas: Slight increase in size of the poorly defined hypoenhancing  mass arising from the head and neck of the pancreas, now measuring  approximately 5.0 x 6.8 x 4.5 cm in the AP, transverse, and  craniocaudal dimensions, respectively. The body and tail the pancreas  remain markedly atrophic, with significant dilatation of the upstream  main pancreatic duct, measuring up to 1.1 cm  (previously 0.9 cm). The  fat plane between the pancreatic mass in the proximal duodenum has  been lost the no jayne invasion is seen. There is no evidence of  involvement of other local organs.    Vascular involvement:    There is no evidence of arterial or venous thrombus.    Celiac axis: Encased.  Hepatic artery: Again noted is encasement of the common, proper,  right, and left hepatic arteries, as well as the gastroduodenal  artery. Previously, the proper hepatic artery was involved, but not  encased.  Superior mesenteric artery: Encased.  Superior mesenteric vein: Encased.  Portal vein: Encased. Slight increase in narrowing of the proximal  left portal vein. However, no portal vein thrombosis.  Splenic artery and vein: Encasement of the central aspect of the  splenic artery with minimal narrowing; the vessel remains patent. The  central aspect of the splenic vein is also encased with increased  narrowing from the comparison study.    Vascular anatomy: No vascular anomalies.    Peritoneum: Small volume of hypodense free fluid in the deep pelvis.  Fluid is also seen at the villa hepatis.    Liver: Diffuse hepatic steatosis. Early enhancing hepatic vascular  shunt located in segment 7.    Remainder of the abdomen and pelvis: Metallic stent within the common  bile duct, terminating in the third portion of the duodenum.  Pneumobilia, mostly left-sided. Ongoing moderate intrahepatic. Ductal  dilatation, slightly increased from prior There is a large stone  within the gallbladder, which is somewhat distended. No significant  gallbladder wall thickening. Fluid adjacent to the gallbladder, which  communicates with the generalized fluid within the villa hepatis. The  spleen is not enlarged. Thickening of the adrenal glands bilaterally,  without discrete nodule. Simple right renal cyst. No radiopaque  genitourinary tract stones. The left kidney is unremarkable. The bowel  is of normal caliber. Moderate colonic stool  burden. Calcified uterine  fibroids. Right adnexal cyst, unchanged colonic diverticulosis.  Atherosclerotic calcifications of the aortoiliac region without  aneurysmal dilatation. No significant change in the shunt between the  right portal vein and the right hepatic vein.    Lung bases: There are multiple nodules and consolidative opacities in  the visualized lung parenchyma, some of which exhibit cavitation. For  example, in the posterior segment of the right lower lobe, there is a  cavitary metastasis (series 5, image 5), measuring approximately 1.2 x  1.5 cm (previously not cavitary and measuring 1.1 x 1.1 cm).  Additionally, the masslike consolidation in the lateral segment of the  right lower lobe (same image) now measures 3.7 x 2.6 cm (previously  3.3 x 2.0 cm). Large mass within the posterior left lower lobe (series  5, image 14) now measures 4.0 x 6.4 cm (previously  3.5 x 5.5 cm).  Increased cavitary component of the mass.    Incompletely visualized atherosclerotic calcifications of the aortic  valve leaflets and coronary arteries. No pericardial or pleural  effusion.    Bones and soft tissues: Advanced degenerative changes of the spine,  including severe facet osteoarthropathy. Additional degenerative  changes of the hip and sacroiliac joints. Retrolisthesis of L1 on L2.  Grade 1 anterolisthesis of L3 on L4. Grade 2 anterolisthesis of L4 on  L5 and L5 on S1. No acute fracture or aggressive-appearing osseous  lesion. Subcutaneous tissues are notable for multiple calcified  lesions in the fat of the gluteal region.      Impression    IMPRESSION:  1. Increase in size of the primary tumor of the pancreatic head and  neck, consistent with known adenocarcinoma. Slight increase in  vascular involvement of the tumor, particularly involving the splenic  vein and proper hepatic artery, which are now encased.  2. Worsening pulmonary metastases.  3. Diffuse hepatic steatosis. Unchanged right hepatic lobe  vascular  shunt.  4. Colonic diverticulosis.  5. Of note, this examination was initially ordered as a CT of the  chest, abdomen and pelvis. Unfortunately only images of the abdomen  and pelvis were obtained. The patient will be contacted and a CT of  the chest will be scheduled and performed at no additional cost to the  patient.    I have personally reviewed the examination and initial interpretation  and I agree with the findings.    THOMAS RAYGOZA MD   Creatinine POCT   Result Value Ref Range    Creatinine 0.3 (L) 0.52 - 1.04 mg/dL    GFR Estimate >90 >60 mL/min/1.7m2    GFR Estimate If Black >90 >60 mL/min/1.7m2     Results for orders placed or performed in visit on 07/19/18   Basic metabolic panel   Result Value Ref Range    Sodium 145 (H) 133 - 144 mmol/L    Potassium 3.8 3.4 - 5.3 mmol/L    Chloride 114 (H) 94 - 109 mmol/L    Carbon Dioxide 24 20 - 32 mmol/L    Anion Gap 7 3 - 14 mmol/L    Glucose 80 70 - 99 mg/dL    Urea Nitrogen 10 7 - 30 mg/dL    Creatinine 0.38 (L) 0.52 - 1.04 mg/dL    GFR Estimate >90 >60 mL/min/1.7m2    GFR Estimate If Black >90 >60 mL/min/1.7m2    Calcium 8.1 (L) 8.5 - 10.1 mg/dL   Magnesium   Result Value Ref Range    Magnesium 2.3 1.6 - 2.3 mg/dL   CBC with platelets differential   Result Value Ref Range    WBC 4.3 4.0 - 11.0 10e9/L    RBC Count 3.33 (L) 3.8 - 5.2 10e12/L    Hemoglobin 10.9 (L) 11.7 - 15.7 g/dL    Hematocrit 32.6 (L) 35.0 - 47.0 %    MCV 98 78 - 100 fl    MCH 32.7 26.5 - 33.0 pg    MCHC 33.4 31.5 - 36.5 g/dL    RDW 18.9 (H) 10.0 - 15.0 %    Platelet Count 215 150 - 450 10e9/L    Diff Method Automated Method     % Neutrophils 52.6 %    % Lymphocytes 29.8 %    % Monocytes 15.3 %    % Eosinophils 1.4 %    % Basophils 0.7 %    % Immature Granulocytes 0.2 %    Absolute Neutrophil 2.2 1.6 - 8.3 10e9/L    Absolute Lymphocytes 1.3 0.8 - 5.3 10e9/L    Absolute Monocytes 0.7 0.0 - 1.3 10e9/L    Absolute Eosinophils 0.1 0.0 - 0.7 10e9/L    Absolute Basophils 0.0 0.0 - 0.2  10e9/L    Abs Immature Granulocytes 0.0 0 - 0.4 10e9/L   Bilirubin  total   Result Value Ref Range    Bilirubin Total 0.5 0.2 - 1.3 mg/dL     Assessment and Plan:   Stage IV Pancreatic cancer-Pt started plan of Liposomal Irinotecan and with 15 % reduction in 5FU on 5/16/2018. Pt has been tolerating well with some loose stools and gassy stomach as times.  She had CT prior to this visit and reviewed with pt that there is clear progression of disease in both pancreas and in lung metastasis. Pt is not surprised with progression and reports that since she is feeling well she would be interested in discussion of new plan.  We will not completed cycle 5 today since she has progression noted and will set up to see Dr Soares on 7/30 for discussion of further plans. Pt made sure I am aware that she will be out of town and early August and cannot start new chemotherapy until 8/8/2018.   Hypokalemia- resolved with daily use of 20 mEq of potassium daily.   Nutrition and weight loss-pt has gained weight over last weeks and is very proud of this though  Calcium has dropped and albumin would be low. She will start back on calcium supplement now which she had stopped in past for unknown reason.   * encouraged pt to have dental cleaning as she requested prior to restarting an chemotherapy.   This was a 25 min visit with > 50% in counseling and coordinating care including education and management of concerns.    Nai Simmons,CNP

## 2018-07-19 NOTE — MR AVS SNAPSHOT
After Visit Summary   7/19/2018    Talya Gatica    MRN: [id number]           Patient Information     Date Of Birth          1940        Visit Information        Provider Department      7/19/2018 12:15 PM NURSE ONLY CANCER CENTER [facility]        Today's Diagnoses     Malignant neoplasm metastatic to both lungs (H)    -  1    Pancreatic adenocarcinoma (H)           Follow-ups after your visit        Your next 10 appointments already scheduled     Jul 19, 2018 12:45 PM CDT   [facility] with LISA Mendieta CNP   [facility]    [address] [facility] [address]   [phone]            Jul 19, 2018  1:30 PM CDT   Folfiri with BAY 8 INFUSION   [facility]    [address] [facility] [address]   [phone]            Jul 30, 2018 10:15 AM CDT   Return Visit with [facility] ONLY CANCER [facility]   [facility]    [address] [facility] [address]   [phone]            Jul 30, 2018 10:45 AM CDT   Return Visit with LISA Mendieta CNP   [facility]    [address] [facility] [address]   [phone]            Jul 30, 2018 11:30 AM CDT   Folfiri with BAY 4 INFUSION   [facility]    [address] [facility] [address]   [phone]            Aug 15, 2018 11:45 AM CDT   Return Visit with [facility]URSE ONLY CANCER CENTER   [facility]    [address] [facility] [address]   [phone]            Aug 15, 2018 12:15 PM CDT   Return Visit with LISA Mendieta CNP   [facility]    [address] [facility] [address]   [phone]            Aug 15,  2018  1:30 PM TAMT   Ethel with BAY 6 INFUSION   [facility]    [address] [facility] [address] [phone]              Who to contact     If you have questions or need follow up information about today's clinic visit or your schedule please contact [facility] directly at [phone].  Normal or non-critical lab and imaging results will be communicated to you by MyChart, letter or phone within 4 business days after the clinic has received the results. If you do not hear from us within 7 days, please contact the clinic through MyChart or phone. If you have a critical or abnormal lab result, we will notify you by phone as soon as possible.  Submit refill requests through [facility] or call your pharmacy and they will forward the refill request to us. Please allow 3 business days for your refill to be completed.          Additional Information About Your Visit        Care EveryWhere ID     This is your Care EveryWhere ID. This could be used by other organizations to access your [place] medical records  [id number]         Blood Pressure from Last 3 Encounters:   06/28/18 154/88   06/13/18 100/68   05/30/18 139/80    Weight from Last 3 Encounters:   06/28/18 43.1 kg (95 lb)   06/13/18 45 kg (99 lb 1.6 oz)   05/30/18 44.9 kg (99 lb)              We Performed the Following     Basic metabolic panel     Bilirubin  total     CBC with platelets differential     Magnesium        Primary Care Provider Office Phone # Fax #    Pancho Cope [phone] [address] [facility] [address]        Equal Access to Services     [facility] Hospital[facility] : Hadii aad ku hadasho Soomaali, waaxda luqadaha, qaybta kaalmada adegladis, carrie wheatley. So [facility] [phone].    ATENCIÓN: Si habla español, tiene a ornelas disposición servicios gratuitos de asistencia lingüística. Llame al [phone].    We comply  with applicable federal civil rights laws and [place] laws. We do not discriminate on the basis of race, color, national origin, age, disability, sex, sexual orientation, or gender identity.            Thank you!     Thank you for choosing [facility]  for your care. Our goal is always to provide you with excellent care. Hearing back from our patients is one way we can continue to improve our services. Please take a few minutes to complete the written survey that you may receive in the mail after your visit with us. Thank you!             Your Updated Medication List - Protect others around you: Learn how to safely use, store and throw away your medicines at www.disposemymeds.org.          This list is accurate as of 7/19/18 12:40 PM.  Always use your most recent med list.                   Brand Name Dispense Instructions for use Diagnosis    amylase-lipase-protease 08283-16896 units Cpep per EC capsule    CREON    180 capsule    Take 1 capsule (24,000 Units) by mouth 3 times daily (with meals)    Pancreatic adenocarcinoma (H)       BIOTIN PO           CLARITIN PO      Take by mouth daily        latanoprost 0.005 % ophthalmic solution    XALATAN    3 Bottle    Place 1 drop into both eyes At Bedtime    Glaucoma suspect, bilateral       lidocaine-prilocaine cream    EMLA    30 g    Apply topically as needed for moderate pain (use dollop of cream to saran wrap 30 min prior to use of port)    Pancreatic adenocarcinoma (H)       loperamide 2 MG capsule    IMODIUM    30 capsule    2 caps at 1st sign of diarrhea & 1 cap every 2hrs until 12hrs diarrhea free. 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),  Pancreatic adenocarcinoma (H)       ondansetron 8 MG tablet    ZOFRAN    10 tablet    Take 1 tablet (8 mg) by mouth every 8 hours as needed (nausea/vomiting)    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

## 2018-07-30 NOTE — MR AVS SNAPSHOT
After Visit Summary   7/30/2018    Talya Gatica    MRN: [id number]           Patient Information     Date Of Birth          1940        Visit Information        Provider Department      7/30/2018 12:30 PM Jim Soares MD [facility]        Today's Diagnoses     Malignant neoplasm metastatic to both lungs (H)    -  1    Pancreatic adenocarcinoma (H)           Follow-ups after your visit        Your next 10 appointments already scheduled     Aug 15, 2018 11:45 AM CDT   Return Visit with [facility]   [facility] ([facility]    [address] [facility] [address]   [phone]            Aug 15, 2018 12:15 PM CDT   Return Visit with LISA Mendieta CNP   [facility] ([facility])    [address] [facility] [address]   [phone]            Aug 15, 2018  1:30 PM CDT   Level 1 with BAY 6 INFUSION   [facility] ([facility])    [address] [facility] [address]   [phone]            Sep 05, 2018 11:45 AM CDT   Return Visit with [facility] ONLY [facility]   [facility] ([facility])    [address] [facility] [address]   [phone]            Sep 05, 2018 12:15 PM CDT   Return Visit with LISA Mendieta CNP   [facility] ([facility])    [address] [facility] [address]   [phone]            Sep 05, 2018  1:00 PM CDT   Level 3 with BAY 8 INFUSION   [facility] ([facility])    [address] [facility] [address]   [phone]            Sep 26, 2018 12:30 PM CDT   Return Visit with [facility]SE ONLY [facility]ER [facility]ENTER   [facility] ([facility])    [address] [facility] [address]   [phone]            Sep 26, 2018   "1:00 PM CDT   Level 3 with [place] 4 INFUSION   [facility] ([facility]    [address] [place] [address] [phone]              Who to contact     If you have questions or need follow up information about today's clinic visit or your schedule please contact [facility] directly at [phone].  Normal or non-critical lab and imaging results will be communicated to you by MyChart, letter or phone within 4 business days after the clinic has received the results. If you do not hear from us within 7 days, please contact the clinic through MyChart or phone. If you have a critical or abnormal lab result, we will notify you by phone as soon as possible.  Submit refill requests through [employer] or call your pharmacy and they will forward the refill request to us. Please allow 3 business days for your refill to be completed.          Additional Information About Your Visit        Care EveryWhere ID     This is your Care EveryWhere ID. This could be used by other organizations to access your [facility] medical records  [id number]        Your Vitals Were     Pulse Temperature Respirations Height Pulse Oximetry BMI (Body Mass Index)    70 98.5  F (36.9  C) (Oral) 16 1.511 m (4' 11.49\") 97% 19.11 kg/m2       Blood Pressure from Last 3 Encounters:   07/30/18 124/62   07/19/18 138/71   06/28/18 154/88    Weight from Last 3 Encounters:   07/30/18 43.6 kg (96 lb 3 oz)   07/19/18 44.7 kg (98 lb 8 oz)   06/28/18 43.1 kg (95 lb)              Today, you had the following     No orders found for display         Today's Medication Changes          These changes are accurate as of 7/30/18 11:59 PM.  If you have any questions, ask your nurse or doctor.               Stop taking these medicines if you haven't already. Please contact your care team if you have questions.     loperamide 2 MG capsule   Commonly known as:  IMODIUM   Stopped by:  Jim Soares MD        "    LORazepam 0.5 MG tablet   Commonly known as:  ATIVAN   Stopped by:  Jim Soares MD           ondansetron 8 MG tablet   Commonly known as:  ZOFRAN   Stopped by:  Jim Soares MD           prochlorperazine 10 MG tablet   Commonly known as:  COMPAZINE   Stopped by:  Jim Soares MD                    Primary Care Provider Office Phone # Fax #    Pancho Rl Cope [phone] [address] [facility] [address]        Equal Access to Services     [facility]: Hadii aad ku hadasho Soomaali, waaxda luqadaha, qaybta kaalmada adeegyada, waxay idiin hayaan adeterence kaur . So [facility] [phone].    ATENCIÓN: Si habla español, tiene a ornelas disposición servicios gratuitos de asistencia lingüística. [facility] [phone].    We comply with applicable federal civil rights laws and [place] laws. We do not discriminate on the basis of race, color, national origin, age, disability, sex, sexual orientation, or gender identity.            Thank you!     Thank you for choosing [facility]  for your care. Our goal is always to provide you with excellent care. Hearing back from our patients is one way we can continue to improve our services. Please take a few minutes to complete the written survey that you may receive in the mail after your visit with us. Thank you!             Your Updated Medication List - Protect others around you: Learn how to safely use, store and throw away your medicines at www.disposemymeds.org.          This list is accurate as of 7/30/18 11:59 PM.  Always use your most recent med list.                   Brand Name Dispense Instructions for use Diagnosis    amylase-lipase-protease 49985-[address] units Cpep per EC capsule    CREON    180 capsule    Take 1 capsule (24,000 Units) by mouth 3 times daily (with meals)    Pancreatic adenocarcinoma (H)       BIOTIN PO           CLARITIN PO      Take by mouth daily        latanoprost 0.005 %  ophthalmic solution    XALATAN    3 Bottle    Place 1 drop into both eyes At Bedtime    Glaucoma suspect, bilateral       lidocaine-prilocaine cream    EMLA    30 g    Apply topically as needed for moderate pain (use dollop of cream to saran wrap 30 min prior to use of port)    Pancreatic adenocarcinoma (H)       Potassium Chloride ER 20 MEQ Tbcr     90 tablet    Take 1 tablet (20 mEq) by mouth daily    Hypokalemia       potassium chloride SA 20 MEQ CR tablet    KLOR-CON    32 tablet    Take 1 tab at 6 pm tonight and 1 tab at 9 pm tonight then start 1 tab daily tomorrow morning.    Hypokalemia, Loose stools       WOMENS 50+ MULTI VITAMIN/MIN PO

## 2018-07-30 NOTE — PROGRESS NOTES
Met with patient after she saw Dr. Soares today to discuss results of her scan and progression of her disease.  He has offered her another treatment option with Abraxane and Carboplatin.  The plan would be for her to start with Abraxane and then add Carboplatin to have a weekly treatment.  She has had several different chemotherapy regimens in the past, so she is familiar with chemotherapy side effects.  Reviewed written information on Abraxane and Carboplatin and highlighted specific side effects of these therapies.  Reviewed most concerning symptoms that warrant an immediate call to the clinic nurse line.  Also reviewed phone numbers to call during and after clinic hours.  Plan to start on 8/15 after she returns from her vacation.

## 2018-07-30 NOTE — NURSING NOTE
"Oncology Rooming Note    July 30, 2018 12:36 PM   Talya Gatica is a 78 year old female who presents for:    Chief Complaint   Patient presents with     Oncology Clinic Visit     follow up      Initial Vitals: /62  Pulse 70  Temp 98.5  F (36.9  C) (Oral)  Resp 16  Ht 1.511 m (4' 11.49\")  Wt 43.6 kg (96 lb 3 oz)  SpO2 97%  BMI 19.11 kg/m2 Estimated body mass index is 19.11 kg/(m^2) as calculated from the following:    Height as of this encounter: 1.511 m (4' 11.49\").    Weight as of this encounter: 43.6 kg (96 lb 3 oz). Body surface area is 1.35 meters squared.  Data Unavailable Comment: Data Unavailable   No LMP recorded. Patient is postmenopausal.  Allergies reviewed: Yes  Medications reviewed: Yes    Medications: Medication refills not needed today.  Pharmacy name entered into [facility]: [id number] - [place], [place] - [address] AT [facility] [address]         5 minutes for nursing intake (face to face time)     Sade Jeffrey LPN              "

## 2018-07-30 NOTE — LETTER
7/30/2018         RE: Talya Gatica  [address]  [facility] [address]        Dear Colleague,    Thank you for referring your patient, Talya Gatica, to the [facility]. Please see a copy of my visit note below.    [facility]  HEMATOLOGY AND ONCOLOGY    FOLLOW-UP VISIT NOTE    PATIENT NAME: Talya Gatica MRN # [id number]  DATE OF VISIT: Jul 30, 2018 YOB: 1940    REFERRING PROVIDER: No referring provider defined for this encounter.    CANCER TYPE: Pancreatic adenocarcinoma  STAGE: IV; extensive lung metastasis    TREATMENT SUMMARY:  - 7/12/17 US guided FNA of pancreatic mass consistent with pancreatic adenocarcinoma    CURRENT INTERVENTIONS:  Progression on gemcitabine and now Folfox    SUBJECTIVE   Talya Gatica is being followed for pancreatic cancer    She is being seen with restaging scans. She has no new complains since last visit. She again has a number of questions for me.     ROS is significant only for a post nasal drip.       PAST MEDICAL HISTORY     Past Medical History:   Diagnosis Date     AR (allergic rhinitis)      Baker's cyst      DJD (degenerative joint disease)      Elevated cholesterol      Glaucoma      Menopause      Vertigo          CURRENT OUTPATIENT MEDICATIONS     Current Outpatient Prescriptions   Medication Sig     amylase-lipase-protease (CREON) [address] units CPEP per EC capsule Take 1 capsule (24,000 Units) by mouth 3 times daily (with meals)     BIOTIN PO      latanoprost (XALATAN) 0.005 % ophthalmic solution Place 1 drop into both eyes At Bedtime     loperamide (IMODIUM) 2 MG capsule 2 caps at 1st sign of diarrhea & 1 cap every 2hrs until 12hrs diarrhea free. During night, 2 caps at bedtime & 2 caps every 4hrs until AM     Loratadine (CLARITIN PO) Take by mouth daily     Multiple Vitamins-Minerals (WOMENS 50+ MULTI VITAMIN/MIN PO)      Potassium Chloride ER 20 MEQ TBCR Take 1 tablet (20 mEq) by mouth daily  "    lidocaine-prilocaine (EMLA) cream Apply topically as needed for moderate pain (use dollop of cream to saran wrap 30 min prior to use of port) (Patient not taking: Reported on 7/30/2018)     LORazepam (ATIVAN) 0.5 MG tablet Take 1 tablet (0.5 mg) by mouth every 4 hours as needed (Anxiety, Nausea/Vomiting or Sleep) (Patient not taking: Reported on 7/30/2018)     ondansetron (ZOFRAN) 8 MG tablet Take 1 tablet (8 mg) by mouth every 8 hours as needed (nausea/vomiting) (Patient not taking: Reported on 6/28/2018)     potassium chloride SA (KLOR-CON) 20 MEQ CR tablet Take 1 tab at 6 pm tonight and 1 tab at 9 pm tonight then start 1 tab daily tomorrow morning. (Patient not taking: Reported on 7/30/2018)     prochlorperazine (COMPAZINE) 10 MG tablet Take 0.5 tablets (5 mg) by mouth every 6 hours as needed (Nausea/Vomiting) (Patient not taking: Reported on 6/28/2018)     No current facility-administered medications for this visit.         ALLERGIES      Allergies   Allergen Reactions     Codeine Camsylate         REVIEW OF SYSTEMS   As above in the HPI, o/w complete 12-point ROS was negative.     PHYSICAL EXAM   /62  Pulse 70  Temp 98.5  F (36.9  C) (Oral)  Resp 16  Ht 1.511 m (4' 11.49\")  Wt 43.6 kg (96 lb 3 oz)  SpO2 97%  BMI 19.11 kg/m2  GEN: NAD  HEENT: PERRL, EOMI, no icterus, injection or pallor. Oropharynx is clear.  LYMPHATICs: no cervical or supraclavicular lymphadenopathy; no other abn lymphadenopathy  PULMONARY: clear with good air entry bilaterally  CARDIOVASCULAR: regular, no murmurs, rubs, or gallops  GASTROINTESTINAL: soft, non-tender, non-distended, normal bowel sounds, no hepatosplenomegaly by percussion or palpation  MUSCULOSKELTAL: warm, well perfused, no edema  NEURO: awake, alert and oriented to time place and person, cranial nerves intact - II - XII, no focal neurologic deficits  SKIN: no rashes     LABORATORY AND IMAGING STUDIES     Recent Labs   Lab Test  07/19/18   1210  06/28/18   " 1200  06/13/18   1213  05/23/18   1452  04/18/18   1155  04/11/18   1301  03/19/18   1735   NA  145*  143   --   140   --   142  140   POTASSIUM  3.8  3.4  2.9*  3.9  3.7  3.8  3.3*   CHLORIDE  114*  111*   --   108   --   113*  108   CO2  24  25   --   25   --   22  22   ANIONGAP  7  7   --   7   --   7  10   BUN  10  7   --   8   --   5*  7   CR  0.38*  0.46*   --   0.44*   --   0.47*  0.48*   GLC  80  95   --   109*   --   91  87   BERNARDO  8.1*  8.9   --   8.8   --   9.0  8.5     Recent Labs   Lab Test  07/19/18   1210  06/28/18   1200  06/13/18   1213   MAG  2.3  2.2  1.8     Recent Labs   Lab Test  07/19/18   1210  06/28/18   1200  06/13/18   1213  06/06/18   1000  05/30/18   1148   WBC  4.3  5.0  6.9  3.7*  6.1   HGB  10.9*  11.3*  10.8*  11.2*  11.1*   PLT  215  275  222  211  251   MCV  98  94  92  93  91   NEUTROPHIL  52.6  65.6  74.9  63.6  69.6     Recent Labs   Lab Test  07/19/18   1210  06/28/18   1200  06/13/18   1213   04/11/18   1301  03/19/18   1735   BILITOTAL  0.5  0.7  1.0   < >  0.8  0.8   ALKPHOS   --   125   --    --   133  152*   ALT   --   29   --    --   46  57*   AST   --   30   --    --   51*  63*   ALBUMIN   --   3.3*   --    --   3.5  3.1*    < > = values in this interval not displayed.     No results found for: TSH  Recent Labs   Lab Test  10/30/17   1342   CEA  4.8*     Results for orders placed or performed in visit on 07/05/18   CT Chest/Abdomen/Pelvis w Contrast    Addendum: 7/17/2018    Addendum performed following the acquisition of CT images through the  chest:    Right chest Port-A-Cath, tip terminating in the low SVC.    Redemonstration of multiple nodules and masses throughout the lung  parenchyma, memory of which exhibit central cavitation. Several of the  lesions, particularly the larger lesions, demonstrate surrounding  groundglass attenuation with interlobular septal thickening. For  example, mass within the apical segment of the right upper lobe  (series 17, image 47), with  soft tissue component measuring  approximately 3.0 x 2.4 cm (previously 2.3 x 2.0 cm). Other lesions  have not significantly changed. For example, cavitary lesion within  the superior segment of the left lower lobe (series 17, image 123) now  measures approximately 1.5 x 2.2 cm.    Visualized thyroid gland is unremarkable.  Atherosclerotic  ossifications of the coronary arteries. Mitral annular  calcifications.. The heart is not enlarged. No pericardial or pleural  effusion. No pneumothorax. The trachea is upper limits of normal in  size, measuring approximately 2.3 cm in the transverse dimension. No  axillary, hilar, or mediastinal lymphadenopathy. The central  tracheobronchial tree appears patent. No mucus plugging or bronchial  wall thickening. Bibasilar traction bronchiectasis.    Impression: (for chest CT portion of the examination): Increase in  size of multiple nodules and masses throughout the lungs, many of  which again show cavitation, consistent with worsening metastatic  disease.    I have personally reviewed the examination and initial interpretation  and I agree with the findings.    THOMAS RAYGOZA MD      Narrative    EXAMINATION: CT CHEST/ABDOMEN/PELVIS W CONTRAST, 7/5/2018 2:16 PM    TECHNIQUE:  Helical CT images from the lung bases through the  symphysis pubis were obtained with contrast using pancreas mass  protocol. Imaging was performed in late arterial, portal venous, and  delayed phases.  Contrast dose: 58 ml isovue 370    COMPARISON: 4/11/2018    HISTORY: Restaging pancreatic cancer with lung metastasis; Malignant  neoplasm metastatic to both lungs (H); Malignant neoplasm metastatic  to both lungs (H); Pancreatic adenocarcinoma (H)    FINDINGS:    Pancreas: Slight increase in size of the poorly defined hypoenhancing  mass arising from the head and neck of the pancreas, now measuring  approximately 5.0 x 6.8 x 4.5 cm in the AP, transverse, and  craniocaudal dimensions, respectively. The body  and tail the pancreas  remain markedly atrophic, with significant dilatation of the upstream  main pancreatic duct, measuring up to 1.1 cm (previously 0.9 cm). The  fat plane between the pancreatic mass in the proximal duodenum has  been lost the no jayne invasion is seen. There is no evidence of  involvement of other local organs.    Vascular involvement:    There is no evidence of arterial or venous thrombus.    Celiac axis: Encased.  Hepatic artery: Again noted is encasement of the common, proper,  right, and left hepatic arteries, as well as the gastroduodenal  artery. Previously, the proper hepatic artery was involved, but not  encased.  Superior mesenteric artery: Encased.  Superior mesenteric vein: Encased.  Portal vein: Encased. Slight increase in narrowing of the proximal  left portal vein. However, no portal vein thrombosis.  Splenic artery and vein: Encasement of the central aspect of the  splenic artery with minimal narrowing; the vessel remains patent. The  central aspect of the splenic vein is also encased with increased  narrowing from the comparison study.    Vascular anatomy: No vascular anomalies.    Peritoneum: Small volume of hypodense free fluid in the deep pelvis.  Fluid is also seen at the villa hepatis.    Liver: Diffuse hepatic steatosis. Early enhancing hepatic vascular  shunt located in segment 7.    Remainder of the abdomen and pelvis: Metallic stent within the common  bile duct, terminating in the third portion of the duodenum.  Pneumobilia, mostly left-sided. Ongoing moderate intrahepatic. Ductal  dilatation, slightly increased from prior There is a large stone  within the gallbladder, which is somewhat distended. No significant  gallbladder wall thickening. Fluid adjacent to the gallbladder, which  communicates with the generalized fluid within the villa hepatis. The  spleen is not enlarged. Thickening of the adrenal glands bilaterally,  without discrete nodule. Simple right renal  cyst. No radiopaque  genitourinary tract stones. The left kidney is unremarkable. The bowel  is of normal caliber. Moderate colonic stool burden. Calcified uterine  fibroids. Right adnexal cyst, unchanged colonic diverticulosis.  Atherosclerotic calcifications of the aortoiliac region without  aneurysmal dilatation. No significant change in the shunt between the  right portal vein and the right hepatic vein.    Lung bases: There are multiple nodules and consolidative opacities in  the visualized lung parenchyma, some of which exhibit cavitation. For  example, in the posterior segment of the right lower lobe, there is a  cavitary metastasis (series 5, image 5), measuring approximately 1.2 x  1.5 cm (previously not cavitary and measuring 1.1 x 1.1 cm).  Additionally, the masslike consolidation in the lateral segment of the  right lower lobe (same image) now measures 3.7 x 2.6 cm (previously  3.3 x 2.0 cm). Large mass within the posterior left lower lobe (series  5, image 14) now measures 4.0 x 6.4 cm (previously  3.5 x 5.5 cm).  Increased cavitary component of the mass.    Incompletely visualized atherosclerotic calcifications of the aortic  valve leaflets and coronary arteries. No pericardial or pleural  effusion.    Bones and soft tissues: Advanced degenerative changes of the spine,  including severe facet osteoarthropathy. Additional degenerative  changes of the hip and sacroiliac joints. Retrolisthesis of L1 on L2.  Grade 1 anterolisthesis of L3 on L4. Grade 2 anterolisthesis of L4 on  L5 and L5 on S1. No acute fracture or aggressive-appearing osseous  lesion. Subcutaneous tissues are notable for multiple calcified  lesions in the fat of the gluteal region.      Impression    IMPRESSION:  1. Increase in size of the primary tumor of the pancreatic head and  neck, consistent with known adenocarcinoma. Slight increase in  vascular involvement of the tumor, particularly involving the splenic  vein and proper hepatic  artery, which are now encased.  2. Worsening pulmonary metastases.  3. Diffuse hepatic steatosis. Unchanged right hepatic lobe vascular  shunt.  4. Colonic diverticulosis.  5. Of note, this examination was initially ordered as a CT of the  chest, abdomen and pelvis. Unfortunately only images of the abdomen  and pelvis were obtained. The patient will be contacted and a CT of  the chest will be scheduled and performed at no additional cost to the  patient.    I have personally reviewed the examination and initial interpretation  and I agree with the findings.    THOMAS RAYGOZA MD           ASSESSMENT AND PLAN   1. Pancreatic cancer - extensive metastatis to lung  2. Weight loss  3. ECOG PS 1  4. No medical comorbidity    I had a lengthy discussion with Talya, who again comes alone for this clinic visit.  She had a number of questions for me which were answered for her.  I reviewed actual images from her restaging CT scan and compared it to her previous scans.  She does have disease progression in the lungs and in the pancreas.  She is aware of this progression as this was reviewed with her. She is worried of getting off therapy. She notes that she has not noticed pain since starting therapy. She notes that on most days she forgets that she has cancer.     We could try alternate regimens to control her disease and possibly could control and even reverse her disease.     I reviewed chemotherapy regimen of nab-paclitaxel alone or with carboplatin. I will start her with single agent nab-paclitaxel for first cycle and assess how well she tolerates the regimen. She might need neulasta with this chemotherapy especially if we were to add carboplatin starting second cycle. I reviewed side effects with the individual agents, rationale, benefits and alternatives. Talya is interested in getting this chemotherapy and is eager to proceed.     I will see her in 4 weeks time with labs prior to visit. We would plan to restage her  in 2-3 months time depending on how well she is tolerating regimen (closer to 2 months if she is having toxicity or concerns for progression).     Additional chemotherapy teaching will be done by Tiffanie.     Over 45 min of direct face to face time spent with patient with more than 50% time spent in counseling and coordinating care.      Again, thank you for allowing me to participate in the care of your patient.        Sincerely,        Jim Soares MD

## 2018-07-30 NOTE — PROGRESS NOTES
[facility]  HEMATOLOGY AND ONCOLOGY    FOLLOW-UP VISIT NOTE    PATIENT NAME: Talya Gatica MRN # [id number]  DATE OF VISIT: Jul 30, 2018 YOB: 1940    REFERRING PROVIDER: No referring provider defined for this encounter.    CANCER TYPE: Pancreatic adenocarcinoma  STAGE: IV; extensive lung metastasis    TREATMENT SUMMARY:  - 7/12/17 US guided FNA of pancreatic mass consistent with pancreatic adenocarcinoma    CURRENT INTERVENTIONS:  Progression on gemcitabine and now Folfox    SUBJECTIVE   Talya Gatica is being followed for pancreatic cancer    She is being seen with restaging scans. She has no new complains since last visit. She again has a number of questions for me.     ROS is significant only for a post nasal drip.       PAST MEDICAL HISTORY     Past Medical History:   Diagnosis Date     AR (allergic rhinitis)      Baker's cyst      DJD (degenerative joint disease)      Elevated cholesterol      Glaucoma      Menopause      Vertigo          CURRENT OUTPATIENT MEDICATIONS     Current Outpatient Prescriptions   Medication Sig     amylase-lipase-protease (CREON) 24631-79896 units CPEP per EC capsule Take 1 capsule (24,000 Units) by mouth 3 times daily (with meals)     BIOTIN PO      latanoprost (XALATAN) 0.005 % ophthalmic solution Place 1 drop into both eyes At Bedtime     loperamide (IMODIUM) 2 MG capsule 2 caps at 1st sign of diarrhea & 1 cap every 2hrs until 12hrs diarrhea free. During night, 2 caps at bedtime & 2 caps every 4hrs until AM     Loratadine (CLARITIN PO) Take by mouth daily     Multiple Vitamins-Minerals (WOMENS 50+ MULTI VITAMIN/MIN PO)      Potassium Chloride ER 20 MEQ TBCR Take 1 tablet (20 mEq) by mouth daily     lidocaine-prilocaine (EMLA) cream Apply topically as needed for moderate pain (use dollop of cream to saran wrap 30 min prior to use of port) (Patient not taking: Reported on 7/30/2018)     LORazepam (ATIVAN) 0.5 MG tablet Take 1 tablet (0.5 mg) by  "mouth every 4 hours as needed (Anxiety, Nausea/Vomiting or Sleep) (Patient not taking: Reported on 7/30/2018)     ondansetron (ZOFRAN) 8 MG tablet Take 1 tablet (8 mg) by mouth every 8 hours as needed (nausea/vomiting) (Patient not taking: Reported on 6/28/2018)     potassium chloride SA (KLOR-CON) 20 MEQ CR tablet Take 1 tab at 6 pm tonight and 1 tab at 9 pm tonight then start 1 tab daily tomorrow morning. (Patient not taking: Reported on 7/30/2018)     prochlorperazine (COMPAZINE) 10 MG tablet Take 0.5 tablets (5 mg) by mouth every 6 hours as needed (Nausea/Vomiting) (Patient not taking: Reported on 6/28/2018)     No current facility-administered medications for this visit.         ALLERGIES      Allergies   Allergen Reactions     Codeine Camsylate         REVIEW OF SYSTEMS   As above in the HPI, o/w complete 12-point ROS was negative.     PHYSICAL EXAM   /62  Pulse 70  Temp 98.5  F (36.9  C) (Oral)  Resp 16  Ht 1.511 m (4' 11.49\")  Wt 43.6 kg (96 lb 3 oz)  SpO2 97%  BMI 19.11 kg/m2  GEN: NAD  HEENT: PERRL, EOMI, no icterus, injection or pallor. Oropharynx is clear.  LYMPHATICs: no cervical or supraclavicular lymphadenopathy; no other abn lymphadenopathy  PULMONARY: clear with good air entry bilaterally  CARDIOVASCULAR: regular, no murmurs, rubs, or gallops  GASTROINTESTINAL: soft, non-tender, non-distended, normal bowel sounds, no hepatosplenomegaly by percussion or palpation  MUSCULOSKELTAL: warm, well perfused, no edema  NEURO: awake, alert and oriented to time place and person, cranial nerves intact - II - XII, no focal neurologic deficits  SKIN: no rashes     LABORATORY AND IMAGING STUDIES     Recent Labs   Lab Test  07/19/18   1210  06/28/18   1200  06/13/18   1213  05/23/18   1452  04/18/18   1155  04/11/18   1301  03/19/18   1735   NA  145*  143   --   140   --   142  140   POTASSIUM  3.8  3.4  2.9*  3.9  3.7  3.8  3.3*   CHLORIDE  114*  111*   --   108   --   113*  108   CO2  24 25   --   25 "   --   22  22   ANIONGAP  7  7   --   7   --   7  10   BUN  10  7   --   8   --   5*  7   CR  0.38*  0.46*   --   0.44*   --   0.47*  0.48*   GLC  80  95   --   109*   --   91  87   BERNARDO  8.1*  8.9   --   8.8   --   9.0  8.5     Recent Labs   Lab Test  07/19/18   1210  06/28/18   1200  06/13/18   1213   MAG  2.3  2.2  1.8     Recent Labs   Lab Test  07/19/18   1210  06/28/18   1200  06/13/18   1213  06/06/18   1000  05/30/18   1148   WBC  4.3  5.0  6.9  3.7*  6.1   HGB  10.9*  11.3*  10.8*  11.2*  11.1*   PLT  215  275  222  211  251   MCV  98  94  92  93  91   NEUTROPHIL  52.6  65.6  74.9  63.6  69.6     Recent Labs   Lab Test  07/19/18   1210  06/28/18   1200  06/13/18   1213   04/11/18   1301  03/19/18   1735   BILITOTAL  0.5  0.7  1.0   < >  0.8  0.8   ALKPHOS   --   125   --    --   133  152*   ALT   --   29   --    --   46  57*   AST   --   30   --    --   51*  63*   ALBUMIN   --   3.3*   --    --   3.5  3.1*    < > = values in this interval not displayed.     No results found for: TSH  Recent Labs   Lab Test  10/30/17   1342   CEA  4.8*     Results for orders placed or performed in visit on 07/05/18   CT Chest/Abdomen/Pelvis w Contrast    Addendum: 7/17/2018    Addendum performed following the acquisition of CT images through the  chest:    Right chest Port-A-Cath, tip terminating in the low SVC.    Redemonstration of multiple nodules and masses throughout the lung  parenchyma, memory of which exhibit central cavitation. Several of the  lesions, particularly the larger lesions, demonstrate surrounding  groundglass attenuation with interlobular septal thickening. For  example, mass within the apical segment of the right upper lobe  (series 17, image 47), with soft tissue component measuring  approximately 3.0 x 2.4 cm (previously 2.3 x 2.0 cm). Other lesions  have not significantly changed. For example, cavitary lesion within  the superior segment of the left lower lobe (series 17, image 123) now  measures  approximately 1.5 x 2.2 cm.    Visualized thyroid gland is unremarkable.  Atherosclerotic  ossifications of the coronary arteries. Mitral annular  calcifications.. The heart is not enlarged. No pericardial or pleural  effusion. No pneumothorax. The trachea is upper limits of normal in  size, measuring approximately 2.3 cm in the transverse dimension. No  axillary, hilar, or mediastinal lymphadenopathy. The central  tracheobronchial tree appears patent. No mucus plugging or bronchial  wall thickening. Bibasilar traction bronchiectasis.    Impression: (for chest CT portion of the examination): Increase in  size of multiple nodules and masses throughout the lungs, many of  which again show cavitation, consistent with worsening metastatic  disease.    I have personally reviewed the examination and initial interpretation  and I agree with the findings.    THOMAS RAYGOZA MD      Narrative    EXAMINATION: CT CHEST/ABDOMEN/PELVIS W CONTRAST, 7/5/2018 2:16 PM    TECHNIQUE:  Helical CT images from the lung bases through the  symphysis pubis were obtained with contrast using pancreas mass  protocol. Imaging was performed in late arterial, portal venous, and  delayed phases.  Contrast dose: 58 ml isovue 370    COMPARISON: 4/11/2018    HISTORY: Restaging pancreatic cancer with lung metastasis; Malignant  neoplasm metastatic to both lungs (H); Malignant neoplasm metastatic  to both lungs (H); Pancreatic adenocarcinoma (H)    FINDINGS:    Pancreas: Slight increase in size of the poorly defined hypoenhancing  mass arising from the head and neck of the pancreas, now measuring  approximately 5.0 x 6.8 x 4.5 cm in the AP, transverse, and  craniocaudal dimensions, respectively. The body and tail the pancreas  remain markedly atrophic, with significant dilatation of the upstream  main pancreatic duct, measuring up to 1.1 cm (previously 0.9 cm). The  fat plane between the pancreatic mass in the proximal duodenum has  been lost the no  jayne invasion is seen. There is no evidence of  involvement of other local organs.    Vascular involvement:    There is no evidence of arterial or venous thrombus.    Celiac axis: Encased.  Hepatic artery: Again noted is encasement of the common, proper,  right, and left hepatic arteries, as well as the gastroduodenal  artery. Previously, the proper hepatic artery was involved, but not  encased.  Superior mesenteric artery: Encased.  Superior mesenteric vein: Encased.  Portal vein: Encased. Slight increase in narrowing of the proximal  left portal vein. However, no portal vein thrombosis.  Splenic artery and vein: Encasement of the central aspect of the  splenic artery with minimal narrowing; the vessel remains patent. The  central aspect of the splenic vein is also encased with increased  narrowing from the comparison study.    Vascular anatomy: No vascular anomalies.    Peritoneum: Small volume of hypodense free fluid in the deep pelvis.  Fluid is also seen at the villa hepatis.    Liver: Diffuse hepatic steatosis. Early enhancing hepatic vascular  shunt located in segment 7.    Remainder of the abdomen and pelvis: Metallic stent within the common  bile duct, terminating in the third portion of the duodenum.  Pneumobilia, mostly left-sided. Ongoing moderate intrahepatic. Ductal  dilatation, slightly increased from prior There is a large stone  within the gallbladder, which is somewhat distended. No significant  gallbladder wall thickening. Fluid adjacent to the gallbladder, which  communicates with the generalized fluid within the villa hepatis. The  spleen is not enlarged. Thickening of the adrenal glands bilaterally,  without discrete nodule. Simple right renal cyst. No radiopaque  genitourinary tract stones. The left kidney is unremarkable. The bowel  is of normal caliber. Moderate colonic stool burden. Calcified uterine  fibroids. Right adnexal cyst, unchanged colonic diverticulosis.  Atherosclerotic  calcifications of the aortoiliac region without  aneurysmal dilatation. No significant change in the shunt between the  right portal vein and the right hepatic vein.    Lung bases: There are multiple nodules and consolidative opacities in  the visualized lung parenchyma, some of which exhibit cavitation. For  example, in the posterior segment of the right lower lobe, there is a  cavitary metastasis (series 5, image 5), measuring approximately 1.2 x  1.5 cm (previously not cavitary and measuring 1.1 x 1.1 cm).  Additionally, the masslike consolidation in the lateral segment of the  right lower lobe (same image) now measures 3.7 x 2.6 cm (previously  3.3 x 2.0 cm). Large mass within the posterior left lower lobe (series  5, image 14) now measures 4.0 x 6.4 cm (previously  3.5 x 5.5 cm).  Increased cavitary component of the mass.    Incompletely visualized atherosclerotic calcifications of the aortic  valve leaflets and coronary arteries. No pericardial or pleural  effusion.    Bones and soft tissues: Advanced degenerative changes of the spine,  including severe facet osteoarthropathy. Additional degenerative  changes of the hip and sacroiliac joints. Retrolisthesis of L1 on L2.  Grade 1 anterolisthesis of L3 on L4. Grade 2 anterolisthesis of L4 on  L5 and L5 on S1. No acute fracture or aggressive-appearing osseous  lesion. Subcutaneous tissues are notable for multiple calcified  lesions in the fat of the gluteal region.      Impression    IMPRESSION:  1. Increase in size of the primary tumor of the pancreatic head and  neck, consistent with known adenocarcinoma. Slight increase in  vascular involvement of the tumor, particularly involving the splenic  vein and proper hepatic artery, which are now encased.  2. Worsening pulmonary metastases.  3. Diffuse hepatic steatosis. Unchanged right hepatic lobe vascular  shunt.  4. Colonic diverticulosis.  5. Of note, this examination was initially ordered as a CT of the  chest,  abdomen and pelvis. Unfortunately only images of the abdomen  and pelvis were obtained. The patient will be contacted and a CT of  the chest will be scheduled and performed at no additional cost to the  patient.    I have personally reviewed the examination and initial interpretation  and I agree with the findings.    THOMAS RAYGOZA MD           ASSESSMENT AND PLAN   1. Pancreatic cancer - extensive metastatis to lung  2. Weight loss  3. ECOG PS 1  4. No medical comorbidity    I had a lengthy discussion with Talya, who again comes alone for this clinic visit.  She had a number of questions for me which were answered for her.  I reviewed actual images from her restaging CT scan and compared it to her previous scans.  She does have disease progression in the lungs and in the pancreas.  She is aware of this progression as this was reviewed with her. She is worried of getting off therapy. She notes that she has not noticed pain since starting therapy. She notes that on most days she forgets that she has cancer.     We could try alternate regimens to control her disease and possibly could control and even reverse her disease.     I reviewed chemotherapy regimen of nab-paclitaxel alone or with carboplatin. I will start her with single agent nab-paclitaxel for first cycle and assess how well she tolerates the regimen. She might need neulasta with this chemotherapy especially if we were to add carboplatin starting second cycle. I reviewed side effects with the individual agents, rationale, benefits and alternatives. Talya is interested in getting this chemotherapy and is eager to proceed.     I will see her in 4 weeks time with labs prior to visit. We would plan to restage her in 2-3 months time depending on how well she is tolerating regimen (closer to 2 months if she is having toxicity or concerns for progression).     Additional chemotherapy teaching will be done by Tiffanie.     Over 45 min of direct face to face time  spent with patient with more than 50% time spent in counseling and coordinating care.

## 2018-08-15 NOTE — NURSING NOTE
"Oncology Rooming Note    August 15, 2018 12:09 PM   Talya Gatica is a 78 year old female who presents for:    Chief Complaint   Patient presents with     Oncology Clinic Visit     follow up     Initial Vitals: /73  Pulse 71  Temp 97.9  F (36.6  C)  Resp 16  Ht 1.511 m (4' 11.49\")  Wt 44 kg (97 lb)  SpO2 98%  BMI 19.27 kg/m2 Estimated body mass index is 19.27 kg/(m^2) as calculated from the following:    Height as of this encounter: 1.511 m (4' 11.49\").    Weight as of this encounter: 44 kg (97 lb). Body surface area is 1.36 meters squared.  No Pain (0) Comment: Data Unavailable   No LMP recorded. Patient is postmenopausal.  Allergies reviewed: Yes  Medications reviewed: Yes    Medications: Medication refills not needed today.  Pharmacy name entered into [facility]: [id number] - [place], [place] - [address] AT [facility] [address]      5 minutes for nursing intake (face to face time)     Carly Bobby LPN              "

## 2018-08-15 NOTE — MR AVS SNAPSHOT
After Visit Summary   8/15/2018    Talya Gatica    MRN: [id number]           Patient Information     Date Of Birth          1940        Visit Information        Provider Department      8/15/2018 11:45 AM NURSE ONLY CANCER CENTER [facility]        Today's Diagnoses     Malignant neoplasm metastatic to both lungs (H)    -  1    Pancreatic adenocarcinoma (H)           Follow-ups after your visit        Your next 10 appointments already scheduled     Aug 15, 2018 12:15 PM CDT   Return Visit with LISA Mendieta CNP   [facility]    [address] [facility] [place] [address]   [phone]            Aug 15, 2018  1:30 PM CDT   Level 1 with BAY 6 INFUSION   [facility] ([facility])    [address] [place] [address]   [phone]            Sep 05, 2018 11:45 AM CDT   Return Visit with NURSE ONLY CANCER CENTER   [facility] ([facility])    [address] [place] [address]   [phone]            Sep 05, 2018 12:15 PM CDT   Return Visit with LISA Mendieta CNP   [facility] ([facility])    [address] [place] [place] [address]   [phone]            Sep 05, 2018  1:00 PM CDT   Level 3 with BAY 9 INFUSION   [facility] ([facility])    [address] [facility] [address]   [phone]            Sep 26, 2018 12:30 PM CDT   Return Visit with NURSE ONLY CANCER CENTER   [facility] ([facility])    [address] [place] [place] [address]   [phone]            Sep 26, 2018  1:00 PM CDT   Level 3 with BAY 4 INFUSION   [facility] ([facility])    [address] [facility] [address] [phone]              Who to contact     If you  have questions or need follow up information about today's clinic visit or your schedule please contact [facility] directly at [phone].  Normal or non-critical lab and imaging results will be communicated to you by MyChart, letter or phone within 4 business days after the clinic has received the results. If you do not hear from us within 7 days, please contact the clinic through MyChart or phone. If you have a critical or abnormal lab result, we will notify you by phone as soon as possible.  Submit refill requests through [employer] or call your pharmacy and they will forward the refill request to us. Please allow 3 business days for your refill to be completed.          Additional Information About Your Visit        Care EveryWhere ID     This is your Care EveryWhere ID. This could be used by other organizations to access your [facility] medical records  [id number]         Blood Pressure from Last 3 Encounters:   07/30/18 124/62   07/19/18 138/71   06/28/18 154/88    Weight from Last 3 Encounters:   07/30/18 43.6 kg (96 lb 3 oz)   07/19/18 44.7 kg (98 lb 8 oz)   06/28/18 43.1 kg (95 lb)              We Performed the Following     CBC with platelets differential     Comprehensive metabolic panel        Primary Care Provider Office Phone # Fax #    Pancho Cope [phone] [address] [facility] [address]        Equal Access to Services     JOCELINE JORDAN : Hadii feliciano ku hadasho Sokirit, waaxda luqadaha, qaybta kaalmada patricio, carrie kaur . So [facility] [phone].    ATENCIÓN: Si habla español, tiene a ornelas disposición servicios gratuitos de asistencia lingüística. Kristen al [phone].    We comply with applicable federal civil rights laws and [place] laws. We do not discriminate on the basis of race, color, national origin, age, disability, sex, sexual orientation, or gender identity.            Thank you!     Thank you for  [facility]  for your care. Our goal is always to provide you with excellent care. Hearing back from our patients is one way we can continue to improve our services. Please take a few minutes to complete the written survey that you may receive in the mail after your visit with us. Thank you!             Your Updated Medication List - Protect others around you: Learn how to safely use, store and throw away your medicines at www.disposemymeds.org.          This list is accurate as of 8/15/18 11:59 AM.  Always use your most recent med list.                   Brand Name Dispense Instructions for use Diagnosis    amylase-lipase-protease 28959-41116 units Cpep per EC capsule    CREON    180 capsule    Take 1 capsule (24,000 Units) by mouth 3 times daily (with meals)    Pancreatic adenocarcinoma (H)       BIOTIN PO           CLARITIN PO      Take by mouth daily        latanoprost 0.005 % ophthalmic solution    XALATAN    3 Bottle    Place 1 drop into both eyes At Bedtime    Glaucoma suspect, bilateral       lidocaine-prilocaine cream    EMLA    30 g    Apply topically as needed for moderate pain (use dollop of cream to saran wrap 30 min prior to use of port)    Pancreatic adenocarcinoma (H)       Potassium Chloride ER 20 MEQ Tbcr     90 tablet    Take 1 tablet (20 mEq) by mouth daily    Hypokalemia       potassium chloride SA 20 MEQ CR tablet    KLOR-CON    32 tablet    Take 1 tab at 6 pm tonight and 1 tab at 9 pm tonight then start 1 tab daily tomorrow morning.    Hypokalemia, Loose stools       WOMENS 50+ MULTI VITAMIN/MIN PO

## 2018-08-15 NOTE — LETTER
8/15/2018         RE: Talya Gatica  [address]   [place] [address]        Dear Colleague,    Thank you for referring your patient, Talya Gatica, to the [facility]. Please see a copy of my visit note below.    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  Treatment:   11/3/2017 began treatment with Gemzar- days 1,8,15 of 28 day plan x 6 cycles  5/16/2018 to begin treatment with FOLFIRI with onivyde at 70 mg/m2 dose reduction of the 5 FU from start of plan by 15%.  8/15/2018 begin Carboplatin/Abraxane    Interval History: Ms. Sanchez comes to clinic alone today for review prior to start of new plan to treat her pancreatic cancer with carboplatin and Abraxane. Pt states she did get to dentist and has missing cap but no pain.  She also went on trip with daughter and felt that went well but still feels somewhat weak- getting around with cane. Denies pain, nausea and is eating well without bowel or bladder issues- though mentions one loose stool while on vacation. She has had no SOB, chest pain, fevers or illness.   Rest of comprehensive and complete ROS is reviewed and is negative.   Past Medical History:   Diagnosis Date     AR (allergic rhinitis)      Baker's cyst      DJD (degenerative joint disease)      Elevated cholesterol      Glaucoma      Menopause      Vertigo      Current Outpatient Prescriptions   Medication     amylase-lipase-protease (CREON) 12533-84420 units CPEP per EC capsule     BIOTIN PO     latanoprost (XALATAN) 0.005 % ophthalmic solution     Multiple Vitamins-Minerals (WOMENS 50+ MULTI VITAMIN/MIN PO)     Potassium  "Chloride ER 20 MEQ TBCR     lidocaine-prilocaine (EMLA) cream     Loratadine (CLARITIN PO)     potassium chloride SA (KLOR-CON) 20 MEQ CR tablet     No current facility-administered medications for this visit.      Allergies   Allergen Reactions     Codeine Camsylate        Physical Exam: /73  Pulse 71  Temp 97.9  F (36.6  C)  Resp 16  Ht 1.511 m (4' 11.49\")  Wt 44 kg (97 lb)  SpO2 98%  BMI 19.27 kg/m2 - weight stable.   ECOG PS-1-2  Constitutional: Alert, moving slowly using cane -needing one assist for transfer without cane.No sign of pain.  ENT: Eyes bright, No mouth sores. [place].  Neck: Supple, No adenopathy.Thyroid symmetric  Cardiac: Heart rate and rhythm is regular and strong with aortic murmur noted as usual.  Respiratory: Breathing easy. Lung sounds clear to auscultation. No cough  Port with no redness or swelling.  GI: Abdomen is soft, non-tender, BS normal. No masses or organomegaly  MS: Muscle tone fair- uses cane for support, extremities normal with very light edema to ankles  Skin: No suspicious lesions or rashes or bruising-wearing wig with ongoing alopecia.  Neuro: Sensory grossly WNL, gait is steady with cane  Lymph: Normal ant/post cervical, axillary, supraclavicular nodes  Psych: Mentation appears normal and affect normal/bright with good conversation.     Laboratory Results:   Results for orders placed or performed in visit on 08/15/18   CBC with platelets differential   Result Value Ref Range    WBC 4.6 4.0 - 11.0 10e9/L    RBC Count 3.52 (L) 3.8 - 5.2 10e12/L    Hemoglobin 11.5 (L) 11.7 - 15.7 g/dL    Hematocrit 33.9 (L) 35.0 - 47.0 %    MCV 96 78 - 100 fl    MCH 32.7 26.5 - 33.0 pg    MCHC 33.9 31.5 - 36.5 g/dL    RDW 14.8 10.0 - 15.0 %    Platelet Count 220 150 - 450 10e9/L    Diff Method Automated Method     % Neutrophils 63.1 %    % Lymphocytes 23.9 %    % Monocytes 11.7 %    % Eosinophils 0.9 %    % Basophils 0.4 %    Absolute Neutrophil 2.9 1.6 - 8.3 10e9/L    Absolute " Lymphocytes 1.1 0.8 - 5.3 10e9/L    Absolute Monocytes 0.5 0.0 - 1.3 10e9/L    Absolute Eosinophils 0.0 0.0 - 0.7 10e9/L    Absolute Basophils 0.0 0.0 - 0.2 10e9/L   Comprehensive metabolic panel   Result Value Ref Range    Sodium 144 133 - 144 mmol/L    Potassium 4.0 3.4 - 5.3 mmol/L    Chloride 111 (H) 94 - 109 mmol/L    Carbon Dioxide 25 20 - 32 mmol/L    Anion Gap 8 3 - 14 mmol/L    Glucose 99 70 - 99 mg/dL    Urea Nitrogen 8 7 - 30 mg/dL    Creatinine 0.42 (L) 0.52 - 1.04 mg/dL    GFR Estimate >90 >60 mL/min/1.7m2    GFR Estimate If Black >90 >60 mL/min/1.7m2    Calcium 8.6 8.5 - 10.1 mg/dL    Bilirubin Total 0.6 0.2 - 1.3 mg/dL    Albumin 3.3 (L) 3.4 - 5.0 g/dL    Protein Total 6.4 (L) 6.8 - 8.8 g/dL    Alkaline Phosphatase 134 40 - 150 U/L    ALT 36 0 - 50 U/L    AST 35 0 - 45 U/L     Assessment and Plan:   Stage IV Pancreatic cancer-Pt showed progression of disease with last plan with FOLFIRI with liposomal irinotecan and today is to start treatment with carboplatin/ Abraxane- starting with only abraxane first cycle.   We reviewed possible side effects including increased chance of nausea. We reviewed use of antiemetics and provided new prescription for the zofran. She is giving her permission to start this plan today and is hoping to tolerate this well with good attitude.   Pt is eligible to start with cycle 1 today and will continue with day 8 and set up to see provider on day 15 for symptom review with labs per treatment plan.   She will see Dr Soares at start of cycle2.   Hypokalemia- continues with daily use of 20 mEq of potassium daily.   This was a 25 min visit with > 50% in counseling and coordinating care including education and management of concerns.    Nai Simmons CNP        Again, thank you for allowing me to participate in the care of your patient.        Sincerely,        Nia Simmons, NP, APRN CNP

## 2018-08-15 NOTE — PROGRESS NOTES
Port accessed with no incidient. Blood return noted. Labs drawn, tubes labeled and double signed. Port flushed with saline and heparin. Patient tolerated port draw well.   Angie Khan RN

## 2018-08-15 NOTE — PROGRESS NOTES
Infusion Nursing Note:  Talya Gatica presents today for C1 D1 Abraxane.    Patient seen by provider today: Yes: Nai Simmons NP   present during visit today: Not Applicable.    Note: N/A.    Intravenous Access:  Implanted Port.    Treatment Conditions:  Lab Results   Component Value Date    HGB 11.5 08/15/2018     Lab Results   Component Value Date    WBC 4.6 08/15/2018      Lab Results   Component Value Date    ANEU 2.9 08/15/2018     Lab Results   Component Value Date     08/15/2018      Results reviewed, labs MET treatment parameters, ok to proceed with treatment.      Post Infusion Assessment:  Patient tolerated infusion without incident.    Discharge Plan:   Patient will return 9/5/18 for next appointment.   Patient discharged in stable condition accompanied by: .  Departure Mode: Ambulatory with cane.    Nubia Yeh RN

## 2018-08-15 NOTE — MR AVS SNAPSHOT
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)    -  1    Pancreatic adenocarcinoma (H)        Hypokalemia           Follow-ups after your visit        Your next 10 appointments already scheduled     Sep 05, 2018 11:45 AM CDT   [facility]   [facility]    [address] [place] [address]   [phone]            Sep 05, 2018 12:15 PM CDT   Return Visit with LISA Mendieta CNP   [facility] ([facility]    [address] [place] [address]   [phone]            Sep 05, 2018  1:00 PM CDT   Level 3 with BAY 9 INFUSION   [facility])    [address] [place] [address]   [phone]            Sep 26, 2018 12:30 PM CDT   Return Visit with [facility]   [facility]    [address] [place] [address]   [phone]            Sep 26, 2018  1:00 PM CDT   Level 3 with BAY 4 INFUSION   [facility])    [address] [place] [address] [phone]              Who to contact     If you have questions or need follow up information about today's clinic visit or your schedule please contact [facility] directly at [phone].  Normal or non-critical lab and imaging results will be communicated to you by MyChart, letter or phone within 4 business days after the clinic has received the results. If you do not hear from us within 7 days, please  "contact the clinic through [employer] or phone. If you have a critical or abnormal lab result, we will notify you by phone as soon as possible.  Submit refill requests through [employer] or call your pharmacy and they will forward the refill request to us. Please allow 3 business days for your refill to be completed.          Additional Information About Your Visit        Care EveryWhere ID     This is your Care EveryWhere ID. This could be used by other organizations to access your [facility] medical records  [id number]        Your Vitals Were     Pulse Temperature Respirations Height Pulse Oximetry BMI (Body Mass Index)    71 97.9  F (36.6  C) 16 1.511 m (4' 11.49\") 98% 19.27 kg/m2       Blood Pressure from Last 3 Encounters:   08/15/18 130/73   07/30/18 124/62   07/19/18 138/71    Weight from Last 3 Encounters:   08/15/18 44 kg (97 lb)   07/30/18 43.6 kg (96 lb 3 oz)   07/19/18 44.7 kg (98 lb 8 oz)              Today, you had the following     No orders found for display         Today's Medication Changes          These changes are accurate as of 8/15/18  3:20 PM.  If you have any questions, ask your nurse or doctor.               Start taking these medicines.        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), Pancreatic adenocarcinoma (H)   Started by:  Nai Simmons APRN CNP        Dose:  8 mg   Take 1 tablet (8 mg) by mouth every 8 hours as needed (nausea/vomiting)   Quantity:  10 tablet   Refills:  2            Where to get your medicines      These medications were sent to [facility] - [place], [place] - [address], [address]  [address], [address], [facility] [address]     Phone:  [phone]     ondansetron 8 MG tablet                Primary Care Provider Office Phone # Fax #    Pancho Cope [phone] [address] [facility] [address]      "   Equal Access to Services     [facility] : Hadii aad ku hadleonardodante Madelinekirit, waarmandda luqadaha, qaybta kagonzalezcarrie barakat. So [facility] [phone].    ATENCIÓN: Si nikolas daniels, tiene a ornelas disposición servicios gratuitos de asistencia lingüística. Negritaame al [phone].    We comply with applicable federal civil rights laws and [place] laws. We do not discriminate on the basis of race, color, national origin, age, disability, sex, sexual orientation, or gender identity.            Thank you!     Thank you for choosing [facility]  for your care. Our goal is always to provide you with excellent care. Hearing back from our patients is one way we can continue to improve our services. Please take a few minutes to complete the written survey that you may receive in the mail after your visit with us. Thank you!             Your Updated Medication List - Protect others around you: Learn how to safely use, store and throw away your medicines at www.disposemymeds.org.          This list is accurate as of 8/15/18  3:20 PM.  Always use your most recent med list.                   Brand Name Dispense Instructions for use Diagnosis    amylase-lipase-protease 93444-28449 units Cpep per EC capsule    CREON    180 capsule    Take 1 capsule (24,000 Units) by mouth 3 times daily (with meals)    Pancreatic adenocarcinoma (H)       BIOTIN PO           CLARITIN PO      Take by mouth daily        latanoprost 0.005 % ophthalmic solution    XALATAN    3 Bottle    Place 1 drop into both eyes At Bedtime    Glaucoma suspect, bilateral       lidocaine-prilocaine cream    EMLA    30 g    Apply topically as needed for moderate pain (use dollop of cream to saran wrap 30 min prior to use of port)    Pancreatic adenocarcinoma (H)       ondansetron 8 MG tablet    ZOFRAN    10 tablet    Take 1 tablet (8 mg) by mouth every 8 hours as needed (nausea/vomiting)    Malignant neoplasm metastatic to  both lungs (H), Pancreatic adenocarcinoma (H)       Potassium Chloride ER 20 MEQ Tbcr     90 tablet    Take 1 tablet (20 mEq) by mouth daily    Hypokalemia       potassium chloride SA 20 MEQ CR tablet    KLOR-CON    32 tablet    Take 1 tab at 6 pm tonight and 1 tab at 9 pm tonight then start 1 tab daily tomorrow morning.    Hypokalemia, Loose stools       WOMENS 50+ MULTI VITAMIN/MIN PO

## 2018-08-15 NOTE — PROGRESS NOTES
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  Treatment:   11/3/2017 began treatment with Gemzar- days 1,8,15 of 28 day plan x 6 cycles  5/16/2018 to begin treatment with FOLFIRI with onivyde at 70 mg/m2 dose reduction of the 5 FU from start of plan by 15%.  8/15/2018 begin Carboplatin/Abraxane    Interval History: Ms. Sanchez comes to clinic alone today for review prior to start of new plan to treat her pancreatic cancer with carboplatin and Abraxane. Pt states she did get to dentist and has missing cap but no pain.  She also went on trip with daughter and felt that went well but still feels somewhat weak- getting around with cane. Denies pain, nausea and is eating well without bowel or bladder issues- though mentions one loose stool while on vacation. She has had no SOB, chest pain, fevers or illness.   Rest of comprehensive and complete ROS is reviewed and is negative.   Past Medical History:   Diagnosis Date     AR (allergic rhinitis)      Baker's cyst      DJD (degenerative joint disease)      Elevated cholesterol      Glaucoma      Menopause      Vertigo      Current Outpatient Prescriptions   Medication     amylase-lipase-protease (CREON) 27031-19150 units CPEP per EC capsule     BIOTIN PO     latanoprost (XALATAN) 0.005 % ophthalmic solution     Multiple Vitamins-Minerals (WOMENS 50+ MULTI VITAMIN/MIN PO)     Potassium Chloride ER 20 MEQ TBCR     lidocaine-prilocaine (EMLA) cream     Loratadine (CLARITIN PO)     potassium chloride SA (KLOR-CON) 20 MEQ CR tablet     No current facility-administered medications for this visit.      Allergies   Allergen Reactions     Codeine  "Camsylate        Physical Exam: /73  Pulse 71  Temp 97.9  F (36.6  C)  Resp 16  Ht 1.511 m (4' 11.49\")  Wt 44 kg (97 lb)  SpO2 98%  BMI 19.27 kg/m2 - weight stable.   ECOG PS-1-2  Constitutional: Alert, moving slowly using cane -needing one assist for transfer without cane.No sign of pain.  ENT: Eyes bright, No mouth sores. [place].  Neck: Supple, No adenopathy.Thyroid symmetric  Cardiac: Heart rate and rhythm is regular and strong with aortic murmur noted as usual.  Respiratory: Breathing easy. Lung sounds clear to auscultation. No cough  Port with no redness or swelling.  GI: Abdomen is soft, non-tender, BS normal. No masses or organomegaly  MS: Muscle tone fair- uses cane for support, extremities normal with very light edema to ankles  Skin: No suspicious lesions or rashes or bruising-wearing wig with ongoing alopecia.  Neuro: Sensory grossly WNL, gait is steady with cane  Lymph: Normal ant/post cervical, axillary, supraclavicular nodes  Psych: Mentation appears normal and affect normal/bright with good conversation.     Laboratory Results:   Results for orders placed or performed in visit on 08/15/18   CBC with platelets differential   Result Value Ref Range    WBC 4.6 4.0 - 11.0 10e9/L    RBC Count 3.52 (L) 3.8 - 5.2 10e12/L    Hemoglobin 11.5 (L) 11.7 - 15.7 g/dL    Hematocrit 33.9 (L) 35.0 - 47.0 %    MCV 96 78 - 100 fl    MCH 32.7 26.5 - 33.0 pg    MCHC 33.9 31.5 - 36.5 g/dL    RDW 14.8 10.0 - 15.0 %    Platelet Count 220 150 - 450 10e9/L    Diff Method Automated Method     % Neutrophils 63.1 %    % Lymphocytes 23.9 %    % Monocytes 11.7 %    % Eosinophils 0.9 %    % Basophils 0.4 %    Absolute Neutrophil 2.9 1.6 - 8.3 10e9/L    Absolute Lymphocytes 1.1 0.8 - 5.3 10e9/L    Absolute Monocytes 0.5 0.0 - 1.3 10e9/L    Absolute Eosinophils 0.0 0.0 - 0.7 10e9/L    Absolute Basophils 0.0 0.0 - 0.2 10e9/L   Comprehensive metabolic panel   Result Value Ref Range    Sodium 144 133 - 144 mmol/L    Potassium " 4.0 3.4 - 5.3 mmol/L    Chloride 111 (H) 94 - 109 mmol/L    Carbon Dioxide 25 20 - 32 mmol/L    Anion Gap 8 3 - 14 mmol/L    Glucose 99 70 - 99 mg/dL    Urea Nitrogen 8 7 - 30 mg/dL    Creatinine 0.42 (L) 0.52 - 1.04 mg/dL    GFR Estimate >90 >60 mL/min/1.7m2    GFR Estimate If Black >90 >60 mL/min/1.7m2    Calcium 8.6 8.5 - 10.1 mg/dL    Bilirubin Total 0.6 0.2 - 1.3 mg/dL    Albumin 3.3 (L) 3.4 - 5.0 g/dL    Protein Total 6.4 (L) 6.8 - 8.8 g/dL    Alkaline Phosphatase 134 40 - 150 U/L    ALT 36 0 - 50 U/L    AST 35 0 - 45 U/L     Assessment and Plan:   Stage IV Pancreatic cancer-Pt showed progression of disease with last plan with FOLFIRI with liposomal irinotecan and today is to start treatment with carboplatin/ Abraxane- starting with only abraxane first cycle.   We reviewed possible side effects including increased chance of nausea. We reviewed use of antiemetics and provided new prescription for the zofran. She is giving her permission to start this plan today and is hoping to tolerate this well with good attitude.   Pt is eligible to start with cycle 1 today and will continue with day 8 and set up to see provider on day 15 for symptom review with labs per treatment plan.   She will see Dr Soares at start of cycle2.   Hypokalemia- continues with daily use of 20 mEq of potassium daily.   This was a 25 min visit with > 50% in counseling and coordinating care including education and management of concerns.    Nai Simmons,CNP

## 2018-08-15 NOTE — MR AVS SNAPSHOT
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)    -  1    Pancreatic adenocarcinoma (H)           Follow-ups after your visit        Your next 10 appointments already scheduled     Sep 05, 2018 11:45 AM CDT   Return Visit with NURSE ONLY CANCER CENTER   [facility] ([facility]    [address] [place] [address]   [phone]            Sep 05, 2018 12:15 PM CDT   Return Visit with LISA Mendieta CNP   [facility] ([facility])    [address] [address] [address]   [phone]            Sep 05, 2018  1:00 PM CDT   Level 3 with BAY 9 INFUSION   [facility] ([facility])    [address] [place] [address]   [phone]            Sep 26, 2018 12:30 PM CDT   Return Visit with NURSE ONLY CANCER CENTER   [facility] ([facility])    [address] [facility] [address] [phone]            Sep 26, 2018  1:00 PM CDT   Level 3 with BAY 4 INFUSION   [facility] ([facility])    [address] [place] [address] [phone]              Who to contact     If you have questions or need follow up information about today's clinic visit or your schedule please contact [facility] directly at [phone].  Normal or non-critical lab and imaging results will be communicated to you by MyChart, letter or phone within 4 business days after the clinic has received the results. If you do not hear from us within 7 days, please contact the clinic through MyChart or  phone. If you have a critical or abnormal lab result, we will notify you by phone as soon as possible.  Submit refill requests through [employer] or call your pharmacy and they will forward the refill request to us. Please allow 3 business days for your refill to be completed.          Additional Information About Your Visit        Care EveryWhere ID     This is your Care EveryWhere ID. This could be used by other organizations to access your [place] medical records  [id number]         Blood Pressure from Last 3 Encounters:   08/15/18 130/73   07/30/18 124/62   07/19/18 138/71    Weight from Last 3 Encounters:   08/15/18 44 kg (97 lb)   07/30/18 43.6 kg (96 lb 3 oz)   07/19/18 44.7 kg (98 lb 8 oz)              Today, you had the following     No orders found for display         Today's Medication Changes          These changes are accurate as of 8/15/18  3:54 PM.  If you have any questions, ask your nurse or doctor.               Start taking these medicines.        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), Pancreatic adenocarcinoma (H)   Started by:  Nai Simmons APRN CNP        Dose:  8 mg   Take 1 tablet (8 mg) by mouth every 8 hours as needed (nausea/vomiting)   Quantity:  10 tablet   Refills:  2            Where to get your medicines      These medications were sent to [facility] [place], [place] - [address]  [address], [facility] [address]     Phone:  [phone]     ondansetron 8 MG tablet                Primary Care Provider Office Phone # Fax #    Pancho Cope [phone] [address] [facility] [address]        Equal Access to Services     [facility] NIKKI : Joleen camarao Sokirit, waaxda luqadaha, qaybta kaalmada patricio, carrie wheatley. So [facility] [phone].    ATENCIÓN: Si  nikolas daniels, tiene a ornelas disposición servicios gratuitos de asistencia lingüística. Kristen dias [phone].    We comply with applicable federal civil rights laws and [place] laws. We do not discriminate on the basis of race, color, national origin, age, disability, sex, sexual orientation, or gender identity.            Thank you!     Thank you for choosing [facility]  for your care. Our goal is always to provide you with excellent care. Hearing back from our patients is one way we can continue to improve our services. Please take a few minutes to complete the written survey that you may receive in the mail after your visit with us. Thank you!             Your Updated Medication List - Protect others around you: Learn how to safely use, store and throw away your medicines at www.disposemymeds.org.          This list is accurate as of 8/15/18  3:54 PM.  Always use your most recent med list.                   Brand Name Dispense Instructions for use Diagnosis    amylase-lipase-protease 36693-43354 units Cpep per EC capsule    CREON    180 capsule    Take 1 capsule (24,000 Units) by mouth 3 times daily (with meals)    Pancreatic adenocarcinoma (H)       BIOTIN PO           CLARITIN PO      Take by mouth daily        latanoprost 0.005 % ophthalmic solution    XALATAN    3 Bottle    Place 1 drop into both eyes At Bedtime    Glaucoma suspect, bilateral       lidocaine-prilocaine cream    EMLA    30 g    Apply topically as needed for moderate pain (use dollop of cream to saran wrap 30 min prior to use of port)    Pancreatic adenocarcinoma (H)       ondansetron 8 MG tablet    ZOFRAN    10 tablet    Take 1 tablet (8 mg) by mouth every 8 hours as needed (nausea/vomiting)    Malignant neoplasm metastatic to both lungs (H), Pancreatic adenocarcinoma (H)       Potassium Chloride ER 20 MEQ Tbcr     90 tablet    Take 1 tablet (20 mEq) by mouth daily    Hypokalemia       potassium chloride SA 20 MEQ CR tablet     KLOR-CON    32 tablet    Take 1 tab at 6 pm tonight and 1 tab at 9 pm tonight then start 1 tab daily tomorrow morning.    Hypokalemia, Loose stools       WOMENS 50+ MULTI VITAMIN/MIN PO

## 2018-08-16 NOTE — PROGRESS NOTES
This is a recent snapshot of the patient's [facility] medical record.  For current drug dose and complete information and questions, call [phone] or In Basket pool, [facility]v home infusion ([id number])  CSN Number:  [id number]

## 2018-08-22 NOTE — PROGRESS NOTES
Port needle left accessed for treatment. Tolerated lab draw without complaint. Port site scrubbed with Chloraprep for 30 seconds. Accessed using sterile technique. [place] drawn-Red/Green/Purple tubes. Double signed by patient and RN. See documentation flowsheet. Sheree Calhoun, RN, BSN, OCN

## 2018-08-22 NOTE — MR AVS SNAPSHOT
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)    -  1    Pancreatic adenocarcinoma (H)           Follow-ups after your visit        Your next 10 appointments already scheduled     Aug 28, 2018 12:45 PM CDT   Return Visit with NURSE ONLY CANCER CENTER   [facility] ([facility])    [address] [facility] [address]   [phone]            Aug 28, 2018  1:15 PM CDT   Return Visit with LISA Mendieta CNP   [facility] ([facility])    [address] [facility] [address]   [phone]            Aug 30, 2018  2:00 PM CDT   Level 2 with BAY 6 On license of [facility] ([facility])    [address] [facility] [address]   [phone]            Sep 05, 2018 11:45 AM CDT   Return Visit with NURSE ONLY CANCER CENTER   [facility] ([facility])    [address] [facility] [address]   [phone]            Sep 05, 2018 12:15 PM CDT   Return Visit with LISA Mendieta CNP   [facility] ([facility])    [address] [facility] [address]   [phone]            Sep 05, 2018  1:00 PM CDT   Level 2 with Calvin 9 On license of [facility] ([facility])    [address] [facility] [address]   [phone]            Sep 26, 2018 12:30 PM CDT   Return Visit with NURSE ONLY CANCER CENTER   [facility] ([facility])    [address] [facility] [address]   [phone]            Sep 26, 2018  1:00 PM CDT    Level 2 with BAY 4 INFUSION   [facility] ([facility])    [address] [place] [address] [phone]              Who to contact     If you have questions or need follow up information about today's clinic visit or your schedule please contact [facility] directly at [phone].  Normal or non-critical lab and imaging results will be communicated to you by MyChart, letter or phone within 4 business days after the clinic has received the results. If you do not hear from us within 7 days, please contact the clinic through MyChart or phone. If you have a critical or abnormal lab result, we will notify you by phone as soon as possible.  Submit refill requests through [employer] or call your pharmacy and they will forward the refill request to us. Please allow 3 business days for your refill to be completed.          Additional Information About Your Visit        Care EveryWhere ID     This is your Care EveryWhere ID. This could be used by other organizations to access your [place] medical records  [id number]        Your Vitals Were     Pulse Temperature Pulse Oximetry BMI (Body Mass Index)          50 97  F (36.1  C) (Oral) 99% 18.91 kg/m2         Blood Pressure from Last 3 Encounters:   08/22/18 116/62   08/15/18 130/73   07/30/18 124/62    Weight from Last 3 Encounters:   08/22/18 43.2 kg (95 lb 3.2 oz)   08/15/18 44 kg (97 lb)   07/30/18 43.6 kg (96 lb 3 oz)              Today, you had the following     No orders found for display       Primary Care Provider Office Phone # Fax #    Pancho Cope [phone] [address] [facility] [address]        Equal Access to Services     KATHRYN JORDAN AH: Joleen Sun, waarmandda luqadaha, qaybta kaalmada patricio, carrie wheatley. So [facility] [phone].    ATENCIÓN: Si habla español, tiene a ornelas disposición servicios gratuitos de  asistencia lingüística. Kristen al [phone].    We comply with applicable federal civil rights laws and [place] laws. We do not discriminate on the basis of race, color, national origin, age, disability, sex, sexual orientation, or gender identity.            Thank you!     Thank you for choosing [facility]  for your care. Our goal is always to provide you with excellent care. Hearing back from our patients is one way we can continue to improve our services. Please take a few minutes to complete the written survey that you may receive in the mail after your visit with us. Thank you!             Your Updated Medication List - Protect others around you: Learn how to safely use, store and throw away your medicines at www.disposemymeds.org.          This list is accurate as of 8/22/18 10:20 AM.  Always use your most recent med list.                   Brand Name Dispense Instructions for use Diagnosis    amylase-lipase-protease 87659-[address] units Cpep per EC capsule    CREON    180 capsule    Take 1 capsule (24,000 Units) by mouth 3 times daily (with meals)    Pancreatic adenocarcinoma (H)       BIOTIN PO           CLARITIN PO      Take by mouth daily        latanoprost 0.005 % ophthalmic solution    XALATAN    3 Bottle    Place 1 drop into both eyes At Bedtime    Glaucoma suspect, bilateral       lidocaine-prilocaine cream    EMLA    30 g    Apply topically as needed for moderate pain (use dollop of cream to saran wrap 30 min prior to use of port)    Pancreatic adenocarcinoma (H)       ondansetron 8 MG tablet    ZOFRAN    10 tablet    Take 1 tablet (8 mg) by mouth every 8 hours as needed (nausea/vomiting)    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    Hypokalemia, Loose stools       WOMENS 50+ MULTI VITAMIN/MIN PO

## 2018-08-22 NOTE — MR AVS SNAPSHOT
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)    -  1    Pancreatic adenocarcinoma (H)           Follow-ups after your visit        Your next 10 appointments already scheduled     Aug 28, 2018 12:45 PM CDT   Return Visit with NURSE ONLY CANCER CENTER   [facility]    [address] [facility] [address]   [phone]            Aug 28, 2018  1:15 PM CDT   Return Visit with LISA Mendieta CNP   [facility]    [address] [facility] [address]   [phone]            Aug 30, 2018  2:00 PM CDT   Level 2 with BAY 6 [facility] ([facility])    [address] [facility] [address]   [phone]            Sep 05, 2018 11:45 AM CDT   Return Visit with NURSE ONLY CANCER CENTER   [facility]    [address] [facility] [address]   [phone]            Sep 05, 2018 12:15 PM CDT   Return Visit with LISA Mendieta CNP   [facility]    [address] [facility] [address]   [phone]            Sep 05, 2018  1:00 PM CDT   Level 2 with BAY 9 INFUSION   [facility]    [address] [facility] [address]   [phone]            Sep 26, 2018 12:30 PM CDT   Return Visit with NURSE ONLY CANCER CENTER   [facility])    [address] [facility] [address]   [phone]            Sep 26, 2018   1:00 PM CDT   Level 2 with [address] ([facility])    [address] [place] [address] [phone]              Who to contact     If you have questions or need follow up information about today's clinic visit or your schedule please contact [facility] directly at [phone].  Normal or non-critical lab and imaging results will be communicated to you by MyChart, letter or phone within 4 business days after the clinic has received the results. If you do not hear from us within 7 days, please contact the clinic through MyChart or phone. If you have a critical or abnormal lab result, we will notify you by phone as soon as possible.  Submit refill requests through [employer] or call your pharmacy and they will forward the refill request to us. Please allow 3 business days for your refill to be completed.          Additional Information About Your Visit        Care EveryWhere ID     This is your Care EveryWhere ID. This could be used by other organizations to access your [place] medical records  [id number]         Blood Pressure from Last 3 Encounters:   08/15/18 130/73   07/30/18 124/62   07/19/18 138/71    Weight from Last 3 Encounters:   08/15/18 44 kg (97 lb)   07/30/18 43.6 kg (96 lb 3 oz)   07/19/18 44.7 kg (98 lb 8 oz)              We Performed the Following     CBC with platelets differential        Primary Care Provider Office Phone # Fax #    Pancho Cope [phone] [address] [facility] [address]        Equal Access to Services     [facility] Center[facility]INDY : Hadii aad ku hadasho Soomaali, waaxda luqadaha, qaybta kaalmada carrie curry . So [facility] [phone].    ATENCIÓN: Si habla español, tiene a ornelas disposición servicios gratuitos de asistencia lingüística. Llame al [phone].    We comply with applicable federal civil rights laws and [place] laws. We  do not discriminate on the basis of race, color, national origin, age, disability, sex, sexual orientation, or gender identity.            Thank you!     Thank you for choosing [facility]  for your care. Our goal is always to provide you with excellent care. Hearing back from our patients is one way we can continue to improve our services. Please take a few minutes to complete the written survey that you may receive in the mail after your visit with us. Thank you!             Your Updated Medication List - Protect others around you: Learn how to safely use, store and throw away your medicines at www.disposemymeds.org.          This list is accurate as of 8/22/18  9:49 AM.  Always use your most recent med list.                   Brand Name Dispense Instructions for use Diagnosis    amylase-lipase-protease 99588-69[address] units Cpep per EC capsule    CREON    180 capsule    Take 1 capsule (24,000 Units) by mouth 3 times daily (with meals)    Pancreatic adenocarcinoma (H)       BIOTIN PO           CLARITIN PO      Take by mouth daily        latanoprost 0.005 % ophthalmic solution    XALATAN    3 Bottle    Place 1 drop into both eyes At Bedtime    Glaucoma suspect, bilateral       lidocaine-prilocaine cream    EMLA    30 g    Apply topically as needed for moderate pain (use dollop of cream to saran wrap 30 min prior to use of port)    Pancreatic adenocarcinoma (H)       ondansetron 8 MG tablet    ZOFRAN    10 tablet    Take 1 tablet (8 mg) by mouth every 8 hours as needed (nausea/vomiting)    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    Hypokalemia, Loose stools       WOMENS 50+ MULTI VITAMIN/MIN  PO

## 2018-08-22 NOTE — PROGRESS NOTES
Infusion Nursing Note:  Talya Gatica presents today for C1D8 Abraxane.    Patient seen by provider today: No   present during visit today: Not Applicable.    Note: Decadron stop time 10:45 am.    Intravenous Access:  Implanted Port.    Treatment Conditions:  Lab Results   Component Value Date    HGB 11.1 08/22/2018     Lab Results   Component Value Date    WBC 3.6 08/22/2018      Lab Results   Component Value Date    ANEU 1.8 08/22/2018     Lab Results   Component Value Date     08/22/2018     Results reviewed, labs MET treatment parameters, ok to proceed with treatment.    Post Infusion Assessment:  Patient tolerated infusion without incident.  Blood return noted pre and post infusion.  Site patent and intact, free from redness, edema or discomfort.  Access discontinued per protocol.    Discharge Plan:   Copy of AVS reviewed with patient and/or family.  Patient will return 8/28/18 for next appointment.  Patient discharged in stable condition accompanied by: self and transportation.  Departure Mode: Ambulatory.    Angie Gordon RN

## 2018-08-28 NOTE — MR AVS SNAPSHOT
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)    -  1    Pancreatic adenocarcinoma (H)           Follow-ups after your visit        Your next 10 appointments already scheduled     Aug 28, 2018  1:15 PM CDT   Return Visit with LISA Mendieta CNP   [facility] ([facility])    [address] [facility] [address]   [phone]            Aug 28, 2018  1:45 PM CDT   Level 2 with BAY 7 INFUSION   [facility] ([facility])    [address] [facility] [address]   [phone]            Sep 05, 2018 11:45 AM CDT   Return Visit with NURSE ONLY CANCER CENTER   [facility] ([facility])    [address] [facility] [address]   [phone]            Sep 05, 2018 12:15 PM CDT   Return Visit with LISA Mendieta CNP   [facility] ([facility])    [address] [facility] [address]   [phone]            Sep 05, 2018  1:00 PM CDT   Level 2 with BAY 9 INFUSION   [facility] ([facility])    [address] [facility] [address]   [phone]            Sep 26, 2018 12:30 PM CDT   Return Visit with NURSE ONLY CANCER CENTER   [facility] ([facility])    [address] [facility] [address]   [phone]            Sep 26, 2018  1:00 PM CDT   Level 2 with BAY 4 INFUSION   [facility] ([facility])    [address] [facility] [address] [phone]              Who to contact     If you  have questions or need follow up information about today's clinic visit or your schedule please contact [facility] directly at [phone].  Normal or non-critical lab and imaging results will be communicated to you by MyChart, letter or phone within 4 business days after the clinic has received the results. If you do not hear from us within 7 days, please contact the clinic through MyChart or phone. If you have a critical or abnormal lab result, we will notify you by phone as soon as possible.  Submit refill requests through [employer] or call your pharmacy and they will forward the refill request to us. Please allow 3 business days for your refill to be completed.          Additional Information About Your Visit        Care EveryWhere ID     This is your Care EveryWhere ID. This could be used by other organizations to access your [place] medical records  [id number]         Blood Pressure from Last 3 Encounters:   08/22/18 116/62   08/15/18 130/73   07/30/18 124/62    Weight from Last 3 Encounters:   08/22/18 43.2 kg (95 lb 3.2 oz)   08/15/18 44 kg (97 lb)   07/30/18 43.6 kg (96 lb 3 oz)              We Performed the Following     CBC with platelets differential        Primary Care Provider Office Phone # Fax #    Pancho Cope [phone] [address] [facility] [address]        Equal Access to Services     JOCELINE JORDAN : Hadii aad ku hadasho Soomaali, waaxda luqadaha, qaybta kaalmada patricio, carrie kaur . So [facility] [phone].    ATENCIÓN: Si habla español, tiene a ornelas disposición servicios gratuitos de asistencia lingüística. Kristen al [phone].    We comply with applicable federal civil rights laws and [place] laws. We do not discriminate on the basis of race, color, national origin, age, disability, sex, sexual orientation, or gender identity.            Thank you!     Thank you for choosing [facility]   for your care. Our goal is always to provide you with excellent care. Hearing back from our patients is one way we can continue to improve our services. Please take a few minutes to complete the written survey that you may receive in the mail after your visit with us. Thank you!             Your Updated Medication List - Protect others around you: Learn how to safely use, store and throw away your medicines at www.disposemymeds.org.          This list is accurate as of 8/28/18  1:01 PM.  Always use your most recent med list.                   Brand Name Dispense Instructions for use Diagnosis    amylase-lipase-protease 02933-50098 units Cpep per EC capsule    CREON    180 capsule    Take 1 capsule (24,000 Units) by mouth 3 times daily (with meals)    Pancreatic adenocarcinoma (H)       BIOTIN PO           CLARITIN PO      Take by mouth daily        latanoprost 0.005 % ophthalmic solution    XALATAN    3 Bottle    Place 1 drop into both eyes At Bedtime    Glaucoma suspect, bilateral       lidocaine-prilocaine cream    EMLA    30 g    Apply topically as needed for moderate pain (use dollop of cream to saran wrap 30 min prior to use of port)    Pancreatic adenocarcinoma (H)       ondansetron 8 MG tablet    ZOFRAN    10 tablet    Take 1 tablet (8 mg) by mouth every 8 hours as needed (nausea/vomiting)    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    Hypokalemia, Loose stools       WOMENS 50+ MULTI VITAMIN/MIN PO

## 2018-08-28 NOTE — MR AVS SNAPSHOT
After Visit Summary   8/28/2018    Talya Gatica    MRN: [id number]           Patient Information     Date Of Birth          1940        Visit Information        Provider Department      8/28/2018 1:15 PM Nai Simmons APRN CNP [facility]        Today's Diagnoses     Pancreatic adenocarcinoma (H)    -  1    Loose stools        Hypokalemia        Loss of weight           Follow-ups after your visit        Your next 10 appointments already scheduled     Sep 13, 2018  2:30 PM CDT   Return Visit with NURSE ONLY CANCER CENTER   [facility] ([facility])    [address] [facility] [address]   [phone]            Sep 13, 2018  3:00 PM CDT   Level 2 with BAY 5 INFUSION   [facility])    [address] [facility] [address]   [phone]            Sep 20, 2018 11:00 AM CDT   Return Visit with NURSE ONLY CANCER CENTER   [facility] ([facility])    [address] [facility] [address]   [phone]            Sep 20, 2018 11:30 AM CDT   Level 2 with BAY 9 INFUSION   [facility])    [address] [facility] [address]   [phone]            Sep 27, 2018  8:45 AM CDT   Return Visit with NURSE ONLY CANCER CENTER   [facility] ([facility])    [address] [facility] [address]   [phone]            Sep 27, 2018  9:15 AM CDT   Return Visit with LISA Mendieta CNP   [facility] ([facility])    [address] [facility] [address]   [phone]            Sep 28, 2018  9:30 AM CDT   Level 2 with BAY 7 INFUSION   [facility])    [address] [facility] [address]   [phone]            Oct 05, 2018  1:30 PM  "CDT   Return Visit with NURSE ONLY CANCER CENTER   [facility] ([facility])    [address] [facility] [address] [phone]            Oct 05, 2018  2:00 PM CDT   Level 2 with BAY 7 INFUSION   [facility] ([facility])    [address] [facility] [address] [phone]            Oct 11, 2018 10:30 AM CDT   Level 2 with BAY 3 INFUSION   [facility] ([facility])    [address] [place] [address] [phone]              Who to contact     If you have questions or need follow up information about today's clinic visit or your schedule please contact [facility] directly at [phone].  Normal or non-critical lab and imaging results will be communicated to you by MyChart, letter or phone within 4 business days after the clinic has received the results. If you do not hear from us within 7 days, please contact the clinic through MyChart or phone. If you have a critical or abnormal lab result, we will notify you by phone as soon as possible.  Submit refill requests through [employer] or call your pharmacy and they will forward the refill request to us. Please allow 3 business days for your refill to be completed.          Additional Information About Your Visit        Care EveryWhere ID     This is your Care EveryWhere ID. This could be used by other organizations to access your [place] medical records  [id number]        Your Vitals Were     Pulse Temperature Respirations Height Pulse Oximetry BMI (Body Mass Index)    71 98.4  F (36.9  C) 18 1.511 m (4' 11.49\") 97% 18.72 kg/m2       Blood Pressure from Last 3 Encounters:   09/05/18 149/77   08/28/18 115/58   08/22/18 116/62    Weight from Last 3 Encounters:   09/05/18 43.5 kg (96 lb)   08/28/18 42.8 kg (94 lb 4 oz)   08/22/18 43.2 kg (95 lb 3.2 oz)              Today, you had the following     No orders " found for display       Primary Care Provider Office Phone # Fax #    Pancho Cope [phone] [address] [facility] [address]        Equal Access to Services     JOCELINE JORDAN : Hadii feliciano ku hadleonardoo Solenoraali, waaxda luqadaha, qaybta kaalmada adegladis, carrie pickens laPennyanu wheatley. So [facility] [phone].    ATENCIÓN: Si habla español, tiene a ornelas disposición servicios gratuitos de asistencia lingüística. Llame al [phone].    We comply with applicable federal civil rights laws and [place] laws. We do not discriminate on the basis of race, color, national origin, age, disability, sex, sexual orientation, or gender identity.            Thank you!     Thank you for choosing [facility]  for your care. Our goal is always to provide you with excellent care. Hearing back from our patients is one way we can continue to improve our services. Please take a few minutes to complete the written survey that you may receive in the mail after your visit with us. Thank you!             Your Updated Medication List - Protect others around you: Learn how to safely use, store and throw away your medicines at www.disposemymeds.org.          This list is accurate as of 8/28/18 11:59 PM.  Always use your most recent med list.                   Brand Name Dispense Instructions for use Diagnosis    amylase-lipase-protease [id number] units Cpep per EC capsule    CREON    180 capsule    Take 1 capsule (24,000 Units) by mouth 3 times daily (with meals)    Pancreatic adenocarcinoma (H)       BIOTIN PO           CLARITIN PO      Take by mouth daily        latanoprost 0.005 % ophthalmic solution    XALATAN    3 Bottle    Place 1 drop into both eyes At Bedtime    Glaucoma suspect, bilateral       lidocaine-prilocaine cream    EMLA    30 g    Apply topically as needed for moderate pain (use dollop of cream to saran wrap 30 min prior to use of port)    Pancreatic  adenocarcinoma (H)       ondansetron 8 MG tablet    ZOFRAN    10 tablet    Take 1 tablet (8 mg) by mouth every 8 hours as needed (nausea/vomiting)    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    Hypokalemia, Loose stools       WOMENS 50+ MULTI VITAMIN/MIN PO

## 2018-08-28 NOTE — PROGRESS NOTES
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  Treatment:   11/3/2017 began treatment with Gemzar- days 1,8,15 of 28 day plan x 6 cycles  5/16/2018 to begin treatment with FOLFIRI with onivyde at 70 mg/m2 dose reduction of the 5 FU from start of plan by 15%.  8/15/2018 begin Carboplatin/Abraxane- begin cycle 1 with abraxane alone at 100 mg/m2    Interval History: Ms. Sanchez comes to clinic alone today for review prior to cycle 1 day 15 of Abraxane at 100 mg/m .  Patient states she has had for significant times of diarrhea and has needed to use Imodium but this also decrease her intake including food and fluids and states this is challenging but feels the diarrhea is actually improving with the new plan.  She continues to use Creon with larger meals.  Admits she is eating 2 meals a day.  She denies any pain nausea shortness of breath fevers or neuropathies.  She was warned about nausea with this program but has had no issues with nausea and actually felt the Zofran which she was given was upsetting her stomach and did not take after day 2.  Rest of comprehensive and complete ROS is reviewed and is negative.   Past Medical History:   Diagnosis Date     AR (allergic rhinitis)      Baker's cyst      DJD (degenerative joint disease)      Elevated cholesterol      Glaucoma      Menopause      Vertigo      Current Outpatient Prescriptions   Medication     amylase-lipase-protease (CREON) 09473-83281 units CPEP per EC capsule     BIOTIN PO     latanoprost (XALATAN) 0.005 % ophthalmic solution     Loratadine (CLARITIN PO)     Multiple Vitamins-Minerals (WOMENS 50+ MULTI VITAMIN/MIN PO)      "ondansetron (ZOFRAN) 8 MG tablet     Potassium Chloride ER 20 MEQ TBCR     lidocaine-prilocaine (EMLA) cream     potassium chloride SA (KLOR-CON) 20 MEQ CR tablet     No current facility-administered medications for this visit.      Allergies   Allergen Reactions     Codeine Camsylate        Physical Exam: /58  Pulse 71  Temp 98.4  F (36.9  C)  Resp 18  Ht 1.511 m (4' 11.49\")  Wt 42.8 kg (94 lb 4 oz)  SpO2 97%  BMI 18.72 kg/m2 - weight slowly decreasing with each visit.  ECOG PS-1-2  Constitutional: Alert, moving slowly using cane -needing one assist for transfer without cane.  In no distress.  ENT: Eyes bright, No mouth sores. [place].  Neck: Supple, No adenopathy.Thyroid symmetric  Cardiac: Heart rate and rhythm is regular and strong with aortic murmur noted as usual.  Respiratory: Breathing easy. Lung sounds clear to auscultation. No cough  Port with no redness or swelling.  GI: Abdomen is soft, non-tender, BS very active at this time and patient states she did take an Imodium this morning. No masses or organomegaly  MS: Muscle tone fair- uses cane for support, extremities normal with minimal edema  Skin: No suspicious lesions or rashes or bruising-wearing wig with ongoing alopecia.  Neuro: Sensory grossly WNL, gait is steady with cane  Lymph: Normal ant/post cervical, axillary, supraclavicular nodes  Psych: Mentation appears normal and affect normal/bright with good conversation.     Laboratory Results:   Results for orders placed or performed in visit on 08/28/18   CBC with platelets differential   Result Value Ref Range    WBC 1.6 (L) 4.0 - 11.0 10e9/L    RBC Count 3.23 (L) 3.8 - 5.2 10e12/L    Hemoglobin 10.3 (L) 11.7 - 15.7 g/dL    Hematocrit 30.3 (L) 35.0 - 47.0 %    MCV 94 78 - 100 fl    MCH 31.9 26.5 - 33.0 pg    MCHC 34.0 31.5 - 36.5 g/dL    RDW 13.8 10.0 - 15.0 %    Platelet Count 220 150 - 450 10e9/L    % Neutrophils 37.0 %    % Lymphocytes 57.0 %    % Monocytes 3.0 %    % Eosinophils 1.0 %    " % Basophils 2.0 %    Absolute Neutrophil 0.6 (L) 1.6 - 8.3 10e9/L    Absolute Lymphocytes 1.0 0.8 - 5.3 10e9/L    Absolute Monocytes 0.0 0.0 - 1.3 10e9/L    Absolute Eosinophils 0.0 0.0 - 0.7 10e9/L    Absolute Basophils 0.0 0.0 - 0.2 10e9/L    RBC Morphology Normal     Platelet Estimate       Automated count confirmed.  Platelet morphology is normal.    Diff Method Manual Differential      Assessment and Plan:   Stage IV Pancreatic cancer-Pt began treatment with Abraxane only 800 mg 0.8 square with hope to start carboplatin at an AUC of 6 with cycle 2.  Patient tolerated cycle 1 day 1 day 8 without problems but today on day 15 of cycle 1 is seeing an ANC of 0.6 after review with pharmacy chose to cancel day 15.  Patient is concerned that this is not working on her cancer but asked her to give it more time patient is to return next week to start cycle 2 and again planned for addition of Carboplatin at an AUC of 6 with her Abraxane  She will return next week we will make decisions as to the dosing and timing of the addition of carboplatin to the Abraxane.  Asked patient to enjoy this week where she should be feeling well and increase her diet and exercise while not on chemotherapy  Intermittent loose stools-may be related to her pancreatic cancer and the use or absence of use of the Creon versus residual from previous chemotherapy.  Patient has Imodium to use as needed.  Encouraged her to use Imodium at the first sign of abdominal cramping to reduce nutrient and weight loss with diarrhea. Patient was also given a sample of Ensure clear since she is not able to tolerate boost or Ensure that she is tried in the past.  Hypokalemia- continues with daily use of 20 mEq of potassium daily.   This was a 25 min visit with > 50% in counseling and coordinating care including education and management of concerns.    Nai Simmons,CNP

## 2018-08-28 NOTE — LETTER
8/28/2018         RE: Talya Gatica  [address]   [place] [address]        Dear Colleague,    Thank you for referring your patient, Talya Gatica, to the [facility]. Please see a copy of my visit note below.    Oncology Follow Up Visit: August 15, 2018     Oncologist: Dr Jim Soares  PCP: Pancho Cope    Diagnosis: Stage IV Pancreatic cancer  Talya Gatica is a 79 yo who c/o jaundice in 7/2017 was found to have  adenocarcinoma of the head of pancreas causing biliary obstruction and several pulmonary nodules consistent with metastasis- initially told days to 3 months of life.  Treatment:   11/3/2017 began treatment with Gemzar- days 1,8,15 of 28 day plan x 6 cycles  5/16/2018 to begin treatment with FOLFIRI with onivyde at 70 mg/m2 dose reduction of the 5 FU from start of plan by 15%.  8/15/2018 begin Carboplatin/Abraxane- begin cycle 1 with abraxane alone at 100 mg/m2    Interval History: Ms. Sanchez comes to clinic alone today for review prior to cycle 1 day 15 of Abraxane at 100 mg/m .  Patient states she has had for significant times of diarrhea and has needed to use Imodium but this also decrease her intake including food and fluids and states this is challenging but feels the diarrhea is actually improving with the new plan.  She continues to use Creon with larger meals.  Admits she is eating 2 meals a day.  She denies any pain nausea shortness of breath fevers or neuropathies.  She was warned about nausea with this program but has had no issues with nausea and actually felt the Zofran which she was given was upsetting her stomach and did not take after day 2.  Rest of comprehensive and complete ROS is reviewed and is negative.   Past Medical History:   Diagnosis Date     AR (allergic rhinitis)      Baker's cyst      DJD (degenerative joint disease)      Elevated cholesterol      Glaucoma      Menopause      Vertigo      Current Outpatient Prescriptions  "  Medication     amylase-lipase-protease (CREON) 47221-84733 units CPEP per EC capsule     BIOTIN PO     latanoprost (XALATAN) 0.005 % ophthalmic solution     Loratadine (CLARITIN PO)     Multiple Vitamins-Minerals (WOMENS 50+ MULTI VITAMIN/MIN PO)     ondansetron (ZOFRAN) 8 MG tablet     Potassium Chloride ER 20 MEQ TBCR     lidocaine-prilocaine (EMLA) cream     potassium chloride SA (KLOR-CON) 20 MEQ CR tablet     No current facility-administered medications for this visit.      Allergies   Allergen Reactions     Codeine Camsylate        Physical Exam: /58  Pulse 71  Temp 98.4  F (36.9  C)  Resp 18  Ht 1.511 m (4' 11.49\")  Wt 42.8 kg (94 lb 4 oz)  SpO2 97%  BMI 18.72 kg/m2 - weight slowly decreasing with each visit.  ECOG PS-1-2  Constitutional: Alert, moving slowly using cane -needing one assist for transfer without cane.  In no distress.  ENT: Eyes bright, No mouth sores. Pauma.  Neck: Supple, No adenopathy.Thyroid symmetric  Cardiac: Heart rate and rhythm is regular and strong with aortic murmur noted as usual.  Respiratory: Breathing easy. Lung sounds clear to auscultation. No cough  Port with no redness or swelling.  GI: Abdomen is soft, non-tender, BS very active at this time and patient states she did take an Imodium this morning. No masses or organomegaly  MS: Muscle tone fair- uses cane for support, extremities normal with minimal edema  Skin: No suspicious lesions or rashes or bruising-wearing wig with ongoing alopecia.  Neuro: Sensory grossly WNL, gait is steady with cane  Lymph: Normal ant/post cervical, axillary, supraclavicular nodes  Psych: Mentation appears normal and affect normal/bright with good conversation.     Laboratory Results:   Results for orders placed or performed in visit on 08/28/18   CBC with platelets differential   Result Value Ref Range    WBC 1.6 (L) 4.0 - 11.0 10e9/L    RBC Count 3.23 (L) 3.8 - 5.2 10e12/L    Hemoglobin 10.3 (L) 11.7 - 15.7 g/dL    Hematocrit 30.3 (L) " 35.0 - 47.0 %    MCV 94 78 - 100 fl    MCH 31.9 26.5 - 33.0 pg    MCHC 34.0 31.5 - 36.5 g/dL    RDW 13.8 10.0 - 15.0 %    Platelet Count 220 150 - 450 10e9/L    % Neutrophils 37.0 %    % Lymphocytes 57.0 %    % Monocytes 3.0 %    % Eosinophils 1.0 %    % Basophils 2.0 %    Absolute Neutrophil 0.6 (L) 1.6 - 8.3 10e9/L    Absolute Lymphocytes 1.0 0.8 - 5.3 10e9/L    Absolute Monocytes 0.0 0.0 - 1.3 10e9/L    Absolute Eosinophils 0.0 0.0 - 0.7 10e9/L    Absolute Basophils 0.0 0.0 - 0.2 10e9/L    RBC Morphology Normal     Platelet Estimate       Automated count confirmed.  Platelet morphology is normal.    Diff Method Manual Differential      Assessment and Plan:   Stage IV Pancreatic cancer-Pt began treatment with Abraxane only 800 mg 0.8 square with hope to start carboplatin at an AUC of 6 with cycle 2.  Patient tolerated cycle 1 day 1 day 8 without problems but today on day 15 of cycle 1 is seeing an ANC of 0.6 after review with pharmacy chose to cancel day 15.  Patient is concerned that this is not working on her cancer but asked her to give it more time patient is to return next week to start cycle 2 and again planned for addition of Carboplatin at an AUC of 6 with her Abraxane  She will return next week we will make decisions as to the dosing and timing of the addition of carboplatin to the Abraxane.  Asked patient to enjoy this week where she should be feeling well and increase her diet and exercise while not on chemotherapy  Intermittent loose stools-may be related to her pancreatic cancer and the use or absence of use of the Creon versus residual from previous chemotherapy.  Patient has Imodium to use as needed.  Encouraged her to use Imodium at the first sign of abdominal cramping to reduce nutrient and weight loss with diarrhea. Patient was also given a sample of Ensure clear since she is not able to tolerate boost or Ensure that she is tried in the past.  Hypokalemia- continues with daily use of 20 mEq of  potassium daily.   This was a 25 min visit with > 50% in counseling and coordinating care including education and management of concerns.    Nai Simmons CNP        Again, thank you for allowing me to participate in the care of your patient.        Sincerely,        Nai Simmons, NP, APRN CNP

## 2018-08-28 NOTE — PROGRESS NOTES
Port accessed using 20 G 3/4 inch needle.  Labs collected from port.  Line flushed with NS and Heparin.  Pt tolerated procedure.  Port left accessed for infusion.    Adry Kerr RN-BSN, PHN, OCN

## 2018-08-28 NOTE — NURSING NOTE
"Oncology Rooming Note    August 28, 2018 1:26 PM   Talya Gatica is a 78 year old female who presents for:    Chief Complaint   Patient presents with     Oncology Clinic Visit     prior to treatment      Initial Vitals: /58  Pulse 71  Temp 98.4  F (36.9  C)  Resp 18  Ht 1.511 m (4' 11.49\")  Wt 42.8 kg (94 lb 4 oz)  SpO2 97%  BMI 18.72 kg/m2 Estimated body mass index is 18.72 kg/(m^2) as calculated from the following:    Height as of this encounter: 1.511 m (4' 11.49\").    Weight as of this encounter: 42.8 kg (94 lb 4 oz). Body surface area is 1.34 meters squared.  No Pain (0) Comment: Data Unavailable   No LMP recorded. Patient is postmenopausal.  Allergies reviewed: Yes  Medications reviewed: Yes    Medications: Medication refills not needed today.  Pharmacy name entered into [employer] [place]: [facility] - [place], [place] - [address] AT [facility] [address]         5 minutes for nursing intake (face to face time)     Sade Jeffrey LPN              "

## 2018-09-05 NOTE — PROGRESS NOTES
Infusion Nursing Note:  Talya Gatica presents today for Abraxane/Carboplatin.   Patient seen by provider today: Yes: Nai Siddiqui,RICHAR   present during visit today: Not Applicable.    Note: Pt states she is doing well today, states she had her last treatment held due neutropenia, pt has a total of 500 ml hydration bag ordered today with treatment.    Intravenous Access:  Implanted Port.    Treatment Conditions:  Lab Results   Component Value Date    HGB 10.4 09/05/2018     Lab Results   Component Value Date    WBC 4.4 09/05/2018      Lab Results   Component Value Date    ANEU 2.6 09/05/2018     Lab Results   Component Value Date     09/05/2018      Lab Results   Component Value Date     09/05/2018                   Lab Results   Component Value Date    POTASSIUM 3.6 09/05/2018           Lab Results   Component Value Date    MAG 2.3 07/19/2018            Lab Results   Component Value Date    CR 0.49 09/05/2018                   Lab Results   Component Value Date    BERNARDO 8.6 09/05/2018                Lab Results   Component Value Date    BILITOTAL 0.5 09/05/2018           Lab Results   Component Value Date    ALBUMIN 3.2 09/05/2018                    Lab Results   Component Value Date    ALT 32 09/05/2018           Lab Results   Component Value Date    AST 29 09/05/2018       Results reviewed, labs MET treatment parameters, ok to proceed with treatment.      Post Infusion Assessment:  Patient tolerated infusion without incident.  Blood return noted pre and post infusion.  Site patent and intact, free from redness, edema or discomfort.  No evidence of extravasations.  Access discontinued per protocol.    Discharge Plan:   Return C2,D8 on 09/12/18 and C2, D15 on 09/26/18 for Abraxane infusion.  Discharge instructions reviewed with: Patient.  Patient and/or family verbalized understanding of discharge instructions and all questions answered.  Patient discharged in stable condition  accompanied by: self.  Departure Mode: Ambulatory.    Conchis Hill RN

## 2018-09-05 NOTE — MR AVS SNAPSHOT
After Visit Summary   9/5/2018    Talya Gatica    MRN: [id number]           Patient Information     Date Of Birth          1940        Visit Information        Provider Department      9/5/2018 12:15 PM Nai Simmons APRN CNP [facility]        Today's Diagnoses     Pancreatic adenocarcinoma (H)    -  1    Loss of weight        Loose stools        Hypokalemia           Follow-ups after your visit        Your next 10 appointments already scheduled     Sep 13, 2018  2:30 PM CDT   [facility] Visit with [facility]   [facility]    [address] [facility] [address]   [phone]            Sep 13, 2018  3:00 PM CDT   Level 2 with BAY 5 INFUSION   [facility]    [address] [facility] [address] [phone]            Sep 20, 2018 11:00 AM CDT   Return Visit with [facility]   [facility]    [address] [facility] [address]   [phone]            Sep 20, 2018 11:30 AM CDT   Level 2 with BAY 9 INFUSION   [facility]    [address] [facility] [address] [phone]            Sep 26, 2018 12:30 PM CDT   Return Visit with [facility]   [facility]    [address] [facility] [address]   [phone]            Sep 26, 2018  1:00 PM CDT   Level 2 with BAY 4 INFUSION   [facility])    [address] [facility] [address] [phone]              Who to contact     If you have questions or need follow up information about today's clinic visit or your schedule please contact [facility] directly at [phone].  Normal or non-critical lab and imaging results  "will be communicated to you by MyChart, letter or phone within 4 business days after the clinic has received the results. If you do not hear from us within 7 days, please contact the clinic through MyChart or phone. If you have a critical or abnormal lab result, we will notify you by phone as soon as possible.  Submit refill requests through [employer] or call your pharmacy and they will forward the refill request to us. Please allow 3 business days for your refill to be completed.          Additional Information About Your Visit        Care EveryWhere ID     This is your Care EveryWhere ID. This could be used by other organizations to access your [place] medical records  [id number]        Your Vitals Were     Pulse Temperature Respirations Height Pulse Oximetry BMI (Body Mass Index)    73 98.7  F (37.1  C) 16 1.511 m (4' 11.49\") 98% 19.07 kg/m2       Blood Pressure from Last 3 Encounters:   09/05/18 149/77   08/28/18 115/58   08/22/18 116/62    Weight from Last 3 Encounters:   09/05/18 43.5 kg (96 lb)   08/28/18 42.8 kg (94 lb 4 oz)   08/22/18 43.2 kg (95 lb 3.2 oz)              Today, you had the following     No orders found for display       Primary Care Provider Office Phone # Fax #    Pancho Cope [phone] [address] [facility] [address]        Equal Access to Services     [facility] : Hadii aad ku hadasho Soomaali, waaxda luqadaha, qaybta kaalmada adeegyada, carrie kaur . So [facility] [phone].    ATENCIÓN: Si habla español, tiene a ornelas disposición servicios gratuitos de asistencia lingüística. Llame al [phone].    We comply with applicable federal civil rights laws and [place] laws. We do not discriminate on the basis of race, color, national origin, age, disability, sex, sexual orientation, or gender identity.            Thank you!     Thank you for choosing [facility]  for your care. Our goal is always to " provide you with excellent care. Hearing back from our patients is one way we can continue to improve our services. Please take a few minutes to complete the written survey that you may receive in the mail after your visit with us. Thank you!             Your Updated Medication List - Protect others around you: Learn how to safely use, store and throw away your medicines at www.disposemymeds.org.          This list is accurate as of 9/5/18  3:49 PM.  Always use your most recent med list.                   Brand Name Dispense Instructions for use Diagnosis    amylase-lipase-protease 07467-30591 units Cpep per EC capsule    CREON    180 capsule    Take 1 capsule (24,000 Units) by mouth 3 times daily (with meals)    Pancreatic adenocarcinoma (H)       BIOTIN PO           CLARITIN PO      Take by mouth daily        latanoprost 0.005 % ophthalmic solution    XALATAN    3 Bottle    Place 1 drop into both eyes At Bedtime    Glaucoma suspect, bilateral       lidocaine-prilocaine cream    EMLA    30 g    Apply topically as needed for moderate pain (use dollop of cream to saran wrap 30 min prior to use of port)    Pancreatic adenocarcinoma (H)       ondansetron 8 MG tablet    ZOFRAN    10 tablet    Take 1 tablet (8 mg) by mouth every 8 hours as needed (nausea/vomiting)    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    Hypokalemia, Loose stools       WOMENS 50+ MULTI VITAMIN/MIN PO

## 2018-09-05 NOTE — MR AVS SNAPSHOT
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)    -  1    Pancreatic adenocarcinoma (H)           Follow-ups after your visit        Your next 10 appointments already scheduled     Sep 05, 2018 12:15 PM CDT   Return Visit with LISA Mendieta CNP   [facility])    [id number] [place] [address]   [phone]            Sep 05, 2018  1:00 PM CDT   Level 2 with BAY 9 INFUSION   [facility])    [address] [place] [address] [phone]            Sep 26, 2018 12:30 PM CDT   Return Visit with [facility] ONLY [facility]ER [facility]ENTER   [facility])    [id number] [place] [address] [phone]            Sep 26, 2018  1:00 PM CDT   Level 2 with BAY 4 INFUSION   [facility])    [address] [place] [address] [phone]              Who to contact     If you have questions or need follow up information about today's clinic visit or your schedule please contact [facility] directly at [phone].  Normal or non-critical lab and imaging results will be communicated to you by MyChart, letter or phone within 4 business days after the clinic has received the results. If you do not hear from us within 7 days, please contact the clinic through [employer] or phone. If you have a critical or abnormal lab result, we will notify you by phone as soon as possible.  Submit refill requests through [employer] or call your pharmacy and they will forward the refill  request to us. Please allow 3 business days for your refill to be completed.          Additional Information About Your Visit        Care EveryWhere ID     This is your Care EveryWhere ID. This could be used by other organizations to access your [place] medical records  [id number]         Blood Pressure from Last 3 Encounters:   08/28/18 115/58   08/22/18 116/62   08/15/18 130/73    Weight from Last 3 Encounters:   08/28/18 42.8 kg (94 lb 4 oz)   08/22/18 43.2 kg (95 lb 3.2 oz)   08/15/18 44 kg (97 lb)              We Performed the Following     CBC with platelets differential     Comprehensive metabolic panel        Primary Care Provider Office Phone # Fax #    Pancho Cope [phone] [address] [facility] [address]        Equal Access to Services     KATHRYN JORDAN : Hadii aad ku hadasho Soomaali, waaxda luqadaha, qaybta kaalmada adeegyada, carrie kaur . So [facility] [phone].    ATENCIÓN: Si habla español, tiene a ornelas disposición servicios gratuitos de asistencia lingüística. [facility] [phone].    We comply with applicable federal civil rights laws and [place] laws. We do not discriminate on the basis of race, color, national origin, age, disability, sex, sexual orientation, or gender identity.            Thank you!     Thank you for choosing [facility]  for your care. Our goal is always to provide you with excellent care. Hearing back from our patients is one way we can continue to improve our services. Please take a few minutes to complete the written survey that you may receive in the mail after your visit with us. Thank you!             Your Updated Medication List - Protect others around you: Learn how to safely use, store and throw away your medicines at www.disposemymeds.org.          This list is accurate as of 9/5/18 11:59 AM.  Always use your most recent med list.                   Brand Name Dispense  Instructions for use Diagnosis    amylase-lipase-protease 84272-33569 units Cpep per EC capsule    CREON    180 capsule    Take 1 capsule (24,000 Units) by mouth 3 times daily (with meals)    Pancreatic adenocarcinoma (H)       BIOTIN PO           CLARITIN PO      Take by mouth daily        latanoprost 0.005 % ophthalmic solution    XALATAN    3 Bottle    Place 1 drop into both eyes At Bedtime    Glaucoma suspect, bilateral       lidocaine-prilocaine cream    EMLA    30 g    Apply topically as needed for moderate pain (use dollop of cream to saran wrap 30 min prior to use of port)    Pancreatic adenocarcinoma (H)       ondansetron 8 MG tablet    ZOFRAN    10 tablet    Take 1 tablet (8 mg) by mouth every 8 hours as needed (nausea/vomiting)    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    Hypokalemia, Loose stools       WOMENS 50+ MULTI VITAMIN/MIN PO

## 2018-09-05 NOTE — LETTER
9/5/2018         RE: Talya Gatica  [address]  [facility] [address]        Dear Colleague,    Thank you for referring your patient, Talya Gatica, to the [facility]. Please see a copy of my visit note below.    Oncology Follow Up Visit:September 5, 2018     Oncologist: Dr Jim Soares  PCP: Pancho Cope    Diagnosis: Stage IV Pancreatic cancer  Talya Gatica is a 79 yo who c/o jaundice in 7/2017 was found to have  adenocarcinoma of the head of pancreas causing biliary obstruction and several pulmonary nodules consistent with metastasis- initially told days to 3 months of life.  Treatment:   11/3/2017 began treatment with Gemzar- days 1,8,15 of 28 day plan x 6 cycles  5/16/2018 to begin treatment with FOLFIRI with onivyde at 70 mg/m2 dose reduction of the 5 FU from start of plan by 15%.  8/15/2018 begin Carboplatin/Abraxane- begin cycle 1 with abraxane alone at 100 mg/m2    Interval History: Ms. Sanchez comes to clinic alone today for review prior to cycle 2 of Abraxane at 100 mg/m   with plans for addition of carboplatin at AUC of 6 today.  Patient was delayed 1 week related to neutropenia and reports that [place] and has been eating well over the last week and has gained back some weight.  She is otherwise feeling well though does kevin some weakness especially the day after being at the fair. She denies any pain nausea mouth sores shortness of breath or fevers.  She is reporting the loose stools are less frequent still noted she continues with her potassium supplement daily-no need for Imodium in the last week.  She continues with Creon twice daily with her meals.  Pt reports she will be moving in more permanently with daughter now.   Rest of comprehensive and complete ROS is reviewed and is negative.   Past Medical History:   Diagnosis Date     AR (allergic rhinitis)      Baker's cyst      DJD (degenerative joint disease)   "    Elevated cholesterol      Glaucoma      Menopause      Vertigo      Current Outpatient Prescriptions   Medication     amylase-lipase-protease (CREON) 51315-66110 units CPEP per EC capsule     BIOTIN PO     latanoprost (XALATAN) 0.005 % ophthalmic solution     lidocaine-prilocaine (EMLA) cream     Loratadine (CLARITIN PO)     Multiple Vitamins-Minerals (WOMENS 50+ MULTI VITAMIN/MIN PO)     ondansetron (ZOFRAN) 8 MG tablet     Potassium Chloride ER 20 MEQ TBCR     potassium chloride SA (KLOR-CON) 20 MEQ CR tablet     No current facility-administered medications for this visit.      Allergies   Allergen Reactions     Codeine Camsylate        Physical Exam: /77  Pulse 73  Temp 98.7  F (37.1  C)  Resp 16  Ht 1.511 m (4' 11.49\")  Wt 43.5 kg (96 lb)  SpO2 98%  BMI 19.07 kg/m2 - weight stable this week  ECOG PS-1-2  Constitutional: Alert, cooperative and in no distress.Moving slowly using cane -needing one assist for transfer without cane.  ENT: Eyes bright, No mouth sores. Crow.  Neck: Supple, No adenopathy.Thyroid symmetric  Cardiac: Heart rate and rhythm has some irregularity and strong with aortic murmur noted as usual.  Respiratory: Breathing easy. Lung sounds clear to auscultation. No cough  Port with no redness or swelling.  GI: Abdomen is soft, non-tender, BS very active at this time and patient states she did take an Imodium this morning. No masses or organomegaly  MS: Muscle tone fair- uses cane, extremities normal with minimal edema  Skin: No suspicious lesions or rashes or bruising-wearing wig with ongoing alopecia.  Neuro: Sensory grossly WNL, gait is steady with cane  Lymph: Normal ant/post cervical, axillary, supraclavicular nodes  Psych: Mentation appears normal and affect normal/bright with good conversation.     Laboratory Results:   Results for orders placed or performed in visit on 09/05/18   CBC with platelets differential   Result Value Ref Range    WBC 4.4 4.0 - 11.0 10e9/L    RBC " Count 3.41 (L) 3.8 - 5.2 10e12/L    Hemoglobin 10.4 (L) 11.7 - 15.7 g/dL    Hematocrit 31.6 (L) 35.0 - 47.0 %    MCV 93 78 - 100 fl    MCH 30.5 26.5 - 33.0 pg    MCHC 32.9 31.5 - 36.5 g/dL    RDW 14.7 10.0 - 15.0 %    Platelet Count 272 150 - 450 10e9/L    Diff Method Automated Method     % Neutrophils 58.7 %    % Lymphocytes 25.9 %    % Monocytes 13.5 %    % Eosinophils 0.7 %    % Basophils 0.7 %    % Immature Granulocytes 0.5 %    Absolute Neutrophil 2.6 1.6 - 8.3 10e9/L    Absolute Lymphocytes 1.1 0.8 - 5.3 10e9/L    Absolute Monocytes 0.6 0.0 - 1.3 10e9/L    Absolute Eosinophils 0.0 0.0 - 0.7 10e9/L    Absolute Basophils 0.0 0.0 - 0.2 10e9/L    Abs Immature Granulocytes 0.0 0 - 0.4 10e9/L   Comprehensive metabolic panel   Result Value Ref Range    Sodium 145 (H) 133 - 144 mmol/L    Potassium 3.6 3.4 - 5.3 mmol/L    Chloride 114 (H) 94 - 109 mmol/L    Carbon Dioxide 26 20 - 32 mmol/L    Anion Gap 5 3 - 14 mmol/L    Glucose 108 (H) 70 - 99 mg/dL    Urea Nitrogen 7 7 - 30 mg/dL    Creatinine 0.49 (L) 0.52 - 1.04 mg/dL    GFR Estimate >90 >60 mL/min/1.7m2    GFR Estimate If Black >90 >60 mL/min/1.7m2    Calcium 8.6 8.5 - 10.1 mg/dL    Bilirubin Total 0.5 0.2 - 1.3 mg/dL    Albumin 3.2 (L) 3.4 - 5.0 g/dL    Protein Total 6.5 (L) 6.8 - 8.8 g/dL    Alkaline Phosphatase 135 40 - 150 U/L    ALT 32 0 - 50 U/L    AST 29 0 - 45 U/L     Assessment and Plan:   Stage IV Pancreatic cancer-Pt is now in 2nd to 3rd line treatment and needing to began treatment with Abraxane only 800 mg per m2  with cycle 1 and needed delay with start of cycle due to neutropenia. She has recovered well today and is meeting goals to continue with cycle 2 with addition of Carboplatin at an AUC of 6.    I feel she will need support in this cycle with her blood counts but will wait to see her lab results each week when returning for her Abraxane  Plan to reimage after 2-3 cycles- Will hold for cycle 3 if able since just starting cycle 2 with the  carboplatin.   Weight loss/hypoalbuminemia- Weight stable this week on her delayed week. Encouraged pt to continue to eat her meals but push snacks and additional fluids between meals to maintain or regain some weight and improve her energy level. Continues to use the creon with main meals.   Intermittent loose stools-improving but noted occasionally with need for imodium.  Encouraged her to use Imodium at the first sign of abdominal cramping to reduce nutrient and weight loss with diarrhea.she has not tried the ensure supplements given last week.   Hypokalemia- resolved with daily use of 20 mEq of potassium daily.   This was a 25 min visit with > 50% in counseling and coordinating care including education and management of concerns.    Nai Simmons CNP        Again, thank you for allowing me to participate in the care of your patient.        Sincerely,        Nai Simmons, RICHAR, APRN CNP

## 2018-09-05 NOTE — MR AVS SNAPSHOT
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)    -  1    Pancreatic adenocarcinoma (H)           Follow-ups after your visit        Your next 10 appointments already scheduled     Sep 26, 2018 12:30 PM CDT   Return Visit with NURSE ONLY CANCER CENTER   [facility] ([facility])    [id number] [place] [address] [phone]            Sep 26, 2018  1:00 PM CDT   Level 2 with [place] 4 INFUSION   [facility] ([facility])    [id number] [place] [address] [phone]              Who to contact     If you have questions or need follow up information about today's clinic visit or your schedule please contact [facility] directly at [phone].  Normal or non-critical lab and imaging results will be communicated to you by MyChart, letter or phone within 4 business days after the clinic has received the results. If you do not hear from us within 7 days, please contact the clinic through MyChart or phone. If you have a critical or abnormal lab result, we will notify you by phone as soon as possible.  Submit refill requests through [email or web address] or call your pharmacy and they will forward the refill request to us. Please allow 3 business days for your refill to be completed.          Additional Information About Your Visit        Care EveryWhere ID     This is your Care EveryWhere ID. This could be used by other organizations to access your [facility] medical records  [id number]         Blood Pressure from Last 3 Encounters:   09/05/18 149/77   08/28/18 115/58   08/22/18 116/62    Weight from Last 3 Encounters:    09/05/18 43.5 kg (96 lb)   08/28/18 42.8 kg (94 lb 4 oz)   08/22/18 43.2 kg (95 lb 3.2 oz)              Today, you had the following     No orders found for display       Primary Care Provider Office Phone # Fax #    Pancho Cope [phone] [address] [facility] [address]        Equal Access to Services     JOCELINE JORDAN : Hadii aad ku hadasho Soomaali, waaxda luqadaha, qaybta kaalmada adeegyada, waxay idiin hayaan adeeg kharash laebenezer . So [facility] [phone].    ATENCIÓN: Si nikolas daniels, tiene a ornelas disposición servicios gratuitos de asistencia lingüística. Llame al [phone].    We comply with applicable federal civil rights laws and [place] laws. We do not discriminate on the basis of race, color, national origin, age, disability, sex, sexual orientation, or gender identity.            Thank you!     Thank you for choosing [facility]  for your care. Our goal is always to provide you with excellent care. Hearing back from our patients is one way we can continue to improve our services. Please take a few minutes to complete the written survey that you may receive in the mail after your visit with us. Thank you!             Your Updated Medication List - Protect others around you: Learn how to safely use, store and throw away your medicines at www.disposemymeds.org.          This list is accurate as of 9/5/18  3:43 PM.  Always use your most recent med list.                   Brand Name Dispense Instructions for use Diagnosis    amylase-lipase-protease 50278-38493 units Cpep per EC capsule    CREON    180 capsule    Take 1 capsule (24,000 Units) by mouth 3 times daily (with meals)    Pancreatic adenocarcinoma (H)       BIOTIN PO           CLARITIN PO      Take by mouth daily        latanoprost 0.005 % ophthalmic solution    XALATAN    3 Bottle    Place 1 drop into both eyes At Bedtime    Glaucoma suspect, bilateral       lidocaine-prilocaine cream     EMLA    30 g    Apply topically as needed for moderate pain (use dollop of cream to saran wrap 30 min prior to use of port)    Pancreatic adenocarcinoma (H)       ondansetron 8 MG tablet    ZOFRAN    10 tablet    Take 1 tablet (8 mg) by mouth every 8 hours as needed (nausea/vomiting)    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    Hypokalemia, Loose stools       WOMENS 50+ MULTI VITAMIN/MIN PO

## 2018-09-05 NOTE — PROGRESS NOTES
Oncology Follow Up Visit:September 5, 2018     Oncologist: Dr Jim Soares  PCP: Pancho Cope    Diagnosis: Stage IV Pancreatic cancer  Talya Gatica is a 77 yo who c/o jaundice in 7/2017 was found to have  adenocarcinoma of the head of pancreas causing biliary obstruction and several pulmonary nodules consistent with metastasis- initially told days to 3 months of life.  Treatment:   11/3/2017 began treatment with Gemzar- days 1,8,15 of 28 day plan x 6 cycles  5/16/2018 to begin treatment with FOLFIRI with onivyde at 70 mg/m2 dose reduction of the 5 FU from start of plan by 15%.  8/15/2018 begin Carboplatin/Abraxane- begin cycle 1 with abraxane alone at 100 mg/m2    Interval History: Ms. Sanchez comes to clinic alone today for review prior to cycle 2 of Abraxane at 100 mg/m  with plans for addition of carboplatin at AUC of 6 today.  Patient was delayed 1 week related to neutropenia and reports that she was able to go to the [employer] fair twice and has been eating well over the last week and has gained back some weight.  She is otherwise feeling well though does kevin some weakness especially the day after being at the fair. She denies any pain nausea mouth sores shortness of breath or fevers.  She is reporting the loose stools are less frequent still noted she continues with her potassium supplement daily-no need for Imodium in the last week.  She continues with Creon twice daily with her meals.  Pt reports she will be moving in more permanently with daughter now.   Rest of comprehensive and complete ROS is reviewed and is negative.   Past Medical History:   Diagnosis Date     AR (allergic rhinitis)      Baker's cyst      DJD (degenerative joint disease)      Elevated cholesterol      Glaucoma      Menopause      Vertigo      Current Outpatient Prescriptions   Medication     amylase-lipase-protease (CREON) 77540-84039 units CPEP per EC capsule     BIOTIN PO     latanoprost (XALATAN) 0.005 % ophthalmic  "solution     lidocaine-prilocaine (EMLA) cream     Loratadine (CLARITIN PO)     Multiple Vitamins-Minerals (WOMENS 50+ MULTI VITAMIN/MIN PO)     ondansetron (ZOFRAN) 8 MG tablet     Potassium Chloride ER 20 MEQ TBCR     potassium chloride SA (KLOR-CON) 20 MEQ CR tablet     No current facility-administered medications for this visit.      Allergies   Allergen Reactions     Codeine Camsylate        Physical Exam: /77  Pulse 73  Temp 98.7  F (37.1  C)  Resp 16  Ht 1.511 m (4' 11.49\")  Wt 43.5 kg (96 lb)  SpO2 98%  BMI 19.07 kg/m2 - weight stable this week  ECOG PS-1-2  Constitutional: Alert, cooperative and in no distress.Moving slowly using cane -needing one assist for transfer without cane.  ENT: Eyes bright, No mouth sores. Ramona.  Neck: Supple, No adenopathy.Thyroid symmetric  Cardiac: Heart rate and rhythm has some irregularity and strong with aortic murmur noted as usual.  Respiratory: Breathing easy. Lung sounds clear to auscultation. No cough  Port with no redness or swelling.  GI: Abdomen is soft, non-tender, BS very active at this time and patient states she did take an Imodium this morning. No masses or organomegaly  MS: Muscle tone fair- uses cane, extremities normal with minimal edema  Skin: No suspicious lesions or rashes or bruising-wearing wig with ongoing alopecia.  Neuro: Sensory grossly WNL, gait is steady with cane  Lymph: Normal ant/post cervical, axillary, supraclavicular nodes  Psych: Mentation appears normal and affect normal/bright with good conversation.     Laboratory Results:   Results for orders placed or performed in visit on 09/05/18   CBC with platelets differential   Result Value Ref Range    WBC 4.4 4.0 - 11.0 10e9/L    RBC Count 3.41 (L) 3.8 - 5.2 10e12/L    Hemoglobin 10.4 (L) 11.7 - 15.7 g/dL    Hematocrit 31.6 (L) 35.0 - 47.0 %    MCV 93 78 - 100 fl    MCH 30.5 26.5 - 33.0 pg    MCHC 32.9 31.5 - 36.5 g/dL    RDW 14.7 10.0 - 15.0 %    Platelet Count 272 150 - 450 10e9/L "    Diff Method Automated Method     % Neutrophils 58.7 %    % Lymphocytes 25.9 %    % Monocytes 13.5 %    % Eosinophils 0.7 %    % Basophils 0.7 %    % Immature Granulocytes 0.5 %    Absolute Neutrophil 2.6 1.6 - 8.3 10e9/L    Absolute Lymphocytes 1.1 0.8 - 5.3 10e9/L    Absolute Monocytes 0.6 0.0 - 1.3 10e9/L    Absolute Eosinophils 0.0 0.0 - 0.7 10e9/L    Absolute Basophils 0.0 0.0 - 0.2 10e9/L    Abs Immature Granulocytes 0.0 0 - 0.4 10e9/L   Comprehensive metabolic panel   Result Value Ref Range    Sodium 145 (H) 133 - 144 mmol/L    Potassium 3.6 3.4 - 5.3 mmol/L    Chloride 114 (H) 94 - 109 mmol/L    Carbon Dioxide 26 20 - 32 mmol/L    Anion Gap 5 3 - 14 mmol/L    Glucose 108 (H) 70 - 99 mg/dL    Urea Nitrogen 7 7 - 30 mg/dL    Creatinine 0.49 (L) 0.52 - 1.04 mg/dL    GFR Estimate >90 >60 mL/min/1.7m2    GFR Estimate If Black >90 >60 mL/min/1.7m2    Calcium 8.6 8.5 - 10.1 mg/dL    Bilirubin Total 0.5 0.2 - 1.3 mg/dL    Albumin 3.2 (L) 3.4 - 5.0 g/dL    Protein Total 6.5 (L) 6.8 - 8.8 g/dL    Alkaline Phosphatase 135 40 - 150 U/L    ALT 32 0 - 50 U/L    AST 29 0 - 45 U/L     Assessment and Plan:   Stage IV Pancreatic cancer-Pt is now in 2nd to 3rd line treatment and needing to began treatment with Abraxane only 800 mg per m2  with cycle 1 and needed delay with start of cycle due to neutropenia. She has recovered well today and is meeting goals to continue with cycle 2 with addition of Carboplatin at an AUC of 6.    I feel she will need support in this cycle with her blood counts but will wait to see her lab results each week when returning for her Abraxane  Plan to reimage after 2-3 cycles- Will hold for cycle 3 if able since just starting cycle 2 with the carboplatin.   Weight loss/hypoalbuminemia- Weight stable this week on her delayed week. Encouraged pt to continue to eat her meals but push snacks and additional fluids between meals to maintain or regain some weight and improve her energy level. Continues to  use the creon with main meals.   Intermittent loose stools-improving but noted occasionally with need for imodium.  Encouraged her to use Imodium at the first sign of abdominal cramping to reduce nutrient and weight loss with diarrhea.she has not tried the ensure supplements given last week.   Hypokalemia- resolved with daily use of 20 mEq of potassium daily.   This was a 25 min visit with > 50% in counseling and coordinating care including education and management of concerns.    Nai Simmons,CNP

## 2018-09-05 NOTE — NURSING NOTE
"Oncology Rooming Note    September 5, 2018 12:18 PM   Talya Gatica is a 78 year old female who presents for:    Chief Complaint   Patient presents with     Oncology Clinic Visit     prior to treatment     Initial Vitals: /77  Pulse 73  Temp 98.7  F (37.1  C)  Resp 16  Ht 1.511 m (4' 11.49\")  Wt 43.5 kg (96 lb)  SpO2 98%  BMI 19.07 kg/m2 Estimated body mass index is 19.07 kg/(m^2) as calculated from the following:    Height as of this encounter: 1.511 m (4' 11.49\").    Weight as of this encounter: 43.5 kg (96 lb). Body surface area is 1.35 meters squared.  No Pain (0) Comment: Data Unavailable   No LMP recorded. Patient is postmenopausal.  Allergies reviewed: Yes  Medications reviewed: Yes    Medications: Medication refills not needed today.  Pharmacy name entered into [facility]: [facility] - [place], [place] - [address] AT [facility] [address]        5 minutes for nursing intake (face to face time)     Carly Bobby LPN              "

## 2018-09-05 NOTE — PROGRESS NOTES
Port needle left accessed for treatment. Tolerated lab draw without complaint. Port site scrubbed with Chloraprep for 30 seconds. Accessed using sterile technique. [place] drawn-Red/Green/Purple tubes. Double signed by patient and RN. See documentation flowsheet. Sheree Calhoun, RN, BSN, OCN

## 2018-09-06 NOTE — TELEPHONE ENCOUNTER
Daniella answered phone without identifying herself or company.  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  She has questions regarding the patient's medical condition.   knows that she has pancreatic cancer and needing more information on her overall condition.  She does not have a DESTIN; [facility] [place] has been trying to reach Talya but no answer.  I called Talya and her mailbox is full.  I told Daniella that I was not able to share information regarding her health without a written consent/DESTIN from patient.     Of note: Dr. Pancho Cope saw patient 10/2017 shortly after dx of pancreatic cancer for hospital follow up and referral.  She has been followed by the oncologist since then.

## 2018-09-06 NOTE — TELEPHONE ENCOUNTER
Daniella is at the   of  Dentistry  and need to do a consultation for over all health on this patient.

## 2018-09-13 NOTE — PROGRESS NOTES
"Patient's name and  were verified.  See Doc Flowsheet - IV assess for details.  IVAD accessed with 20G 3/4\" haley gripper plus needle  blood return positive: YES  Site without redness, tenderness or swelling: YES  flushed with 20cc NS and 5cc 100u/ml heparin  Needle: left access for chemotherapy  Comments: Labs drawn.  Patient tolerated procedure without incident.    Ayush Fong  RN, BSN      "

## 2018-09-13 NOTE — MR AVS SNAPSHOT
After Visit Summary   9/13/2018    Talya Gatica    MRN: [id number]           Patient Information     Date Of Birth          1940        Visit Information        Provider Department      9/13/2018 2:30 PM NURSE ONLY CANCER CENTER [facility]        Today's Diagnoses     Malignant neoplasm metastatic to both lungs (H)    -  1    Pancreatic adenocarcinoma (H)           Follow-ups after your visit        Your next 10 appointments already scheduled     Sep 13, 2018  3:00 PM CDT   Level 2 with BAY 5 INFUSION   [facility] ([facility])    [address] [facility] [address]   [phone]            Sep 20, 2018 11:00 AM CDT   Return Visit with NURSE ONLY CANCER CENTER   [facility] ([facility])    [address] [facility] [address]   [phone]            Sep 20, 2018 11:30 AM CDT   Level 2 with BAY 9 INFUSION   [facility] ([facility])    [address] [facility] [address]   [phone]            Sep 27, 2018  8:45 AM CDT   Return Visit with NURSE ONLY CANCER CENTER   [facility] ([facility])    [address] [facility] [address]   [phone]            Sep 27, 2018  9:15 AM CDT   Return Visit with LISA Mendieta CNP   [facility] ([facility])    [address] [facility] [address]   [phone]            Sep 28, 2018  9:30 AM CDT   Level 2 with BAY 7 INFUSION   [facility] ([facility])    [address] [facility] [address]   [phone]            Oct 05, 2018  1:30 PM CDT   Return Visit with NURSE ONLY CANCER CENTER   [facility] ([facility])    [address] [facility] [address]   [phone]            Oct 05, 2018  2:00 PM CDT   Level  2 with BAY 7 INFUSION   [facility] ([facility])    [address] [facility] [address] [phone]            Oct 11, 2018 10:00 AM CDT   Return Visit with NURSE ONLY CANCER CENTER   [facility] ([facility])    [address] [facility] [address] [phone]            Oct 11, 2018 10:30 AM CDT   Level 2 with BAY 3 INFUSION   [facility] ([facility])    [address] [facility] [address] [phone]              Who to contact     If you have questions or need follow up information about today's clinic visit or your schedule please contact [facility] directly at [phone].  Normal or non-critical lab and imaging results will be communicated to you by MyChart, letter or phone within 4 business days after the clinic has received the results. If you do not hear from us within 7 days, please contact the clinic through MyChart or phone. If you have a critical or abnormal lab result, we will notify you by phone as soon as possible.  Submit refill requests through [employer] or call your pharmacy and they will forward the refill request to us. Please allow 3 business days for your refill to be completed.          Additional Information About Your Visit        Care EveryWhere ID     This is your Care EveryWhere ID. This could be used by other organizations to access your [place] medical records  [id number]         Blood Pressure from Last 3 Encounters:   09/05/18 149/77   08/28/18 115/58   08/22/18 116/62    Weight from Last 3 Encounters:   09/05/18 43.5 kg (96 lb)   08/28/18 42.8 kg (94 lb 4 oz)   08/22/18 43.2 kg (95 lb 3.2 oz)              We Performed the Following     CBC with platelets differential        Primary Care Provider Office Phone # Fax #    Pancho Cope [phone] [address] [facility] [address]         Equal Access to Services     [facility] : Hadii aad ku hadleonardodante Madelinekirit, waarmandda luqadaha, qaybta kagonzalezcarrie barakat. So [facility] [phone].    ATENCIÓN: Si nikolas daniels, tiene a ornelas disposición servicios gratuitos de asistencia lingüística. Negritaame al [phone].    We comply with applicable federal civil rights laws and [place] laws. We do not discriminate on the basis of race, color, national origin, age, disability, sex, sexual orientation, or gender identity.            Thank you!     Thank you for choosing [facility]  for your care. Our goal is always to provide you with excellent care. Hearing back from our patients is one way we can continue to improve our services. Please take a few minutes to complete the written survey that you may receive in the mail after your visit with us. Thank you!             Your Updated Medication List - Protect others around you: Learn how to safely use, store and throw away your medicines at www.disposemymeds.org.          This list is accurate as of 9/13/18  2:45 PM.  Always use your most recent med list.                   Brand Name Dispense Instructions for use Diagnosis    amylase-lipase-protease 95358-74939 units Cpep per EC capsule    CREON    180 capsule    Take 1 capsule (24,000 Units) by mouth 3 times daily (with meals)    Pancreatic adenocarcinoma (H)       BIOTIN PO           CLARITIN PO      Take by mouth daily        latanoprost 0.005 % ophthalmic solution    XALATAN    3 Bottle    Place 1 drop into both eyes At Bedtime    Glaucoma suspect, bilateral       lidocaine-prilocaine cream    EMLA    30 g    Apply topically as needed for moderate pain (use dollop of cream to saran wrap 30 min prior to use of port)    Pancreatic adenocarcinoma (H)       ondansetron 8 MG tablet    ZOFRAN    10 tablet    Take 1 tablet (8 mg) by mouth every 8 hours as needed (nausea/vomiting)    Malignant neoplasm metastatic to  both lungs (H), Pancreatic adenocarcinoma (H)       Potassium Chloride ER 20 MEQ Tbcr     90 tablet    Take 1 tablet (20 mEq) by mouth daily    Hypokalemia       potassium chloride SA 20 MEQ CR tablet    KLOR-CON    32 tablet    Take 1 tab at 6 pm tonight and 1 tab at 9 pm tonight then start 1 tab daily tomorrow morning.    Hypokalemia, Loose stools       WOMENS 50+ MULTI VITAMIN/MIN PO

## 2018-09-13 NOTE — MR AVS SNAPSHOT
After Visit Summary   9/13/2018    Talya Gatica    MRN: [id number]           Patient Information     Date Of Birth          1940        Visit Information        Provider Department      9/13/2018 3:00 PM BAY 5 INFUSION [facility]        Today's Diagnoses     Malignant neoplasm metastatic to both lungs (H)    -  1    Pancreatic adenocarcinoma (H)           Follow-ups after your visit        Your next 10 appointments already scheduled     Sep 20, 2018 11:00 AM CDT   Return Visit with NURSE ONLY CANCER CENTER   [facility] ([facility])    [address] [facility] [address]   [phone]            Sep 20, 2018 11:30 AM CDT   Level 2 with BAY 9 INFUSION   [facility] ([facility])    [address] [facility] [address]   [phone]            Sep 27, 2018  8:45 AM CDT   Return Visit with NURSE ONLY CANCER CENTER   [facility] ([facility])    [address] [facility] [address]   [phone]            Sep 27, 2018  9:15 AM CDT   Return Visit with LISA Mendieta [facility] ([facility])    [address] [facility] [address]   [phone]            Sep 28, 2018  9:30 AM CDT   Level 2 with BAY 7 INFUSION   [facility] ([facility])    [address] [facility] [address]   [phone]            Oct 05, 2018  1:30 PM CDT   Return Visit with NURSE ONLY CANCER CENTER   [facility] ([facility])    [address] [facility] [address]   [phone]            Oct 05, 2018  2:00 PM CDT   Level 2 with BAY 7 INFUSION   [facility] ([facility])    [address] [facility] [address]   [phone]            Oct 11, 2018 10:00 AM CDT   Return Visit  with NURSE ONLY CANCER CENTER   [facility] ([facility])    [address] [facility] [address] [phone]            Oct 11, 2018 10:30 AM CDT   Level 2 with BAY 3 INFUSION   [facility] ([facility])    [address] [facility] [address] [phone]              Who to contact     If you have questions or need follow up information about today's clinic visit or your schedule please contact [facility] directly at [phone].  Normal or non-critical lab and imaging results will be communicated to you by MyChart, letter or phone within 4 business days after the clinic has received the results. If you do not hear from us within 7 days, please contact the clinic through MyChart or phone. If you have a critical or abnormal lab result, we will notify you by phone as soon as possible.  Submit refill requests through [email or web address] or call your pharmacy and they will forward the refill request to us. Please allow 3 business days for your refill to be completed.          Additional Information About Your Visit        Care EveryWhere ID     This is your Care EveryWhere ID. This could be used by other organizations to access your [place] medical records  [id number]        Your Vitals Were     Pulse Temperature Respirations Pulse Oximetry BMI (Body Mass Index)       94 98.5  F (36.9  C) (Oral) 18 98% 18.6 kg/m2        Blood Pressure from Last 3 Encounters:   09/13/18 115/76   09/05/18 149/77   08/28/18 115/58    Weight from Last 3 Encounters:   09/13/18 42.5 kg (93 lb 9.6 oz)   09/05/18 43.5 kg (96 lb)   08/28/18 42.8 kg (94 lb 4 oz)              Today, you had the following     No orders found for display       Primary Care Provider Office Phone # Fax #    Pancho Cope [phone] [address] [facility] [address]        Equal Access to Services     JOCELINE JORDAN : Hadii aad ku  yee Sun, waarmandda luqadaha, qaybta kagonzalezda patricio, carrie wilson patriciaterence stanfordbailey lavinicioana rosa dioni. So [facility] [phone].    ATENCIÓN: Si nikolas daniels, tiene a ornelas disposición servicios gratuitos de asistencia lingüística. Kristen al [phone].    We comply with applicable federal civil rights laws and [place] laws. We do not discriminate on the basis of race, color, national origin, age, disability, sex, sexual orientation, or gender identity.            Thank you!     Thank you for choosing [facility]  for your care. Our goal is always to provide you with excellent care. Hearing back from our patients is one way we can continue to improve our services. Please take a few minutes to complete the written survey that you may receive in the mail after your visit with us. Thank you!             Your Updated Medication List - Protect others around you: Learn how to safely use, store and throw away your medicines at www.disposemymeds.org.          This list is accurate as of 9/13/18 11:59 PM.  Always use your most recent med list.                   Brand Name Dispense Instructions for use Diagnosis    amylase-lipase-protease 60564-[address] units Cpep per EC capsule    CREON    180 capsule    Take 1 capsule (24,000 Units) by mouth 3 times daily (with meals)    Pancreatic adenocarcinoma (H)       BIOTIN PO           CLARITIN PO      Take by mouth daily        latanoprost 0.005 % ophthalmic solution    XALATAN    3 Bottle    Place 1 drop into both eyes At Bedtime    Glaucoma suspect, bilateral       lidocaine-prilocaine cream    EMLA    30 g    Apply topically as needed for moderate pain (use dollop of cream to saran wrap 30 min prior to use of port)    Pancreatic adenocarcinoma (H)       ondansetron 8 MG tablet    ZOFRAN    10 tablet    Take 1 tablet (8 mg) by mouth every 8 hours as needed (nausea/vomiting)    Malignant neoplasm metastatic to both lungs (H), Pancreatic adenocarcinoma (H)        Potassium Chloride ER 20 MEQ Tbcr     90 tablet    Take 1 tablet (20 mEq) by mouth daily    Hypokalemia       potassium chloride SA 20 MEQ CR tablet    KLOR-CON    32 tablet    Take 1 tab at 6 pm tonight and 1 tab at 9 pm tonight then start 1 tab daily tomorrow morning.    Hypokalemia, Loose stools       WOMENS 50+ MULTI VITAMIN/MIN PO

## 2018-09-13 NOTE — PROGRESS NOTES
Infusion Nursing Note:  Talya Gatica presents today for Abraxane.    Patient seen by provider today: No   present during visit today: Not Applicable.    Note: N/A.    Intravenous Access:  Implanted Port.    Treatment Conditions:  Results reviewed, labs MET treatment parameters, ok to proceed with treatment.    Post Infusion Assessment:  Patient tolerated infusion without incident.    Discharge Plan:   RTC as scheduled.    FREDIS LINARES RN

## 2018-09-20 NOTE — MR AVS SNAPSHOT
After Visit Summary   9/20/2018    Talya Gatica    MRN: [id number]           Patient Information     Date Of Birth          1940        Visit Information        Provider Department      9/20/2018 11:30 AM BAY 9 INFUSION [facility]        Today's Diagnoses     Pancreatic adenocarcinoma (H)    -  1    Chemotherapy-induced neutropenia (H)           Follow-ups after your visit        Your next 10 appointments already scheduled     Sep 27, 2018  8:45 AM CDT   Return Visit with NURSE ONLY CANCER CENTER   [facility] ([facility])    [address] [facility] [address]   [phone]            Sep 27, 2018  9:15 AM CDT   Return Visit with LISA Mendieta CNP   [facility] ([facility])    [address] [facility] [address]   [phone]            Sep 28, 2018  9:30 AM CDT   Level 2 with BAY 7 INFUSION   [facility] ([facility])    [address] [facility] [address]   [phone]            Oct 05, 2018  1:30 PM CDT   Return Visit with NURSE ONLY CANCER CENTER   [facility] ([facility])    [address] [facility] [address]   [phone]            Oct 05, 2018  2:00 PM CDT   Level 2 with BAY 7 INFUSION   [facility] ([facility])    [address] [facility] [address]   [phone]            Oct 11, 2018 10:00 AM CDT   Return Visit with NURSE ONLY CANCER CENTER   [facility] ([facility])    [address] [facility] [address]   [phone]            Oct 11, 2018 10:30 AM CDT   Level 2 with BAY 3 INFUSION   [facility] ([facility])    [address] [facility] [address]   [phone]              Who to contact     If you have questions or need  follow up information about today's clinic visit or your schedule please contact [facility] directly at [phone].  Normal or non-critical lab and imaging results will be communicated to you by MyChart, letter or phone within 4 business days after the clinic has received the results. If you do not hear from us within 7 days, please contact the clinic through MyChart or phone. If you have a critical or abnormal lab result, we will notify you by phone as soon as possible.  Submit refill requests through [employer] or call your pharmacy and they will forward the refill request to us. Please allow 3 business days for your refill to be completed.          Additional Information About Your Visit        Care EveryWhere ID     This is your Care EveryWhere ID. This could be used by other organizations to access your [place] medical records  [id number]        Your Vitals Were     Pulse Temperature BMI (Body Mass Index)             75 97.8  F (36.6  C) (Oral) 18.89 kg/m2          Blood Pressure from Last 3 Encounters:   09/20/18 112/64   09/13/18 115/76   09/05/18 149/77    Weight from Last 3 Encounters:   09/20/18 43.1 kg (95 lb 1.6 oz)   09/13/18 42.5 kg (93 lb 9.6 oz)   09/05/18 43.5 kg (96 lb)              Today, you had the following     No orders found for display       Primary Care Provider Office Phone # Fax #    Pancho Cope [phone] [address] [facility] [address]        Equal Access to Services     JOCELINE JORDAN : Hadii aad ku hadasho Soomaali, waaxda luqadaha, qaybta kaalmada patriciaegyada, wax andres kaur . So [facility] [phone].    ATENCIÓN: Si habla español, tiene a ornelas disposición servicios gratuitos de asistencia lingüística. Llame al [phone].    We comply with applicable federal civil rights laws and [place] laws. We do not discriminate on the basis of race, color, national origin, age, disability, sex, sexual orientation,  or gender identity.            Thank you!     Thank you for choosing [facility]  for your care. Our goal is always to provide you with excellent care. Hearing back from our patients is one way we can continue to improve our services. Please take a few minutes to complete the written survey that you may receive in the mail after your visit with us. Thank you!             Your Updated Medication List - Protect others around you: Learn how to safely use, store and throw away your medicines at www.disposemymeds.org.          This list is accurate as of 9/20/18 11:59 PM.  Always use your most recent med list.                   Brand Name Dispense Instructions for use Diagnosis    amylase-lipase-protease 16424-92935 units Cpep per EC capsule    CREON    180 capsule    Take 1 capsule (24,000 Units) by mouth 3 times daily (with meals)    Pancreatic adenocarcinoma (H)       BIOTIN PO           CLARITIN PO      Take by mouth daily        latanoprost 0.005 % ophthalmic solution    XALATAN    3 Bottle    Place 1 drop into both eyes At Bedtime    Glaucoma suspect, bilateral       lidocaine-prilocaine cream    EMLA    30 g    Apply topically as needed for moderate pain (use dollop of cream to saran wrap 30 min prior to use of port)    Pancreatic adenocarcinoma (H)       ondansetron 8 MG tablet    ZOFRAN    10 tablet    Take 1 tablet (8 mg) by mouth every 8 hours as needed (nausea/vomiting)    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    Hypokalemia, Loose stools       WOMENS 50+ MULTI VITAMIN/MIN PO

## 2018-09-20 NOTE — PROGRESS NOTES
Port needle left accessed for treatment. Tolerated port access and draw without complaint. Port site scrubbed with Chloraprep for 30 seconds. Accessed using sterile technique. [place] drawn-Red/Green/Purple tubes. Double signed by patient and RN. See documentation flowsheet. Sheree Calhoun, RN, BSN, OCN

## 2018-09-20 NOTE — MR AVS SNAPSHOT
After Visit Summary   9/20/2018    Talya Gatica    MRN: [id number]           Patient Information     Date Of Birth          1940        Visit Information        Provider Department      9/20/2018 11:00 AM NURSE ONLY CANCER CENTER [facility]        Today's Diagnoses     Malignant neoplasm metastatic to both lungs (H)    -  1    Pancreatic adenocarcinoma (H)           Follow-ups after your visit        Your next 10 appointments already scheduled     Sep 27, 2018  8:45 AM CDT   Return Visit with [facility]   [facility]    [address] [facility] [address]   [phone]            Sep 27, 2018  9:15 AM CDT   Return Visit with LISA Mendieta CNP   [facility]    [address] [facility] [address]   [phone]            Sep 28, 2018  9:30 AM CDT   Level 2 with BAY 7 INFUSION   [facility]    [address] [facility] [address]   [phone]            Oct 05, 2018  1:30 PM CDT   Return Visit with [facility] ONLY [facility]   [facility]    [address] [facility] [address]   [phone]            Oct 05, 2018  2:00 PM CDT   Level 2 with BAY 7 INFUSION   [facility]    [address] [facility] [address]   [phone]            Oct 11, 2018 10:00 AM CDT   Return Visit with NURSE ONLY CANCER CENTER   [facility]    [address] [facility] [address]   [phone]            Oct 11, 2018 10:30 AM CDT   Level 2 with BAY 3 INFUSION   [facility]    [address] [facility] [address]   [phone]              Who to contact     If you have  questions or need follow up information about today's clinic visit or your schedule please contact [facility] directly at [phone].  Normal or non-critical lab and imaging results will be communicated to you by MyChart, letter or phone within 4 business days after the clinic has received the results. If you do not hear from us within 7 days, please contact the clinic through MyChart or phone. If you have a critical or abnormal lab result, we will notify you by phone as soon as possible.  Submit refill requests through [email or web address] or call your pharmacy and they will forward the refill request to us. Please allow 3 business days for your refill to be completed.          Additional Information About Your Visit        Care EveryWhere ID     This is your Care EveryWhere ID. This could be used by other organizations to access your [place] medical records  [id number]         Blood Pressure from Last 3 Encounters:   09/13/18 115/76   09/05/18 149/77   08/28/18 115/58    Weight from Last 3 Encounters:   09/13/18 42.5 kg (93 lb 9.6 oz)   09/05/18 43.5 kg (96 lb)   08/28/18 42.8 kg (94 lb 4 oz)              We Performed the Following     CBC with platelets differential        Primary Care Provider Office Phone # Fax #    Pancho Cope [phone] [address] [facility] [address]        Equal Access to Services     JOCELINE JORDAN : Hadii aad ku hadasho Soomaali, waaxda luqadaha, qaybta kaalmada patricio, carrie wheatley. So [facility] [phone].    ATENCIÓN: Si habla español, tiene a ornelas disposición servicios gratuitos de asistencia lingüística. Kristen al [phone].    We comply with applicable federal civil rights laws and [place] laws. We do not discriminate on the basis of race, color, national origin, age, disability, sex, sexual orientation, or gender identity.            Thank you!     Thank you for choosing [facility]   for your care. Our goal is always to provide you with excellent care. Hearing back from our patients is one way we can continue to improve our services. Please take a few minutes to complete the written survey that you may receive in the mail after your visit with us. Thank you!             Your Updated Medication List - Protect others around you: Learn how to safely use, store and throw away your medicines at www.disposemymeds.org.          This list is accurate as of 9/20/18 11:50 AM.  Always use your most recent med list.                   Brand Name Dispense Instructions for use Diagnosis    amylase-lipase-protease 21051-40163 units Cpep per EC capsule    CREON    180 capsule    Take 1 capsule (24,000 Units) by mouth 3 times daily (with meals)    Pancreatic adenocarcinoma (H)       BIOTIN PO           CLARITIN PO      Take by mouth daily        latanoprost 0.005 % ophthalmic solution    XALATAN    3 Bottle    Place 1 drop into both eyes At Bedtime    Glaucoma suspect, bilateral       lidocaine-prilocaine cream    EMLA    30 g    Apply topically as needed for moderate pain (use dollop of cream to saran wrap 30 min prior to use of port)    Pancreatic adenocarcinoma (H)       ondansetron 8 MG tablet    ZOFRAN    10 tablet    Take 1 tablet (8 mg) by mouth every 8 hours as needed (nausea/vomiting)    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    Hypokalemia, Loose stools       WOMENS 50+ MULTI VITAMIN/MIN PO

## 2018-09-24 NOTE — PROGRESS NOTES
Treatment held for pt today due to neutropenia. We will cancel day and pt will restart with start of next cycle. SGcelsoschloki,CNp

## 2018-09-24 NOTE — PROGRESS NOTES
Certificate of medical need for gas service was completed and faxed to [employer] @ [phone].  Fax was confirmed to Ctr. Pt.

## 2018-09-26 NOTE — PROGRESS NOTES
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  Treatment:   11/3/2017 began treatment with Gemzar- days 1,8,15 of 28 day plan x 6 cycles  5/16/2018 to begin treatment with FOLFIRI with onivyde at 70 mg/m2 dose reduction of the 5 FU from start of plan by 15%.  8/15/2018 begin Carboplatin/Abraxane- begin cycle 1 with abraxane alone at 100 mg/m2, began cycle 2 with both drugs    Interval History: Ms. Sanchez comes to clinic alone today for review prior to start of cycle 3 of carboplatin/Abraxane. Pt did not meet goals for continuation of C2D15 and was cancelled. Today patient shows up to clinic to needing assistance to get around and is very weak though walking with cane.  Patient states she has these moments especially earlier in the morning when she has a week but seems to get some food and some fluids into her system and feels stronger by midday.  Today she denies having any pain however says this week has been difficult and she has not been able to eat as much as usual and is surprised by maintaining her weight-feels this is not nausea.  She reports Creon has increased in cost and she is no longer using 3 times a day but has reduced to twice daily with 2-3 diarrhea stools noted most days.  She denies any falls but notes that her daughter is now with her every evening just in case something happens.  Pt reports she will be moving in more permanently with daughter now.   Rest of comprehensive and complete ROS is reviewed and is negative.   Past Medical History:   Diagnosis Date     AR (allergic rhinitis)      Baker's cyst      DJD (degenerative joint disease)      Elevated  "cholesterol      Glaucoma      Menopause      Vertigo      Current Outpatient Prescriptions   Medication     amylase-lipase-protease (CREON) 05461-87108 units CPEP per EC capsule     BIOTIN PO     latanoprost (XALATAN) 0.005 % ophthalmic solution     lidocaine-prilocaine (EMLA) cream     Loratadine (CLARITIN PO)     Multiple Vitamins-Minerals (WOMENS 50+ MULTI VITAMIN/MIN PO)     ondansetron (ZOFRAN) 8 MG tablet     Potassium Chloride ER 20 MEQ TBCR     potassium chloride SA (KLOR-CON) 20 MEQ CR tablet     No current facility-administered medications for this visit.      Allergies   Allergen Reactions     Codeine Camsylate        Physical Exam: /66 (BP Location: Left arm, Cuff Size: Child)  Pulse 132  Temp 98.3  F (36.8  C) (Oral)  Resp 30  Ht 1.511 m (4' 11.49\")  Wt 43.6 kg (96 lb 3.2 oz)  SpO2 94%  BMI 19.11 kg/m2  - weight stable this week  ECOG PS-2  Constitutional: Alert but appears generally weak. Cooperative and in no distress.Moving slowly using cane -needing one assist for walking today Transported by Wheelchair to your after visit.  ENT: Eyes bright, No mouth sores-membranes dry-was able to take 8 ounces of water orally without problems in office [place].  Neck: Supple, No adenopathy.Thyroid symmetric  Cardiac: Heart rate and rhythm has some irregularity as noted last visit and is strong with aortic murmur noted as usual.  Respiratory: Breathing easy. Lung sounds clear to auscultation except for lower left base some potential rales. No cough  Port with no redness or swelling.  GI: Abdomen is soft, non-tender, BS active at this time. No masses or organomegaly  MS: Muscle tone fair at best- uses cane, extremities normal with minimal edema  Skin: No suspicious lesions or rashes or bruising-wearing wig with ongoing alopecia.  Neuro: Sensory grossly WNL, gait is steady with cane  Lymph: Normal ant/post cervical, axillary, supraclavicular nodes  Psych: Mentation appears normal and affect normal/bright " with good conversation.     Laboratory Results:   Results for orders placed or performed in visit on 09/27/18   CBC with platelets differential   Result Value Ref Range    WBC 13.4 (H) 4.0 - 11.0 10e9/L    RBC Count 3.25 (L) 3.8 - 5.2 10e12/L    Hemoglobin 9.6 (L) 11.7 - 15.7 g/dL    Hematocrit 27.9 (L) 35.0 - 47.0 %    MCV 86 78 - 100 fl    MCH 29.5 26.5 - 33.0 pg    MCHC 34.4 31.5 - 36.5 g/dL    RDW 16.0 (H) 10.0 - 15.0 %    Platelet Count 142 (L) 150 - 450 10e9/L    % Neutrophils 89.0 %    % Lymphocytes 7.0 %    % Monocytes 2.0 %    % Metamyelocytes 2.0 %    Absolute Neutrophil 11.9 (H) 1.6 - 8.3 10e9/L    Absolute Lymphocytes 0.9 0.8 - 5.3 10e9/L    Absolute Monocytes 0.3 0.0 - 1.3 10e9/L    Absolute Metamyelocytes 0.3 (H) 0 10e9/L    Anisocytosis Slight     RBC Fragments Slight     Ovalocytes Slight     [place] Cells Slight     Platelet Estimate       Automated count confirmed.  Platelet morphology is normal.    Diff Method Manual Differential    Comprehensive metabolic panel   Result Value Ref Range    Sodium 135 133 - 144 mmol/L    Potassium 3.4 3.4 - 5.3 mmol/L    Chloride 103 94 - 109 mmol/L    Carbon Dioxide 19 (L) 20 - 32 mmol/L    Anion Gap 13 3 - 14 mmol/L    Glucose 132 (H) 70 - 99 mg/dL    Urea Nitrogen 17 7 - 30 mg/dL    Creatinine 0.47 (L) 0.52 - 1.04 mg/dL    GFR Estimate >90 >60 mL/min/1.7m2    GFR Estimate If Black >90 >60 mL/min/1.7m2    Calcium 9.0 8.5 - 10.1 mg/dL    Bilirubin Total 1.2 0.2 - 1.3 mg/dL    Albumin 2.6 (L) 3.4 - 5.0 g/dL    Protein Total 6.4 (L) 6.8 - 8.8 g/dL    Alkaline Phosphatase 133 40 - 150 U/L    ALT 22 0 - 50 U/L    AST 19 0 - 45 U/L   Preliminary report of 9/27/2018 chest xray:   Impression: Extensive multifocal airspace opacifications to the upper  and lower lobes are consistent with known metastatic nodules and  masses. Superimposed pneumonia cannot be excluded without a recent  comparable chest radiograph.    Assessment and Plan:   Stage IV Pancreatic cancer-Pt patient  had cycle 2-day 15 canceled due to neutropenia today returns for top of cycle 3 and appears very weak though does make goals for treatment.  Her white cell count is up though I cannot find any sign of infection.  Did get chest x-ray though did not get a urinary check as patient has no symptoms and is difficult for her to get urinary sample.  No fevers cough or other symptoms of pneumonia are noted by patient and she is taking oral fluids well.  -We will wait for symptoms to declare since preliminary report cannot rule out pneumonia but cannot define pneumonia and patient may get more symptoms of concern related to antibiotic use if not needed.  Patient admits she is very weak today and just does not feel well to continue with cycle 3 and would like to wait 1 week.  I agree with this plan since she does appear weak and concern for possible infection.    She will return next week with provider visit for review prior to continuing on with any treatment.  She will present to ER with any concerning symptoms including any fevers new cough or shortness of breath.  Encouraged her to increase fluids and stay safe to prevent falls.  Plan was to reimage after 2-3 cycles-planned to hold for cycle 3 if able since just completed cycle 2 with the carboplatin but today patient is appearing more weak and ultimately may be related to progression of disease.  Weight loss/hypoalbuminemia-had a significant drop in her albumin this last 2 weeks and admits she is not eating well and has decreased her Creon use to 2 times daily.  Reviewed nutrition with her and although she has maintained weight does not appears she is maintaining her nutrition level.  Intermittent loose stools-.  Claims to have not changed diarrhea since decreasing Creon use.  Continues to have the Imodium available not always using the Imodium even when 3 stools or greater per day.  Again reviewed use of the Imodium for loose stools.  Hypokalemia- resolved with daily use  of 20 mEq of potassium daily.   This was a 30 min visit with > 50% in counseling and coordinating care including education and management of concerns.    Nai Simmons,CNP

## 2018-09-27 NOTE — PROGRESS NOTES
Port needle de-accessed without complaint/incidence as patient will not receive chemo tomorrow. Sheree Calhoun RN

## 2018-09-27 NOTE — LETTER
9/27/2018         RE: Talya Gatica  [address]  [facility] [address]        Dear Colleague,    Thank you for referring your patient, Talya Gatica, to the [facility]. Please see a copy of my visit note below.    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  Treatment:   11/3/2017 began treatment with Gemzar- days 1,8,15 of 28 day plan x 6 cycles  5/16/2018 to begin treatment with FOLFIRI with onivyde at 70 mg/m2 dose reduction of the 5 FU from start of plan by 15%.  8/15/2018 begin Carboplatin/Abraxane- begin cycle 1 with abraxane alone at 100 mg/m2, began cycle 2 with both drugs    Interval History: Ms. Sanchez comes to clinic alone today for review prior to start of cycle 3 of carboplatin/Abraxane. Pt did not meet goals for continuation of C2D15 and was cancelled. Today patient shows up to clinic to needing assistance to get around and is very weak though walking with cane.  Patient states she has these moments especially earlier in the morning when she has a week but seems to get some food and some fluids into her system and feels stronger by midday.  Today she denies having any pain however says this week has been difficult and she has not been able to eat as much as usual and is surprised by maintaining her weight-feels this is not nausea.  She reports Creon has increased in cost and she is no longer using 3 times a day but has reduced to twice daily with 2-3 diarrhea stools noted most days.  She denies any falls but notes that her daughter is now with her every evening just in case something happens.  Pt reports she will be moving  "in more permanently with daughter now.   Rest of comprehensive and complete ROS is reviewed and is negative.   Past Medical History:   Diagnosis Date     AR (allergic rhinitis)      Baker's cyst      DJD (degenerative joint disease)      Elevated cholesterol      Glaucoma      Menopause      Vertigo      Current Outpatient Prescriptions   Medication     amylase-lipase-protease (CREON) 54635-47341 units CPEP per EC capsule     BIOTIN PO     latanoprost (XALATAN) 0.005 % ophthalmic solution     lidocaine-prilocaine (EMLA) cream     Loratadine (CLARITIN PO)     Multiple Vitamins-Minerals (WOMENS 50+ MULTI VITAMIN/MIN PO)     ondansetron (ZOFRAN) 8 MG tablet     Potassium Chloride ER 20 MEQ TBCR     potassium chloride SA (KLOR-CON) 20 MEQ CR tablet     No current facility-administered medications for this visit.      Allergies   Allergen Reactions     Codeine Camsylate        Physical Exam: /66 (BP Location: Left arm, Cuff Size: Child)  Pulse 132  Temp 98.3  F (36.8  C) (Oral)  Resp 30  Ht 1.511 m (4' 11.49\")  Wt 43.6 kg (96 lb 3.2 oz)  SpO2 94%  BMI 19.11 kg/m2  - weight stable this week  ECOG PS-2  Constitutional: Alert but appears generally weak. Cooperative and in no distress.Moving slowly using cane -needing one assist for walking today Transported by Wheelchair to your after visit.  ENT: Eyes bright, No mouth sores-membranes dry-was able to take 8 ounces of water orally without problems in office [place].  Neck: Supple, No adenopathy.Thyroid symmetric  Cardiac: Heart rate and rhythm has some irregularity as noted last visit and is strong with aortic murmur noted as usual.  Respiratory: Breathing easy. Lung sounds clear to auscultation except for lower left base some potential rales. No cough  Port with no redness or swelling.  GI: Abdomen is soft, non-tender, BS active at this time. No masses or organomegaly  MS: Muscle tone fair at best- uses cane, extremities normal with minimal edema  Skin: No " suspicious lesions or rashes or bruising-wearing wig with ongoing alopecia.  Neuro: Sensory grossly WNL, gait is steady with cane  Lymph: Normal ant/post cervical, axillary, supraclavicular nodes  Psych: Mentation appears normal and affect normal/bright with good conversation.     Laboratory Results:   Results for orders placed or performed in visit on 09/27/18   CBC with platelets differential   Result Value Ref Range    WBC 13.4 (H) 4.0 - 11.0 10e9/L    RBC Count 3.25 (L) 3.8 - 5.2 10e12/L    Hemoglobin 9.6 (L) 11.7 - 15.7 g/dL    Hematocrit 27.9 (L) 35.0 - 47.0 %    MCV 86 78 - 100 fl    MCH 29.5 26.5 - 33.0 pg    MCHC 34.4 31.5 - 36.5 g/dL    RDW 16.0 (H) 10.0 - 15.0 %    Platelet Count 142 (L) 150 - 450 10e9/L    % Neutrophils 89.0 %    % Lymphocytes 7.0 %    % Monocytes 2.0 %    % Metamyelocytes 2.0 %    Absolute Neutrophil 11.9 (H) 1.6 - 8.3 10e9/L    Absolute Lymphocytes 0.9 0.8 - 5.3 10e9/L    Absolute Monocytes 0.3 0.0 - 1.3 10e9/L    Absolute Metamyelocytes 0.3 (H) 0 10e9/L    Anisocytosis Slight     RBC Fragments Slight     Ovalocytes Slight     [place] Cells Slight     Platelet Estimate       Automated count confirmed.  Platelet morphology is normal.    Diff Method Manual Differential    Comprehensive metabolic panel   Result Value Ref Range    Sodium 135 133 - 144 mmol/L    Potassium 3.4 3.4 - 5.3 mmol/L    Chloride 103 94 - 109 mmol/L    Carbon Dioxide 19 (L) 20 - 32 mmol/L    Anion Gap 13 3 - 14 mmol/L    Glucose 132 (H) 70 - 99 mg/dL    Urea Nitrogen 17 7 - 30 mg/dL    Creatinine 0.47 (L) 0.52 - 1.04 mg/dL    GFR Estimate >90 >60 mL/min/1.7m2    GFR Estimate If Black >90 >60 mL/min/1.7m2    Calcium 9.0 8.5 - 10.1 mg/dL    Bilirubin Total 1.2 0.2 - 1.3 mg/dL    Albumin 2.6 (L) 3.4 - 5.0 g/dL    Protein Total 6.4 (L) 6.8 - 8.8 g/dL    Alkaline Phosphatase 133 40 - 150 U/L    ALT 22 0 - 50 U/L    AST 19 0 - 45 U/L   Preliminary report of 9/27/2018 chest xray:   Impression: Extensive multifocal airspace  opacifications to the upper  and lower lobes are consistent with known metastatic nodules and  masses. Superimposed pneumonia cannot be excluded without a recent  comparable chest radiograph.    Assessment and Plan:   Stage IV Pancreatic cancer-Pt patient had cycle 2-day 15 canceled due to neutropenia today returns for top of cycle 3 and appears very weak though does make goals for treatment.  Her white cell count is up though I cannot find any sign of infection.  Did get chest x-ray though did not get a urinary check as patient has no symptoms and is difficult for her to get urinary sample.  No fevers cough or other symptoms of pneumonia are noted by patient and she is taking oral fluids well.  -We will wait for symptoms to declare since preliminary report cannot rule out pneumonia but cannot define pneumonia and patient may get more symptoms of concern related to antibiotic use if not needed.  Patient admits she is very weak today and just does not feel well to continue with cycle 3 and would like to wait 1 week.  I agree with this plan since she does appear weak and concern for possible infection.    She will return next week with provider visit for review prior to continuing on with any treatment.  She will present to ER with any concerning symptoms including any fevers new cough or shortness of breath.  Encouraged her to increase fluids and stay safe to prevent falls.  Plan was to reimage after 2-3 cycles-planned to hold for cycle 3 if able since just completed cycle 2 with the carboplatin but today patient is appearing more weak and ultimately may be related to progression of disease.  Weight loss/hypoalbuminemia-had a significant drop in her albumin this last 2 weeks and admits she is not eating well and has decreased her Creon use to 2 times daily.  Reviewed nutrition with her and although she has maintained weight does not appears she is maintaining her nutrition level.  Intermittent loose stools-.  Claims  to have not changed diarrhea since decreasing Creon use.  Continues to have the Imodium available not always using the Imodium even when 3 stools or greater per day.  Again reviewed use of the Imodium for loose stools.  Hypokalemia- resolved with daily use of 20 mEq of potassium daily.   This was a 30 min visit with > 50% in counseling and coordinating care including education and management of concerns.    Nai Simmons CNP        Port needle de-accessed without complaint/incidence as patient will not receive chemo tomorrow. Sheree Calhoun RN      Again, thank you for allowing me to participate in the care of your patient.        Sincerely,        Nai Simmons, RICHAR, APRN CNP

## 2018-09-27 NOTE — NURSING NOTE
"Oncology Rooming Note    September 27, 2018 9:47 AM   Talya Gatica is a 78 year old female who presents for:    Chief Complaint   Patient presents with     Oncology Clinic Visit     Initial Vitals: /66 (BP Location: Left arm, Cuff Size: Child)  Pulse 132  Temp 98.3  F (36.8  C) (Oral)  Resp 30  Ht 1.511 m (4' 11.49\")  Wt 43.6 kg (96 lb 3.2 oz)  SpO2 94%  BMI 19.11 kg/m2 Estimated body mass index is 19.11 kg/(m^2) as calculated from the following:    Height as of this encounter: 1.511 m (4' 11.49\").    Weight as of this encounter: 43.6 kg (96 lb 3.2 oz). Body surface area is 1.35 meters squared.  No Pain (0) Comment: Data Unavailable   No LMP recorded. Patient is postmenopausal.  Allergies reviewed: Yes  Medications reviewed: Yes    Medications: Medication refills not needed today.  Pharmacy name entered into [id number] - [place], [place] - [address] AT [facility] [address]         5 minutes for nursing intake (face to face time)     Sade Jeffrey LPN              "

## 2018-09-27 NOTE — MR AVS SNAPSHOT
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)        [place]           Follow-ups after your visit        Your next 10 appointments already scheduled     Oct 05, 2018 12:15 PM CDT   Return Visit with NURSE ONLY CANCER CENTER   [facility] ([facility])    [address] [place] [address]   [phone]            Oct 05, 2018 12:45 PM CDT   Return Visit with LISA Mendieta CNP   [facility] ([facility])    [address] [place] [id number] [phone]            Oct 05, 2018  2:00 PM CDT   Level 2 with BAY 7 INFUSION   [facility])    [address] [place] [id number] [phone]            Oct 11, 2018 10:00 AM CDT   Return Visit with NURSE ONLY CANCER CENTER   [facility] ([facility])    [address] [place] [address] [phone]            Oct 11, 2018 10:30 AM CDT   Level 2 with BAY 3 INFUSION   [facility])    [address] [place] [address] [phone]              Future tests that were ordered for you today     Open Future Orders        Priority Expected Expires Ordered    X-ray Chest 2 vws* Routine 9/27/2018 9/27/2019 9/27/2018            Who to contact     If you have questions or need follow up information about today's clinic visit or your schedule please contact [facility] directly at [phone].  Normal or non-critical lab and imaging results will  "be communicated to you by MyChart, letter or phone within 4 business days after the clinic has received the results. If you do not hear from us within 7 days, please contact the clinic through MyChart or phone. If you have a critical or abnormal lab result, we will notify you by phone as soon as possible.  Submit refill requests through [employer] or call your pharmacy and they will forward the refill request to us. Please allow 3 business days for your refill to be completed.          Additional Information About Your Visit        Care EveryWhere ID     This is your Care EveryWhere ID. This could be used by other organizations to access your [place] medical records  [id number]        Your Vitals Were     Pulse Temperature Respirations Height Pulse Oximetry BMI (Body Mass Index)    132 98.3  F (36.8  C) (Oral) 30 1.511 m (4' 11.49\") 94% 19.11 kg/m2       Blood Pressure from Last 3 Encounters:   09/27/18 101/66   09/20/18 112/64   09/13/18 115/76    Weight from Last 3 Encounters:   09/27/18 43.6 kg (96 lb 3.2 oz)   09/20/18 43.1 kg (95 lb 1.6 oz)   09/13/18 42.5 kg (93 lb 9.6 oz)                 Today's Medication Changes          These changes are accurate as of 9/27/18 10:50 AM.  If you have any questions, ask your nurse or doctor.               These medicines have changed or have updated prescriptions.        Dose/Directions    amylase-lipase-protease 55733-63514 units Cpep per EC capsule   Commonly known as:  CREON   This may have changed:  how much to take   Used for:  Pancreatic adenocarcinoma (H)        Dose:  1 capsule   Take 1 capsule (24,000 Units) by mouth 3 times daily (with meals)   Quantity:  180 capsule   Refills:  3                Primary Care Provider Office Phone # Fax #    Pancho Cope [phone] [address] [facility] [[email or web address]] [id number]        Equal Access to Services     JOCELINE JORDAN AH: Hadii aad ku hadasho Soomaali, waaxda luqadaha, qaybta kaalmada kristiyada, carrie " andres gomezterence stanfordbailey la'aan ah. So [facility] [phone].    ATENCIÓN: Si nikolas daniels, tiene a ornelas disposición servicios gratuitos de asistencia lingüística. Kristen al [phone].    We comply with applicable federal civil rights laws and [place] laws. We do not discriminate on the basis of race, color, national origin, age, disability, sex, sexual orientation, or gender identity.            Thank you!     Thank you for choosing [facility]  for your care. Our goal is always to provide you with excellent care. Hearing back from our patients is one way we can continue to improve our services. Please take a few minutes to complete the written survey that you may receive in the mail after your visit with us. Thank you!             Your Updated Medication List - Protect others around you: Learn how to safely use, store and throw away your medicines at www.disposemymeds.org.          This list is accurate as of 9/27/18 10:50 AM.  Always use your most recent med list.                   Brand Name Dispense Instructions for use Diagnosis    amylase-lipase-protease 85160-24808 units Cpep per EC capsule    CREON    180 capsule    Take 1 capsule (24,000 Units) by mouth 3 times daily (with meals)    Pancreatic adenocarcinoma (H)       BIOTIN PO           CLARITIN PO      Take by mouth daily        latanoprost 0.005 % ophthalmic solution    XALATAN    3 Bottle    Place 1 drop into both eyes At Bedtime    Glaucoma suspect, bilateral       lidocaine-prilocaine cream    EMLA    30 g    Apply topically as needed for moderate pain (use dollop of cream to saran wrap 30 min prior to use of port)    Pancreatic adenocarcinoma (H)       ondansetron 8 MG tablet    ZOFRAN    10 tablet    Take 1 tablet (8 mg) by mouth every 8 hours as needed (nausea/vomiting)    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    Hypokalemia, Loose stools       WOMENS 50+ MULTI VITAMIN/MIN PO

## 2018-09-27 NOTE — MR AVS SNAPSHOT
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)    -  1    Pancreatic adenocarcinoma (H)           Follow-ups after your visit        Your next 10 appointments already scheduled     Sep 28, 2018  9:30 AM CDT   Level 2 with BAY 7 INFUSION   [facility] ([facility])    [address] [address]   [phone]            Oct 05, 2018  1:30 PM CDT   Return Visit with NURSE ONLY CANCER CENTER   [facility] ([facility])    [address] [address]   [phone]            Oct 05, 2018  2:00 PM CDT   Level 2 with BAY 7 INFUSION   [facility] ([facility])    [address] [facility] [address]   [phone]            Oct 11, 2018 10:00 AM CDT   Return Visit with NURSE ONLY CANCER CENTER   [facility] ([facility])    [address] [facility] [address]   [phone]            Oct 11, 2018 10:30 AM CDT   Level 2 with BAY 3 INFUSION   [facility] ([facility])    [address] [address]   [phone]              Who to contact     If you have questions or need follow up information about today's clinic visit or your schedule please contact [facility] directly at [phone].  Normal or non-critical lab and imaging results will be communicated to you by MyChart, letter or phone within 4 business days after the clinic has received the results. If you do not hear from us within 7 days, please contact the clinic through MyChart or phone. If  you have a critical or abnormal lab result, we will notify you by phone as soon as possible.  Submit refill requests through [employer] or call your pharmacy and they will forward the refill request to us. Please allow 3 business days for your refill to be completed.          Additional Information About Your Visit        Care EveryWhere ID     This is your Care EveryWhere ID. This could be used by other organizations to access your [place] medical records  [id number]         Blood Pressure from Last 3 Encounters:   09/27/18 101/66   09/20/18 112/64   09/13/18 115/76    Weight from Last 3 Encounters:   09/27/18 43.6 kg (96 lb 3.2 oz)   09/20/18 43.1 kg (95 lb 1.6 oz)   09/13/18 42.5 kg (93 lb 9.6 oz)              We Performed the Following     CBC with platelets differential     Comprehensive metabolic panel          Today's Medication Changes          These changes are accurate as of 9/27/18 10:19 AM.  If you have any questions, ask your nurse or doctor.               These medicines have changed or have updated prescriptions.        Dose/Directions    amylase-lipase-protease 61693-95088 units Cpep per EC capsule   Commonly known as:  CREON   This may have changed:  how much to take   Used for:  Pancreatic adenocarcinoma (H)        Dose:  1 capsule   Take 1 capsule (24,000 Units) by mouth 3 times daily (with meals)   Quantity:  180 capsule   Refills:  3                Primary Care Provider Office Phone # Fax #    Pancho Rl Cope [phone] [address] [facility] [address]        Equal Access to Services     JOCELINE JORDAN AH: Hadii feliciano ku hadasho Soomaali, waaxda luqadaha, qaybta kaalmada adeegyada, carrie wheatley. So [facility] [phone].    ATENCIÓN: Si habla español, tiene a ornelas disposición servicios gratuitos de asistencia lingüística. Llame al [phone].    We comply with applicable federal civil rights laws and [place] laws. We do not discriminate on  the basis of race, color, national origin, age, disability, sex, sexual orientation, or gender identity.            Thank you!     Thank you for choosing [facility]  for your care. Our goal is always to provide you with excellent care. Hearing back from our patients is one way we can continue to improve our services. Please take a few minutes to complete the written survey that you may receive in the mail after your visit with us. Thank you!             Your Updated Medication List - Protect others around you: Learn how to safely use, store and throw away your medicines at www.disposemymeds.org.          This list is accurate as of 9/27/18 10:19 AM.  Always use your most recent med list.                   Brand Name Dispense Instructions for use Diagnosis    amylase-lipase-protease 90301-26666 units Cpep per EC capsule    CREON    180 capsule    Take 1 capsule (24,000 Units) by mouth 3 times daily (with meals)    Pancreatic adenocarcinoma (H)       BIOTIN PO           CLARITIN PO      Take by mouth daily        latanoprost 0.005 % ophthalmic solution    XALATAN    3 Bottle    Place 1 drop into both eyes At Bedtime    Glaucoma suspect, bilateral       lidocaine-prilocaine cream    EMLA    30 g    Apply topically as needed for moderate pain (use dollop of cream to saran wrap 30 min prior to use of port)    Pancreatic adenocarcinoma (H)       ondansetron 8 MG tablet    ZOFRAN    10 tablet    Take 1 tablet (8 mg) by mouth every 8 hours as needed (nausea/vomiting)    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    Hypokalemia, Loose stools       WOMENS 50+ MULTI VITAMIN/MIN PO

## 2018-09-27 NOTE — PROGRESS NOTES
Patient reported feeling dizzy and had a respiration rate of 30. Left port needle accessed just in case. Tolerated port access and draw without complaint. Port site scrubbed with Chloraprep for 30 seconds. Accessed using sterile technique. [place] drawn-Red/Green/Purple tubes. Double signed by patient and RN. See documentation flowsheet. Sheree Calhoun, RN, BSN, OCN

## 2018-10-01 NOTE — ED NOTES
Bed: ED23  Expected date: 10/1/18  Expected time: 11:09 AM  Means of arrival: Ambulance  Comments:  Marry Beltran3

## 2018-10-01 NOTE — TELEPHONE ENCOUNTER
"Received call from dtr,Janeth. States patient is not doing well and has declined over the weekend. She is too weak to get out of bed, is incontinent and has thrush. Daughter states patient's \"rib cage is pressing out\".  Dtr unable to assist patient to the bathroom or transport patient to the hospital.  Advised daughter to call 911. Writer discussed with Nai Simmons CNP to confirm recommendation to call EMS. Daughter agreed.   Miriam Small  RN, BSN, OCN    "

## 2018-10-01 NOTE — ED TRIAGE NOTES
Pt brought in by ambulance with c/o generalized weakness, fatigue and right flank pain worse with cough. Reports deciding to not take her last chemo on Thuursday. Pt reports she is having trouble walking with her cane and other ADLs. Reports it started Almost a couple weeks ago. Hx of pancreatic cancer

## 2018-10-01 NOTE — LETTER
Transition Communication Hand-off for Care Transitions to Next Level of Care Provider    Name: Talya Gatica  : 1940  MRN #: [id number]  Primary Care Provider: Pancho Cope     Primary Clinic: [address] [facility] [address]     Reason for Hospitalization:  Adult failure to thrive [R62.7]  Dehydration [E86.0]  Admit Date/Time: 10/1/2018 11:20 AM  Discharge Date: 10/4    Reason for Communication Hand-off Referral: known patient    Discharge Plan: going to daughter's home and enrolling in hospice  Hospice:  [facility]   Main: [phone], Fax: [phone]     Follow-up plan:  Future Appointments  Date Time Provider Department Center   10/5/2018 12:15 PM [facility] [place]   10/5/2018 12:45 PM Nai Simmons APRN [facility]   10/5/2018 2:00 PM BAY 7 INFUSION [facility] [place]   10/11/2018 10:00 AM [facility] [place]   10/11/2018 10:30 AM BAY 3 INFUSION INF [place]       Any outstanding tests or procedures:        Referrals     Future Labs/Procedures    Home care nursing referral     Comments:    [facility]    Hospice to eval and treat            AVS/Discharge Summary included

## 2018-10-01 NOTE — IP AVS SNAPSHOT
MRN:[id number]                      After Visit Summary   10/1/2018    Talya Gatica    MRN: [id number]           Thank you!     Thank you for choosing [facility] for your care. Our goal is always to provide you with excellent care. Hearing back from our patients is one way we can continue to improve our services. Please take a few minutes to complete the written survey that you may receive in the mail after you visit with us. Thank you!        Patient Information     Date Of Birth          1940        Designated Caregiver       Most Recent Value    Caregiver    Will someone help with your care after discharge? yes    Name of designated caregiver diallo Fowler    Phone number of caregiver [phone]    Caregiver address [address]      About your hospital stay     You were admitted on:  October 1, 2018 You last received care in the:  [address] [place] [address]    You were discharged on:  October 5, 2018       Who to Call     For medical emergencies, please call 911.  For non-urgent questions about your medical care, please call your primary care provider or clinic, [phone]          Attending Provider     Provider Specialty    Leelee Ingram MD Emergency Medicine Emergency Medical Services    Maegan Fuentes MD Internal Medicine    Rl Adorno MD Oncology       Primary Care Provider Office Phone # Fax #    Pancho Rl Cope [phone]       When to contact your care team       Contact the home hospice team with any questions as above.                  After Care Instructions     Activity       Your activity upon discharge: activity as tolerated            Diet       Follow this diet upon discharge: Regular as tolerated            Discharge Equipment: Wheelchair       The patient has a mobility limitation that significantly impairs his/her ability to participate in one or more mobility-related activities of daily living  (MRADLs).  The patient's mobility limitation cannot be sufficiently resolved by the use of an appropriately fitted cane or walker  The patient's home provides adequate access between rooms, maneuvering space and surfaces for the use of the manual wheelchair provided.  Use of a manual wheelchair will significantly improve the patient's ability to participate in MRADLs and the patient will use it on a regular basis in the home  The patient has not expressed an unwillingness to use the manual wheelchair that is provided in the home.  The patient has sufficient upper extremity function and other physical and mental capabilities needed to safely self-propel the manual wheelchair that is provided in the home during a typical day, OR the patient has a caregiver who is available, willing and able to provide assistance with the wheelchair when the patient has limitations.    Treatment Diagnosis: gait instability, limited gait endurance, poor activity tolerance            Discharge Equipment: Wheelchair       The patient has a mobility limitation that significantly impairs his/her ability to participate in one or more mobility-related activities of daily living (MRADLs).  The patient's mobility limitation cannot be sufficiently resolved by the use of an appropriately fitted cane or walker  The patient's home provides adequate access between rooms, maneuvering space and surfaces for the use of the manual wheelchair provided.  Use of a manual wheelchair will significantly improve the patient's ability to participate in MRADLs and the patient will use it on a regular basis in the home  The patient has not expressed an unwillingness to use the manual wheelchair that is provided in the home.  The patient has sufficient upper extremity function and other physical and mental capabilities needed to safely self-propel the manual wheelchair that is provided in the home during a typical day, OR the patient has a caregiver who is  available, willing and able to provide assistance with the wheelchair when the patient has limitations.    Treatment Diagnosis: gait instability, poor activity tolerance, and decreased gait endurance            IV access       **Ordering Provider MUST call/page Care Coordinator/ to discuss arranging this service**    You are going home with the following vascular access device: Port-a-Cath.                  Follow-up Appointments     Follow Up and recommended labs and tests       You are discharging to home with plan for enrollment into [facility] (phone [phone]). They will be calling you and your daughter to arrange enrollment. Please call their team with any questions.                  Your next 10 appointments already scheduled     Oct 11, 2018 10:00 AM CDT   [facility] with [facility]   [facility]    [address] [place] [id number] [phone]            Oct 11, 2018 10:30 AM CDT   Level 2 with BAY 3 INFUSION   [facility])    [address] [place] [address] [phone]              Additional Services     Home care nursing referral       [facility]    Hospice to eval and treat                  Pending Results     No orders found from 9/29/2018 to 10/2/2018.            Statement of Approval     Ordered          10/04/18 1021  I have reviewed and agree with all the recommendations and orders detailed in this document.  EFFECTIVE NOW     Approved and electronically signed by:  Alison Rodriguez PA-C             Admission Information     Date & Time Provider Department Dept. Phone    10/1/2018 Rl Adorno MD [address] [facility] [phone]      Your Vitals Were     Blood Pressure Pulse Temperature Respirations Height Weight    88/40 (BP Location: Left arm) 82 96  F (35.6  C)  "(Oral) 18 1.549 m (5' 1\") 46.8 kg (103 lb 1.6 oz)    Pulse Oximetry BMI (Body Mass Index)                94% 19.48 kg/m2          Care EveryWhere ID     This is your Care EveryWhere ID. This could be used by other organizations to access your [facility] medical records  [id number]        Equal Access to Services     JOCELINE JORDAN AH: Hadnikhil suárez hadwong Sokirit, waaxda luqadaha, qaybta kaalmada patricio, carrie coynenaveeneufemia kaur . So [facility] [phone].    ATENCIÓN: Si habla español, tiene a ornelas disposición servicios gratuitos de asistencia lingüística. Kristen al [phone].    We comply with applicable federal civil rights laws and [place] laws. We do not discriminate on the basis of race, color, national origin, age, disability, sex, sexual orientation, or gender identity.               Review of your medicines      START taking        Dose / Directions    acetaminophen 325 MG tablet   Commonly known as:  TYLENOL   Used for:  Malignant neoplasm metastatic to both lungs (H)        Dose:  650 mg   Take 2 tablets (650 mg) by mouth every 4 hours as needed for mild pain or fever   Quantity:  50 tablet   Refills:  0       artificial saliva Liqd liquid   Used for:  Dry mouth        Dose:  5-10 mL   Swish and spit 5-10 mLs in mouth 4 times daily as needed for dry mouth   Quantity:  59 mL   Refills:  0       diclofenac 1 % Gel topical gel   Commonly known as:  VOLTAREN   Used for:  Other acute back pain        Dose:  2 g   Place 2 g onto the skin 4 times daily as needed for moderate pain   Quantity:  100 g   Refills:  0       fluconazole 200 MG tablet   Commonly known as:  DIFLUCAN   Indication:  likely esophgeal candidiasis; severe oral thrush.   Used for:  Thrush        Dose:  200 mg   Take 1 tablet (200 mg) by mouth daily for 10 days   Quantity:  10 tablet   Refills:  0       Lidocaine 4 % Patch   Commonly known as:  LIDOCARE   Used for:  Other acute back pain        Dose:  1-3 patch   Place 1-3 patches " onto the skin every 24 hours . Can use as needed. You can keep patch(es) on for up to 12 hrs, then remove for 12 hours. Can use during the daytime or overnight depending on when pain is worse.   Quantity:  15 patch   Refills:  0       loperamide 2 MG capsule   Commonly known as:  IMODIUM   Used for:  Pancreatic adenocarcinoma (H), Diarrhea, unspecified type        Dose:  2 mg   Take 1 capsule (2 mg) by mouth 4 times daily as needed for diarrhea   Quantity:  20 capsule   Refills:  0       megestrol 20 MG tablet   Commonly known as:  MEGACE   Used for:  Adult failure to thrive, Anorexia        Dose:  20 mg   Take 1 tablet (20 mg) by mouth daily   Quantity:  30 tablet   Refills:  0       nystatin 829225 UNIT/ML suspension   Commonly known as:  MYCOSTATIN   Indication:  Candidiasis Fungal Infection of the Oropharynx   Used for:  Thrush        Dose:  897721 Units   Swish and swallow 5 mLs (500,000 Units) in mouth 4 times daily for 10 days   Quantity:  200 mL   Refills:  0       order for DME   Used for:  Gait instability        Equipment being ordered: Walker (), Commode () and Other: Gait belt Treatment Diagnosis: Gait Instability   Quantity:  1 each   Refills:  0       traMADol 50 MG tablet   Commonly known as:  ULTRAM   Used for:  Other acute back pain        Dose:  50 mg   Take 1 tablet (50 mg) by mouth every 6 hours as needed for moderate pain   Quantity:  10 tablet   Refills:  0         CONTINUE these medicines which may have CHANGED, or have new prescriptions. If we are uncertain of the size of tablets/capsules you have at home, strength may be listed as something that might have changed.        Dose / Directions    amylase-lipase-protease 57368-23711 units Cpep per EC capsule   Commonly known as:  CREON   This may have changed:  how much to take   Used for:  Pancreatic adenocarcinoma (H)        Dose:  1 capsule   Take 1 capsule (24,000 Units) by mouth 3 times daily (with meals)   Quantity:  180 capsule    Refills:  3         CONTINUE these medicines which have NOT CHANGED        Dose / Directions    CLARITIN PO        Take by mouth daily   Refills:  0       latanoprost 0.005 % ophthalmic solution   Commonly known as:  XALATAN   Used for:  Glaucoma suspect, bilateral        Dose:  1 drop   Place 1 drop into both eyes At Bedtime   Quantity:  3 Bottle   Refills:  4       lidocaine-prilocaine cream   Commonly known as:  EMLA   Used for:  Pancreatic adenocarcinoma (H)        Apply topically as needed for moderate pain (use dollop of cream to saran wrap 30 min prior to use of port)   Quantity:  30 g   Refills:  1       ondansetron 8 MG tablet   Commonly known as:  ZOFRAN   Used for:  Malignant neoplasm metastatic to both lungs (H), Pancreatic adenocarcinoma (H)        Dose:  8 mg   Take 1 tablet (8 mg) by mouth every 8 hours as needed (nausea/vomiting)   Quantity:  10 tablet   Refills:  2       Potassium Chloride ER 20 MEQ Tbcr   Used for:  Hypokalemia        Dose:  20 mEq   Take 1 tablet (20 mEq) by mouth daily   Quantity:  90 tablet   Refills:  0       WOMENS 50+ MULTI VITAMIN/MIN PO        Refills:  0         STOP taking     BIOTIN PO           potassium chloride SA 20 MEQ CR tablet   Commonly known as:  KLOR-CON                Where to get your medicines      These medications were sent to [facility] - [place], [place] - [address] [address]     Phone:  [phone]     acetaminophen 325 MG tablet    artificial saliva Liqd liquid    diclofenac 1 % Gel topical gel    fluconazole 200 MG tablet    loperamide 2 MG capsule    megestrol 20 MG tablet    nystatin 133356 UNIT/ML suspension         Some of these will need a paper prescription and others can be bought over the counter. Ask your nurse if you have questions.     Bring a paper prescription for each of these medications     Lidocaine 4 % Patch    order for DME    traMADol 50 MG tablet                Protect  others around you: Learn how to safely use, store and throw away your medicines at www.disposemymeds.org.        ANTIBIOTIC INSTRUCTION     You've Been Prescribed an Antibiotic - Now What?  Your healthcare team thinks that you or your loved one might have an infection. Some infections can be treated with antibiotics, which are powerful, life-saving drugs. Like all medications, antibiotics have side effects and should only be used when necessary. There are some important things you should know about your antibiotic treatment.      Your healthcare team may run tests before you start taking an antibiotic.    Your team may take samples (e.g., from your blood, urine or other areas) to run tests to look for bacteria. These test can be important to determine if you need an antibiotic at all and, if you do, which antibiotic will work best.      Within a few days, your healthcare team might change or even stop your antibiotic.    Your team may start you on an antibiotic while they are working to find out what is making you sick.    Your team might change your antibiotic because test results show that a different antibiotic would be better to treat your infection.    In some cases, once your team has more information, they learn that you do not need an antibiotic at all. They may find out that you don't have an infection, or that the antibiotic you're taking won't work against your infection. For example, an infection caused by a virus can't be treated with antibiotics. Staying on an antibiotic when you don't need it is more likely to be harmful than helpful.      You may experience side effects from your antibiotic.    Like all medications, antibiotics have side effects. Some of these can be serious.    Let you healthcare team know if you have any known allergies when you are admitted to the hospital.    One significant side effect of nearly all antibiotics is the risk of severe and sometimes deadly diarrhea caused by  Clostridium difficile (C. Difficile). This occurs when a person takes antibiotics because some good germs are destroyed. Antibiotic use allows C. diificile to take over, putting patients at high risk for this serious infection.    As a patient or caregiver, it is important to understand your or your loved one's antibiotic treatment. It is especially important for caregivers to speak up when patients can't speak for themselves. Here are some important questions to ask your healthcare team.    What infection is this antibiotic treating and how do you know I have that infection?    What side effects might occur from this antibiotic?    How long will I need to take this antibiotic?    Is it safe to take this antibiotic with other medications or supplements (e.g., vitamins) that I am taking?     Are there any special directions I need to know about taking this antibiotic? For example, should I take it with food?    How will I be monitored to know whether my infection is responding to the antibiotic?    What tests may help to make sure the right antibiotic is prescribed for me?      Information provided by:  www.cdc.gov/getsmart  U.S. Department of Health and Human Services  Centers for disease Control and Prevention  National Center for Emerging and Zoonotic Infectious Diseases  Division of Healthcare Quality Promotion        Information about OPIOIDS     PRESCRIPTION OPIOIDS: WHAT YOU NEED TO KNOW   We gave you an opioid (narcotic) pain medicine. It is important to manage your pain, but opioids are not always the best choice. You should first try all the other options your care team gave you. Take this medicine for as short a time (and as few doses) as possible.    Some activities can increase your pain, such as bandage changes or therapy sessions. It may help to take your pain medicine 30 to 60 minutes before these activities. Reduce your stress by getting enough sleep, working on hobbies you enjoy and practicing  relaxation or meditation. Talk to your care team about ways to manage your pain beyond prescription opioids.    These medicines have risks:    DO NOT drive when on new or higher doses of pain medicine. These medicines can affect your alertness and reaction times, and you could be arrested for driving under the influence (DUI). If you need to use opioids long-term, talk to your care team about driving.    DO NOT operate heavy machinery    DO NOT do any other dangerous activities while taking these medicines.    DO NOT drink any alcohol while taking these medicines.     If the opioid prescribed includes acetaminophen, DO NOT take with any other medicines that contain acetaminophen. Read all labels carefully. Look for the word  acetaminophen  or  Tylenol.  Ask your pharmacist if you have questions or are unsure.    You can get addicted to pain medicines, especially if you have a history of addiction (chemical, alcohol or substance dependence). Talk to your care team about ways to reduce this risk.    All opioids tend to cause constipation. Drink plenty of water and eat foods that have a lot of fiber, such as fruits, vegetables, prune juice, apple juice and high-fiber cereal. Take a laxative (Miralax, milk of magnesia, Colace, Senna) if you don t move your bowels at least every other day. Other side effects include upset stomach, sleepiness, dizziness, throwing up, tolerance (needing more of the medicine to have the same effect), physical dependence and slowed breathing.    Store your pills in a secure place, locked if possible. We will not replace any lost or stolen medicine. If you don t finish your medicine, please throw away (dispose) as directed by your pharmacist. The [place] Pollution Control Agency has more information about safe disposal: https://www.pca.[facility].[place].us/living-green/managing-unwanted-medications             Medication List: This is a list of all your medications and when to take them. Check marks  below indicate your daily home schedule. Keep this list as a reference.      Medications           Morning Afternoon Evening Bedtime As Needed    acetaminophen 325 MG tablet   Commonly known as:  TYLENOL   Take 2 tablets (650 mg) by mouth every 4 hours as needed for mild pain or fever   Last time this was given:  650 mg on 10/5/2018  9:38 AM                                   amylase-lipase-protease 91109-40057 units Cpep per EC capsule   Commonly known as:  CREON   Take 1 capsule (24,000 Units) by mouth 3 times daily (with meals)   Last time this was given:  10/5/2018 12:14 PM                                         artificial saliva Liqd liquid   Swish and spit 5-10 mLs in mouth 4 times daily as needed for dry mouth   Last time this was given:  10 mLs on 10/5/2018 12:14 PM                                            CLARITIN PO   Take by mouth daily                                   diclofenac 1 % Gel topical gel   Commonly known as:  VOLTAREN   Place 2 g onto the skin 4 times daily as needed for moderate pain   Last time this was given:  2 g on 10/3/2018  4:55 PM                                   fluconazole 200 MG tablet   Commonly known as:  DIFLUCAN   Take 1 tablet (200 mg) by mouth daily for 10 days   Last time this was given:  200 mg on 10/5/2018  9:38 AM                                   latanoprost 0.005 % ophthalmic solution   Commonly known as:  XALATAN   Place 1 drop into both eyes At Bedtime   Last time this was given:  1 drop on 10/3/2018 10:15 PM                                   Lidocaine 4 % Patch   Commonly known as:  LIDOCARE   Place 1-3 patches onto the skin every 24 hours . Can use as needed. You can keep patch(es) on for up to 12 hrs, then remove for 12 hours. Can use during the daytime or overnight depending on when pain is worse.   Last time this was given:  2 patches on 10/4/2018  8:34 PM                                   lidocaine-prilocaine cream   Commonly known as:  EMLA   Apply topically  as needed for moderate pain (use dollop of cream to saran wrap 30 min prior to use of port)                                   loperamide 2 MG capsule   Commonly known as:  IMODIUM   Take 1 capsule (2 mg) by mouth 4 times daily as needed for diarrhea   Last time this was given:  2 mg on 10/4/2018  9:01 PM                                   megestrol 20 MG tablet   Commonly known as:  MEGACE   Take 1 tablet (20 mg) by mouth daily   Last time this was given:  20 mg on 10/5/2018  9:38 AM                                   nystatin [id number] UNIT/ML suspension   Commonly known as:  MYCOSTATIN   Swish and swallow 5 mLs (500,000 Units) in mouth 4 times daily for 10 days   Last time this was given:  500,000 Units on 10/5/2018 12:13 PM                                            ondansetron 8 MG tablet   Commonly known as:  ZOFRAN   Take 1 tablet (8 mg) by mouth every 8 hours as needed (nausea/vomiting)                                   order for DME   Equipment being ordered: Walker (), Commode () and Other: Gait belt Treatment Diagnosis: Gait Instability                                Potassium Chloride ER 20 MEQ Tbcr   Take 1 tablet (20 mEq) by mouth daily                                   traMADol 50 MG tablet   Commonly known as:  ULTRAM   Take 1 tablet (50 mg) by mouth every 6 hours as needed for moderate pain   Last time this was given:  50 mg on 10/2/2018  5:06 AM                                   WOMENS 50+ MULTI VITAMIN/MIN PO

## 2018-10-01 NOTE — ED PROVIDER NOTES
"  History     Chief Complaint   Patient presents with     Generalized Weakness     The history is provided by the patient.     Talya Gatica is a 78 year old female with a history of metastatic pancreatic cancer with metastases to both lungs who presents with generalized weakness and right lower chest pain. The patient states she has been feeling unwell for two weeks, and states she has \"deteriorated fast\" in the last 6 days. The patient also complains of thrush and reports she is having trouble swallowing. She reports a decrease in PO intake. The patient states she normally walks with a cane, however, since Friday she has become more ill and on Saturday she could no longer ambulate and reports she is crawling to try and get around her home. She also reports a cough at baseline that has somewhat improved, but notes that when she does cough it triggers the right lower chest pain. Lying in bed on her right side also triggers the pain. She states she is weaker everyday and has been unable to perform ADLs as she used to. She also reports urinary frequency. She denies dysuria or change in urinary odor. She denies fever or abdominal pain. She lives with her daughter. The patient states she was told of a heart murmur in the past.    Chest xray 9/27/18:  Impression: Extensive multifocal airspace opacifications to the upper  and lower lobes are consistent with known metastatic nodules and  masses. Superimposed pneumonia cannot be excluded without a recent  comparable chest radiograph.    I have reviewed the Medications, Allergies, Past Medical and Surgical History, and Social History in the [facility] system.  Past Medical History:   Diagnosis Date     AR (allergic rhinitis)      Baker's cyst      DJD (degenerative joint disease)      Elevated cholesterol      Glaucoma      Menopause      Vertigo        Past Surgical History:   Procedure Laterality Date     CATARACT IOL, RT/LT       lipoma removal leg       " PHACOEMULSIFICATION WITH STANDARD INTRAOCULAR LENS IMPLANT  200?    right and left eye     right knee replacement       spinal stenoses       SURGICAL HISTORY OF -       leg     SURGICAL HISTORY OF -   9-14     cyst left eye lateral canthus-removed       Family History   Problem Relation Age of Onset     Glaucoma Mother 60     Hypertension Mother        Social History   Substance Use Topics     Smoking status: Never Smoker     Smokeless tobacco: Never Used     Alcohol use No       Current Facility-Administered Medications   Medication     0.9% sodium chloride BOLUS    Followed by     sodium chloride 0.9% infusion     Current Outpatient Prescriptions   Medication     amylase-lipase-protease (CREON) 16377-37526 units CPEP per EC capsule     BIOTIN PO     latanoprost (XALATAN) 0.005 % ophthalmic solution     Loratadine (CLARITIN PO)     Multiple Vitamins-Minerals (WOMENS 50+ MULTI VITAMIN/MIN PO)     Potassium Chloride ER 20 MEQ TBCR     lidocaine-prilocaine (EMLA) cream     ondansetron (ZOFRAN) 8 MG tablet     potassium chloride SA (KLOR-CON) 20 MEQ CR tablet        Allergies   Allergen Reactions     Codeine Camsylate        Review of Systems   Constitutional: Positive for appetite change (decreased). Negative for fever.   HENT: Positive for trouble swallowing.    Respiratory: Positive for cough (baseline).    Cardiovascular: Positive for chest pain (lower right).   Gastrointestinal: Negative for abdominal pain.   Genitourinary: Positive for frequency. Negative for dysuria.   Neurological: Positive for weakness (generalized).       Physical Exam   BP: 108/58  Pulse: 85  Temp: 98.1  F (36.7  C)  Resp: 16  Weight: 42.2 kg (93 lb) (no scale bed)  SpO2: 97 %      Physical Exam  General: patient is alert and oriented, cachectic and ill-appearing   head: atraumatic and normocephalic   EENT: Very dry mucus membranes with diffuse white lesion noted in the posterior oropharynx, pupils round and reactive, sclera mildly  icteric  Neck: supple with full range of motion  Cardiovascular: regular rate and rhythm, systolic murmur appreciated, extremities warm and well perfused, no lower extremity edema  Pulmonary: Decreased breath sounds in the right lower extremity with rhonchi  Abdomen: soft, nondistended, tenderness to palpation in the right upper quadrant without guarding  Musculoskeletal: normal range of motion   Neurological: alert and oriented, moving all extremities symmetrically  Skin: warm, dry, mildly jaundiced appearing    ED Course     ED Course     Procedures             Critical Care time:  none     The Lactic acid level is elevated due to dehydration, at this time there is no sign of severe sepsis or septic shock.       Labs Ordered and Resulted from Time of ED Arrival Up to the Time of Departure from the ED - No data to display         Assessments & Plan (with Medical Decision Making)   The patient is a 78 year old female with a history of metastatic pancreatic cancer with metastases to both lungs who presents with generalized weakness and right lower chest pain. Differential diagnosis includes but not limited to dehydration, pneumonia, pleural effusion, hepatic dysfunction, obstructive biliary, hepatic disease, UTI, PE. The patient did have labs including CBC, CMP, lipase, troponin and UA.  Labs are notable for a sodium of 132, total bilirubin of 1.9 and lactate of 2.1.  The remainder of her labs are essentially at baseline.  Her albumin is quite low at 2.0.  UA pending.  Chest xray shows by lateral masslike opacity similar to prior.  Given her significant pain in the right lower chest particularly pleuritic in nature CT is pending to rule out possible PE in the setting of her malignancy.  Right upper quadrant ultrasound shows markedly dilated pancreatic duct and cholelithiasis without acute cholecystitis. The patient does appear significantly dehydrated and does have thrush in the posterior oral pharynx which is  causing her dysphasia and significant decrease in her PO intake. She does live at home with her daughter however is no longer able to ambulate or perform ADLs. Patient will be admitted to oncology for continued IV fluids and failure to thrive.  Patient was signed out to evening provider pending CT imaging and UA with initiation of antibiotics if positive.    This part of the medical record was transcribed by Cayla Riggins, Medical Scribe, from a dictation done by Dr. Ingram.   I have reviewed the nursing notes.    I have reviewed the findings, diagnosis, plan and need for follow up with the patient.    New Prescriptions    No medications on file       Final diagnoses:   Dehydration   Adult failure to thrive     I, Cayla Riggins, am serving as a trained medical scribe to document services personally performed by Leelee Ingram MD, based on the provider's statements to me.   Leelee VALVERDE MD, was physically present and have reviewed and verified the accuracy of this note documented by Cayla Riggins.    10/1/2018   [facility], [place], EMERGENCY DEPARTMENT     Leelee Ingram MD  10/01/18 5000

## 2018-10-01 NOTE — LETTER
Transition Communication Hand-off for Care Transitions to Next Level of Care Provider    Name: Talya Gatica  : 1940  MRN #: [id number]  Primary Care Provider: Pancho Cope     Primary Clinic: [address] [facility] [address]     Reason for Hospitalization:  Adult failure to thrive [R62.7]  Dehydration [E86.0]  Admit Date/Time: 10/1/2018 11:20 AM  Discharge Date: 10/4/18  Payor Source: Payor: MEDICA / Plan: MEDICA PRIME SOLUTION / Product Type: Indemnity /     Readmission Assessment Measure (NEGRA) Risk Score/category: Average         Reason for Communication Hand-off Referral: Other Discharging home and electing hospice services    Discharge Plan:    Social Work Services Discharge Note      Patient Name:  Talya Gatica     Anticipated Discharge Date:  10/4/18    Discharge Disposition:   Hospice:  [facility]   Main: [phone], Fax: [phone]    Will discharge to her daughter's apartment.    Following MD:  Pancho Cope MD     Additional Services/Equipment Arranged:  Referral made yesterday to [facility]; they will contact Pt/dtr directly to arrange for SOC meeting following discharge.     Pt's dtr will provide discharge transportation home today at approx. 4PM.     Patient / Family response to discharge plan:  In agreement     Concern for non-adherence with plan of care:   Y/N N  Discharge Needs Assessment:  Needs       Most Recent Value    Equipment Currently Used at Home cane, straight    Transportation Available family or friend will provide          Already enrolled in Tele-monitoring program and name of program:  N/A  Follow-up specialty is recommended: No    Follow-up plan:  Future Appointments  Date Time Provider Department Center   10/5/2018 12:15 PM NURSE ONLY CANCER CENTER [facility]   10/5/2018 12:45 PM Nai Simmons APRN CNP [facility]   10/5/2018 2:00 PM BAY 7 INFUSION [facility] [place]    10/11/2018 10:00 AM NURSE ONLY CANCER CENTER [facility] [place]   10/11/2018 10:30 AM BAY 3 INFUSION [facility] [place]       Any outstanding tests or procedures:        Referrals     Future Labs/Procedures    Home care nursing referral     Comments:    [facility]    Hospice to eval and treat           VIKAS Pacheco, LGSW  7C Surgical Oncology Unit  - Covering for Unit 7D Hematology/Oncology  Phone: [phone]  Pager: [phone]

## 2018-10-02 NOTE — PROGRESS NOTES
Attempted to see pt for redness on coccyx area. Pt mentioned no issue with the bottom. Provider is with the patient currently. RN assessed the area and noted blanchable erythema, no open areas. Placed Mepilex dressing for prevention and repositioning pt as per protocol, which is appropriate at this time. Spoke with RN and recommended to continue with pressure injury prevention. No further visit planned, will sign off.

## 2018-10-02 NOTE — PLAN OF CARE
Problem: Patient Care Overview  Goal: Plan of Care/Patient Progress Review  Outcome: No Change    Pt A&Ox4 with minor forgetfulness. VSS. IVF running @75mL/hr. C/o R upper back pain, tramadol given x1. C/o generalized weakness, up with assist x1 to commode. Blanchable redness on coccyx - WOC nurse consulted. Continue to monitor.

## 2018-10-02 NOTE — PROGRESS NOTES
10/02/18 0900   Quick Adds   Type of Visit Initial Occupational Therapy Evaluation   Living Environment   Lives With child(ian), adult  (daughter)   Living Arrangements apartment   Number of Stairs to Enter Home 0   Number of Stairs Within Home 0   Transportation Available family or friend will provide   Living Environment Comment Pt reports she has a house but is currently staying with her daughter in an apartment.  inconsistant reporting although sounds like she is selling her house.  No stairs at apartment but long hallway.    Self-Care   Dominant Hand right   Usual Activity Tolerance good   Current Activity Tolerance fair   Regular Exercise no   Equipment Currently Used at Home cane, straight   Activity/Exercise/Self-Care Comment Reports a cane for community only. no AE in house.    Functional Level Prior   Ambulation 1-->assistive equipment   Transferring 0-->independent   Toileting 0-->independent   Bathing 0-->independent   Dressing 0-->independent   Swallowing 0-->swallows foods/liquids without difficulty   Cognition 0 - no cognition issues reported   Fall history within last six months no   Which of the above functional risks had a recent onset or change? ambulation;transferring;toileting;bathing;dressing;cognition   Prior Functional Level Comment Per pt she was IND a few weeks ago, tub transfers for bathing IND and cane for community mobility only.  The week prior to admit pt was crawling around on the floor due to inability to walk and reports getting stuck in the tub for an hour, unable to get out.    General Information   Onset of Illness/Injury or Date of Surgery - Date 10/01/18   Referring Physician Dr Rosales   Patient/Family Goals Statement to be able to walk around the apartment   Additional Occupational Profile Info/Pertinent History of Current Problem 77 yo female with progressive metastatic pancreatic cancer who is presenting to the ER with extreme FTT, inability to ambulate, poor PO intake and  severe oral thrush. CT CAP done in ER showed worsening metastatic disease with increased size of cavitary lung masses and new peritoneal carcinomatosis.     Precautions/Limitations fall precautions   General Observations Pt sleeping soundly, per RN pt had pain meds 4 hours ago and struggling to wake up.  Pleasant and agreeable.    General Info Comments activity up ad layo   Cognitive Status Examination   Orientation orientation to person, place and time   Level of Consciousness lethargic/somnolent;confused;alert   Able to Follow Commands mild impairment   Personal Safety (Cognitive) mild impairment   Memory impaired   Attention Distractible during evaluation   Cognitive Comment Pt chatty, sometimes hard to follow as pt not directly answering questions and occasionally moving off topic.    Visual Perception   Visual Perception Comments reports great vision since cataract sx   Sensory Examination   Sensory Comments WFL   Pain Assessment   Patient Currently in Pain No   Integumentary/Edema   Integumentary/Edema no deficits were identifed   Posture   Posture Comments hunched posture in standing, posterior lean with sitting EOB   Range of Motion (ROM)   ROM Quick Adds No deficits were identified   Strength   Strength Comments well below her baseline, weakness   Hand Strength   Hand Strength Comments below baseline   Coordination   Fine Motor Coordination BNL   Functional Limitations Decreased speed;Fine motor ADL performance impaired   Mobility   Bed Mobility Comments mod A   Transfer Skills   Transfer Comments mod A transfer to chair   Transfer Skill: Bed to Chair/Chair to Bed   Level of [place]: Bed to Chair moderate assist (50% patients effort)   Physical Assist/Nonphysical Assist: Bed to Chair 1 person assist   Transfer Skill: Sit to Stand   Level of [place]: Sit/Stand moderate assist (50% patients effort)   Physical Assist/Nonphysical Assist: Sit/Stand 1 person assist   Toilet Transfer   Toilet Transfer  "Comments BSC   Tub/Shower Transfer   Tub/Shower Transfer Comments takes baths at home   Balance   Balance Comments bnl   Grooming   Level of [place]: Grooming minimum assist (75% patients effort)   Physical Assist/Nonphysical Assist: Grooming set-up required   Instrumental Activities of Daily Living (IADL)   Previous Responsibilities meal prep;housekeeping;laundry;shopping;medication management  (has help w all)   Activities of Daily Living Analysis   Impairments Contributing to Impaired Activities of Daily Living balance impaired;cognition impaired;coordination impaired;fear and anxiety;strength decreased;postural control impaired   General Therapy Interventions   Planned Therapy Interventions ADL retraining;IADL retraining;strengthening   Clinical Impression   Criteria for Skilled Therapeutic Interventions Met yes, treatment indicated   OT Diagnosis failure to thrive   Influenced by the following impairments weakness, AMS   Assessment of Occupational Performance 5 or more Performance Deficits   Identified Performance Deficits all ADLs and IADLs are affected   Clinical Decision Making (Complexity) Low complexity   Therapy Frequency 5 times/wk   Predicted Duration of Therapy Intervention (days/wks) 3 weeks   Anticipated Discharge Disposition Transitional Care Facility   Risks and Benefits of Treatment have been explained. Yes   Patient, Family & other staff in agreement with plan of care Yes   [facility] TM \"6 Clicks\"   2016, Trustees of [facility], under license to [employer].  All rights reserved.   6 Clicks Short Forms Daily Activity Inpatient Short Form   [facility]  \"6 Clicks\" Daily Activity Inpatient Short Form   1. Putting on and taking off regular lower body clothing? 2 - A Lot   2. Bathing (including washing, rinsing, drying)? 2 - A Lot   3. Toileting, which includes using toilet, bedpan or urinal? 2 - A Lot   4. Putting on and taking off regular upper body " clothing? 3 - A Little   5. Taking care of personal grooming such as brushing teeth? 4 - None   6. Eating meals? 4 - None   Daily Activity Raw Score (Score out of 24.Lower scores equate to lower levels of function) 17   Total Evaluation Time   Total Evaluation Time (Minutes) 15

## 2018-10-02 NOTE — PLAN OF CARE
Problem: Patient Care Overview  Goal: Plan of Care/Patient Progress Review  Discharge Planner OT  KELLY Suresh  Patient plan for discharge: home w daughter  Current status: Pt currently well below her baseline for strength and IND with ADLs.  Needing heavy Ax1 for pivot to chair. Able to complete simple ADLS seated in chair after set up.  Pt tangential and easily off topic.  Difficult to assess accurate PLOF.  Barriers to return to prior living situation: fall risk, weakness, AMS  Recommendations for discharge: TCU  Rationale for recommendations: To improve strength and activity tolerance for ADLs and safe functional trnasfers       Entered by: Alison Walter 10/02/2018 9:54 AM

## 2018-10-02 NOTE — PROGRESS NOTES
10/02/18 1318   Quick Adds   Type of Visit Initial PT Evaluation      Language English   Living Environment   Lives With child(ian), adult   Living Arrangements apartment   Home Accessibility tub/shower is not walk in   Number of Stairs to Enter Home 0   Number of Stairs Within Home 0   Transportation Available family or friend will provide   Living Environment Comment Pt reports that she is currently staying at her daughter's apartment where there are no stairs but long hallways to get to/from the elevator. She also states that her daughter would be able to supervise her or help with assistance when home. Daughter does work. Method of which pt describes how she gets into and out of the tub does not sound safe (possibly she uses a shower chair) - hard to fully gain a clear picture of living arrangement due to pt's cognition status and rambling.    Self-Care   Dominant Hand right   Usual Activity Tolerance good   Current Activity Tolerance fair   Regular Exercise no   Equipment Currently Used at Home cane, straight   Activity/Exercise/Self-Care Comment Pt reported starting to use a cane ~ 1 week ago, then continued to rapidly decline in function where she was crawling to get around her apartment due to fatigue/weakness.    Functional Level Prior   Ambulation 1-->assistive equipment   Transferring 0-->independent   Toileting 0-->independent   Bathing 0-->independent   Dressing 0-->independent   Eating 0-->independent   Communication 0-->understands/communicates without difficulty   Swallowing 0-->swallows foods/liquids without difficulty   Cognition 0 - no cognition issues reported   Fall history within last six months no   Which of the above functional risks had a recent onset or change? ambulation;transferring;toileting;bathing   Prior Functional Level Comment Pt uses a cane for short and long distances.    General Information   Onset of Illness/Injury or Date of Surgery - Date 10/01/18   Referring  Physician Harrison Rosales MD   Patient/Family Goals Statement Pt would like to get back to walking with a cane.   Pertinent History of Current Problem (include personal factors and/or comorbidities that impact the POC) 77 yo female with progressive metastatic pancreatic cancer who is presenting to the ER with extreme FTT, inability to ambulate, poor PO intake and severe oral thrush. CT CAP done in ER showed worsening metastatic disease with increased size of cavitary lung masses and new peritoneal carcinomatosis.    Precautions/Limitations no known precautions/limitations   General Observations Pt is up with min A and use of FWW.    Cognitive Status Examination   Orientation orientation to person, place and time   Level of Consciousness alert   Memory impaired   Pain Assessment   Patient Currently in Pain No   Integumentary/Edema   Integumentary/Edema Comments Increased fluid in B LE - no pitting edema   Posture    Posture Comments Not impaired in sitting - in standing posterior trunk lean/increased back extension with hips shifted anteriorly    Range of Motion (ROM)   ROM Comment WFL for functional mobility   Strength   Strength Comments WFL for functional mobility: at least a 3/5 for all B LE MMTs   Bed Mobility   Bed Mobility Comments SBA   Transfer Skills   Transfer Comments SBA - Min A with FWW   Gait   Gait Comments Min A with FWW   Balance   Balance Comments Sitting balance is good, Standing balance is fair   Sensory Examination   Sensory Perception Comments No new deficits noted by pt (no numbness or tingling)   General Therapy Interventions   Planned Therapy Interventions balance training;gait training;strengthening;neuromuscular re-education;transfer training   Clinical Impression   Criteria for Skilled Therapeutic Intervention yes, treatment indicated   PT Diagnosis Impaired functional mobility   Influenced by the following impairments Decreased endurance, strength, and cognition   Functional limitations  "due to impairments Increased supervision/assistance needed for functional mobility, use of a FWW for mobility, unable to walk distances outside of room   Clinical Presentation Evolving/Changing   Clinical Presentation Rationale clinical judgement and chart review   Clinical Decision Making (Complexity) Moderate complexity   Therapy Frequency` 5 times/week   Predicted Duration of Therapy Intervention (days/wks) several days   Anticipated Equipment Needs at Discharge walker   Risk & Benefits of therapy have been explained Yes   Patient, Family & other staff in agreement with plan of care Yes   [facility] TM \"6 Clicks\"   2016, Trustees of [facility], under license to [employer].  All rights reserved.   6 Clicks Short Forms Basic Mobility Inpatient Short Form   [facility] AM-PAC  \"6 Clicks\" V.2 Basic Mobility Inpatient Short Form   1. Turning from your back to your side while in a flat bed without using bedrails? 3 - A Little   2. Moving from lying on your back to sitting on the side of a flat bed without using bedrails? 3 - A Little   3. Moving to and from a bed to a chair (including a wheelchair)? 3 - A Little   4. Standing up from a chair using your arms (e.g., wheelchair, or bedside chair)? 3 - A Little   5. To walk in hospital room? 3 - A Little   6. Climbing 3-5 steps with a railing? 2 - A Lot   Basic Mobility Raw Score (Score out of 24.Lower scores equate to lower levels of function) 17   Total Evaluation Time   Total Evaluation Time (Minutes) 10     "

## 2018-10-02 NOTE — PROGRESS NOTES
CLINICAL NUTRITION SERVICES - BRIEF NOTE      Per chart review, nutrition consult received on admission last night, but no reason given. Per H&P, pt presents with extreme FTT, inability to ambulate, poor po intakes and oral thrush. CT CAP obtained in ER which shows worsening metastatic disease.     INTERVENTIONS   Spoke with PA during interdisciplinary rounds today regarding POC and informed that focus of pt's care is around comfort and therefore, no aggressive nutrition intervention planned at this time.     RD to sign off at this time.    Myla Wren RD,LD  Unit pager [phone]

## 2018-10-02 NOTE — PROGRESS NOTES
[facility], [place]    Hematology / Oncology Progress Note    Date of Admission: 10/1/2018  Date of Service (when I saw the patient): 10/02/2018     Assessment & Plan   Patient is a 79 yo female with progressive metastatic pancreatic cancer who is presenting to the ER with extreme FTT, inability to ambulate, poor PO intake and severe oral thrush. CT CAP done in ER showed worsening metastatic disease with increased size of cavitary lung masses and new peritoneal carcinomatosis.       #Extreme failure to thrive.   #Progressive metastatic pancreatic cancer.  Pt describes rapidly progressive weakness/imbalance, lethargy, inability to ambulate, and poor PO intake over the last week PTA. Progressive drop in albumin, 2.0 on admit. Afebrile and VS stable on admit. Lactic acid mildly elevated, but remainder of labs/VS reassuring. Did have hypotension early this AM, responsive to fluids. Clinical status most likely related to progressive disease as seen on imaging. Did have UA on admission, no e/o infection.   - will obtain MR brain to assess for any treatable cause of imbalance  - continue IV hydration, encourage PO intake as able  - we discussed our recommendation for no further chemotherapy and discussed the idea of hospice. She would like to discuss further with her daughter, but she would ultimately like to return home (where she lives with her daughter).   - PT/OT for assessment of safe dispo planning/equipment needs at home.       #Stage IV metastatic pancreatic cancer.   Follows with Dr. Soares. Presented with jaundice in 7/2017 and was found to have adenocarcinoma of the head of pancreas causing biliary obstruction and several pulmonary nodules consistent with metastases. S/P CBD stenting and multiple chemo regimens. Most recently, she was started on palliative Carboplatin/Abraxane on 8/15/18. Cycle 2 day 15 was canceled due to low counts/neutropenia. Cycle 3 was again cancelled as patient  was too weak and declined. She now has radiographic evidence of disease progression as above.   - goals of care discussion as above   - team will alert Dr. Soares to pt's admission     #Anasarca.   Diffuse subcutaneous edema and new mild ascites noted on CT A&P. No significant pleural effusions or ascites requiring tap. Does have b/l LE edema, likely due to third-spacing from low albumin.      #Mild lactic acidosis.   Likely rel to advanced met malignancy. No evidence of sepsis, non-toxic appearing at this time. No definite consolidation, apart from that which could be confluence of mets, noted on CXR or CT chest to suggest PNA. UA negative.   - IVF support, monitor clinically.     #Intermittent loose stools.   Has been ongoing outpatient, instructed to trial imodium.   - one stool thus far this admission, continue to monitor  - continue Creon; pt reportedly decreased frequency of use at home due to cost     #Oropharyngeal candidiasis.    Extensive thrush through out the mouth and oropharynx, noted on admission.    - Nystatin swish and swallow.  - started Fluconazole for esophageal candidiasis - 200 mg loading dose followed by 200 mg daily x 14 days (x10/1).      #Hyponatremia.   On admit, likely due to hypovolemia. Currently IMPROVED with fluid support.      #RUQ pain  #Right posterior shoulder/torso pain.   Likely related to underlying metastatic malignancy. Pt reported RUQ pain on admit. RUQ US showed cholelithiasis without acute cholecystitis, stable CBD stent, but no acute findings. CT CAP negative for acute PE but showed worsening metastatic disease with increased size of lung masses and new peritoneal carcinomatosis. No pleural effusion. No osseous abnormalities seen on imaging.   - tramadol PRN   - can try voltaren gel QID, lidoderm patch at night      #Anemia, thrombocytopenia.  2/2 chemotherapy.  - Monitor CBC.      FEN:   - Regular diet as tolerated.  - IVFs with D5 NS @ 75 ml/hr  - PRN lyte replacement per  "standing protocol.     Prophylaxis:   - No pharmacologic VTE ppx due to thrombocytopenia.   - PRN constipation ppx.       Code Status: FULL at this time. Will continue to discuss along with GOC.     Disposition: Inpatient admn for severe FTT, oral thrush, and progressive metastatic disease. Ongoing GOC discussions today. Anticipate discharge with hospice support (home would be pt preference at this time).     Alison Rodriguez PA-C  Heme/Onc  [phone]        Interval History   Admitted overnight, afebrile. States she didn't get to bed until about 1:30 AM so was tired this AM. She was sitting up in chair, awaiting breakfast after being evaluated by OT team this AM. She describes how quickly she went downhill at home since last Thursday, when she \"didn't take chemo\". Reports rapidly increasing weakness and trouble with \"balance\" at home. Denies room spinning or \"vertigo\" symptoms (states she has dealt with vertigo in the past). States that on Saturday she was so weak that she had to crawl on the floor. She also had an episode of stool that caught her by surprise. She has chronic cough, productive of greenish-brownish sputum. Denies any new SOB or difficulty breathing. Had reported RUQ pain to admitting MD, but denies abdominal pain today. Report pain in right posterior shoulder and back. Denies increased bloating, nausea, or vomiting. Does have extremely dry mouth and has been having pain with swallowing, even water, which she attributes to the thrush. Denies dysuria. Reports difficulty with LE edema, but thinks this is pretty good at present.     We discussed the result of her CT scans, which demonstrate that her cancer is progressing. She understands our recommendation for no further treatment at this time and wanting to focus on supporting her and keeping her comfortable at home, which is where she would like to be. She reports that she and her daughter agreed a long time ago that she would not want a \"life not worth " "living\". She is going to give her daughter some time to rest this morning (since she was here with Amira so late last night) and then call her with the news of her scan. In the meantime, we discussed obtaining brain imaging to see if there is anything else we could treat symptomatically. Will check back in with patient and her daughter later today.    Physical Exam   Temp: 97.5  F (36.4  C) Temp src: Oral BP: 96/47 Pulse: 88   Resp: 20 SpO2: 92 % O2 Device: None (Room air)    Vitals:    10/01/18 1125 10/01/18 2229   Weight: 42.2 kg (93 lb) 46.7 kg (103 lb)     Vital Signs with Ranges  Temp:  [96.7  F (35.9  C)-97.5  F (36.4  C)] 97.5  F (36.4  C)  Pulse:  [80-96] 88  Resp:  [18-20] 20  BP: ()/() 96/47  SpO2:  [91 %-100 %] 92 %  I/O last 3 completed shifts:  In: 552.5 [P.O.:60; I.V.:492.5]  Out: 500 [Urine:500]    Constitutional: Pleasant female seen sitting up in chair. Awake, interactive, telling stories. NAD.   HEENT: NC/AT. Alopecia, wearing head scarf. OP pink and dry, +thrush.   Heart: RRR, no murmurs appreciated.   Lungs: Breathing even and non-labored on RA. Dry cough appreciated on/off. Lungs are course throughout, no wheezing or crackles appreciated.   Abd: Normal BS. Abd mildly distended but soft, non-tender.   MSK: 1-2+ pitting edema of b/l LEs.   Skin: Clean, dry. No rashes or lesions on limited exam.   Neuro: Alert, oriented. Speech normal. CN testing intact. PERRL, EOMI. Finger-to-nose intact b/l. Did not assess stance/gait due to pt not wanting to stand for exam due to weakness. Mentation appears normal.    Medications     dextrose 5% and 0.45% NaCl 75 mL/hr at 10/02/18 0025     - MEDICATION INSTRUCTIONS -         amylase-lipase-protease  2 capsule Oral TID w/meals     artificial saliva  5-10 mL Swish & Spit 4x Daily     diclofenac  2 g Transdermal 4x Daily     fluconazole  200 mg Oral Daily     latanoprost  1 drop Both Eyes At Bedtime     lidocaine  2-3 patch Transdermal Q24h    And     " [START ON 10/3/2018] lidocaine   Transdermal Q24H    And     lidocaine   Transdermal Q8H     megestrol  20 mg Oral Daily     nystatin  500,000 Units Oral 4x Daily     sodium chloride 0.9% (bottle)           Data   Results for orders placed or performed during the hospital encounter of 10/01/18 (from the past 24 hour(s))   US Abdomen Limited    Narrative    EXAMINATION: Limited Abdominal Ultrasound, 10/1/2018 1:55 PM     COMPARISON: None. CT dated 7/5/2018 is available for correlation.    HISTORY: Right upper quadrant pain. Metastatic pancreatic cancer.    FINDINGS:   Fluid: Perihepatic fluid is noted.    Liver: Pneumobilia. The liver demonstrates normal echotexture with  increased echogenicity, measuring 14 cm in craniocaudal dimension.  There is no focal mass.     Gallbladder: Solitary large gallstone in the lumen of the gallbladder  measuring up to 4 cm correlates with recent CT. No gallbladder wall  thickening, pericholecystic fluid or positive sonographic Eckert sign.  Echogenic foci with associated posterior artifact noted in the  gallbladder wall suggestive of underlying adenomyomatosis.    Bile Ducts: Both the intra- and extrahepatic biliary system are of  normal caliber.  Common bile duct stent is in place. The common bile  duct measures 7 mm in diameter.    Pancreas: Known pancreatic mass is not visualized with ultrasound.  Persistent dilation of the pancreatic duct up to 8 to 9 mm.    Kidney: The right kidney measures 10.6 cm long. There is no  hydronephrosis or hydroureter, no shadowing renal calculi, cystic  lesion or mass. Benign-appearing left renal cyst measures 4.4 x 3.8 x  2.6 cm.      Impression    IMPRESSION:   1.  No acute right upper quadrant abdominal ultrasound finding.  2.  Markedly dilated pancreatic duct in this patient with known  pancreatic carcinoma.  3.  Cholelithiasis without evidence of acute cholecystitis.  Biliary  stent in place.  4.  Hepatic steatosis with trace perihepatic fluid.   Pneumobilia.    BANDAR SANTANA MD   XR Chest 1 View    Narrative    XR CHEST 1 VW  10/1/2018 2:10 PM      HISTORY: right sided low chest pain, ?PNA vs effusion;     COMPARISON: 9/27/2018    FINDINGS: Single portable AP view of the chest semiupright. Right IJ  Port-A-Cath tip projects over the mid SVC. Cardiac silhouette is  stable and within normal limits. The pulmonary vasculature is  obscured. Lung volumes are slightly low. There are extensive bilateral  nodular and masslike opacities which are not significantly changed  from the previous study. No definite superimposed consolidation,  pleural effusion, or pneumothorax.      Impression    IMPRESSION: Bilateral nodular/masslike opacities are similar to the  previous study and consistent with known metastases. There is no  definite superimposed consolidation.    I have personally reviewed the examination and initial interpretation  and I agree with the findings.    FRANCISCO ADAMES MD   UA with Microscopic   Result Value Ref Range    Color Urine Yellow     Appearance Urine Clear     Glucose Urine Negative NEG^Negative mg/dL    Bilirubin Urine Negative NEG^Negative    Ketones Urine 10 (A) NEG^Negative mg/dL    Specific Gravity Urine 1.012 1.003 - 1.035    Blood Urine Negative NEG^Negative    pH Urine 6.0 5.0 - 7.0 pH    Protein Albumin Urine Negative NEG^Negative mg/dL    Urobilinogen mg/dL 2.0 0.0 - 2.0 mg/dL    Nitrite Urine Negative NEG^Negative    Leukocyte Esterase Urine Small (A) NEG^Negative    Source Clean catch urine     WBC Urine 1 0 - 5 /HPF    RBC Urine 1 0 - 2 /HPF    Squamous Epithelial /HPF Urine 1 0 - 1 /HPF    Transitional Epi 1 0 - 1 /HPF    Mucous Urine Present (A) NEG^Negative /LPF   CT Abdomen Pelvis w Contrast    Narrative    Examination:  CT ABDOMEN PELVIS W CONTRAST 10/1/2018 8:53 PM     Comparison: 7/5/2018    History: RUQ pain;     Technique: Volumetric helical acquisition of CT images from the lung  bases through the symphysis pubis after the  uneventful administration  of iopamidol (ISOVUE-370) solution 59 mL.  Coronal and sagittal images  were reconstructed from the source data.    Findings:    Abdomen/pelvis:  Small volume of new ascites. Diffuse mesenteric stranding. Possible  studding in nodularity of the fat in the left upper quadrant and  possible mild new peritoneal thickening. Biliary stent in place with  pneumobilia similar to prior study. Gas is now seen in the  gallbladder. Stable appearance of a single calcified gallstone. The  gallbladder is otherwise unremarkable. No acute findings within the  liver. Unchanged portal-hepatic venous shunt in the right lobe of the  liver. Large simple renal cyst on the right kidney. Left kidney is  normal. The adrenal glands and spleen are normal. Ill-defined  hypoattenuating/hypoenhancing pancreatic head mass is not  substantially changed from the prior study with stable severe upstream  pancreatic parenchymal atrophy and ductal dilation. No dilated loops  of large or small bowel. The major abdominal vasculature is patent and  within normal limits. There is encasement of the celiac artery and  possibly the SMA by the pancreatic mass. The bladder is well distended  and unremarkable. Large right adnexal cyst is similar to prior.  Densely calcified uterine fibroids.    Bones and soft tissues:   No acute osseus abnormality or suspicious bony lesion. Anasarca.  Severe degenerative changes in the lumbar spine with stepwise  retrolisthesis from L1 to L3 and stepwise anterolisthesis from L4 to  S1.      Impression    Impression:   1. Worsening metastatic disease with as evidenced by increased size of  pulmonary metastases and possible developing peritoneal  carcinomatosis. Primary pancreatic mass is similar to the prior with  vascular involvement.  2. Anasarca.  3. Cholelithiasis and pneumobilia unchanged from prior study with  biliary stent in place. No evidence of cholecystitis.    I have personally reviewed the  examination and initial interpretation  and I agree with the findings.    JULIA ART MD   CT Chest Pulmonary Embolism w Contrast    Narrative    Examination:  CT CHEST PULMONARY EMBOLISM W CONTRAST 10/1/2018 8:54 PM      Comparison: 7/5/2018    History: right lower chest pain, Dyspnea;     TECHNIQUE: Volumetric helical acquisition of CT images of the chest  from the lung apices to the kidneys were acquired in arterial phase  after the uncomplicated administration of iopamidol (ISOVUE-370)  solution 59 mL.  Three-dimensional (3D) post-processed angiographic  images were reconstructed, archived to PACS and used in interpretation  of this study.    FINDINGS:  There is adequate opacification of the main and lobar pulmonary  arteries. No filling defect in the lobar and main segmental pulmonary  arteries to suggest pulmonary embolism.     Chest:   Innumerable large cavitary metastatic nodules and masses are increased  from the previous study. Question of superimposed consolidation in the  posterior left lung base versus confluence of masses. No pleural  effusion or pneumothorax. No focal airspace opacity or significant  interstitial thickening.     No acute osseus abnormality or suspicious bony lesion.      Impression    IMPRESSION:   1. Negative for pulmonary embolus.  2. Significantly worsening pulmonary metastatic disease.  3. New consolidation in the posterior left lung base versus confluence  of metastatic masses. Evaluation is limited by arterial phase of  contrast for PE study.    I have personally reviewed the examination and initial interpretation  and I agree with the findings.    JULIA ART MD   CBC with platelets differential   Result Value Ref Range    WBC 3.8 (L) 4.0 - 11.0 10e9/L    RBC Count 2.87 (L) 3.8 - 5.2 10e12/L    Hemoglobin 8.2 (L) 11.7 - 15.7 g/dL    Hematocrit 24.9 (L) 35.0 - 47.0 %    MCV 87 78 - 100 fl    MCH 28.6 26.5 - 33.0 pg    MCHC 32.9 31.5 - 36.5 g/dL    RDW 17.0  (H) 10.0 - 15.0 %    Platelet Count 116 (L) 150 - 450 10e9/L    Diff Method Manual Differential     % Neutrophils 86.0 %    % Lymphocytes 7.0 %    % Monocytes 7.0 %    % Eosinophils 0.0 %    % Basophils 0.0 %    Nucleated RBCs 1 (H) 0 /100    Absolute Neutrophil 3.3 1.6 - 8.3 10e9/L    Absolute Lymphocytes 0.3 (L) 0.8 - 5.3 10e9/L    Absolute Monocytes 0.3 0.0 - 1.3 10e9/L    Absolute Eosinophils 0.0 0.0 - 0.7 10e9/L    Absolute Basophils 0.0 0.0 - 0.2 10e9/L    Absolute Nucleated RBC 0.0     Anisocytosis Slight     Poikilocytosis Slight     Ovalocytes Slight     Microcytes Present     Platelet Estimate Confirming automated cell count    Basic metabolic panel   Result Value Ref Range    Sodium 137 133 - 144 mmol/L    Potassium 3.4 3.4 - 5.3 mmol/L    Chloride 105 94 - 109 mmol/L    Carbon Dioxide 21 20 - 32 mmol/L    Anion Gap 10 3 - 14 mmol/L    Glucose 156 (H) 70 - 99 mg/dL    Urea Nitrogen 10 7 - 30 mg/dL    Creatinine 0.41 (L) 0.52 - 1.04 mg/dL    GFR Estimate >90 >60 mL/min/1.7m2    GFR Estimate If Black >90 >60 mL/min/1.7m2    Calcium 8.2 (L) 8.5 - 10.1 mg/dL   Magnesium   Result Value Ref Range    Magnesium 2.1 1.6 - 2.3 mg/dL   Phosphorus   Result Value Ref Range    Phosphorus 3.3 2.5 - 4.5 mg/dL

## 2018-10-02 NOTE — ED NOTES
[facility], [place]   ED Nurse to Floor Handoff     Talya Gatica is a 78 year old female who speaks English and lives with others,  in an assisted living  They arrived in the ED by ambulance from home    ED Chief Complaint: Generalized Weakness    ED Dx;   Final diagnoses:   Dehydration   Adult failure to thrive         Needed?: No    Allergies:   Allergies   Allergen Reactions     Codeine Camsylate    .  Past Medical Hx:   Past Medical History:   Diagnosis Date     AR (allergic rhinitis)      Baker's cyst      DJD (degenerative joint disease)      Elevated cholesterol      Glaucoma      Menopause      Vertigo       Baseline Mental status: WDL  Current Mental Status changes: at basesline    Infection present or suspected this encounter: no  Sepsis suspected: No  Isolation type: No active isolations     Activity level - Baseline/Home:  Stand with Assist of 2  Activity Level - Current:   Stand with Assist of 2    Bariatric equipment needed?: No    In the ED these meds were given:   Medications   0.9% sodium chloride BOLUS (1,000 mLs Intravenous New Bag 10/1/18 1355)     Followed by   sodium chloride 0.9% infusion (not administered)   sodium chloride (PF) 0.9% PF flush 65 mL (65 mLs Intravenous Given 10/1/18 2040)   iopamidol (ISOVUE-370) solution 59 mL (59 mLs Intravenous Given 10/1/18 2039)       Drips running?  No    Home pump  No    Current LDAs  Peripheral IV Left Upper forearm (Active)   Site Assessment [facility] 10/1/2018  7:53 PM   Number of days:       Port A Cath Single 05/14/18 Right Chest wall (Active)   Access Date 10/01/18 10/1/2018  1:11 PM   Access Attempts 1 10/1/2018  1:11 PM   Gauge/Length 20 gauge;3/4 inch 10/1/2018  1:11 PM   Site Assessment [facility] 10/1/2018  1:11 PM   Line Status Saline locked 10/1/2018  1:11 PM   Extravasation? No 10/1/2018  1:11 PM   Dressing Intervention Transparent 10/1/2018  1:11 PM   Number of days:140       Labs results:   Labs Ordered  and Resulted from Time of ED Arrival Up to the Time of Departure from the ED   CBC WITH PLATELETS DIFFERENTIAL - Abnormal; Notable for the following:        Result Value    RBC Count 3.09 (*)     Hemoglobin 9.0 (*)     Hematocrit 25.7 (*)     RDW 16.4 (*)     Platelet Count 97 (*)     Absolute Lymphocytes 0.2 (*)     All other components within normal limits   COMPREHENSIVE METABOLIC PANEL - Abnormal; Notable for the following:     Sodium 132 (*)     Creatinine 0.39 (*)     Calcium 8.2 (*)     Bilirubin Total 1.9 (*)     Albumin 2.0 (*)     Protein Total 6.2 (*)     All other components within normal limits   LIPASE - Abnormal; Notable for the following:     Lipase 49 (*)     All other components within normal limits   LACTIC ACID WHOLE BLOOD - Abnormal; Notable for the following:     Lactic Acid 2.1 (*)     All other components within normal limits   ROUTINE UA WITH MICROSCOPIC - Abnormal; Notable for the following:     Ketones Urine 10 (*)     Leukocyte Esterase Urine Small (*)     Mucous Urine Present (*)     All other components within normal limits   TROPONIN I       Imaging Studies:   Recent Results (from the past 24 hour(s))   US Abdomen Limited    Narrative    EXAMINATION: Limited Abdominal Ultrasound, 10/1/2018 1:55 PM     COMPARISON: None. CT dated 7/5/2018 is available for correlation.    HISTORY: Right upper quadrant pain. Metastatic pancreatic cancer.    FINDINGS:   Fluid: Perihepatic fluid is noted.    Liver: Pneumobilia. The liver demonstrates normal echotexture with  increased echogenicity, measuring 14 cm in craniocaudal dimension.  There is no focal mass.     Gallbladder: Solitary large gallstone in the lumen of the gallbladder  measuring up to 4 cm correlates with recent CT. No gallbladder wall  thickening, pericholecystic fluid or positive sonographic Eckert sign.  Echogenic foci with associated posterior artifact noted in the  gallbladder wall suggestive of underlying adenomyomatosis.    Bile  Ducts: Both the intra- and extrahepatic biliary system are of  normal caliber.  Common bile duct stent is in place. The common bile  duct measures 7 mm in diameter.    Pancreas: Known pancreatic mass is not visualized with ultrasound.  Persistent dilation of the pancreatic duct up to 8 to 9 mm.    Kidney: The right kidney measures 10.6 cm long. There is no  hydronephrosis or hydroureter, no shadowing renal calculi, cystic  lesion or mass. Benign-appearing left renal cyst measures 4.4 x 3.8 x  2.6 cm.      Impression    IMPRESSION:   1.  No acute right upper quadrant abdominal ultrasound finding.  2.  Markedly dilated pancreatic duct in this patient with known  pancreatic carcinoma.  3.  Cholelithiasis without evidence of acute cholecystitis.  Biliary  stent in place.  4.  Hepatic steatosis with trace perihepatic fluid.  Pneumobilia.    BANDAR SANTANA MD   XR Chest 1 View    Narrative    XR CHEST 1 VW  10/1/2018 2:10 PM      HISTORY: right sided low chest pain, ?PNA vs effusion;     COMPARISON: 9/27/2018    FINDINGS: Single portable AP view of the chest semiupright. Right IJ  Port-A-Cath tip projects over the mid SVC. Cardiac silhouette is  stable and within normal limits. The pulmonary vasculature is  obscured. Lung volumes are slightly low. There are extensive bilateral  nodular and masslike opacities which are not significantly changed  from the previous study. No definite superimposed consolidation,  pleural effusion, or pneumothorax.      Impression    IMPRESSION: Bilateral nodular/masslike opacities are similar to the  previous study and consistent with known metastases. There is no  definite superimposed consolidation.    I have personally reviewed the examination and initial interpretation  and I agree with the findings.    FRANCISCO ADAMES MD       Recent vital signs:   BP 96/48  Pulse 85  Temp 98.1  F (36.7  C) (Oral)  Resp 16  Wt 42.2 kg (93 lb)  SpO2 97%  BMI 18.48 kg/m2    Cardiac Rhythm: Other  Pt  needs tele? No  Skin/wound Issues: None    Code Status: Full Code    Pain control: pt had none    Nausea control: pt had none    Abnormal labs/tests/findings requiring intervention: none    Family present during ED course? Yes   Family Comments/Social Situation comments: daughter present    Tasks needing completion: None    Marisol Juárez, RN  ascom-- [phone]  [phone] [place] [facility]  [phone] [facility] ED

## 2018-10-02 NOTE — PLAN OF CARE
Problem: Patient Care Overview  Goal: Plan of Care/Patient Progress Review  Discharge Planner PT 7D  Patient plan for discharge: Apartment with daughter  Current status: Initiated and completed PT evaluation. Treatment initiated. Increased rambling during subjective portion of evaluation - cues needed to stay on task. SBA for bed mobility, STS with FWW min A once in standing pt had a posterior lean, and initiated gait in hospital room (~20 very short steps) with FWW. Cues to push hips back as pt demonstrated a posterior trunk lean/back extension with hips shifted forward where min A needed to keep pt from falling backwards. Pt demonstrates a need for IP PT.   Barriers to return to prior living situation: medical status, cognition, poor standing balance, decreased activity tolerance  Recommendations for discharge: TCU  Rationale for recommendations: Pt is a good candidate for a TCU to increase activity tolerance, balance, and cognition before returning to prior living arrangement.        Entered by: Steffany Kraus 10/02/2018 1:53 PM

## 2018-10-03 NOTE — PLAN OF CARE
Problem: Patient Care Overview  Goal: Plan of Care/Patient Progress Review  Discharge Planner OT   Patient plan for discharge: home with hospice services and assist from family   Current status: Pt needed CGA for bed mobility, Jennifer for sit<>stand transfers from EOB, unsteady on feet when marching or stepping in place with mild posterior lean noted. Th discussed AE for home as pt will likely need it for safe completion of ADLs, pt agreeable to majority of Th suggestions. Pt reports her change in functional status has been very sudden and rapid, states she was mod-I in functional mobility and ADLs prior to a few days ago. Seems more clear-headed this session and able to recall more about prior level of function.   Barriers to return to prior living situation: deconditioning, cognition, level of assist   Recommendations for discharge: Originally recommending TCU, however after discussion with medical team and pt, d/c REC is now home with 24 hour assist and hospice services. Pt would benefit from the following equipment: Bedside commode, shower chair, walker, and grab bars in tub if possible. Pt would not be safe to be alone at home 2/2 risk of possible falls from weakness. Pts family would benefit from caregiver training for safe transfers.   Rationale for recommendations: See above.        Entered by: Eusebia Palencia 10/03/2018 3:56 PM

## 2018-10-03 NOTE — PLAN OF CARE
Problem: Patient Care Overview  Goal: Plan of Care/Patient Progress Review  Outcome: No Change  0992-5046: BPs 100s/40s. Other VSS on RA. Headache managed with tylenol x1. R shoulder/back pain managed well with voltaren gel. Pt reports sore tongue on R side and thrush; nystatin and biotene rinses scheduled. Denies nausea, appetite poor with minimal PO intake. Urine output low. Up with 1 assist to bedside commode. Pt's daughter here in the evening, pt was to speak with her regarding hospice per discussion with MD. Continue with POC.

## 2018-10-03 NOTE — PROGRESS NOTES
[facility], [place]    Hematology / Oncology Progress Note    Date of Admission: 10/1/2018  Date of Service (when I saw the patient): 10/03/2018     Assessment & Plan   Patient is a 77 yo female with progressive metastatic pancreatic cancer who is presenting to the ER with extreme FTT, inability to ambulate, poor PO intake and severe oral thrush. CT CAP done in ER showed worsening metastatic disease with increased size of cavitary lung masses and new peritoneal carcinomatosis.      Today:  - pt and family agreeable to plan for home with hospice support, SW involved  - brain MRI negative for intracranial mets  - 1U PRBCs today as acute on chronic anemia is likely contributing to her symptoms     #Extreme failure to thrive.   #Progressive metastatic pancreatic cancer.  Pt describes rapidly progressive weakness/imbalance, lethargy, inability to ambulate, and poor PO intake over the last week PTA. Progressive drop in albumin, 2.0 on admit. Afebrile and VS stable on admit. Lactic acid mildly elevated, but remainder of labs/VS reassuring. Did have hypotension early this AM, responsive to fluids. Clinical status most likely related to progressive disease as seen on imaging. Did have UA on admission, no e/o infection.   - MRI brain negative for intracranial mets  - continue IV hydration, encourage PO intake as able  - pt had good discussion with her daughter, both are in agreement for home with home hospice support  - PT/OT for assessment of safe dispo planning/equipment needs at home.       #Stage IV metastatic pancreatic cancer.   Follows with Dr. Soares. Presented with jaundice in 7/2017 and was found to have adenocarcinoma of the head of pancreas causing biliary obstruction and several pulmonary nodules consistent with metastases. S/P CBD stenting and multiple chemo regimens. Most recently, she was started on palliative Carboplatin/Abraxane on 8/15/18. Cycle 2 day 15 was canceled due to low  counts/neutropenia. Cycle 3 was again cancelled as patient was too weak and declined. She now has radiographic evidence of disease progression as above.      #Anasarca.   Diffuse subcutaneous edema and new mild ascites noted on CT A&P. No significant pleural effusions or ascites requiring tap. Does have b/l LE edema, likely due to third-spacing from low albumin.      #Mild lactic acidosis.   Likely rel to advanced met malignancy. No evidence of sepsis, non-toxic appearing at this time. No definite consolidation, apart from that which could be confluence of mets, noted on CXR or CT chest to suggest PNA. UA negative.   - s/p IVF support, monitor clinically.     #Intermittent loose stools.   Has been ongoing outpatient, instructed to trial imodium.   - one stool thus far this admission, continue to monitor  - continue Creon; pt reportedly decreased frequency of use at home due to cost     #Oropharyngeal candidiasis.    Extensive thrush through out the mouth and oropharynx, noted on admission.   - Nystatin swish and swallow.  - started Fluconazole for esophageal candidiasis - 200 mg loading dose followed by 200 mg daily x 14 days (x10/1).      #Hyponatremia. RESOLVED.   On admit, likely due to hypovolemia. Improved with fluid support.      #RUQ pain  #Right posterior shoulder/torso pain.   Likely related to underlying metastatic malignancy. Pt reported RUQ pain on admit. RUQ US showed cholelithiasis without acute cholecystitis, stable CBD stent, but no acute findings. CT CAP negative for acute PE but showed worsening metastatic disease with increased size of lung masses and new peritoneal carcinomatosis. No pleural effusion. No osseous abnormalities seen on imaging.   - tramadol PRN   - can try voltaren gel QID, lidoderm patch at night. She reports improvement in her pain this AM.       #Acute on chronic anemia  #Thrombocytopenia.  2/2 chemotherapy. Anemia worsening in context of progressive disease, some  hemodilution.  - will give 1U PRBCs today    FEN:   - Regular diet as tolerated.  - IVFs with D5 NS @ 75 ml/hr, discontinue at this time. Bolus PRN  - PRN lyte replacement per standing protocol.     Prophylaxis:   - No pharmacologic VTE ppx due to thrombocytopenia.   - PRN constipation ppx.       Code Status: DNR/DNI. Discussed with patient today.      Disposition: Pt/family agreeable to discharge to home with home hospice support.   - appreciate SW involvement. Will work to arrange this, most likely to discharge tomorrow.     Alison Rodriguez PA-C  Heme/Onc  [phone]        Interval History   No acute events overnight, afebrile. Amira had a good talk with her daughter last evening, she is in agreement with have Amira come back home (they live together) with home hospice support. Amira reports feeling a little better than when she came in. Reports her pain in her back is much improved. Hasn't yet been out of bed yet this AM to assess her level of weakness. Ongoing dry mouth. Denies pain with swallowing. Denies SOB or difficulty breathing. Denies abdominal pain or nausea. Denies any blood in stools.     Physical Exam   Temp: 96.5  F (35.8  C) Temp src: Oral BP: 104/56 Pulse: 82 Heart Rate: 91 Resp: 18 SpO2: 96 % O2 Device: None (Room air)    Vitals:    10/01/18 1125 10/01/18 2229   Weight: 42.2 kg (93 lb) 46.7 kg (103 lb)     Vital Signs with Ranges  Temp:  [96  F (35.6  C)-98.6  F (37  C)] 96.5  F (35.8  C)  Pulse:  [80-90] 82  Heart Rate:  [69-91] 91  Resp:  [16-18] 18  BP: ()/(41-56) 104/56  SpO2:  [88 %-96 %] 96 %  I/O last 3 completed shifts:  In: 4040 [P.O.:1220; I.V.:2820]  Out: 600 [Urine:600]    Constitutional: Pleasant female seen reclined in bed, getting morning medications. Awake, interactive. NAD.   HEENT: NC/AT. Alopecia, wearing head scarf. OP pink and dry, +thrush which is improving but tongue is raw appearing.   Heart: Irregular (possible skipped beats), but rate WNL.   Lungs: Breathing even and  non-labored on RA. Dry cough appreciated on/off. Lungs are course throughout, no wheezing or crackles appreciated.   Abd: Normal BS. Abd soft, non-tender.   MSK: 1-2+ pitting edema of b/l LEs.   Skin: Clean, dry. No rashes or lesions on limited exam.   Neuro: Alert, oriented. Speech normal.     Medications     dextrose 5% and 0.9% NaCl 75 mL/hr at 10/03/18 0637     - MEDICATION INSTRUCTIONS -         amylase-lipase-protease  2 capsule Oral TID w/meals     artificial saliva  5-10 mL Swish & Spit 4x Daily     diclofenac  2 g Transdermal 4x Daily     fluconazole  200 mg Oral Daily     latanoprost  1 drop Both Eyes At Bedtime     lidocaine  2-3 patch Transdermal Q24h    And     lidocaine   Transdermal Q24H    And     lidocaine   Transdermal Q8H     megestrol  20 mg Oral Daily     nystatin  500,000 Units Oral 4x Daily       Data   Results for orders placed or performed during the hospital encounter of 10/01/18 (from the past 24 hour(s))   MR Brain w/o & w Contrast    Narrative     MR BRAIN W/O & W CONTRAST 10/2/2018 4:13 PM    Provided History: pt with metastatic pancreatic cancer (currently lung  and possible peritoneal mets), acute weakness and imbalance. Please  assess for metastatic disease;    Comparison: None available.    Technique: Multiplanar T1-weighted, axial FLAIR, and susceptibility  images were obtained without intravenous contrast. Following  intravenous gadolinium-based contrast administration, axial  T2-weighted, diffusion, and T1-weighted images (in multiple planes)  were obtained.    Contrast : 5 Gadavist     Findings:  No abnormal foci of intracranial enhancement to suggest metastatic  disease. Normal marrow signal.    Mild leukoaraiosis and generalized parenchymal volume loss. Ventricles  are not enlarged out of proportion to the cerebral sulci. No  intracranial hemorrhage, mass effect, midline shift or abnormal extra  axial fluid collection. No abnormal foci of restricted diffusion.  Patent major  intracranial vascular flow voids.    Trace right mastoid effusion. Paranasal sinuses are clear. Bilateral  pseudophakia.      Impression    Impression:  No evidence of metastatic disease.    I have personally reviewed the examination and initial interpretation  and I agree with the findings.    LILIA THAPA MD   CBC with platelets   Result Value Ref Range    WBC 4.0 4.0 - 11.0 10e9/L    RBC Count 2.60 (L) 3.8 - 5.2 10e12/L    Hemoglobin 7.4 (L) 11.7 - 15.7 g/dL    Hematocrit 22.7 (L) 35.0 - 47.0 %    MCV 87 78 - 100 fl    MCH 28.5 26.5 - 33.0 pg    MCHC 32.6 31.5 - 36.5 g/dL    RDW 17.4 (H) 10.0 - 15.0 %    Platelet Count 128 (L) 150 - 450 10e9/L   Basic metabolic panel   Result Value Ref Range    Sodium 137 133 - 144 mmol/L    Potassium 3.6 3.4 - 5.3 mmol/L    Chloride 108 94 - 109 mmol/L    Carbon Dioxide 22 20 - 32 mmol/L    Anion Gap 8 3 - 14 mmol/L    Glucose 133 (H) 70 - 99 mg/dL    Urea Nitrogen 10 7 - 30 mg/dL    Creatinine 0.38 (L) 0.52 - 1.04 mg/dL    GFR Estimate >90 >60 mL/min/1.7m2    GFR Estimate If Black >90 >60 mL/min/1.7m2    Calcium 7.4 (L) 8.5 - 10.1 mg/dL   Magnesium   Result Value Ref Range    Magnesium 1.9 1.6 - 2.3 mg/dL   Phosphorus   Result Value Ref Range    Phosphorus 2.4 (L) 2.5 - 4.5 mg/dL   ABO/Rh type and screen   Result Value Ref Range    Units Ordered 1     ABO B     RH(D) Pos     Antibody Screen Neg     Test Valid Only At          [facility]    Specimen Expires 10/06/2018     Crossmatch Red Blood Cells    Blood component   Result Value Ref Range    Unit Number [id number]     Blood Component Type Red Blood Cells Leukocyte Reduced     Division Number 00     Status of Unit Released to care unit 10/03/2018 1155     Blood Product Code [id number]     Unit Status ISS

## 2018-10-03 NOTE — PROGRESS NOTES
Social Work: Assessment with Discharge Plan    Patient Name:  Talya Gatica  :  1940  Age:  78 year old  MRN:  [id number]  Risk/Complexity Score:  Filed Complexity Screen Score: 3  Completed assessment with:  Patient, Patient's dtr (Janeth), Chart Review    Presenting Information   Reason for Referral:  Discharge plan and Hospice coordination  Date of Intake:  October 3, 2018  Referral Source:  [employer] [facility] (Alison)  Decision Maker:  Patient  Alternate Decision Maker:  Pt's dtr (Janeth) per [facility]  Health Care Directive:  Not on file  Living Situation:  Pt will be moving in with her dtr Janeth in her apartment. Janeth reports it is on the 3rd level but there is an elevator.  Previous Functional Status:  Independent  Patient and family understanding of hospitalization:  Pt and dtr demonstrate good understanding.  Cultural/Language/Spiritual Considerations:  English speaking  Adjustment to Illness:  Appropriate    Physical Health  Reason for Admission:    1. Dehydration    2. Adult failure to thrive    3. Malignant neoplasm metastatic to both lungs (H)      Services Needed/Recommended:  Hospice    Mental Health/Chemical Dependency  Diagnosis:  None  Support/Services in Place:  n/a  Services Needed/Recommended:  n/a    Support System  Significant relationship at present time:  Single  Family of origin is available for support:  Yes - adult daughter (Janeth) involved and supportive  Other support available:  No  Gaps in support system:  Yes  Patient is caregiver to:  None     Provider Information   Primary Care Physician:  Pancho Cope   [phone]   Clinic:  [address]  / [facility] [address]      :  None    Financial   Income Source:  Retired  Financial Concerns:  None identified at this time  Insurance:    Payor/Plan Subscriber Name Rel Member # Group #   MEDICARE - MEDICARE F* TALYA GATICA*  [id number]       ATTN CLAIMS, [address]   MEDICA - MEDICA PRIME* TALYA GATICA*   [id number]       [address] [id number]       Discharge Plan   Patient and family discharge goal:  Home with Hospice    SW discussed hospice agency choices. Pt/dtr hope for the Pt to move to [facility] closer to end of life, and thus would like to have a referral made the [facility] hospice program so that this transition would go smoothly when that time comes.  Provided education on discharge plan:  YES  Patient agreeable to discharge plan:  YES  Barriers to discharge:  Coordination of hospice services    Discharge Recommendations   Anticipated Disposition:  Home with [facility]  Transportation Needs:  Family:  Dtr (Janeth)    Additional comments   SW met with Pt at bedside and spoke with Pt's dtr (Janeth) via the phone. They are both understanding and in agreement with hospice recommendation. Per preference, [facility] (Main: [phone], Fax: [phone]). SW faxed referral today. Hospice will contact Pt's dtr (Janeth) directly to arrange for a Hospice SOC post discharge.     Pt's dtr (Janeth) plans to provide discharge transportation home tomorrow at 4PM.    VIKAS Pacheco, LGSW  7C Surgical Oncology Unit  - Covering for Unit 7D Hematology/Oncology  Phone: [phone]  Pager: [phone]

## 2018-10-03 NOTE — PROGRESS NOTES
"VSS on RA, /50's. Pt A&Ox4, but forgetful. Pt states pain on R shoulder, ameliorated by Voltaren gel. Pain over coccyx, area of blanchable redness covered with meliplex, repositioning encouraged. Pt refused tylenol for pain because she \"prefers not to take it during the day.\" Pt also states irritation on R side of tongue - no lesions observed but mucosa is dry with white patches due to thrush. Oral cares performed in addition to nystatin and biotene. Intake has been fair today, ate cream of wheat for breakfast and had BMx1 this afternoon. Phos replaced per conditional order. RBC's ordered and transfused due to low Hgb. D5NS infusing at 75 mL/hr via port. Assist x1 to commode. Pt states acceptance regarding anticipated discharge home with hospice and states that her daughter supports this decision also. Meeting with her  this afternoon. Pt is pleasant and agreeable with plan. Continue to monitor and w/ POC.  "

## 2018-10-04 NOTE — PROGRESS NOTES
Social Work Services Discharge Note      Patient Name:  Talya Gatica     Anticipated Discharge Date:  10/4/18    Discharge Disposition:   Hospice:  [facility]   Main: [phone], Fax: [phone]    Will discharge to her daughter's apartment.    Following MD:  Pancho Cope MD     Additional Services/Equipment Arranged:  Referral made yesterday to [facility]; they will contact Pt/dtr directly to arrange for SOC meeting following discharge.     Pt's dtr will provide discharge transportation home today at approx. 4PM.    SW spoke with JJ Abbasi with [facility]. SW confirmed that dtr declined to meet this evening for hospice sign on, and instead scheduled to meet tomorrow at 3:30PM.    Pt was started on O2 today. ROBYN coordinated with Bedside RN (Scarlet) who re-checked and Pt was sating in the high 90's. Pt/family in agreement with discharge home without O2. SW updated JJ Abbasi that family may want O2 added for comfort and Elroy assured SW that they can do that once they have completed sign on ppwk tomorrow.      Patient / Family response to discharge plan:  In agreement     Persons notified of above discharge plan:  Pt, Pt's dtr, 7D Nursing, [facility]    Staff Discharge Instructions:  ROBYN faxed discharge orders to Hospice.    CTS Handoff completed:  YES    VIKAS Pacheco, LGSW  7C Surgical Oncology Unit  - Covering for Unit 7D Hematology/Oncology  Phone: [phone]  Pager: [phone]

## 2018-10-04 NOTE — PLAN OF CARE
Problem: Patient Care Overview  Goal: Plan of Care/Patient Progress Review  Outcome: No Change  AVSS on RA, soft BPs per pt baseline. Mild headache managed with tylenol x1, denies shoulder pain. Denies nausea. Continues to have very dry mouth, encourage sips of water overnight when awake. Up to bedside commode with 1 assist. Plan to discharge on home hospice this afternoon, around 4 pm, transported by daughter. Continue with POC.

## 2018-10-04 NOTE — PLAN OF CARE
Problem: Patient Care Overview  Goal: Plan of Care/Patient Progress Review  OT: Cancel, PT addressing all equipment and caregiver education.  Pt discharging.    Occupational Therapy Discharge Summary    Reason for therapy discharge:    Discharged to home.    Progress towards therapy goal(s). See goals on Care Plan in [facility] electronic health record for goal details.  Goals partially met.  Barriers to achieving goals:   limited tolerance for therapy and discharge from facility.    Therapy recommendation(s):    Continue home exercise program.

## 2018-10-04 NOTE — PROGRESS NOTES
Prior Authorization Approval    megestrol 20 mg tabs  Date Initiated: 10/04/2018  Date Completed: 10/04/2018  Prior Auth Type: Other: HRM     Status: Approved    Effective Date: 07/06/2018 - 10/04/2019  Copay: 0.00  [place] Filled: Yes    Insurance: [employer]  Ph: [phone]  ID: [id number]  Case Number: [id number]  Submitted Via: Matthieu Montano  [facility] Liaison  Ph: [phone] Page: [phone]

## 2018-10-04 NOTE — PLAN OF CARE
Pt to discharge this afternoon to home hospice with assistance from daughter. /50's (baseline), OVSS stable on 2 LPM O2. A&Ox4, but forgetful. Pt denies pain, nausea. Appetite has been fair with considerable breakfast and lunch intake. Incontinent of stool, diarrhea BMx2. Imodium given. Pt is concerned about managing incontinence at home. Pt reports pain in upper R shoulder is well-managed with lidocaine patches and voltaren gel, refused voltaren today d/t absence of pain. Blanchable redness over coccyx, covered with mepilex. Oral thrush, dry mouth treated with nystatin and biotene rinses. Port de-accessed in preparation for discharge. Pt reports she and family are agreeable to plan. Continue to monitor and w/ POC.

## 2018-10-04 NOTE — PLAN OF CARE
Problem: Patient Care Overview  Goal: Plan of Care/Patient Progress Review  Outcome: Therapy, progress towards functional goals is fair  Discharge Planner PT 7D  Patient plan for discharge: discharge today to home on hospice with assistance from family  Current status: Minimal progression of gait endurance and transfers with use of FWW due to increased SOB and fatigue. Discussed with patient equipment recommendations to decrease caregiver burden - after encouragement and education agreed to commode, walker, gait belt and grab bars for tub/shower if able.   Barriers to return to prior living situation: medial status  Recommendations for discharge: home with 24 hour assistance and supervision + home O2  Rationale for recommendations: Pt is to discharge home on hospice with 24 hour assistance and supervision for safe necessary functional mobility.        Entered by: Steffany Kraus 10/04/2018 11:41 AM

## 2018-10-04 NOTE — PLAN OF CARE
Problem: Patient Care Overview  Goal: Plan of Care/Patient Progress Review  Patient has been assessed for Home Oxygen needs. Oxygen readings:    *Pulse oximetry (SpO2) = 95% on room air at rest while awake.    *SpO2 improved to 95% on 0 liters/minute at rest. Oxygen not needed at rest.    *SpO2 = 93% on room air during activity/with exercise. 3x STS + walking ~10 ft    *SpO2 improved to 93% on 0 liters/minute during activity/with exercise.

## 2018-10-04 NOTE — PLAN OF CARE
"Problem: Patient Care Overview  Goal: Plan of Care/Patient Progress Review  Outcome: No Change    /52 (BP Location: Right arm)  Pulse 82  Temp 98  F (36.7  C) (Oral)  Resp 18  Ht 1.549 m (5' 1\")  Wt 46.7 kg (103 lb)  SpO2 98%  BMI 19.46 kg/m2    Time: 2712-9322.   Reason for admission: Weakness, failure to thrive.   VS: VSS on RA with O2 sats in mid 90s. Afebrile. Per report, O2 sats dropped last night when pt was sleeping, pulse oximeter in place with alarms set.   Activity: Up with assist x1, unsteady on feet. Calls appropriately.   Neuros: A&Ox4. Pt anxious & very talkative. Neuros intact. C/o right shoulder pain managed with Voltaren gel & lidocaine patches, currently pt has 2 lidocaine patches on upper right back. C/o coccyx pain, blanchable redness, mepilex in place.   Cardiac: WDL. Denies chest pain.   Respiratory: WDL. LS clear. Denies SOB.   GI/: Voiding without difficulty. No BM on this shift, LBM 10/3. +gas, +BS. Denies nausea or vomiting.   Diet: Regular diet, tolerating well. Taking PO fluids well.   Skin: Redness on coccyx, mepilex in place, encouraged frequent weight shifts. Oral thrush, receiving nystatin.   Lines: Right port infusing NS at TKO.   Labs: Hgb 7.4, received 1unit PRBCs on day shift.   New changes this shift: No new changes this shift. Continued with POC.   Plan: Continue to monitor pain, VS, & labs.   Will continue to monitor & follow POC.      "

## 2018-10-04 NOTE — DISCHARGE SUMMARY
[facility], [place]    Discharge Summary  Hematology / Oncology    Date of Admission:  10/1/2018  Date of Discharge:  10/4/2018  Discharging Provider: Alison BAUTISTA / Dr. Adorno  Date of Service (when I saw the patient): 10/04/18    Discharge Diagnoses   Progressive metastatic pancreatic cancer    History of Present Illness   Talya Gatica is a 79 yo female with progressive metastatic pancreatic cancer who presented to the ER with extreme FTT, inability to ambulate, poor PO intake and severe oral thrush. CT CAP done in ER showed worsening metastatic disease with increased size of cavitary lung masses and new peritoneal carcinomatosis.      On day of discharge, Amira is feeling better compared to day prior. Thinks she did feel a little better after PRBC yesterday. Reports back pain improved, she is able to better move in bed this AM. No further diarrhea after episodes yesterday that wore her out. Denies nausea. Denies feeling of increased edema. Plan is to discharge to home where she lives with her daughter and enroll with [facility] hospice team.     Remainder of hospital course as below.     Hospital Course   Talya Gatica was admitted on 10/1/2018.  The following problems were addressed during her hospitalization:    #Extreme failure to thrive.   #Progressive metastatic pancreatic cancer.  Pt describes rapidly progressive weakness/imbalance, lethargy, inability to ambulate, and poor PO intake over the last week PTA. Progressive drop in albumin, 2.0 on admit. Afebrile and VS stable on admit. Lactic acid mildly elevated, but remainder of labs/VS reassuring. Did have hypotension early this AM, responsive to fluids. Clinical status most likely related to progressive disease as seen on imaging. Did have UA on admission, no e/o infection.   - MRI brain negative for intracranial mets  - continue IV hydration, encourage PO intake as able  - pt had good  discussion with her daughter, both are in agreement for home with home hospice support  - PT/OT for assessment of safe dispo planning/equipment needs at home.       #Stage IV metastatic pancreatic cancer.   Follows with Dr. Soares. Presented with jaundice in 7/2017 and was found to have adenocarcinoma of the head of pancreas causing biliary obstruction and several pulmonary nodules consistent with metastases. S/P CBD stenting and multiple chemo regimens. Most recently, she was started on palliative Carboplatin/Abraxane on 8/15/18. Cycle 2 day 15 was canceled due to low counts/neutropenia. Cycle 3 was again cancelled as patient was too weak and declined. She now has radiographic evidence of disease progression as above.       #Anasarca.   Diffuse subcutaneous edema and new mild ascites noted on CT A&P. No significant pleural effusions or ascites requiring tap. Does have b/l LE edema, likely due to third-spacing from low albumin.       #Mild lactic acidosis.   Likely rel to advanced met malignancy. No evidence of sepsis, non-toxic appearing at this time. No definite consolidation, apart from that which could be confluence of mets, noted on CXR or CT chest to suggest PNA. UA negative.   - s/p IVF support, monitor clinically.      #Intermittent loose stools.   Has been ongoing outpatient, instructed to trial imodium.   - continue Creon; pt reportedly decreased frequency of use at home due to cost   - imodium PRN     #Oropharyngeal candidiasis.    Extensive thrush through out the mouth and oropharynx, noted on admission.   - Nystatin swish and swallow. Continue for oral cares.   - started Fluconazole for esophageal candidiasis - 200 mg loading dose followed by 200 mg daily x 14 days (x10/1).       #Hyponatremia. RESOLVED.   On admit, likely due to hypovolemia. Improved with fluid support.       #RUQ pain  #Right posterior shoulder/torso pain.   Likely related to underlying metastatic malignancy. Pt reported RUQ pain on  admit. RUQ US showed cholelithiasis without acute cholecystitis, stable CBD stent, but no acute findings. CT CAP negative for acute PE but showed worsening metastatic disease with increased size of lung masses and new peritoneal carcinomatosis. No pleural effusion. No osseous abnormalities seen on imaging.   - tramadol PRN   - can try voltaren gel QID, lidoderm patch at night. She reports improvement in her pain.       #Acute on chronic anemia  #Thrombocytopenia.  2/2 chemotherapy. Anemia worsening in context of progressive disease, some hemodilution. S/p 1U PRBCs on 10/3.      FEN:   - Regular diet as tolerated.  - s/p IVFs  - PRN lyte replacement per standing protocol.      Prophylaxis:   - No pharmacologic VTE ppx due to thrombocytopenia.   - PRN constipation ppx.       Code Status: DNR/DNI. Discussed with patient on 10/3.       Disposition: Discharge to home with support of daughter and plan to enroll with home hospice team. Will send home with medications for ongoing supportive care (oral cares/thrush, pain control). Did not send full hospice order set as pt not yet enrolled in hospice.      Alison Rodriguez PA-C  Heme/Onc  [phone]      Code Status   DNR / DNI    Primary Care Physician   Pancho Cope      Discharge Disposition   Discharged to home with anticipate home hospice support.  Condition at discharge: Fair    Consultations This Hospital Stay   VASCULAR ACCESS CARE ADULT IP CONSULT  VASCULAR ACCESS CARE ADULT IP CONSULT  NUTRITION SERVICES ADULT IP CONSULT  OCCUPATIONAL THERAPY ADULT IP CONSULT  PHYSICAL THERAPY ADULT IP CONSULT  WOUND OSTOMY CONTINENCE NURSE  IP CONSULT    Discharge Orders     Home care nursing referral     Follow Up and recommended labs and tests   You are discharging to home with plan for enrollment into [facility] (phone [phone]). They will be calling you and your daughter to arrange enrollment. Please call their team with any questions.      Activity   Your activity upon discharge: activity as tolerated     When to contact your care team   Contact the home hospice team with any questions as above.     IV access   **Ordering Provider MUST call/page Care Coordinator/ to discuss arranging this service**    You are going home with the following vascular access device: Port-a-Cath.     DNR/DNI     Diet   Follow this diet upon discharge: Regular as tolerated       Discharge Medications   Current Discharge Medication List      START taking these medications    Details   acetaminophen (TYLENOL) 325 MG tablet Take 2 tablets (650 mg) by mouth every 4 hours as needed for mild pain or fever  Qty: 50 tablet, Refills: 0    Associated Diagnoses: Malignant neoplasm metastatic to both lungs (H)      artificial saliva (BIOTENE DRY MOUTHWASH) LIQD liquid Swish and spit 5-10 mLs in mouth 4 times daily as needed for dry mouth  Qty: 59 mL, Refills: 0    Comments: Or can relabel inpatient bottle for discharge.  Associated Diagnoses: Dry mouth      diclofenac (VOLTAREN) 1 % GEL topical gel Place 2 g onto the skin 4 times daily as needed for moderate pain  Qty: 100 g, Refills: 0    Comments: Please re-label inpatient tube.  Associated Diagnoses: Other acute back pain      fluconazole (DIFLUCAN) 200 MG tablet Take 1 tablet (200 mg) by mouth daily for 10 days  Qty: 10 tablet, Refills: 0    Associated Diagnoses: Thrush      Lidocaine (LIDOCARE) 4 % Patch Place 1-3 patches onto the skin every 24 hours . Can use as needed. You can keep patch(es) on for up to 12 hrs, then remove for 12 hours. Can use during the daytime or overnight depending on when pain is worse.  Qty: 15 patch, Refills: 0    Comments: Ok to substitute over-the-counter patches if prescription not covered by insurance.  Associated Diagnoses: Other acute back pain      loperamide (IMODIUM) 2 MG capsule Take 1 capsule (2 mg) by mouth 4 times daily as needed for diarrhea  Qty: 20 capsule, Refills: 0     Associated Diagnoses: Pancreatic adenocarcinoma (H); Diarrhea, unspecified type      megestrol (MEGACE) 20 MG tablet Take 1 tablet (20 mg) by mouth daily  Qty: 30 tablet, Refills: 0    Associated Diagnoses: Adult failure to thrive; Anorexia      nystatin (MYCOSTATIN) 159988 UNIT/ML suspension Swish and swallow 5 mLs (500,000 Units) in mouth 4 times daily for 10 days  Qty: 200 mL, Refills: 0    Associated Diagnoses: Thrush      traMADol (ULTRAM) 50 MG tablet Take 1 tablet (50 mg) by mouth every 6 hours as needed for moderate pain  Qty: 10 tablet, Refills: 0    Associated Diagnoses: Other acute back pain         CONTINUE these medications which have NOT CHANGED    Details   amylase-lipase-protease (CREON) 27300-92872 units CPEP per EC capsule Take 1 capsule (24,000 Units) by mouth 3 times daily (with meals)  Qty: 180 capsule, Refills: 3    Associated Diagnoses: Pancreatic adenocarcinoma (H)      latanoprost (XALATAN) 0.005 % ophthalmic solution Place 1 drop into both eyes At Bedtime  Qty: 3 Bottle, Refills: 4    Associated Diagnoses: Glaucoma suspect, bilateral      Loratadine (CLARITIN PO) Take by mouth daily      Multiple Vitamins-Minerals (WOMENS 50+ MULTI VITAMIN/MIN PO)       Potassium Chloride ER 20 MEQ TBCR Take 1 tablet (20 mEq) by mouth daily  Qty: 90 tablet, Refills: 0    Associated Diagnoses: Hypokalemia      lidocaine-prilocaine (EMLA) cream Apply topically as needed for moderate pain (use dollop of cream to saran wrap 30 min prior to use of port)  Qty: 30 g, Refills: 1    Associated Diagnoses: Pancreatic adenocarcinoma (H)      ondansetron (ZOFRAN) 8 MG tablet Take 1 tablet (8 mg) by mouth every 8 hours as needed (nausea/vomiting)  Qty: 10 tablet, Refills: 2    Associated Diagnoses: Malignant neoplasm metastatic to both lungs (H); Pancreatic adenocarcinoma (H)         STOP taking these medications       BIOTIN PO Comments:   Reason for Stopping:         potassium chloride SA (KLOR-CON) 20 MEQ CR tablet  Comments:   Reason for Stopping:             Allergies   Allergies   Allergen Reactions     Codeine Camsylate      Data    ROUTINE IP LABS (Last four results)  BMP  Recent Labs  Lab 10/03/18  0534 10/02/18  0545 10/01/18  1314    137 132*   POTASSIUM 3.6 3.4 4.0   CHLORIDE 108 105 99   BERNARDO 7.4* 8.2* 8.2*   CO2 22 21 24   BUN 10 10 11   CR 0.38* 0.41* 0.39*   * 156* 89     CBC  Recent Labs  Lab 10/03/18  0534 10/02/18  0545 10/01/18  1314   WBC 4.0 3.8* 5.5   RBC 2.60* 2.87* 3.09*   HGB 7.4* 8.2* 9.0*   HCT 22.7* 24.9* 25.7*   MCV 87 87 83   MCH 28.5 28.6 29.1   MCHC 32.6 32.9 35.0   RDW 17.4* 17.0* 16.4*   * 116* 97*       Results for orders placed or performed during the hospital encounter of 10/01/18   US Abdomen Limited    Narrative    EXAMINATION: Limited Abdominal Ultrasound, 10/1/2018 1:55 PM     COMPARISON: None. CT dated 7/5/2018 is available for correlation.    HISTORY: Right upper quadrant pain. Metastatic pancreatic cancer.    FINDINGS:   Fluid: Perihepatic fluid is noted.    Liver: Pneumobilia. The liver demonstrates normal echotexture with  increased echogenicity, measuring 14 cm in craniocaudal dimension.  There is no focal mass.     Gallbladder: Solitary large gallstone in the lumen of the gallbladder  measuring up to 4 cm correlates with recent CT. No gallbladder wall  thickening, pericholecystic fluid or positive sonographic Eckert sign.  Echogenic foci with associated posterior artifact noted in the  gallbladder wall suggestive of underlying adenomyomatosis.    Bile Ducts: Both the intra- and extrahepatic biliary system are of  normal caliber.  Common bile duct stent is in place. The common bile  duct measures 7 mm in diameter.    Pancreas: Known pancreatic mass is not visualized with ultrasound.  Persistent dilation of the pancreatic duct up to 8 to 9 mm.    Kidney: The right kidney measures 10.6 cm long. There is no  hydronephrosis or hydroureter, no shadowing renal calculi,  cystic  lesion or mass. Benign-appearing left renal cyst measures 4.4 x 3.8 x  2.6 cm.      Impression    IMPRESSION:   1.  No acute right upper quadrant abdominal ultrasound finding.  2.  Markedly dilated pancreatic duct in this patient with known  pancreatic carcinoma.  3.  Cholelithiasis without evidence of acute cholecystitis.  Biliary  stent in place.  4.  Hepatic steatosis with trace perihepatic fluid.  Pneumobilia.    BANDAR SANTANA MD   XR Chest 1 View    Narrative    XR CHEST 1 VW  10/1/2018 2:10 PM      HISTORY: right sided low chest pain, ?PNA vs effusion;     COMPARISON: 9/27/2018    FINDINGS: Single portable AP view of the chest semiupright. Right IJ  Port-A-Cath tip projects over the mid SVC. Cardiac silhouette is  stable and within normal limits. The pulmonary vasculature is  obscured. Lung volumes are slightly low. There are extensive bilateral  nodular and masslike opacities which are not significantly changed  from the previous study. No definite superimposed consolidation,  pleural effusion, or pneumothorax.      Impression    IMPRESSION: Bilateral nodular/masslike opacities are similar to the  previous study and consistent with known metastases. There is no  definite superimposed consolidation.    I have personally reviewed the examination and initial interpretation  and I agree with the findings.    FRANCISCO ADAMES MD   CT Chest Pulmonary Embolism w Contrast    Narrative    Examination:  CT CHEST PULMONARY EMBOLISM W CONTRAST 10/1/2018 8:54 PM      Comparison: 7/5/2018    History: right lower chest pain, Dyspnea;     TECHNIQUE: Volumetric helical acquisition of CT images of the chest  from the lung apices to the kidneys were acquired in arterial phase  after the uncomplicated administration of iopamidol (ISOVUE-370)  solution 59 mL.  Three-dimensional (3D) post-processed angiographic  images were reconstructed, archived to PACS and used in interpretation  of this study.    FINDINGS:  There is  adequate opacification of the main and lobar pulmonary  arteries. No filling defect in the lobar and main segmental pulmonary  arteries to suggest pulmonary embolism.     Chest:   Innumerable large cavitary metastatic nodules and masses are increased  from the previous study. Question of superimposed consolidation in the  posterior left lung base versus confluence of masses. No pleural  effusion or pneumothorax. No focal airspace opacity or significant  interstitial thickening.     No acute osseus abnormality or suspicious bony lesion.      Impression    IMPRESSION:   1. Negative for pulmonary embolus.  2. Significantly worsening pulmonary metastatic disease.  3. New consolidation in the posterior left lung base versus confluence  of metastatic masses. Evaluation is limited by arterial phase of  contrast for PE study.    I have personally reviewed the examination and initial interpretation  and I agree with the findings.    JULIA ART MD   CT Abdomen Pelvis w Contrast    Narrative    Examination:  CT ABDOMEN PELVIS W CONTRAST 10/1/2018 8:53 PM     Comparison: 7/5/2018    History: RUQ pain;     Technique: Volumetric helical acquisition of CT images from the lung  bases through the symphysis pubis after the uneventful administration  of iopamidol (ISOVUE-370) solution 59 mL.  Coronal and sagittal images  were reconstructed from the source data.    Findings:    Abdomen/pelvis:  Small volume of new ascites. Diffuse mesenteric stranding. Possible  studding in nodularity of the fat in the left upper quadrant and  possible mild new peritoneal thickening. Biliary stent in place with  pneumobilia similar to prior study. Gas is now seen in the  gallbladder. Stable appearance of a single calcified gallstone. The  gallbladder is otherwise unremarkable. No acute findings within the  liver. Unchanged portal-hepatic venous shunt in the right lobe of the  liver. Large simple renal cyst on the right kidney. Left kidney  is  normal. The adrenal glands and spleen are normal. Ill-defined  hypoattenuating/hypoenhancing pancreatic head mass is not  substantially changed from the prior study with stable severe upstream  pancreatic parenchymal atrophy and ductal dilation. No dilated loops  of large or small bowel. The major abdominal vasculature is patent and  within normal limits. There is encasement of the celiac artery and  possibly the SMA by the pancreatic mass. The bladder is well distended  and unremarkable. Large right adnexal cyst is similar to prior.  Densely calcified uterine fibroids.    Bones and soft tissues:   No acute osseus abnormality or suspicious bony lesion. Anasarca.  Severe degenerative changes in the lumbar spine with stepwise  retrolisthesis from L1 to L3 and stepwise anterolisthesis from L4 to  S1.      Impression    Impression:   1. Worsening metastatic disease with as evidenced by increased size of  pulmonary metastases and possible developing peritoneal  carcinomatosis. Primary pancreatic mass is similar to the prior with  vascular involvement.  2. Anasarca.  3. Cholelithiasis and pneumobilia unchanged from prior study with  biliary stent in place. No evidence of cholecystitis.    I have personally reviewed the examination and initial interpretation  and I agree with the findings.    JULIA ART MD   MR Brain w/o & w Contrast    Narrative     MR BRAIN W/O & W CONTRAST 10/2/2018 4:13 PM    Provided History: pt with metastatic pancreatic cancer (currently lung  and possible peritoneal mets), acute weakness and imbalance. Please  assess for metastatic disease;    Comparison: None available.    Technique: Multiplanar T1-weighted, axial FLAIR, and susceptibility  images were obtained without intravenous contrast. Following  intravenous gadolinium-based contrast administration, axial  T2-weighted, diffusion, and T1-weighted images (in multiple planes)  were obtained.    Contrast : 5 Gadavist      Findings:  No abnormal foci of intracranial enhancement to suggest metastatic  disease. Normal marrow signal.    Mild leukoaraiosis and generalized parenchymal volume loss. Ventricles  are not enlarged out of proportion to the cerebral sulci. No  intracranial hemorrhage, mass effect, midline shift or abnormal extra  axial fluid collection. No abnormal foci of restricted diffusion.  Patent major intracranial vascular flow voids.    Trace right mastoid effusion. Paranasal sinuses are clear. Bilateral  pseudophakia.      Impression    Impression:  No evidence of metastatic disease.    I have personally reviewed the examination and initial interpretation  and I agree with the findings.    LILIA THAPA MD

## 2018-10-05 NOTE — PLAN OF CARE
Problem: Patient Care Overview  Goal: Plan of Care/Patient Progress Review  Outcome: No Change    AVSS, afebrile. C/o back pain, tylenol given x 1 with relief. Pt forgetful, bed alarm on for safety. Plan is to discharge home with hospice today, see previous RN note regarding pt's concerns. Pt did not express concerns overnight about hospice. Cont POC.

## 2018-10-05 NOTE — PLAN OF CARE
Problem: Patient Care Overview  Goal: Plan of Care/Patient Progress Review  Outcome: No Change  Pt dcd to home with family and home hospice nurse.port a cath is deaccessed. No pain.

## 2018-10-05 NOTE — PLAN OF CARE
Problem: Patient Care Overview  Goal: Plan of Care/Patient Progress Review  Occupational Therapy Discharge Summary    Reason for therapy discharge:    Discharged to home with eventual transfer to hospice facility.    Progress towards therapy goal(s). See goals on Care Plan in Epic electronic health record for goal details.  Goals not met.  Barriers to achieving goals:   limited tolerance for therapy.    Therapy recommendation(s):    No further therapy is recommended.

## 2018-10-05 NOTE — PLAN OF CARE
Problem: Patient Care Overview  Goal: Plan of Care/Patient Progress Review  Outcome: No Change    AVSS on RA. Discharge cancelled due to not being able to get wheelchair and walker tonight. Pt is considering not going home on hospice. Would like to know what additional treatment options are. [facility] to come  tomorrow to assess transportation needs for patient. Continue with POC.

## 2018-10-05 NOTE — PROGRESS NOTES
Social Work Services Discharge Note      Patient Name:  Talya Gatica     Anticipated Discharge Date:  10/5/18    Discharge Disposition:   Hospice:  [facility]   Main: [phone], Fax: [phone]     Will discharge to her daughter's apartment.    Following MD:  Pancho Cope MD     Additional Services/Equipment Arranged:  [facility] has arranged to have wheelchair and walker delivered to Pt's dtr's apartment at 12PM today.    Pt's dtr will provide discharge transportation today at 12:30PM.     Patient / Family response to discharge plan:  In agreement     Persons notified of above discharge plan:  Pt, Pt's dtr, [facility]D Nursing, [facility] ([facility]), [facility] (Alison)    Staff Discharge Instructions:  SW faxed discharge orders to Hospice.    CTS Handoff completed:  YES    VIKAS Pacheco, LGSW  7C Surgical Oncology Unit  - Covering for Unit 7D Hematology/Oncology  Phone: [phone]  Pager: [phone]

## 2018-10-05 NOTE — DISCHARGE SUMMARY
[facility], [place]    Discharge Summary  Hematology / Oncology    Date of Admission:  10/1/2018  Date of Discharge:  10/5/2018   Discharging Provider: Alison BAUTISTA / Dr. Adorno  Date of Service (when I saw the patient): 10/05/18    Pt was originally due to discharge on 10/4. Due to logistics (question of obtaining supplemental O2 for home and equipment needs prior to when home hospice team could intake pt), she did not discharge on 10/4 and is now discharging on 10/5. Appreciate SW assist with this.     Discharge Diagnoses   Progressive metastatic pancreatic cancer    History of Present Illness   Talya Gatica is a 79 yo female with progressive metastatic pancreatic cancer who presented to the ER with extreme FTT, inability to ambulate, poor PO intake and severe oral thrush. CT CAP done in ER showed worsening metastatic disease with increased size of cavitary lung masses and new peritoneal carcinomatosis.      On day of discharge, Amira is feeling better compared to day prior. Thinks she did feel a little better after PRBC yesterday. Reports back pain improved, she is able to better move in bed this AM. No further diarrhea after episodes yesterday that wore her out. Denies nausea. Denies feeling of increased edema. Plan is to discharge to home where she lives with her daughter and enroll with [facility] hospice team.     Remainder of hospital course as below.     Hospital Course   Talya Gatica was admitted on 10/1/2018.  The following problems were addressed during her hospitalization:    #Extreme failure to thrive.   #Progressive metastatic pancreatic cancer.  Pt describes rapidly progressive weakness/imbalance, lethargy, inability to ambulate, and poor PO intake over the last week PTA. Progressive drop in albumin, 2.0 on admit. Afebrile and VS stable on admit. Lactic acid mildly elevated, but remainder of labs/VS reassuring. Did have  hypotension early this AM, responsive to fluids. Clinical status most likely related to progressive disease as seen on imaging. Did have UA on admission, no e/o infection.   - MRI brain negative for intracranial mets  - continue IV hydration, encourage PO intake as able  - pt had good discussion with her daughter, both are in agreement for home with home hospice support  - PT/OT for assessment of safe dispo planning/equipment needs at home.       #Stage IV metastatic pancreatic cancer.   Follows with Dr. Soares. Presented with jaundice in 7/2017 and was found to have adenocarcinoma of the head of pancreas causing biliary obstruction and several pulmonary nodules consistent with metastases. S/P CBD stenting and multiple chemo regimens. Most recently, she was started on palliative Carboplatin/Abraxane on 8/15/18. Cycle 2 day 15 was canceled due to low counts/neutropenia. Cycle 3 was again cancelled as patient was too weak and declined. She now has radiographic evidence of disease progression as above.       #Anasarca.   Diffuse subcutaneous edema and new mild ascites noted on CT A&P. No significant pleural effusions or ascites requiring tap. Does have b/l LE edema, likely due to third-spacing from low albumin.       #Mild lactic acidosis.   Likely rel to advanced met malignancy. No evidence of sepsis, non-toxic appearing at this time. No definite consolidation, apart from that which could be confluence of mets, noted on CXR or CT chest to suggest PNA. UA negative.   - s/p IVF support, monitor clinically.      #Intermittent loose stools.   Has been ongoing outpatient, instructed to trial imodium.   - continue Creon; pt reportedly decreased frequency of use at home due to cost   - imodium PRN     #Oropharyngeal candidiasis.    Extensive thrush through out the mouth and oropharynx, noted on admission.   - Nystatin swish and swallow. Continue for oral cares.   - started Fluconazole for esophageal candidiasis - 200 mg loading  dose followed by 200 mg daily x 14 days (x10/1).       #Hyponatremia. RESOLVED.   On admit, likely due to hypovolemia. Improved with fluid support.       #RUQ pain  #Right posterior shoulder/torso pain.   Likely related to underlying metastatic malignancy. Pt reported RUQ pain on admit. RUQ US showed cholelithiasis without acute cholecystitis, stable CBD stent, but no acute findings. CT CAP negative for acute PE but showed worsening metastatic disease with increased size of lung masses and new peritoneal carcinomatosis. No pleural effusion. No osseous abnormalities seen on imaging.   - tramadol PRN   - can try voltaren gel QID, lidoderm patch at night. She reports improvement in her pain.       #Acute on chronic anemia  #Thrombocytopenia.  2/2 chemotherapy. Anemia worsening in context of progressive disease, some hemodilution. S/p 1U PRBCs on 10/3.      FEN:   - Regular diet as tolerated.  - s/p IVFs  - PRN lyte replacement per standing protocol.      Prophylaxis:   - No pharmacologic VTE ppx due to thrombocytopenia.   - PRN constipation ppx.       Code Status: DNR/DNI. Discussed with patient on 10/3.       Disposition: Discharge to home with support of daughter and plan to enroll with home hospice team. Will send home with medications for ongoing supportive care (oral cares/thrush, pain control). Did not send full hospice order set as pt not yet enrolled in hospice.      Alison Rodriguez PA-C  Heme/Onc  [phone]      Code Status   DNR / DNI    Primary Care Physician   Pancho Cope      Discharge Disposition   Discharged to home with anticipate home hospice support.  Condition at discharge: Fair    Consultations This Hospital Stay   VASCULAR ACCESS CARE ADULT IP CONSULT  VASCULAR ACCESS CARE ADULT IP CONSULT  NUTRITION SERVICES ADULT IP CONSULT  OCCUPATIONAL THERAPY ADULT IP CONSULT  PHYSICAL THERAPY ADULT IP CONSULT  WOUND OSTOMY CONTINENCE NURSE  IP CONSULT    Discharge Orders     Home care nursing referral      Follow Up and recommended labs and tests   You are discharging to home with plan for enrollment into [facility] (phone [phone]). They will be calling you and your daughter to arrange enrollment. Please call their team with any questions.     Activity   Your activity upon discharge: activity as tolerated     When to contact your care team   Contact the home hospice team with any questions as above.     IV access   **Ordering Provider MUST call/page Care Coordinator/ to discuss arranging this service**    You are going home with the following vascular access device: Port-a-Cath.     Discharge Equipment: Wheelchair   The patient has a mobility limitation that significantly impairs his/her ability to participate in one or more mobility-related activities of daily living (MRADLs).  The patient's mobility limitation cannot be sufficiently resolved by the use of an appropriately fitted cane or walker  The patient's home provides adequate access between rooms, maneuvering space and surfaces for the use of the manual wheelchair provided.  Use of a manual wheelchair will significantly improve the patient's ability to participate in MRADLs and the patient will use it on a regular basis in the home  The patient has not expressed an unwillingness to use the manual wheelchair that is provided in the home.  The patient has sufficient upper extremity function and other physical and mental capabilities needed to safely self-propel the manual wheelchair that is provided in the home during a typical day, OR the patient has a caregiver who is available, willing and able to provide assistance with the wheelchair when the patient has limitations.    Treatment Diagnosis: gait instability, limited gait endurance, poor activity tolerance     Discharge Equipment: Wheelchair   The patient has a mobility limitation that significantly impairs his/her ability to participate in one or more  mobility-related activities of daily living (MRADLs).  The patient's mobility limitation cannot be sufficiently resolved by the use of an appropriately fitted cane or walker  The patient's home provides adequate access between rooms, maneuvering space and surfaces for the use of the manual wheelchair provided.  Use of a manual wheelchair will significantly improve the patient's ability to participate in MRADLs and the patient will use it on a regular basis in the home  The patient has not expressed an unwillingness to use the manual wheelchair that is provided in the home.  The patient has sufficient upper extremity function and other physical and mental capabilities needed to safely self-propel the manual wheelchair that is provided in the home during a typical day, OR the patient has a caregiver who is available, willing and able to provide assistance with the wheelchair when the patient has limitations.    Treatment Diagnosis: gait instability, poor activity tolerance, and decreased gait endurance     DNR/DNI     Diet   Follow this diet upon discharge: Regular as tolerated       Discharge Medications   Current Discharge Medication List      START taking these medications    Details   acetaminophen (TYLENOL) 325 MG tablet Take 2 tablets (650 mg) by mouth every 4 hours as needed for mild pain or fever  Qty: 50 tablet, Refills: 0    Associated Diagnoses: Malignant neoplasm metastatic to both lungs (H)      artificial saliva (BIOTENE DRY MOUTHWASH) LIQD liquid Swish and spit 5-10 mLs in mouth 4 times daily as needed for dry mouth  Qty: 59 mL, Refills: 0    Comments: Or can relabel inpatient bottle for discharge.  Associated Diagnoses: Dry mouth      diclofenac (VOLTAREN) 1 % GEL topical gel Place 2 g onto the skin 4 times daily as needed for moderate pain  Qty: 100 g, Refills: 0    Comments: Please re-label inpatient tube.  Associated Diagnoses: Other acute back pain      fluconazole (DIFLUCAN) 200 MG tablet Take 1  tablet (200 mg) by mouth daily for 10 days  Qty: 10 tablet, Refills: 0    Associated Diagnoses: Thrush      Lidocaine (LIDOCARE) 4 % Patch Place 1-3 patches onto the skin every 24 hours . Can use as needed. You can keep patch(es) on for up to 12 hrs, then remove for 12 hours. Can use during the daytime or overnight depending on when pain is worse.  Qty: 15 patch, Refills: 0    Comments: Ok to substitute over-the-counter patches if prescription not covered by insurance.  Associated Diagnoses: Other acute back pain      loperamide (IMODIUM) 2 MG capsule Take 1 capsule (2 mg) by mouth 4 times daily as needed for diarrhea  Qty: 20 capsule, Refills: 0    Associated Diagnoses: Pancreatic adenocarcinoma (H); Diarrhea, unspecified type      megestrol (MEGACE) 20 MG tablet Take 1 tablet (20 mg) by mouth daily  Qty: 30 tablet, Refills: 0    Associated Diagnoses: Adult failure to thrive; Anorexia      nystatin (MYCOSTATIN) 358994 UNIT/ML suspension Swish and swallow 5 mLs (500,000 Units) in mouth 4 times daily for 10 days  Qty: 200 mL, Refills: 0    Associated Diagnoses: Thrush      order for DME Equipment being ordered: Walker (), Commode () and Other: Gait belt  Treatment Diagnosis: Gait Instability  Qty: 1 each, Refills: 0    Associated Diagnoses: Gait instability      traMADol (ULTRAM) 50 MG tablet Take 1 tablet (50 mg) by mouth every 6 hours as needed for moderate pain  Qty: 10 tablet, Refills: 0    Associated Diagnoses: Other acute back pain         CONTINUE these medications which have NOT CHANGED    Details   amylase-lipase-protease (CREON) 84372-82063 units CPEP per EC capsule Take 1 capsule (24,000 Units) by mouth 3 times daily (with meals)  Qty: 180 capsule, Refills: 3    Associated Diagnoses: Pancreatic adenocarcinoma (H)      latanoprost (XALATAN) 0.005 % ophthalmic solution Place 1 drop into both eyes At Bedtime  Qty: 3 Bottle, Refills: 4    Associated Diagnoses: Glaucoma suspect, bilateral       Loratadine (CLARITIN PO) Take by mouth daily      Multiple Vitamins-Minerals (WOMENS 50+ MULTI VITAMIN/MIN PO)       Potassium Chloride ER 20 MEQ TBCR Take 1 tablet (20 mEq) by mouth daily  Qty: 90 tablet, Refills: 0    Associated Diagnoses: Hypokalemia      lidocaine-prilocaine (EMLA) cream Apply topically as needed for moderate pain (use dollop of cream to saran wrap 30 min prior to use of port)  Qty: 30 g, Refills: 1    Associated Diagnoses: Pancreatic adenocarcinoma (H)      ondansetron (ZOFRAN) 8 MG tablet Take 1 tablet (8 mg) by mouth every 8 hours as needed (nausea/vomiting)  Qty: 10 tablet, Refills: 2    Associated Diagnoses: Malignant neoplasm metastatic to both lungs (H); Pancreatic adenocarcinoma (H)         STOP taking these medications       BIOTIN PO Comments:   Reason for Stopping:         potassium chloride SA (KLOR-CON) 20 MEQ CR tablet Comments:   Reason for Stopping:             Allergies   Allergies   Allergen Reactions     Codeine Camsylate      Data    ROUTINE IP LABS (Last four results)  BMP    Recent Labs  Lab 10/03/18  0534 10/02/18  0545 10/01/18  1314    137 132*   POTASSIUM 3.6 3.4 4.0   CHLORIDE 108 105 99   BERNARDO 7.4* 8.2* 8.2*   CO2 22 21 24   BUN 10 10 11   CR 0.38* 0.41* 0.39*   * 156* 89     CBC    Recent Labs  Lab 10/03/18  0534 10/02/18  0545 10/01/18  1314   WBC 4.0 3.8* 5.5   RBC 2.60* 2.87* 3.09*   HGB 7.4* 8.2* 9.0*   HCT 22.7* 24.9* 25.7*   MCV 87 87 83   MCH 28.5 28.6 29.1   MCHC 32.6 32.9 35.0   RDW 17.4* 17.0* 16.4*   * 116* 97*       Results for orders placed or performed during the hospital encounter of 10/01/18   US Abdomen Limited    Narrative    EXAMINATION: Limited Abdominal Ultrasound, 10/1/2018 1:55 PM     COMPARISON: None. CT dated 7/5/2018 is available for correlation.    HISTORY: Right upper quadrant pain. Metastatic pancreatic cancer.    FINDINGS:   Fluid: Perihepatic fluid is noted.    Liver: Pneumobilia. The liver demonstrates normal  echotexture with  increased echogenicity, measuring 14 cm in craniocaudal dimension.  There is no focal mass.     Gallbladder: Solitary large gallstone in the lumen of the gallbladder  measuring up to 4 cm correlates with recent CT. No gallbladder wall  thickening, pericholecystic fluid or positive sonographic Eckert sign.  Echogenic foci with associated posterior artifact noted in the  gallbladder wall suggestive of underlying adenomyomatosis.    Bile Ducts: Both the intra- and extrahepatic biliary system are of  normal caliber.  Common bile duct stent is in place. The common bile  duct measures 7 mm in diameter.    Pancreas: Known pancreatic mass is not visualized with ultrasound.  Persistent dilation of the pancreatic duct up to 8 to 9 mm.    Kidney: The right kidney measures 10.6 cm long. There is no  hydronephrosis or hydroureter, no shadowing renal calculi, cystic  lesion or mass. Benign-appearing left renal cyst measures 4.4 x 3.8 x  2.6 cm.      Impression    IMPRESSION:   1.  No acute right upper quadrant abdominal ultrasound finding.  2.  Markedly dilated pancreatic duct in this patient with known  pancreatic carcinoma.  3.  Cholelithiasis without evidence of acute cholecystitis.  Biliary  stent in place.  4.  Hepatic steatosis with trace perihepatic fluid.  Pneumobilia.    BANDAR SANTANA MD   XR Chest 1 View    Narrative    XR CHEST 1 VW  10/1/2018 2:10 PM      HISTORY: right sided low chest pain, ?PNA vs effusion;     COMPARISON: 9/27/2018    FINDINGS: Single portable AP view of the chest semiupright. Right IJ  Port-A-Cath tip projects over the mid SVC. Cardiac silhouette is  stable and within normal limits. The pulmonary vasculature is  obscured. Lung volumes are slightly low. There are extensive bilateral  nodular and masslike opacities which are not significantly changed  from the previous study. No definite superimposed consolidation,  pleural effusion, or pneumothorax.      Impression    IMPRESSION:  Bilateral nodular/masslike opacities are similar to the  previous study and consistent with known metastases. There is no  definite superimposed consolidation.    I have personally reviewed the examination and initial interpretation  and I agree with the findings.    FRANCISCO ADAMES MD   CT Chest Pulmonary Embolism w Contrast    Narrative    Examination:  CT CHEST PULMONARY EMBOLISM W CONTRAST 10/1/2018 8:54 PM      Comparison: 7/5/2018    History: right lower chest pain, Dyspnea;     TECHNIQUE: Volumetric helical acquisition of CT images of the chest  from the lung apices to the kidneys were acquired in arterial phase  after the uncomplicated administration of iopamidol (ISOVUE-370)  solution 59 mL.  Three-dimensional (3D) post-processed angiographic  images were reconstructed, archived to PACS and used in interpretation  of this study.    FINDINGS:  There is adequate opacification of the main and lobar pulmonary  arteries. No filling defect in the lobar and main segmental pulmonary  arteries to suggest pulmonary embolism.     Chest:   Innumerable large cavitary metastatic nodules and masses are increased  from the previous study. Question of superimposed consolidation in the  posterior left lung base versus confluence of masses. No pleural  effusion or pneumothorax. No focal airspace opacity or significant  interstitial thickening.     No acute osseus abnormality or suspicious bony lesion.      Impression    IMPRESSION:   1. Negative for pulmonary embolus.  2. Significantly worsening pulmonary metastatic disease.  3. New consolidation in the posterior left lung base versus confluence  of metastatic masses. Evaluation is limited by arterial phase of  contrast for PE study.    I have personally reviewed the examination and initial interpretation  and I agree with the findings.    JULIA ART MD   CT Abdomen Pelvis w Contrast    Narrative    Examination:  CT ABDOMEN PELVIS W CONTRAST 10/1/2018 8:53 PM      Comparison: 7/5/2018    History: RUQ pain;     Technique: Volumetric helical acquisition of CT images from the lung  bases through the symphysis pubis after the uneventful administration  of iopamidol (ISOVUE-370) solution 59 mL.  Coronal and sagittal images  were reconstructed from the source data.    Findings:    Abdomen/pelvis:  Small volume of new ascites. Diffuse mesenteric stranding. Possible  studding in nodularity of the fat in the left upper quadrant and  possible mild new peritoneal thickening. Biliary stent in place with  pneumobilia similar to prior study. Gas is now seen in the  gallbladder. Stable appearance of a single calcified gallstone. The  gallbladder is otherwise unremarkable. No acute findings within the  liver. Unchanged portal-hepatic venous shunt in the right lobe of the  liver. Large simple renal cyst on the right kidney. Left kidney is  normal. The adrenal glands and spleen are normal. Ill-defined  hypoattenuating/hypoenhancing pancreatic head mass is not  substantially changed from the prior study with stable severe upstream  pancreatic parenchymal atrophy and ductal dilation. No dilated loops  of large or small bowel. The major abdominal vasculature is patent and  within normal limits. There is encasement of the celiac artery and  possibly the SMA by the pancreatic mass. The bladder is well distended  and unremarkable. Large right adnexal cyst is similar to prior.  Densely calcified uterine fibroids.    Bones and soft tissues:   No acute osseus abnormality or suspicious bony lesion. Anasarca.  Severe degenerative changes in the lumbar spine with stepwise  retrolisthesis from L1 to L3 and stepwise anterolisthesis from L4 to  S1.      Impression    Impression:   1. Worsening metastatic disease with as evidenced by increased size of  pulmonary metastases and possible developing peritoneal  carcinomatosis. Primary pancreatic mass is similar to the prior with  vascular involvement.  2.  Anasarca.  3. Cholelithiasis and pneumobilia unchanged from prior study with  biliary stent in place. No evidence of cholecystitis.    I have personally reviewed the examination and initial interpretation  and I agree with the findings.    JULIA ART MD   MR Brain w/o & w Contrast    Narrative     MR BRAIN W/O & W CONTRAST 10/2/2018 4:13 PM    Provided History: pt with metastatic pancreatic cancer (currently lung  and possible peritoneal mets), acute weakness and imbalance. Please  assess for metastatic disease;    Comparison: None available.    Technique: Multiplanar T1-weighted, axial FLAIR, and susceptibility  images were obtained without intravenous contrast. Following  intravenous gadolinium-based contrast administration, axial  T2-weighted, diffusion, and T1-weighted images (in multiple planes)  were obtained.    Contrast : 5 Gadavist     Findings:  No abnormal foci of intracranial enhancement to suggest metastatic  disease. Normal marrow signal.    Mild leukoaraiosis and generalized parenchymal volume loss. Ventricles  are not enlarged out of proportion to the cerebral sulci. No  intracranial hemorrhage, mass effect, midline shift or abnormal extra  axial fluid collection. No abnormal foci of restricted diffusion.  Patent major intracranial vascular flow voids.    Trace right mastoid effusion. Paranasal sinuses are clear. Bilateral  pseudophakia.      Impression    Impression:  No evidence of metastatic disease.    I have personally reviewed the examination and initial interpretation  and I agree with the findings.    LILIA THAPA MD

## 2018-10-05 NOTE — PROGRESS NOTES
Social Work Services Progress Note    Hospital Day: 4  Date of Initial Social Work Evaluation:  10/3/18  Collaborated with:  [facility] RN (Elroy [phone]), [employer] [facility] (Alison), Pt's dtr (Janeth - left ), Chart Review    Data:  SW involved for coordination of hospice. Pt did not discharge last evening d/t new recommendation for wheelchair and walker at discharge, and hospice could not provide this equipment until today. Per [employer] NORBERT (Alison), discharge plan continues to be home on hospice and Pt continues to be medically stable for discharge.     Intervention:  SW coordinated with [employer] [facility] (Alison) this morning. She informed SW that another conversation was had with Pt re: hospice and she is still understanding and in agreement with this recommendation and would like to return home on hospice today.     SW spoke with [facility] RN (Elroy) today re: discharge planning. She reported that she came to [facility] last evening and met with Pt and her dtr to discuss hospice services. Per Elroy, Pt's dtr Janeth is planning to take time off work to provide 24/7 care to Pt at home and neither are interested in hospice placement at this time. Pt is adamant that she wants to return home before going anywhere for hospice. Per Elroy, she will have a wheelchair and walker delivered to Pt's dtr's apartment by 12PM today.     SW left  for Pt's dtr (Janeth) to confirm discharge today and if she still wanted to provide discharge transportation; SW awaiting return call. SW received update from Bedside RN (Raymond) that Pt's dtr will be providing discharge transport today at 12:30PM.     Assessment:  See bedside RN, and medical team notes.    Plan:    Anticipated Disposition:  Home with [facility]    Barriers to d/c plan:  n/a    Follow Up:  SW to continue to follow and assist with discharge plan.    VIKAS Pacheco, LGSW  7C Surgical Oncology Unit  - Covering for  Unit 7D Hematology/Oncology  Phone: [phone]  Pager: [phone]

## 2018-10-06 NOTE — PLAN OF CARE
Problem: Patient Care Overview  Goal: Plan of Care/Patient Progress Review  Physical Therapy Discharge Summary    Reason for therapy discharge:    Discharged to home with hospice    Progress towards therapy goal(s). See goals on Care Plan in [facility] electronic health record for goal details.  Goals partially met.  Barriers to achieving goals:   discharge from facility.    Therapy recommendation(s):    No further therapy is recommended.

## 2018-10-08 NOTE — TELEPHONE ENCOUNTER
This pt was Discharged from [facility] on 10/5/18 for 10/5/18 Other Acute Back Pain, Dehydration      Please note this information was received from either Cindy or Marry BECKHAM IP daily report with Dr. Cope identified as the PCP.    A follow-up visit has not been scheduled.    Please follow-up with patient accordingly.

## 2018-10-08 NOTE — TELEPHONE ENCOUNTER
Per Dr. Pancho Cope:  No need to contact her.  Is being followed by oncology and now discharged to hospice.  Nora Sanon RN

## 2018-10-09 NOTE — TELEPHONE ENCOUNTER
Reason for Call:  Form, our goal is to have forms completed with 72 hours, however, some forms may require a visit or additional information.    Type of letter, form or note:  Hospice and homecare form    Who is the form from?: Patient    Where did the form come from: Faxed in   What clinic location was the form placed at?: [facility]    Where the form was placed: [facility]    What number is listed as a contact on the form?: [phone] (JJ ePrry)       Additional comments: N/A    Call taken on 10/9/2018 at 5:39 PM by Sheela Cortés

## 2019-01-01 ENCOUNTER — MEDICAL CORRESPONDENCE (OUTPATIENT)
Dept: HEALTH INFORMATION MANAGEMENT | Facility: CLINIC | Age: 79
End: 2019-01-01

## 2019-01-01 ENCOUNTER — TELEPHONE (OUTPATIENT)
Dept: FAMILY MEDICINE | Facility: CLINIC | Age: 79
End: 2019-01-01

## 2019-03-27 NOTE — TELEPHONE ENCOUNTER
Called and spoke with Audrey, hospice nurse.  She states that she was just updating Dr. Pancho Cope;  States is declining.  She does not want anything done;  Is not taking any pain meds, uses oxygen only when she wants to.  Her feet are puffy like balloons and very cool to touch.  Spending a lot of time with Janeth.  Appears to be at peace with her condition.  Needing nothing from Dr. Pancho Cope at this time.  Nora Sanon RN

## 2019-03-27 NOTE — TELEPHONE ENCOUNTER
[facility] [place] is calling stating Patient has done very well until last two week of hospice, fluid is returning to legs and lungs and abdomen is distended and somewhat firm, is now oxygen dependent and no new orders. Thank you.

## 2022-12-30 NOTE — PLAN OF CARE
Problem: Patient Care Overview  Goal: Plan of Care/Patient Progress Review  Outcome: No Change    D: Arrived on unit at 2230 from ED on gurney accompanied by one orderly. Admitted for failure to thrive. Complains of weakness, poor appetite, and mild RUQ pain.    I: Oriented to room and use of call light. Physician notified of patient's arrival on unit.     A: A&Ox4 with minor forgetfulness. VSS. Complains of weakness, poor appetite, and mild RUQ pain. Declined pain medication.    P: IV hydration as ordered. Encourage oral intake. Notify provider with any changes.        pt deferred, reports no issues with stair negotiation

## 2024-02-14 ENCOUNTER — MEDICAL CORRESPONDENCE (OUTPATIENT)
Dept: HEALTH INFORMATION MANAGEMENT | Facility: CLINIC | Age: 84
End: 2024-02-14

## 2025-01-16 NOTE — H&P
Heme/Onc History and Physical    Talya Gatica MRN# [id number]   Age: 78 year old YOB: 1940     Date of Admission:  10/1/2018    Primary care provider: Pancho Cope          Assessment and Plan:   Assessment:  Patient is a 79 yo female with progressive metastatic pancreatic cancer who is presenting to the ER with extreme FTT, inability to ambulate, poor PO intake and severe oral thrush. CT CAP done in ER showed worsening metastatic disease with increased size of cavitary lung masses and new peritoneal carcinomatosis.      Plan:  #Extreme failure to thrive: Rapidly progressive weakness, lethargy, inability to ambulate, poor PO intake and weight loss in the setting of radiographic evidence of progressive metastatic disease. Progressive drop in albumin, currently at 2.0. Patient decreased her Creon use to BID instead of TID due to cost.   - IVF D5NS at 75 ml/hr.  - Encourage PO intake in small amounts few times a day. Snacks/supplements.  - Nutrition services consult.  - Trial of megace 20 mg daily.  - PT/OT for placement.     #Oropharyngeal candidiasis: Extensive thrush through out the mouth and oropharynx.   - Nystatin swish n swallow.  - Will empirically treat with fluconazole for esophageal candidiasis - 200 mg loading dose followed by 200 mg daily x 14 days.   - EGD if patient does not respond to fluconazole.     #HypoNA: Likely due to hypovolemia. Na on admission 132.   - IVF D5NS at 75 ml/hr.  - Monitor BMP.    #RUQ pain: Likely rel to underlying metastatic malignancy. RUQ US showed cholelithiasis without ac cholecystitis, stable CBD stent, but no acute findings. CT CAP negative for acute PE but showed worsening metastatic disease with increased size of cavitary lung masses and new peritoneal carcinomatosis. No acute rib fractures to explain the pain.   - Patient was not on any pain control regimen on admn.  - Start tramadol PRN and go up to opioids as needed.     #Mild lactic  acidosis: Likely rel to advanced met malignancy. No evidence of sepsis or tissue hypoperfusion. No definite consolidation noted on CXR or CT chest to suggest PNA. UA negative. No need for blood cx.   - Monitor clinically.     #Pancytopenia: Likely rel to chemo.   - Monitor CBC.   - No therapeutic DVT ppx given thrombocytopenia.     #Anasarca: Diffuse subcutaneous edema noted on CT A&P. No significant pleural effusions or ascites requiring tap. B/L pedal edema 2+. Likely rel to third-spacing from low albumin.    - Improve nutritional status and treat thrush.   - Avoid aggressive fluid resuscitation as it may worsen anasarca.   - Keep legs propped up.      #Stage IV metastatic pancreatic cancer: Presented with jaundice in 7/2017 and was found to have adenocarcinoma of the head of pancreas causing biliary obstruction and several pulmonary nodules consistent with metastases. S/P CBD stenting and multiple chemo regimens. Most recently, she was started on pall Carboplatin/Abraxane on 8/15/18. Cycle 2 day 15 was canceled due to low counts. Cycle 3 was again cancelled as patient was too weak. She now has radiographic evidence of disease progression as above.   - Goals of care discussion per primary team.       FEN:   - Regular diet as tolerated.  - D5NS at 75 ml/hr.  - PRN lyte replacement per standing protocol.    Prophylaxis:   - No pharmacologic VTE ppx due to thrombocytopenia.   - PRN constipation ppx.      Code Status: FULL    Disposition: Inpatient admn for severe FTT, oral thrush, and progressive metastatic disease.       Harrison Rosales MD  Hospitalist, Hematology and Oncology  10/01/2018              Chief Complaint:   Extreme FTT, inability to ambulate, poor PO intake and severe oral thrush         History of Present Illness:   Patient is a 79 yo female with progressive metastatic pancreatic cancer who is presenting to the ER with extreme FTT, inability to ambulate, poor PO intake and severe oral thrush.     She was  in her usual state of health about 1 week ago. Since then, she has noted rapidly progressive weakness, lethargy, inability to ambulate, poor PO intake and weight loss. She has been too weak to walk or use a cane and has often been crawling on the floor. She also reports severe oral thrush which causes dysphagia and further limits her nutrition. She endorses RUQ pain, intermittent, since few days. No trauma to the site. It increases when she lays supine. No associated fevers, chills, chest pain, dyspnea, nausea, vomiting, diarrhea, dysuria, skin rashes. She also complains of pedal edema. She lives with her daughter who has accompanied her in the ER and daughter thinks that she is too weak and frail now to continue to live with her.            Review of Systems:     14-point review of systems was otherwise negative except as noted in HPI.          Past Medical History:   Past medical history was reviewed.   Past Medical History:   Diagnosis Date     AR (allergic rhinitis)      Baker's cyst      DJD (degenerative joint disease)      Elevated cholesterol      Glaucoma      Menopause      Vertigo              Past Surgical History:   Past surgical history was reviewed.   Past Surgical History:   Procedure Laterality Date     CATARACT IOL, RT/LT       lipoma removal leg       PHACOEMULSIFICATION WITH STANDARD INTRAOCULAR LENS IMPLANT  200?    right and left eye     right knee replacement       spinal stenoses       SURGICAL HISTORY OF -       leg     SURGICAL HISTORY OF -   9-14     cyst left eye lateral canthus-removed             Social History:   Social history was reviewed.   Social History   Substance Use Topics     Smoking status: Never Smoker     Smokeless tobacco: Never Used     Alcohol use No             Family History:   Family history was reviewed.  Family History   Problem Relation Age of Onset     Glaucoma Mother 60     Hypertension Mother              Allergies:     Allergies   Allergen Reactions     Codeine  Camsylate              Medications:   Medications Reviewed.   Current Facility-Administered Medications   Medication     sodium chloride 0.9% infusion     Current Outpatient Prescriptions   Medication Sig     amylase-lipase-protease (CREON) 72471-22929 units CPEP per EC capsule Take 1 capsule (24,000 Units) by mouth 3 times daily (with meals) (Patient taking differently: Take 2 capsules by mouth 3 times daily (with meals) )     BIOTIN PO      latanoprost (XALATAN) 0.005 % ophthalmic solution Place 1 drop into both eyes At Bedtime     Loratadine (CLARITIN PO) Take by mouth daily     Multiple Vitamins-Minerals (WOMENS 50+ MULTI VITAMIN/MIN PO)      Potassium Chloride ER 20 MEQ TBCR Take 1 tablet (20 mEq) by mouth daily     lidocaine-prilocaine (EMLA) cream Apply topically as needed for moderate pain (use dollop of cream to saran wrap 30 min prior to use of port) (Patient not taking: Reported on 8/15/2018)     ondansetron (ZOFRAN) 8 MG tablet Take 1 tablet (8 mg) by mouth every 8 hours as needed (nausea/vomiting) (Patient not taking: Reported on 9/5/2018)     potassium chloride SA (KLOR-CON) 20 MEQ CR tablet Take 1 tab at 6 pm tonight and 1 tab at 9 pm tonight then start 1 tab daily tomorrow morning. (Patient not taking: Reported on 7/30/2018)             Physical Exam:   Vitals were reviewed.  Blood pressure 97/55, pulse 85, temperature 98.1  F (36.7  C), temperature source Oral, resp. rate 16, weight 42.2 kg (93 lb), SpO2 97 %.    Constitutional: awake, laying in bed, appears comfortable, in NAD  HEENT: MMM, EOM intact, sclerae clear and anicteric  Heart: RRR, no murmurs appreciated  Lungs: Clear to auscultation bilaterally, breathing comfortably on room air, no wheezes, rhonchi  Abd: Soft, non-tender, BS+, no masses appreciated  MSK: 2+ pitting edema  Skin: No rash or lesions on limited exam  Neuro: CN2-12 grossly intact, no lateralizing symptoms or focal neurologic deficits           Data:        ROUTINE LABS (Last  four results)  CMP  Recent Labs  Lab 10/01/18  1314 09/27/18  0930   * 135   POTASSIUM 4.0 3.4   CHLORIDE 99 103   CO2 24 19*   ANIONGAP 9 13   GLC 89 132*   BUN 11 17   CR 0.39* 0.47*   GFRESTIMATED >90 >90   GFRESTBLACK >90 >90   BERNARDO 8.2* 9.0   PROTTOTAL 6.2* 6.4*   ALBUMIN 2.0* 2.6*   BILITOTAL 1.9* 1.2   ALKPHOS 111 133   AST 26 19   ALT 18 22     CBC  Recent Labs  Lab 10/01/18  1314 09/27/18  0930   WBC 5.5 13.4*   RBC 3.09* 3.25*   HGB 9.0* 9.6*   HCT 25.7* 27.9*   MCV 83 86   MCH 29.1 29.5   MCHC 35.0 34.4   RDW 16.4* 16.0*   PLT 97* 142*     INRNo lab results found in last 7 days.  Arterial Blood GasNo lab results found in last 7 days.         No